# Patient Record
Sex: MALE | Race: WHITE | NOT HISPANIC OR LATINO | Employment: OTHER | URBAN - NONMETROPOLITAN AREA
[De-identification: names, ages, dates, MRNs, and addresses within clinical notes are randomized per-mention and may not be internally consistent; named-entity substitution may affect disease eponyms.]

---

## 2017-03-15 ENCOUNTER — OFFICE VISIT (OUTPATIENT)
Dept: CARDIOLOGY | Facility: CLINIC | Age: 62
End: 2017-03-15

## 2017-03-15 VITALS
SYSTOLIC BLOOD PRESSURE: 133 MMHG | WEIGHT: 215 LBS | HEIGHT: 66 IN | DIASTOLIC BLOOD PRESSURE: 72 MMHG | BODY MASS INDEX: 34.55 KG/M2 | HEART RATE: 61 BPM | OXYGEN SATURATION: 96 %

## 2017-03-15 DIAGNOSIS — R07.9 CHEST PAIN, UNSPECIFIED TYPE: ICD-10-CM

## 2017-03-15 DIAGNOSIS — E78.5 HYPERLIPIDEMIA, UNSPECIFIED HYPERLIPIDEMIA TYPE: ICD-10-CM

## 2017-03-15 DIAGNOSIS — I25.119 CORONARY ARTERY DISEASE INVOLVING NATIVE CORONARY ARTERY OF NATIVE HEART WITH ANGINA PECTORIS (HCC): Primary | ICD-10-CM

## 2017-03-15 DIAGNOSIS — R06.02 SHORTNESS OF BREATH: ICD-10-CM

## 2017-03-15 DIAGNOSIS — E11.9 TYPE 2 DIABETES MELLITUS WITHOUT COMPLICATION, WITHOUT LONG-TERM CURRENT USE OF INSULIN (HCC): ICD-10-CM

## 2017-03-15 DIAGNOSIS — I73.9 PVD (PERIPHERAL VASCULAR DISEASE) (HCC): ICD-10-CM

## 2017-03-15 DIAGNOSIS — R09.89 BILATERAL CAROTID BRUITS: ICD-10-CM

## 2017-03-15 DIAGNOSIS — I77.9 BILATERAL CAROTID ARTERY DISEASE (HCC): ICD-10-CM

## 2017-03-15 DIAGNOSIS — I10 ESSENTIAL HYPERTENSION: ICD-10-CM

## 2017-03-15 PROCEDURE — 99213 OFFICE O/P EST LOW 20 MIN: CPT | Performed by: NURSE PRACTITIONER

## 2017-03-15 RX ORDER — NITROGLYCERIN 0.4 MG/1
TABLET SUBLINGUAL
Qty: 30 TABLET | Refills: 3 | Status: SHIPPED | OUTPATIENT
Start: 2017-03-15

## 2017-03-15 NOTE — PATIENT INSTRUCTIONS
Shortness of Breath  Shortness of breath means you have trouble breathing. It could also mean that you have a medical problem. You should get immediate medical care for shortness of breath.  CAUSES   · Not enough oxygen in the air such as with high altitudes or a smoke-filled room.  · Certain lung diseases, infections, or problems.  · Heart disease or conditions, such as angina or heart failure.  · Low red blood cells (anemia).  · Poor physical fitness, which can cause shortness of breath when you exercise.  · Chest or back injuries or stiffness.  · Being overweight.  · Smoking.  · Anxiety, which can make you feel like you are not getting enough air.  DIAGNOSIS   Serious medical problems can often be found during your physical exam. Tests may also be done to determine why you are having shortness of breath. Tests may include:  · Chest X-rays.  · Lung function tests.  · Blood tests.  · An electrocardiogram (ECG).  · An ambulatory electrocardiogram. An ambulatory ECG records your heartbeat patterns over a 24-hour period.  · Exercise testing.  · A transthoracic echocardiogram (TTE). During echocardiography, sound waves are used to evaluate how blood flows through your heart.  · A transesophageal echocardiogram (JAMAAL).  · Imaging scans.  Your health care provider may not be able to find a cause for your shortness of breath after your exam. In this case, it is important to have a follow-up exam with your health care provider as directed.   TREATMENT   Treatment for shortness of breath depends on the cause of your symptoms and can vary greatly.  HOME CARE INSTRUCTIONS   · Do not smoke. Smoking is a common cause of shortness of breath. If you smoke, ask for help to quit.  · Avoid being around chemicals or things that may bother your breathing, such as paint fumes and dust.  · Rest as needed. Slowly resume your usual activities.  · If medicines were prescribed, take them as directed for the full length of time directed. This  includes oxygen and any inhaled medicines.  · Keep all follow-up appointments as directed by your health care provider.  SEEK MEDICAL CARE IF:   · Your condition does not improve in the time expected.  · You have a hard time doing your normal activities even with rest.  · You have any new symptoms.  SEEK IMMEDIATE MEDICAL CARE IF:   · Your shortness of breath gets worse.  · You feel light-headed, faint, or develop a cough not controlled with medicines.  · You start coughing up blood.  · You have pain with breathing.  · You have chest pain or pain in your arms, shoulders, or abdomen.  · You have a fever.  · You are unable to walk up stairs or exercise the way you normally do.  MAKE SURE YOU:  · Understand these instructions.  · Will watch your condition.  · Will get help right away if you are not doing well or get worse.     This information is not intended to replace advice given to you by your health care provider. Make sure you discuss any questions you have with your health care provider.     Document Released: 09/12/2002 Document Revised: 12/23/2014 Document Reviewed: 03/04/2013  [employer] Interactive Patient Education ©2016 [employer]  Nonspecific Chest Pain   Chest pain can be caused by many different conditions. There is always a chance that your pain could be related to something serious, such as a heart attack or a blood clot in your lungs. Chest pain can also be caused by conditions that are not life-threatening. If you have chest pain, it is very important to follow up with your health care provider.  CAUSES   Chest pain can be caused by:  · Heartburn.  · Pneumonia or bronchitis.  · Anxiety or stress.  · Inflammation around your heart (pericarditis) or lung (pleuritis or pleurisy).  · A blood clot in your lung.  · A collapsed lung (pneumothorax). It can develop suddenly on its own (spontaneous pneumothorax) or from trauma to the chest.  · Shingles infection (varicella-zoster virus).  · Heart  attack.  · Damage to the bones, muscles, and cartilage that make up your chest wall. This can include:    Bruised bones due to injury.    Strained muscles or cartilage due to frequent or repeated coughing or overwork.    Fracture to one or more ribs.    Sore cartilage due to inflammation (costochondritis).  RISK FACTORS   Risk factors for chest pain may include:  · Activities that increase your risk for trauma or injury to your chest.  · Respiratory infections or conditions that cause frequent coughing.  · Medical conditions or overeating that can cause heartburn.  · Heart disease or family history of heart disease.  · Conditions or health behaviors that increase your risk of developing a blood clot.  · Having had chicken pox (varicella zoster).  SIGNS AND SYMPTOMS  Chest pain can feel like:  · Burning or tingling on the surface of your chest or deep in your chest.  · Crushing, pressure, aching, or squeezing pain.  · Dull or sharp pain that is worse when you move, cough, or take a deep breath.  · Pain that is also felt in your back, neck, shoulder, or arm, or pain that spreads to any of these areas.  Your chest pain may come and go, or it may stay constant.  DIAGNOSIS  Lab tests or other studies may be needed to find the cause of your pain. Your health care provider may have you take a test called an ambulatory ECG (electrocardiogram). An ECG records your heartbeat patterns at the time the test is performed. You may also have other tests, such as:  · Transthoracic echocardiogram (TTE). During echocardiography, sound waves are used to create a picture of all of the heart structures and to look at how blood flows through your heart.  · Transesophageal echocardiogram (JAMAAL). This is a more advanced imaging test that obtains images from inside your body. It allows your health care provider to see your heart in finer detail.  · Cardiac monitoring. This allows your health care provider to monitor your heart rate and rhythm  in real time.  · Holter monitor. This is a portable device that records your heartbeat and can help to diagnose abnormal heartbeats. It allows your health care provider to track your heart activity for several days, if needed.  · Stress tests. These can be done through exercise or by taking medicine that makes your heart beat more quickly.  · Blood tests.  · Imaging tests.  TREATMENT   Your treatment depends on what is causing your chest pain. Treatment may include:  · Medicines. These may include:    Acid blockers for heartburn.    Anti-inflammatory medicine.    Pain medicine for inflammatory conditions.    Antibiotic medicine, if an infection is present.    Medicines to dissolve blood clots.    Medicines to treat coronary artery disease.  · Supportive care for conditions that do not require medicines. This may include:    Resting.    Applying heat or cold packs to injured areas.    Limiting activities until pain decreases.  HOME CARE INSTRUCTIONS  · If you were prescribed an antibiotic medicine, finish it all even if you start to feel better.  · Avoid any activities that bring on chest pain.  · Do not use any tobacco products, including cigarettes, chewing tobacco, or electronic cigarettes. If you need help quitting, ask your health care provider.  · Do not drink alcohol.  · Take medicines only as directed by your health care provider.  · Keep all follow-up visits as directed by your health care provider. This is important. This includes any further testing if your chest pain does not go away.  · If heartburn is the cause for your chest pain, you may be told to keep your head raised (elevated) while sleeping. This reduces the chance that acid will go from your stomach into your esophagus.  · Make lifestyle changes as directed by your health care provider. These may include:    Getting regular exercise. Ask your health care provider to suggest some activities that are safe for you.    Eating a heart-healthy diet. A  registered dietitian can help you to learn healthy eating options.    Maintaining a healthy weight.    Managing diabetes, if necessary.    Reducing stress.  SEEK MEDICAL CARE IF:  · Your chest pain does not go away after treatment.  · You have a rash with blisters on your chest.  · You have a fever.  SEEK IMMEDIATE MEDICAL CARE IF:   · Your chest pain is worse.  · You have an increasing cough, or you cough up blood.  · You have severe abdominal pain.  · You have severe weakness.  · You faint.  · You have chills.  · You have sudden, unexplained chest discomfort.  · You have sudden, unexplained discomfort in your arms, back, neck, or jaw.  · You have shortness of breath at any time.  · You suddenly start to sweat, or your skin gets clammy.  · You feel nauseous or you vomit.  · You suddenly feel light-headed or dizzy.  · Your heart begins to beat quickly, or it feels like it is skipping beats.  These symptoms may represent a serious problem that is an emergency. Do not wait to see if the symptoms will go away. Get medical help right away. Call your local emergency services (911 in the [place] Do not drive yourself to the hospital.     This information is not intended to replace advice given to you by your health care provider. Make sure you discuss any questions you have with your health care provider.     Document Released: 09/27/2006 Document Revised: 01/08/2016 Document Reviewed: 07/24/2015  [employer] Interactive Patient Education ©2016 [employer]  Coronary Artery Disease, Male  Coronary artery disease (CAD) is a process in which the blood vessels of the heart (coronary arteries) become narrow or blocked. The narrowing or blockage can lead to decreased blood flow to the heart muscle (angina). Prolonged reduced blood flow can cause a heart attack (myocardial infarction, MI). Because CAD is the leading cause of death in men, it is important to understand what causes this condition and how it is  treated.  CAUSES  Atherosclerosis is the cause of CAD. This is the buildup of fat and cholesterol (plaque) on the inside of the arteries. Over time, the plaque may narrow or block the artery, and this will lessen blood flow to the heart. Plaque can also become weak and break off within a coronary artery to form a clot and cause a sudden blockage.  RISK FACTORS  Many risk factors increase your chances of getting CAD, including:  · High cholesterol levels.  · High blood pressure (hypertension).  · Tobacco use.  · Diabetes.  · Age. Men over age 45 are at a greater risk of CAD.  · Gender. Men often develop CAD earlier in life than women.  · Family history of CAD.  · Obesity.  · Lack of exercise.  · A diet high in saturated fats.  SYMPTOMS   Many people do not experience any symptoms during the early stages of CAD. As the condition progresses, symptoms may include:   · Chest pain.  ¨ The pain can be described as a crushing or squeezing in the chest, or a tightness, pressure, fullness, or heaviness in the chest.  ¨ The pain can last more than a few minutes or can stop and recur.   · Pain in the arms, neck, jaw, or back.  · Unexplained heartburn or indigestion.  · Shortness of breath.  · Nausea.  · Sudden cold sweats.   Less common symptoms of CAD in men can include:  · Fatigue.  · Unexplained feelings of nervousness or anxiety.  · Weakness.  · Diarrhea.  · Sudden light-headedness.  DIAGNOSIS   Tests to diagnose CAD may include:  · ECG (electrocardiogram).  · Exercise stress test. This looks for signs of blockage when the heart is being exercised.  · Pharmacologic stress test. This test looks for signs of blockage when the heart is being stressed with a medicine.  · Blood tests.  · Coronary angiogram. This is a procedure to look at the coronary arteries to see if there is any blockage.  TREATMENT  The treatment of CAD may include the following:  · Healthy behavioral changes to reduce or control risk  factors.  · Medicine.  · Coronary stenting. A stent helps to keep an artery open.  · Coronary angioplasty. This procedure widens a narrowed or blocked artery.  · Coronary artery bypass surgery. This will allow your blood to pass the blockage (bypass) to reach your heart.  HOME CARE INSTRUCTIONS  · Take medicines only as directed by your health care provider.  · Do not take the following medicines unless your health care provider approves:    Nonsteroidal anti-inflammatory drugs (NSAIDs), such as ibuprofen, naproxen, or celecoxib.    Vitamin supplements that contain vitamin A, vitamin E, or both.  · Manage other health conditions such as hypertension and diabetes as directed by your health care provider.  · Follow a heart-healthy diet. A dietitian can help to educate you about healthy food options and changes.  · Use healthy cooking methods such as roasting, grilling, broiling, baking, poaching, steaming, or stir-frying. Talk to a dietitian to learn more about healthy cooking methods.  · Follow an exercise program approved by your health care provider.  · Maintain a healthy weight. Lose weight as approved by your health care provider.  · Plan rest periods when fatigued.  · Learn to manage stress.  · Do not use any tobacco products, including cigarettes, chewing tobacco, or electronic cigarettes. If you need help quitting, ask your health care provider.  · If you drink alcohol, and your health care provider approves, limit your alcohol intake to no more than 1 drink per day. One drink equals 12 ounces of beer, 5 ounces of wine, or 1½ ounces of hard liquor.  · Stop illegal drug use.  · Your health care provider may ask you to monitor your blood pressure. A blood pressure reading consists of a higher number over a lower number, such as 110 over 72, which is written as 110/72. Ideally, your blood pressure should be:    Below 140/90 if you have no other medical conditions.    Below 130/80 if you have diabetes or kidney  disease.  · Keep all follow-up visits as directed by your health care provider. This is important.  SEEK IMMEDIATE MEDICAL CARE IF:  · You have pain in your chest, neck, arm, jaw, stomach, or back that lasts more than a few minutes, is recurring, or is unrelieved by taking medicine under your tongue (sublingual nitroglycerin).  · You have profuse sweating without cause.  · You have unexplained:    Heartburn or indigestion.    Shortness of breath or difficulty breathing.    Nausea or vomiting.    Fatigue.    Feelings of nervousness or anxiety.    Weakness.    Diarrhea.  · You have sudden light-headedness or dizziness.  · You faint.  These symptoms may represent a serious problem that is an emergency. Do not wait to see if the symptoms will go away. Get medical help right away. Call your local emergency services (911 in the [place] Do not drive yourself to the hospital.     This information is not intended to replace advice given to you by your health care provider. Make sure you discuss any questions you have with your health care provider.     Document Released: 07/15/2015 Document Reviewed: 07/15/2015  "[employer] Interactive Patient Education ©2016 "[employer] Inc[employer]

## 2017-03-15 NOTE — PROGRESS NOTES
Subjective   Mike Gusman is a 61 y.o. male     Chief Complaint   Patient presents with   • Follow-up     presents as a follow up       HPI    Problem List:    1. CAD  1.1 Stress Test 9/1613 - normal SPECT imaging without inducible chest pain or ECG abnormalities.  Normal regional and global left ventricular systolic function.    2. Hypertension  3. Hyperlipidemia   4. Viral Pericarditis 2009  5. Aortic valve disorder  6. PVD   6.1 Carotid Artery U/S 7/29/13 - no sig stenosis or occlusion in either carotid system  6.2 JESSICA 3/7/14 - mild changes consistent with known PVD of BLE; probably diffuse   6.2 MRI BLE 4/7/14 - diffuse narrowing of the right common iliac and right external iliac artery; high grade focal stenosis of the left common iliac artery   6.3 H/O left common iliac artery stenting   7. Shortness of breath  7.1 Echo 8/29/13 - EF 60-65%: mild MR, trace TR; mild to mod aortic sclerosis without stenosis; trace AR; trace IN  8. H/O back surgery; per report   9. DM II  10. GERD  11. BPH   12. Brain Bleed 11/2011 s/p fall   13. ADRIANA - non compliant CPAP     Patient is a 61-year-old male who presents today for a follow-up with his wife at his side.  He says he has been having some issues with breathing when he tries to lay down at night.  He says he just cannot get any air.  He does not really have any other symptoms with it except maybe a little tightness, but not really.  He denies any palpitations or fluttering, dizziness, presyncope or syncope.  He does complain of orthopnea.  For about a year, but did not complain of this at his previous appt.  He has not been able to lay down or he will not be able to get his breath.  He says he has been congested lately, but it is not because of this. He says his normal shortness of breath has not changed.  They brought his recent labs.       Current Outpatient Prescriptions   Medication Sig Dispense Refill   • aspirin 81 MG tablet Take 1 tablet by mouth daily. 30 tablet  11   • atorvastatin (LIPITOR) 10 MG tablet Take 10 mg by mouth daily.     • donepezil (ARICEPT) 10 MG tablet Take 10 mg by mouth every night.     • finasteride (PROSCAR) 5 MG tablet Take 5 mg by mouth daily.     • gabapentin (NEURONTIN) 600 MG tablet Take 600 mg by mouth 3 (three) times a day.     • magnesium oxide (MAGOX) 400 (241.3 MG) MG tablet tablet Take 400 mg by mouth daily.     • memantine (NAMENDA) 5 MG tablet Take 5 mg by mouth 2 (two) times a day.     • mirtazapine (REMERON) 15 MG tablet Take 15 mg by mouth every night.     • nebivolol (BYSTOLIC) 5 MG tablet Take 5 mg by mouth daily.     • omeprazole (PriLOSEC) 40 MG capsule Take 40 mg by mouth daily.     • ramipril (ALTACE) 10 MG capsule Take 10 mg by mouth daily.     • SITagliptin-MetFORMIN HCl ER (JANUMET XR) 100-1000 MG tablet sustained-release 24 hour Take  by mouth.     • tamsulosin (FLOMAX) 0.4 MG capsule 24 hr capsule Take 1 capsule by mouth every night.     • nitroglycerin (NITROSTAT) 0.4 MG SL tablet 1 under the tongue as needed for angina, may repeat q5mins for up three doses 30 tablet 3     No current facility-administered medications for this visit.        ALLERGIES    Review of patient's allergies indicates no known allergies.    Past Medical History   Diagnosis Date   • GERD (gastroesophageal reflux disease)    • Hyperlipidemia    • Hypertension        Social History     Social History   • Marital status:      Spouse name: N/A   • Number of children: N/A   • Years of education: N/A     Occupational History   • Not on file.     Social History Main Topics   • Smoking status: Former Smoker   • Smokeless tobacco: Never Used   • Alcohol use No   • Drug use: No   • Sexual activity: Defer     Other Topics Concern   • Not on file     Social History Narrative       Family History   Problem Relation Age of Onset   • Heart failure Mother    • Heart disease Father    • Heart failure Father        Review of Systems   Constitutional: Positive for  "fatigue. Negative for diaphoresis.   HENT: Positive for congestion, postnasal drip, rhinorrhea and sneezing.    Eyes: Positive for visual disturbance (glasses as needed).   Respiratory: Positive for shortness of breath (no change; has to concentrate on his breathing; can't get any air when he lays down at night). Negative for chest tightness.    Cardiovascular: Positive for chest pain (gasp for air at night when he lays down; for about 1 yr). Negative for palpitations and leg swelling.   Gastrointestinal: Negative for nausea and vomiting.   Endocrine: Negative.    Genitourinary: Negative for difficulty urinating.   Musculoskeletal: Positive for arthralgias, back pain (low back ) and gait problem (uses cane). Negative for neck pain.   Skin: Negative.    Allergic/Immunologic: Positive for environmental allergies.   Neurological: Negative for dizziness, syncope and light-headedness.   Hematological: Bruises/bleeds easily (bleeds easily).   Psychiatric/Behavioral: The patient is nervous/anxious.        Objective   Visit Vitals   • /72 (BP Location: Left arm, Patient Position: Sitting)   • Pulse 61   • Ht 66\" (167.6 cm)   • Wt 215 lb (97.5 kg)   • SpO2 96%   • BMI 34.7 kg/m2     Lab Results (most recent)     None        Physical Exam   Constitutional: He is oriented to person, place, and time. Vital signs are normal. He appears well-developed and well-nourished. He is active and cooperative.   HENT:   Head: Normocephalic.   Eyes: Lids are normal.   Neck: Normal carotid pulses, no hepatojugular reflux and no JVD present. Carotid bruit is present (soft ).   Cardiovascular: Normal rate, regular rhythm and normal heart sounds.    Pulses:       Radial pulses are 2+ on the right side, and 2+ on the left side.        Dorsalis pedis pulses are 2+ on the right side, and 2+ on the left side.        Posterior tibial pulses are 2+ on the right side, and 2+ on the left side.   No edema BLE.    Pulmonary/Chest: Effort normal and " breath sounds normal.   Abdominal: Normal appearance.   Neurological: He is alert and oriented to person, place, and time.   Skin: Skin is warm, dry and intact.   Psychiatric: He has a normal mood and affect. His speech is normal and behavior is normal. Judgment and thought content normal. Cognition and memory are normal.       Procedure   Procedures         Assessment/Plan      Diagnosis Plan   1. Coronary artery disease involving native coronary artery of native heart with angina pectoris  Stress Test With Myocardial Perfusion One Day    Adult Transthoracic Echo Complete    nitroglycerin (NITROSTAT) 0.4 MG SL tablet    Stress Test With Myocardial Perfusion One Day    Adult Transthoracic Echo Complete   2. Essential hypertension  Stress Test With Myocardial Perfusion One Day    Adult Transthoracic Echo Complete    Stress Test With Myocardial Perfusion One Day    Adult Transthoracic Echo Complete   3. Hyperlipidemia, unspecified hyperlipidemia type  Stress Test With Myocardial Perfusion One Day    Adult Transthoracic Echo Complete    Stress Test With Myocardial Perfusion One Day    Adult Transthoracic Echo Complete   4. PVD (peripheral vascular disease)  Stress Test With Myocardial Perfusion One Day    Adult Transthoracic Echo Complete    Stress Test With Myocardial Perfusion One Day    Adult Transthoracic Echo Complete   5. Type 2 diabetes mellitus without complication, without long-term current use of insulin  Stress Test With Myocardial Perfusion One Day    Adult Transthoracic Echo Complete    Stress Test With Myocardial Perfusion One Day    Adult Transthoracic Echo Complete   6. Shortness of breath  Stress Test With Myocardial Perfusion One Day    Adult Transthoracic Echo Complete    D-dimer, Quantitative    proBNP    Stress Test With Myocardial Perfusion One Day    Adult Transthoracic Echo Complete    D-dimer, Quantitative    proBNP   7. Chest pain, unspecified type  Stress Test With Myocardial Perfusion One  Day    Adult Transthoracic Echo Complete    D-dimer, Quantitative    proBNP    nitroglycerin (NITROSTAT) 0.4 MG SL tablet    Stress Test With Myocardial Perfusion One Day    Adult Transthoracic Echo Complete    D-dimer, Quantitative    proBNP   8. Bilateral carotid artery disease  US Carotid Bilateral    US Carotid Bilateral   9. Bilateral carotid bruits  US Carotid Bilateral    US Carotid Bilateral       Return for After testing.    Patient will have an ischemia work-up, stress and echo.  He will get a carotid U/S.  He will get BNP and D-Dimer.  He will continue his medication regimen.  He will use nitro PRN for chest pain, no resolution he will go to the ER.  He will follow-up after testing or sooner if any changes.

## 2017-04-04 ENCOUNTER — HOSPITAL ENCOUNTER (OUTPATIENT)
Dept: CARDIOLOGY | Facility: HOSPITAL | Age: 62
Discharge: HOME | End: 2017-04-04

## 2017-04-04 ENCOUNTER — OUTSIDE FACILITY SERVICE (OUTPATIENT)
Dept: CARDIOLOGY | Facility: CLINIC | Age: 62
End: 2017-04-04

## 2017-04-04 LAB
MAXIMAL PREDICTED HEART RATE: 157 BPM
STRESS TARGET HR: 133 BPM

## 2017-04-04 PROCEDURE — A9500 TC99M SESTAMIBI: HCPCS | Performed by: INTERNAL MEDICINE

## 2017-04-04 PROCEDURE — 93880 EXTRACRANIAL BILAT STUDY: CPT

## 2017-04-04 PROCEDURE — 93306 TTE W/DOPPLER COMPLETE: CPT

## 2017-04-04 PROCEDURE — 0 TECHNETIUM SESTAMIBI: Performed by: INTERNAL MEDICINE

## 2017-04-04 PROCEDURE — 93018 CV STRESS TEST I&R ONLY: CPT | Performed by: INTERNAL MEDICINE

## 2017-04-04 PROCEDURE — 78452 HT MUSCLE IMAGE SPECT MULT: CPT

## 2017-04-04 PROCEDURE — 78452 HT MUSCLE IMAGE SPECT MULT: CPT | Performed by: INTERNAL MEDICINE

## 2017-04-04 PROCEDURE — 25010000002 REGADENOSON 0.4 MG/5ML SOLUTION: Performed by: INTERNAL MEDICINE

## 2017-04-04 PROCEDURE — 93306 TTE W/DOPPLER COMPLETE: CPT | Performed by: INTERNAL MEDICINE

## 2017-04-04 PROCEDURE — 93017 CV STRESS TEST TRACING ONLY: CPT

## 2017-04-04 RX ADMIN — Medication 1 DOSE: at 11:00

## 2017-04-04 RX ADMIN — REGADENOSON 0.4 MG: 0.08 INJECTION, SOLUTION INTRAVENOUS at 11:00

## 2017-04-10 ENCOUNTER — TELEPHONE (OUTPATIENT)
Dept: CARDIOLOGY | Facility: CLINIC | Age: 62
End: 2017-04-10

## 2017-04-19 ENCOUNTER — DOCUMENTATION (OUTPATIENT)
Dept: CARDIOLOGY | Facility: CLINIC | Age: 62
End: 2017-04-19

## 2017-05-03 ENCOUNTER — OFFICE VISIT (OUTPATIENT)
Dept: CARDIOLOGY | Facility: CLINIC | Age: 62
End: 2017-05-03

## 2017-05-03 VITALS
DIASTOLIC BLOOD PRESSURE: 63 MMHG | WEIGHT: 217.8 LBS | HEIGHT: 66 IN | SYSTOLIC BLOOD PRESSURE: 129 MMHG | OXYGEN SATURATION: 98 % | BODY MASS INDEX: 35 KG/M2 | HEART RATE: 78 BPM

## 2017-05-03 DIAGNOSIS — E78.5 HYPERLIPIDEMIA, UNSPECIFIED HYPERLIPIDEMIA TYPE: ICD-10-CM

## 2017-05-03 DIAGNOSIS — I51.89 DIASTOLIC DYSFUNCTION: ICD-10-CM

## 2017-05-03 DIAGNOSIS — J44.9 CHRONIC OBSTRUCTIVE PULMONARY DISEASE, UNSPECIFIED COPD TYPE (HCC): ICD-10-CM

## 2017-05-03 DIAGNOSIS — E11.9 TYPE 2 DIABETES MELLITUS WITHOUT COMPLICATION, WITHOUT LONG-TERM CURRENT USE OF INSULIN (HCC): ICD-10-CM

## 2017-05-03 DIAGNOSIS — I10 ESSENTIAL HYPERTENSION: ICD-10-CM

## 2017-05-03 DIAGNOSIS — G47.33 OSA (OBSTRUCTIVE SLEEP APNEA): ICD-10-CM

## 2017-05-03 DIAGNOSIS — R42 DIZZINESS: ICD-10-CM

## 2017-05-03 DIAGNOSIS — I73.9 PVD (PERIPHERAL VASCULAR DISEASE) (HCC): Primary | ICD-10-CM

## 2017-05-03 DIAGNOSIS — I25.10 CORONARY ARTERY DISEASE INVOLVING NATIVE CORONARY ARTERY OF NATIVE HEART WITHOUT ANGINA PECTORIS: ICD-10-CM

## 2017-05-03 PROCEDURE — 99214 OFFICE O/P EST MOD 30 MIN: CPT | Performed by: NURSE PRACTITIONER

## 2017-05-03 RX ORDER — NORTRIPTYLINE HYDROCHLORIDE 50 MG/1
50 CAPSULE ORAL NIGHTLY
COMMUNITY
Start: 2017-03-08

## 2017-05-03 RX ORDER — MIRTAZAPINE 30 MG/1
30 TABLET, FILM COATED ORAL NIGHTLY
COMMUNITY
Start: 2017-02-07 | End: 2020-11-03

## 2017-05-03 RX ORDER — PROMETHAZINE HYDROCHLORIDE 25 MG/1
25 TABLET ORAL EVERY 6 HOURS PRN
Refills: 0 | COMMUNITY
Start: 2017-04-17 | End: 2020-11-03

## 2017-05-03 RX ORDER — FUROSEMIDE 20 MG/1
TABLET ORAL
Qty: 30 TABLET | Refills: 11 | Status: SHIPPED | OUTPATIENT
Start: 2017-05-03 | End: 2018-05-23

## 2017-05-22 ENCOUNTER — OUTSIDE FACILITY SERVICE (OUTPATIENT)
Dept: CARDIOLOGY | Facility: CLINIC | Age: 62
End: 2017-05-22

## 2017-05-22 PROCEDURE — 93228 REMOTE 30 DAY ECG REV/REPORT: CPT | Performed by: INTERNAL MEDICINE

## 2017-06-15 ENCOUNTER — OFFICE VISIT (OUTPATIENT)
Dept: CARDIOLOGY | Facility: CLINIC | Age: 62
End: 2017-06-15

## 2017-06-15 VITALS
SYSTOLIC BLOOD PRESSURE: 137 MMHG | OXYGEN SATURATION: 96 % | DIASTOLIC BLOOD PRESSURE: 70 MMHG | WEIGHT: 211.6 LBS | HEIGHT: 66 IN | HEART RATE: 81 BPM | BODY MASS INDEX: 34.01 KG/M2

## 2017-06-15 DIAGNOSIS — G47.33 OSA (OBSTRUCTIVE SLEEP APNEA): ICD-10-CM

## 2017-06-15 DIAGNOSIS — I10 ESSENTIAL HYPERTENSION: ICD-10-CM

## 2017-06-15 DIAGNOSIS — I25.10 CORONARY ARTERY DISEASE INVOLVING NATIVE CORONARY ARTERY OF NATIVE HEART WITHOUT ANGINA PECTORIS: ICD-10-CM

## 2017-06-15 DIAGNOSIS — I73.9 PVD (PERIPHERAL VASCULAR DISEASE) (HCC): Primary | ICD-10-CM

## 2017-06-15 DIAGNOSIS — E78.5 HYPERLIPIDEMIA, UNSPECIFIED HYPERLIPIDEMIA TYPE: ICD-10-CM

## 2017-06-15 DIAGNOSIS — I47.1 PSVT (PAROXYSMAL SUPRAVENTRICULAR TACHYCARDIA) (HCC): ICD-10-CM

## 2017-06-15 PROCEDURE — 99214 OFFICE O/P EST MOD 30 MIN: CPT | Performed by: NURSE PRACTITIONER

## 2017-06-15 RX ORDER — METOPROLOL SUCCINATE 25 MG/1
25 TABLET, EXTENDED RELEASE ORAL DAILY
Qty: 30 TABLET | Refills: 5 | Status: SHIPPED | OUTPATIENT
Start: 2017-06-15 | End: 2017-10-10 | Stop reason: SDUPTHER

## 2017-06-15 NOTE — PROGRESS NOTES
Subjective   Mike Gusman is a 62 y.o. male     Chief Complaint   Patient presents with   • Follow-up     presents as a follow up       HPI    Problem List:    1. CAD  1.1 Stress Test 9/1613 - normal SPECT imaging without inducible chest pain or ECG abnormalities.  Normal regional and global left ventricular systolic function.    1.2 Stress Test 4/4/17 - no ischemia; low risk  2. Hypertension  3. Hyperlipidemia   4. Viral Pericarditis 2009  5. Aortic valve disorder  6. PVD   6.1 Carotid Artery U/S 7/29/13 - no sig stenosis or occlusion in either carotid system  6.2 Carotid Artery U/S 3/15/17 - 16-49% DEXTER and LICA; antegrade flow bilaterally   6.2 JESSICA 3/7/14 - mild changes consistent with known PVD of BLE; probably diffuse   6.2 MRI BLE 4/7/14 - diffuse narrowing of the right common iliac and right external iliac artery; high grade focal stenosis of the left common iliac artery   6.3 H/O left common iliac artery stenting   7. Shortness of breath  7.1 Echo 8/29/13 - EF 60-65%: mild MR, trace TR; mild to mod aortic sclerosis without stenosis; trace AR; trace OH  7.2 Echo 4/4/17 - mild LVH; EF 60-65%; DD II; mild MR  8. H/O back surgery; per report   9. DM II  10. GERD  11. BPH   12. Brain Bleed 11/2011 s/p fall   13. ADRIANA - non compliant CPAP   14. Emphysema/Asthma   15. PSVT  15.1 Event Monitor 5/9-5/22/17 - NSR - ST with one episode of PSVT    Patient is a 62-year-old male who presents today for a follow-up on test results.  He denies any chest pain, pressure, palpitations, fluttering, presyncope, syncope, orthopnea, PND or edema.  He will get short of breath with activity, but not right away, after he has been doing it for a little while.  He says he gets dizzy all of the time.  He goes and sees Dr. Cabral this month.  They are going to [place] in Aug with their grandchildren.     We went over event monitor.     Current Outpatient Prescriptions   Medication Sig Dispense Refill   • ADVAIR DISKUS 250-50 MCG/DOSE  DISKUS 1 puff Daily.     • aspirin 81 MG tablet Take 1 tablet by mouth daily. 30 tablet 11   • atorvastatin (LIPITOR) 10 MG tablet Take 10 mg by mouth daily.     • donepezil (ARICEPT) 10 MG tablet Take 10 mg by mouth every night.     • finasteride (PROSCAR) 5 MG tablet Take 5 mg by mouth daily.     • furosemide (LASIX) 20 MG tablet Take Thurs, Friday and Sat then as needed for 2 lb weight gain 30 tablet 11   • gabapentin (NEURONTIN) 600 MG tablet Take 600 mg by mouth 3 (three) times a day.     • magnesium oxide (MAGOX) 400 (241.3 MG) MG tablet tablet Take 400 mg by mouth daily.     • memantine (NAMENDA) 5 MG tablet Take 5 mg by mouth 2 (two) times a day.     • mirtazapine (REMERON) 30 MG tablet Every Night.     • nitroglycerin (NITROSTAT) 0.4 MG SL tablet 1 under the tongue as needed for angina, may repeat q5mins for up three doses 30 tablet 3   • nortriptyline (PAMELOR) 50 MG capsule Every Night.     • omeprazole (PriLOSEC) 40 MG capsule Take 40 mg by mouth daily.     • promethazine (PHENERGAN) 25 MG tablet prn  0   • ramipril (ALTACE) 10 MG capsule Take 10 mg by mouth daily.     • SITagliptin-MetFORMIN HCl ER (JANUMET XR) 100-1000 MG tablet sustained-release 24 hour Take  by mouth Every Night.     • tamsulosin (FLOMAX) 0.4 MG capsule 24 hr capsule Take 1 capsule by mouth every night.     • metoprolol succinate XL (TOPROL-XL) 25 MG 24 hr tablet Take 1 tablet by mouth Daily. 30 tablet 5     No current facility-administered medications for this visit.        ALLERGIES    Review of patient's allergies indicates no known allergies.    Past Medical History:   Diagnosis Date   • GERD (gastroesophageal reflux disease)    • Hyperlipidemia    • Hypertension        Social History     Social History   • Marital status:      Spouse name: N/A   • Number of children: N/A   • Years of education: N/A     Occupational History   • Not on file.     Social History Main Topics   • Smoking status: Former Smoker   • Smokeless  "tobacco: Never Used   • Alcohol use No   • Drug use: No   • Sexual activity: Defer     Other Topics Concern   • Not on file     Social History Narrative       Family History   Problem Relation Age of Onset   • Heart failure Mother    • Heart disease Father    • Heart failure Father        Review of Systems   Constitutional: Positive for fatigue. Negative for diaphoresis.   HENT: Positive for rhinorrhea and sneezing.    Eyes: Positive for visual disturbance (glasses PRN when drive ).   Respiratory: Positive for shortness of breath (any activity after a few minutes not right away, going to see Dr. Cabral). Negative for chest tightness.    Cardiovascular: Negative for chest pain, palpitations and leg swelling.   Gastrointestinal: Negative for nausea and vomiting.   Endocrine: Negative.    Genitourinary: Negative for difficulty urinating.   Musculoskeletal: Positive for arthralgias, back pain and myalgias.   Skin: Negative.    Allergic/Immunologic: Positive for environmental allergies.   Neurological: Positive for dizziness (anytime ). Negative for syncope and light-headedness.   Hematological: Bruises/bleeds easily.   Psychiatric/Behavioral: The patient is nervous/anxious.        Objective   /70 (BP Location: Left arm, Patient Position: Sitting)  Pulse 81  Ht 66\" (167.6 cm)  Wt 211 lb 9.6 oz (96 kg)  SpO2 96%  BMI 34.15 kg/m2  Lab Results (most recent)     None        Physical Exam   Constitutional: He is oriented to person, place, and time. Vital signs are normal. He appears well-developed and well-nourished. He is active and cooperative.   HENT:   Head: Normocephalic.   Eyes: Lids are normal.   Neck: Normal carotid pulses, no hepatojugular reflux and no JVD present. Carotid bruit is not present.   Cardiovascular: Normal rate, regular rhythm and normal heart sounds.    Pulses:       Radial pulses are 2+ on the right side, and 2+ on the left side.        Dorsalis pedis pulses are 2+ on the right side, and 2+ " on the left side.        Posterior tibial pulses are 2+ on the right side, and 2+ on the left side.   Trace edema BLE.    Pulmonary/Chest: Effort normal and breath sounds normal.   Abdominal: Normal appearance and bowel sounds are normal.   Musculoskeletal:   Uses a cane    Neurological: He is alert and oriented to person, place, and time.   Skin: Skin is warm, dry and intact.   Psychiatric: He has a normal mood and affect. His speech is normal and behavior is normal. Judgment and thought content normal. Cognition and memory are normal.       Procedure   Procedures         Assessment/Plan      Diagnosis Plan   1. PVD (peripheral vascular disease)     2. Hyperlipidemia, unspecified hyperlipidemia type     3. Essential hypertension     4. Coronary artery disease involving native coronary artery of native heart without angina pectoris     5. ADRIANA (obstructive sleep apnea)     6. PSVT (paroxysmal supraventricular tachycardia)  metoprolol succinate XL (TOPROL-XL) 25 MG 24 hr tablet       Return in about 2 months (around 8/15/2017).    PVD - doing well, no change.  Dyslipidemia - managed with lipitor. Hypertension, managed well.  CAD - ASA, Statin & Beta.  ADRIANA - noncompliant with CPAP.  PSVT - will stop Bystolic and start Metoprolol.  He will monitor his BP/HR.  He will follow-up in 2 mos or sooner if any changes.  He will continue his medication regimen otherwise.

## 2017-06-15 NOTE — PATIENT INSTRUCTIONS
Paroxysmal Supraventricular Tachycardia  Paroxysmal supraventricular tachycardia (PSVT) is a type of abnormal heart rhythm. It causes your heart to beat very quickly and then suddenly stop beating so quickly. A normal heart rate is  beats per minute. During an episode of PSVT, your heart rate may be 150-250 beats per minute. This can make you feel light-headed and short of breath. An episode of PSVT can be frightening. It is usually not dangerous.  The heart has four chambers. All chambers need to work together for the heart to beat effectively. A normal heartbeat usually starts in the right upper chamber of the heart (atrium) when an area (sinoatrial node) puts out an electrical signal that spreads to the other chambers. People with PSVT may have abnormal electrical pathways, or they may have other areas in the upper chambers that send out electrical signals. The result is a very rapid heartbeat.  When your heart beats very quickly, it does not have time to fill completely with blood. When PSVT happens often or it lasts for long periods, it can lead to heart weakness and failure. Most people with PSVT do not have any other heart disease.  CAUSES  Abnormal electrical activity in the heart causes PSVT. It is not known why some people get PSVT and others do not.  RISK FACTORS  You may be more likely to have PSVT if:  · You are 20-30 years old.  · You are a woman.  Other factors that may increase your chances of an attack include:  · Stress.  · Being tired.  · Smoking.  · Stimulant drugs.  · Alcoholic drinks.  · Caffeine.  · Pregnancy.  SIGNS AND SYMPTOMS  A mild episode of PSVT may cause no symptoms. If you do have signs and symptoms, they may include:  · A pounding heart.  · Feeling of skipped heartbeats (palpitations).  · Weakness.  · Shortness of breath.  · Tightness or pain in your chest.  · Light-headedness.  · Anxiety.  · Dizziness.  · Sweating.  · Nausea.  · A fainting spell.  DIAGNOSIS  Your health care  provider may suspect PSVT if you have symptoms that come and go. The health care provider will do a physical exam. If you are having an episode during the exam, the health care provider may be able to diagnose PSVT by listening to your heart and feeling your pulse. Tests may also be done, including:  · An electrical study of your heart (electrocardiogram, or ECG).  · A test in which you wear a portable ECG monitor all day (Holter monitor) or for several days (event monitor).  · A test that involves taking an image of your heart using sound waves (echocardiogram) to rule out other causes of a fast heart rate.  TREATMENT  You may not need treatment if episodes of PSVT do not happen often or if they do not cause symptoms. If PSVT episodes do cause symptoms, your health care provider may first suggest trying a self-treatment called vagus nerve stimulation. The vagus nerve extends down from the brain. It regulates certain body functions. Stimulating this nerve can slow down the heart. Your health care provider can teach you ways to do this. You may need to try a few ways to find what works best for you. Options include:  · Holding your breath and pushing, as though you are having a bowel movement.  · Massaging an area on one side of your neck below your jaw.  · Bending forward with your head between your legs.  · Bending forward with your head between your legs and coughing.  · Massaging your eyeballs with your eyes closed.  If vagus nerve stimulation does not work, other treatment options include:  · Medicines to prevent an attack.  · Being treated in the hospital with medicine or electric shock to stop an attack (cardioversion). This treatment can include:    Getting medicine through an IV line.    Having a small electric shock delivered to your heart. You will be given medicine to make you sleep through this procedure.  · If you have frequent episodes with symptoms, you may need a procedure to get rid of the faulty  areas of your heart (radiofrequency ablation) and end the episodes of PSVT. In this procedure:    A long, thin tube (catheter) is passed through one of your veins into your heart.    Energy directed through the catheter eliminates the areas of your heart that are causing abnormal electric stimulation.  HOME CARE INSTRUCTIONS  · Take medicines only as directed by your health care provider.  · Do not use caffeine in any form if caffeine triggers episodes of PSVT. Otherwise, consume caffeine in moderation. This means no more than a few cups of coffee or the equivalent each day.  · Do not drink alcohol if alcohol triggers episodes of PSVT. Otherwise, limit alcohol intake to no more than 1 drink per day for nonpregnant women and 2 drinks per day for men. One drink equals 12 ounces of beer, 5 ounces of wine, or 1½ ounces of hard liquor.  · Do not use any tobacco products, including cigarettes, chewing tobacco, or electronic cigarettes. If you need help quitting, ask your health care provider.  · Try to get at least 7 hours of sleep each night.  · Find healthy ways to manage stress.  · Perform vagus nerve stimulation as directed by your health care provider.  · Maintain a healthy weight.  · Get some exercise on most days. Ask your health care provider to suggest some good activities for you.  SEEK MEDICAL CARE IF:  · You are having episodes of PSVT more often, or they are lasting longer.  · Vagus nerve stimulation is no longer helping.  · You have new symptoms during an episode.  SEEK IMMEDIATE MEDICAL CARE IF:  · You have chest pain or trouble breathing.  · You have an episode of PSVT that has lasted longer than 20 minutes.  · You have passed out from an episode of PSVT.  These symptoms may represent a serious problem that is an emergency. Do not wait to see if the symptoms will go away. Get medical help right away. Call your local emergency services (911 in the [place] Do not drive yourself to the hospital.     This  information is not intended to replace advice given to you by your health care provider. Make sure you discuss any questions you have with your health care provider.     Document Released: 12/18/2006 Document Revised: 01/08/2016 Document Reviewed: 05/28/2015  [employer] Interactive Patient Education ©2017 [email or web address]

## 2017-08-16 ENCOUNTER — OFFICE VISIT (OUTPATIENT)
Dept: CARDIOLOGY | Facility: CLINIC | Age: 62
End: 2017-08-16

## 2017-08-16 VITALS
BODY MASS INDEX: 34.78 KG/M2 | WEIGHT: 216.4 LBS | HEIGHT: 66 IN | OXYGEN SATURATION: 94 % | HEART RATE: 77 BPM | DIASTOLIC BLOOD PRESSURE: 74 MMHG | SYSTOLIC BLOOD PRESSURE: 135 MMHG

## 2017-08-16 DIAGNOSIS — E78.5 HYPERLIPIDEMIA, UNSPECIFIED HYPERLIPIDEMIA TYPE: ICD-10-CM

## 2017-08-16 DIAGNOSIS — I10 ESSENTIAL HYPERTENSION: ICD-10-CM

## 2017-08-16 DIAGNOSIS — G47.33 OSA (OBSTRUCTIVE SLEEP APNEA): ICD-10-CM

## 2017-08-16 DIAGNOSIS — I25.10 CORONARY ARTERY DISEASE INVOLVING NATIVE CORONARY ARTERY OF NATIVE HEART WITHOUT ANGINA PECTORIS: Primary | ICD-10-CM

## 2017-08-16 PROCEDURE — 99214 OFFICE O/P EST MOD 30 MIN: CPT | Performed by: NURSE PRACTITIONER

## 2017-08-16 PROCEDURE — 93000 ELECTROCARDIOGRAM COMPLETE: CPT | Performed by: NURSE PRACTITIONER

## 2017-08-16 NOTE — PATIENT INSTRUCTIONS
Edema  Edema is an abnormal buildup of fluids in your body tissues. Edema is somewhat dependent on gravity to pull the fluid to the lowest place in your body. That makes the condition more common in the legs and thighs (lower extremities). Painless swelling of the feet and ankles is common and becomes more likely as you get older. It is also common in looser tissues, like around your eyes.   When the affected area is squeezed, the fluid may move out of that spot and leave a dent for a few moments. This dent is called pitting.   CAUSES   There are many possible causes of edema. Eating too much salt and being on your feet or sitting for a long time can cause edema in your legs and ankles. Hot weather may make edema worse. Common medical causes of edema include:  · Heart failure.  · Liver disease.  · Kidney disease.  · Weak blood vessels in your legs.  · Cancer.  · An injury.  · Pregnancy.  · Some medications.  · Obesity.   SYMPTOMS   Edema is usually painless. Your skin may look swollen or shiny.   DIAGNOSIS   Your health care provider may be able to diagnose edema by asking about your medical history and doing a physical exam. You may need to have tests such as X-rays, an electrocardiogram, or blood tests to check for medical conditions that may cause edema.   TREATMENT   Edema treatment depends on the cause. If you have heart, liver, or kidney disease, you need the treatment appropriate for these conditions. General treatment may include:  · Elevation of the affected body part above the level of your heart.  · Compression of the affected body part. Pressure from elastic bandages or support stockings squeezes the tissues and forces fluid back into the blood vessels. This keeps fluid from entering the tissues.  · Restriction of fluid and salt intake.  · Use of a water pill (diuretic). These medications are appropriate only for some types of edema. They pull fluid out of your body and make you urinate more often. This  gets rid of fluid and reduces swelling, but diuretics can have side effects. Only use diuretics as directed by your health care provider.  HOME CARE INSTRUCTIONS   · Keep the affected body part above the level of your heart when you are lying down.    · Do not sit still or stand for prolonged periods.    · Do not put anything directly under your knees when lying down.  · Do not wear constricting clothing or garters on your upper legs.    · Exercise your legs to work the fluid back into your blood vessels. This may help the swelling go down.    · Wear elastic bandages or support stockings to reduce ankle swelling as directed by your health care provider.    · Eat a low-salt diet to reduce fluid if your health care provider recommends it.    · Only take medicines as directed by your health care provider.   SEEK MEDICAL CARE IF:   · Your edema is not responding to treatment.  · You have heart, liver, or kidney disease and notice symptoms of edema.  · You have edema in your legs that does not improve after elevating them.    · You have sudden and unexplained weight gain.  SEEK IMMEDIATE MEDICAL CARE IF:   · You develop shortness of breath or chest pain.    · You cannot breathe when you lie down.  · You develop pain, redness, or warmth in the swollen areas.    · You have heart, liver, or kidney disease and suddenly get edema.  · You have a fever and your symptoms suddenly get worse.  MAKE SURE YOU:   · Understand these instructions.  · Will watch your condition.  · Will get help right away if you are not doing well or get worse.     This information is not intended to replace advice given to you by your health care provider. Make sure you discuss any questions you have with your health care provider.     Document Released: 12/18/2006 Document Revised: 04/10/2017 Document Reviewed: 10/10/2014  [employer] Interactive Patient Education ©2017 [employer]

## 2017-08-16 NOTE — PROGRESS NOTES
Subjective   Mike Gusman is a 62 y.o. male     Chief Complaint   Patient presents with   • Hypertension     presents as a follow up        HPI    Problem List:    1. CAD  1.1 Stress Test 9/1613 - normal SPECT imaging without inducible chest pain or ECG abnormalities.  Normal regional and global left ventricular systolic function.    1.2 Stress Test 4/4/17 - no ischemia; low risk  2. Hypertension  3. Hyperlipidemia   4. Viral Pericarditis 2009  5. Aortic valve disorder  6. PVD   6.1 Carotid Artery U/S 7/29/13 - no sig stenosis or occlusion in either carotid system  6.2 Carotid Artery U/S 3/15/17 - 16-49% DEXTER and LICA; antegrade flow bilaterally   6.2 JESSICA 3/7/14 - mild changes consistent with known PVD of BLE; probably diffuse   6.2 MRI BLE 4/7/14 - diffuse narrowing of the right common iliac and right external iliac artery; high grade focal stenosis of the left common iliac artery   6.3 H/O left common iliac artery stenting   7. Shortness of breath  7.1 Echo 8/29/13 - EF 60-65%: mild MR, trace TR; mild to mod aortic sclerosis without stenosis; trace AR; trace W[place]  7.2 Echo 4/4/17 - mild LVH; EF 60-65%; DD II; mild MR  8. H/O back surgery; per report   9. DM II  10. GERD  11. BPH   12. Brain Bleed 11/2011 s/p fall   13. ADRIANA - non compliant CPAP   14. Emphysema/Asthma   15. PSVT  15.1 Event Monitor 5/9-5/22/17 - NSR - ST with one episode of PSVT    Patient is a 62-year-old male who presents today for follow-up.  He denies any chest pain, pressure, palpitations, fluttering, dizziness, presyncope, syncope, orthopnea, PND or edema.  He is short of breath all the time this has not changed.  Dr. Cabral did start him on an inhaler which he just started today.  Patient just got back from [place] with his family and had a great time.    Current Outpatient Prescriptions   Medication Sig Dispense Refill   • ADVAIR DISKUS 250-50 MCG/DOSE DISKUS 1 puff Daily.     • aspirin 81 MG tablet Take 1 tablet by mouth daily. 30 tablet 11    • atorvastatin (LIPITOR) 10 MG tablet Take 10 mg by mouth daily.     • donepezil (ARICEPT) 10 MG tablet Take 10 mg by mouth every night.     • finasteride (PROSCAR) 5 MG tablet Take 5 mg by mouth daily.     • furosemide (LASIX) 20 MG tablet Take Thurs, Friday and Sat then as needed for 2 lb weight gain 30 tablet 11   • gabapentin (NEURONTIN) 600 MG tablet Take 600 mg by mouth 3 (three) times a day.     • magnesium oxide (MAGOX) 400 (241.3 MG) MG tablet tablet Take 400 mg by mouth daily.     • memantine (NAMENDA) 5 MG tablet Take 5 mg by mouth 2 (two) times a day.     • metoprolol succinate XL (TOPROL-XL) 25 MG 24 hr tablet Take 1 tablet by mouth Daily. 30 tablet 5   • mirtazapine (REMERON) 30 MG tablet Every Night.     • nitroglycerin (NITROSTAT) 0.4 MG SL tablet 1 under the tongue as needed for angina, may repeat q5mins for up three doses 30 tablet 3   • nortriptyline (PAMELOR) 50 MG capsule Every Night.     • omeprazole (PriLOSEC) 40 MG capsule Take 40 mg by mouth daily.     • promethazine (PHENERGAN) 25 MG tablet prn  0   • ramipril (ALTACE) 10 MG capsule Take 10 mg by mouth daily.     • SITagliptin-MetFORMIN HCl ER (JANUMET XR) 100-1000 MG tablet sustained-release 24 hour Take  by mouth Every Night.     • tamsulosin (FLOMAX) 0.4 MG capsule 24 hr capsule Take 1 capsule by mouth every night.       No current facility-administered medications for this visit.        ALLERGIES    Review of patient's allergies indicates no known allergies.    Past Medical History:   Diagnosis Date   • COPD (chronic obstructive pulmonary disease)    • GERD (gastroesophageal reflux disease)    • Hyperlipidemia    • Hypertension        Social History     Social History   • Marital status:      Spouse name: N/A   • Number of children: N/A   • Years of education: N/A     Occupational History   • Not on file.     Social History Main Topics   • Smoking status: Former Smoker   • Smokeless tobacco: Never Used   • Alcohol use No   •  "Drug use: No   • Sexual activity: Defer     Other Topics Concern   • Not on file     Social History Narrative       Family History   Problem Relation Age of Onset   • Heart failure Mother    • Heart disease Father    • Heart failure Father        Review of Systems   Constitutional: Positive for fatigue. Negative for diaphoresis.   HENT: Positive for postnasal drip.    Eyes: Positive for visual disturbance (glasses, wears when he drives at night ).   Respiratory: Positive for shortness of breath (all of the time ). Negative for chest tightness.    Cardiovascular: Negative for chest pain, palpitations and leg swelling.   Gastrointestinal: Negative for nausea and vomiting.   Endocrine: Negative.    Genitourinary: Negative for difficulty urinating.   Musculoskeletal: Positive for arthralgias, back pain and gait problem (uses a cane ). Negative for neck pain.   Skin: Negative.    Allergic/Immunologic: Positive for environmental allergies.   Neurological: Negative for dizziness, syncope and light-headedness.   Hematological: Bruises/bleeds easily (bruises and bleeds easily ).   Psychiatric/Behavioral: Negative.        Objective   /74 (BP Location: Left arm, Patient Position: Sitting)  Pulse 77  Ht 66\" (167.6 cm)  Wt 216 lb 6.4 oz (98.2 kg)  SpO2 94%  BMI 34.93 kg/m2  Lab Results (most recent)     None        Physical Exam   Constitutional: He is oriented to person, place, and time. Vital signs are normal. He appears well-developed and well-nourished. He is active and cooperative.   HENT:   Head: Normocephalic.   Eyes: Lids are normal.   Neck: Normal carotid pulses, no hepatojugular reflux and no JVD present. Carotid bruit is not present.   Cardiovascular: Normal rate, regular rhythm and normal heart sounds.    Pulses:       Radial pulses are 2+ on the right side, and 2+ on the left side.        Dorsalis pedis pulses are 2+ on the right side, and 2+ on the left side.        Posterior tibial pulses are 2+ on the " right side, and 2+ on the left side.   Trace edema - 1+ edema BLE.    Pulmonary/Chest: Effort normal and breath sounds normal.   Abdominal: Normal appearance and bowel sounds are normal.   Musculoskeletal:   Uses a cane    Neurological: He is alert and oriented to person, place, and time.   Skin: Skin is warm, dry and intact.   Psychiatric: He has a normal mood and affect. His speech is normal and behavior is normal. Judgment and thought content normal. Cognition and memory are normal.       Procedure     ECG 12 Lead  Date/Time: 8/16/2017 12:37 PM  Performed by: CAMILLE JOHNSON  Authorized by: CAMILLE JOHNSON   Comparison: compared with previous ECG from 9/8/2016  Similar to previous ECG  Rhythm: sinus rhythm  Rate: normal  BPM: 74  QRS axis: normal  Q waves: III  Clinical impression: non-specific ECG                 Assessment/Plan      Diagnosis Plan   1. Coronary artery disease involving native coronary artery of native heart without angina pectoris     2. Hyperlipidemia, unspecified hyperlipidemia type     3. Essential hypertension     4. ADRIANA (obstructive sleep apnea)         Return in about 6 months (around 2/16/2018).         CAD-patient is on aspirin, statin and beta.  Hyperlipidemia-patient is on Lipitor monitor by PCP.  Hypertension-doing well.  ADRIANA-patient uses CPAP at noncompliant.  He will continue his medication regimen.  He will follow-up in 6 months or sooner if any changes.

## 2017-10-10 ENCOUNTER — TELEPHONE (OUTPATIENT)
Dept: CARDIOLOGY | Facility: CLINIC | Age: 62
End: 2017-10-10

## 2017-10-10 DIAGNOSIS — I47.1 PSVT (PAROXYSMAL SUPRAVENTRICULAR TACHYCARDIA) (HCC): ICD-10-CM

## 2017-10-10 RX ORDER — METOPROLOL SUCCINATE 25 MG/1
25 TABLET, EXTENDED RELEASE ORAL DAILY
Qty: 90 TABLET | Refills: 3 | Status: SHIPPED | OUTPATIENT
Start: 2017-10-10 | End: 2018-10-31 | Stop reason: SDUPTHER

## 2017-10-10 NOTE — TELEPHONE ENCOUNTER
----- Message from Debi Pineda sent at 10/10/2017 11:13 AM EDT -----  Contact: PATIENT  THE PATIENT STOPPED AND REQUESTED A 90 DAY SUPPLY OF HIS METOPROLOL 25MG.  HE USES [facility].  IF THERE ARE ANY ISSUES HE CAN BE REACHED [phone].  THANKS.

## 2018-02-14 ENCOUNTER — OFFICE VISIT (OUTPATIENT)
Dept: CARDIOLOGY | Facility: CLINIC | Age: 63
End: 2018-02-14

## 2018-02-14 VITALS
SYSTOLIC BLOOD PRESSURE: 131 MMHG | HEART RATE: 89 BPM | HEIGHT: 66 IN | BODY MASS INDEX: 35.65 KG/M2 | OXYGEN SATURATION: 96 % | WEIGHT: 221.8 LBS | DIASTOLIC BLOOD PRESSURE: 71 MMHG

## 2018-02-14 DIAGNOSIS — IMO0001 CLASS 2 OBESITY WITH SERIOUS COMORBIDITY AND BODY MASS INDEX (BMI) OF 35.0 TO 35.9 IN ADULT, UNSPECIFIED OBESITY TYPE: Primary | ICD-10-CM

## 2018-02-14 DIAGNOSIS — G47.33 OSA (OBSTRUCTIVE SLEEP APNEA): ICD-10-CM

## 2018-02-14 DIAGNOSIS — I25.10 CORONARY ARTERY DISEASE INVOLVING NATIVE CORONARY ARTERY OF NATIVE HEART WITHOUT ANGINA PECTORIS: ICD-10-CM

## 2018-02-14 DIAGNOSIS — I10 ESSENTIAL HYPERTENSION: ICD-10-CM

## 2018-02-14 DIAGNOSIS — E78.5 HYPERLIPIDEMIA, UNSPECIFIED HYPERLIPIDEMIA TYPE: ICD-10-CM

## 2018-02-14 PROCEDURE — 99213 OFFICE O/P EST LOW 20 MIN: CPT | Performed by: NURSE PRACTITIONER

## 2018-02-14 NOTE — PROGRESS NOTES
Subjective   Mike Gusman is a 62 y.o. male     Chief Complaint   Patient presents with   • Coronary Artery Disease     presents as a follow up       HPI    Problem List:    1. CAD  1.1 Stress Test 9/1613 - normal SPECT imaging without inducible chest pain or ECG abnormalities.  Normal regional and global left ventricular systolic function.    1.2 Stress Test 4/4/17 - no ischemia; low risk  2. Hypertension  3. Hyperlipidemia   4. Viral Pericarditis 2009  5. Aortic valve disorder  6. PVD   6.1 Carotid Artery U/S 7/29/13 - no sig stenosis or occlusion in either carotid system  6.2 Carotid Artery U/S 3/15/17 - 16-49% DEXTER and LICA; antegrade flow bilaterally   6.2 JESSICA 3/7/14 - mild changes consistent with known PVD of BLE; probably diffuse   6.2 MRI BLE 4/7/14 - diffuse narrowing of the right common iliac and right external iliac artery; high grade focal stenosis of the left common iliac artery   6.3 H/O left common iliac artery stenting   7. Shortness of breath  7.1 Echo 8/29/13 - EF 60-65%: mild MR, trace TR; mild to mod aortic sclerosis without stenosis; trace AR; trace [place]  7.2 Echo 4/4/17 - mild LVH; EF 60-65%; DD II; mild MR  8. H/O back surgery; per report   9. DM II  10. GERD  11. BPH   12. Brain Bleed 11/2011 s/p fall   13. ADRIANA - non compliant CPAP   14. Emphysema/Asthma   15. PSVT  15.1 Event Monitor 5/9-5/22/17 - NSR - ST with one episode of PSVT    Patient is a 62-year-old male who presents today for follow-up with his wife at his side.  He denies any chest pain, pressure, palpitations, fluttering, dizziness, presyncope, syncope, orthopnea, PND or edema.  He says he does get short of breath with any exertion and he relates this to his back.  He has a history of 3 back surgeries and is currently getting nerve blocks.  PCP monitors his cholesterol.    Current Outpatient Prescriptions   Medication Sig Dispense Refill   • ADVAIR DISKUS 250-50 MCG/DOSE DISKUS 1 puff Daily.     • aspirin 81 MG tablet Take 1  tablet by mouth daily. 30 tablet 11   • atorvastatin (LIPITOR) 10 MG tablet Take 10 mg by mouth daily.     • donepezil (ARICEPT) 10 MG tablet Take 10 mg by mouth every night.     • finasteride (PROSCAR) 5 MG tablet Take 5 mg by mouth daily.     • furosemide (LASIX) 20 MG tablet Take Thurs, Friday and Sat then as needed for 2 lb weight gain 30 tablet 11   • gabapentin (NEURONTIN) 600 MG tablet Take 600 mg by mouth 3 (three) times a day.     • magnesium oxide (MAGOX) 400 (241.3 MG) MG tablet tablet Take 400 mg by mouth daily.     • memantine (NAMENDA) 5 MG tablet Take 5 mg by mouth 2 (two) times a day.     • metoprolol succinate XL (TOPROL-XL) 25 MG 24 hr tablet Take 1 tablet by mouth Daily. 90 tablet 3   • mirtazapine (REMERON) 30 MG tablet Every Night.     • nitroglycerin (NITROSTAT) 0.4 MG SL tablet 1 under the tongue as needed for angina, may repeat q5mins for up three doses 30 tablet 3   • nortriptyline (PAMELOR) 50 MG capsule Every Night.     • omeprazole (PriLOSEC) 40 MG capsule Take 40 mg by mouth daily.     • promethazine (PHENERGAN) 25 MG tablet prn  0   • ramipril (ALTACE) 10 MG capsule Take 10 mg by mouth daily.     • SITagliptin-MetFORMIN HCl ER (JANUMET XR) 100-1000 MG tablet sustained-release 24 hour Take  by mouth Every Night.     • tamsulosin (FLOMAX) 0.4 MG capsule 24 hr capsule Take 1 capsule by mouth every night.       No current facility-administered medications for this visit.        ALLERGIES    Review of patient's allergies indicates no known allergies.    Past Medical History:   Diagnosis Date   • COPD (chronic obstructive pulmonary disease)    • GERD (gastroesophageal reflux disease)    • Hyperlipidemia    • Hypertension        Social History     Social History   • Marital status:      Spouse name: N/A   • Number of children: N/A   • Years of education: N/A     Occupational History   • Not on file.     Social History Main Topics   • Smoking status: Former Smoker   • Smokeless tobacco:  "Never Used   • Alcohol use No   • Drug use: No   • Sexual activity: Defer     Other Topics Concern   • Not on file     Social History Narrative       Family History   Problem Relation Age of Onset   • Heart failure Mother    • Heart disease Father    • Heart failure Father        Review of Systems   Constitutional: Positive for fatigue. Negative for diaphoresis.   HENT: Positive for postnasal drip and rhinorrhea. Negative for congestion, sinus pressure and sneezing.    Eyes: Negative for visual disturbance.   Respiratory: Positive for shortness of breath (with any exertion ). Negative for chest tightness.    Cardiovascular: Negative for chest pain, palpitations and leg swelling.   Gastrointestinal: Negative for constipation, diarrhea, nausea and vomiting.   Endocrine: Negative.    Genitourinary: Negative for difficulty urinating.   Musculoskeletal: Positive for back pain (H/O low back surgery x 3 and getting nerve block injections ) and gait problem (uses a cane ). Negative for arthralgias, myalgias and neck pain.   Skin: Negative.    Allergic/Immunologic: Positive for environmental allergies.   Neurological: Negative for dizziness, syncope, light-headedness and headaches.   Hematological: Does not bruise/bleed easily.   Psychiatric/Behavioral: The patient is not nervous/anxious.        Objective   /71 (BP Location: Left arm, Patient Position: Sitting)  Pulse 89  Ht 167.6 cm (66\")  Wt 101 kg (221 lb 12.8 oz)  SpO2 96%  BMI 35.8 kg/m2  Vitals:    02/14/18 1044   BP: 131/71   BP Location: Left arm   Patient Position: Sitting   Pulse: 89   SpO2: 96%   Weight: 101 kg (221 lb 12.8 oz)   Height: 167.6 cm (66\")      Lab Results (most recent)     None        Physical Exam   Constitutional: He is oriented to person, place, and time. Vital signs are normal. He appears well-developed and well-nourished. He is active and cooperative.   HENT:   Head: Normocephalic.   Eyes: Lids are normal.   Neck: Normal carotid " pulses, no hepatojugular reflux and no JVD present. Carotid bruit is not present.   Cardiovascular: Normal rate, regular rhythm and normal heart sounds.    Pulses:       Radial pulses are 2+ on the right side, and 2+ on the left side.        Dorsalis pedis pulses are 2+ on the right side, and 2+ on the left side.        Posterior tibial pulses are 2+ on the right side, and 2+ on the left side.   Trace edema RLE; no edema LLE.    Pulmonary/Chest: Effort normal and breath sounds normal.   Abdominal: Normal appearance and bowel sounds are normal.   Musculoskeletal:   Uses a cane    Neurological: He is alert and oriented to person, place, and time.   Skin: Skin is warm, dry and intact.   Psychiatric: He has a normal mood and affect. His speech is normal and behavior is normal. Judgment and thought content normal. Cognition and memory are normal.       Procedure   Procedures         Assessment/Plan      Diagnosis Plan   1. Class 2 obesity with serious comorbidity and body mass index (BMI) of 35.0 to 35.9 in adult, unspecified obesity type     2. Coronary artery disease involving native coronary artery of native heart without angina pectoris     3. Essential hypertension     4. Hyperlipidemia, unspecified hyperlipidemia type     5. ADRIANA (obstructive sleep apnea)         Return in about 3 months (around 5/14/2018).         Obesity-patient provide education material.  CAD-patient is on aspirin, beta and statin.  Hypertension-patient doing well.  Hyperlipidemia-patient is on Lipitor monitor by PCP.  ADRIANA-patient is compliant with CPAP.  He will continue his medication regimen.  Follow-up in 3 months or sooner if any changes.    Electronically signed by:

## 2018-02-14 NOTE — PATIENT INSTRUCTIONS
Exercising to Lose Weight  Exercising can help you to lose weight. In order to lose weight through exercise, you need to do vigorous-intensity exercise. You can tell that you are exercising with vigorous intensity if you are breathing very hard and fast and cannot hold a conversation while exercising.  Moderate-intensity exercise helps to maintain your current weight. You can tell that you are exercising at a moderate level if you have a higher heart rate and faster breathing, but you are still able to hold a conversation.  How often should I exercise?  Choose an activity that you enjoy and set realistic goals. Your health care provider can help you to make an activity plan that works for you. Exercise regularly as directed by your health care provider. This may include:  · Doing resistance training twice each week, such as:  ¨ Push-ups.  ¨ Sit-ups.  ¨ Lifting weights.  ¨ Using resistance bands.  · Doing a given intensity of exercise for a given amount of time. Choose from these options:  ¨ 150 minutes of moderate-intensity exercise every week.  ¨ 75 minutes of vigorous-intensity exercise every week.  ¨ A mix of moderate-intensity and vigorous-intensity exercise every week.  Children, pregnant women, people who are out of shape, people who are overweight, and older adults may need to consult a health care provider for individual recommendations. If you have any sort of medical condition, be sure to consult your health care provider before starting a new exercise program.  What are some activities that can help me to lose weight?  · Walking at a rate of at least 4.5 miles an hour.  · Jogging or running at a rate of 5 miles per hour.  · Biking at a rate of at least 10 miles per hour.  · Lap swimming.  · Roller-skating or in-line skating.  · Cross-country skiing.  · Vigorous competitive sports, such as football, basketball, and soccer.  · Jumping rope.  · Aerobic dancing.  How can I be more active in my day-to-day  activities?  · Use the stairs instead of the elevator.  · Take a walk during your lunch break.  · If you drive, park your car farther away from work or school.  · If you take public transportation, get off one stop early and walk the rest of the way.  · Make all of your phone calls while standing up and walking around.  · Get up, stretch, and walk around every 30 minutes throughout the day.  What guidelines should I follow while exercising?  · Do not exercise so much that you hurt yourself, feel dizzy, or get very short of breath.  · Consult your health care provider prior to starting a new exercise program.  · Wear comfortable clothes and shoes with good support.  · Drink plenty of water while you exercise to prevent dehydration or heat stroke. Body water is lost during exercise and must be replaced.  · Work out until you breathe faster and your heart beats faster.  This information is not intended to replace advice given to you by your health care provider. Make sure you discuss any questions you have with your health care provider.  Document Released: 01/20/2012 Document Revised: 05/25/2017 Document Reviewed: 05/21/2015  [employer] Interactive Patient Education © 2017 [employer]      MyPlate from [employer]  The general, healthful diet is based on the 2010 Dietary Guidelines for Americans. The amount of food you need to eat from each food group depends on your age, sex, and level of physical activity and can be individualized by a dietitian. Go to ChooseMyPlate.gov for more information.  What do I need to know about the MyPlate plan?  · Enjoy your food, but eat less.  · Avoid oversized portions.  ¨ ½ of your plate should include fruits and vegetables.  ¨ ¼ of your plate should be grains.  ¨ ¼ of your plate should be protein.  Grains   · Make at least half of your grains whole grains.  · For a 2,000 calorie daily food plan, eat 6 oz every day.  · 1 oz is about 1 slice bread, 1 cup cereal, or ½ cup cooked rice, cereal,  or pasta.  Vegetables   · Make half your plate fruits and vegetables.  · For a 2,000 calorie daily food plan, eat 2½ cups every day.  · 1 cup is about 1 cup raw or cooked vegetables or vegetable juice or 2 cups raw leafy greens.  Fruits   · Make half your plate fruits and vegetables.  · For a 2,000 calorie daily food plan, eat 2 cups every day.  · 1 cup is about 1 cup fruit or 100% fruit juice or ½ cup dried fruit.  Protein   · For a 2,000 calorie daily food plan, eat 5½ oz every day.  · 1 oz is about 1 oz meat, poultry, or fish, ¼ cup cooked beans, 1 egg, 1 Tbsp peanut butter, or ½ oz nuts or seeds.  Dairy   · Switch to fat-free or low-fat (1%) milk.  · For a 2,000 calorie daily food plan, eat 3 cups every day.  · 1 cup is about 1 cup milk or yogurt or soy milk (soy beverage), 1½ oz natural cheese, or 2 oz processed cheese.  Fats, Oils, and Empty Calories   · Only small amounts of oils are recommended.  · Empty calories are calories from solid fats or added sugars.  · Compare sodium in foods like soup, bread, and frozen meals. Choose the foods with lower numbers.  · Drink water instead of sugary drinks.  What foods can I eat?  Grains   Whole grains such as whole wheat, quinoa, millet, and bulgur. Bread, rolls, and pasta made from whole grains. Brown or wild rice. Hot or cold cereals made from whole grains and without added sugar.  Vegetables   All fresh vegetables, especially fresh red, dark green, or orange vegetables. Peas and beans. Low-sodium frozen or canned vegetables prepared without added salt. Low-sodium vegetable juices.  Fruits   All fresh, frozen, and dried fruits. Canned fruit packed in water or fruit juice without added sugar. Fruit juices without added sugar.  Meats and Other Protein Sources   Boiled, baked, or grilled lean meat trimmed of fat. Skinless poultry. Fresh seafood and shellfish. Canned seafood packed in water. Unsalted nuts and unsalted nut butters. Tofu. Dried beans and pea. Eggs.  Dairy    Low-fat or fat-free milk, yogurt, and cheeses.  Sweets and Desserts   Frozen desserts made from low-fat milk.  Fats and Oils   Olive, peanut, and canola oils and margarine. Salad dressing and mayonnaise made from these oils.  Other   Soups and casseroles made from allowed ingredients and without added fat or salt.  The items listed above may not be a complete list of recommended foods or beverages. Contact your dietitian for more options.   What foods are not recommended?  Grains   Sweetened, low-fiber cereals. Packaged baked goods. Snack crackers and chips. Cheese crackers, butter crackers, and biscuits. Frozen waffles, sweet breads, doughnuts, pastries, packaged baking mixes, pancakes, cakes, and cookies.  Vegetables   Regular canned or frozen vegetables or vegetables prepared with salt. Canned tomatoes. Canned tomato sauce. Fried vegetables. Vegetables in cream sauce or cheese sauce.  Fruits   Fruits packed in syrup or made with added sugar.  Meats and Other Protein Sources   Marbled or fatty meats such as ribs. Poultry with skin. Fried meats, poultry, eggs, or fish. Sausages, hot dogs, and deli meats such as pastrami, bologna, or salami.  Dairy   Whole milk, cream, cheeses made from whole milk, sour cream. Ice cream or yogurt made from whole milk or with added sugar.  Beverages   For adults, no more than one alcoholic drink per day. Regular soft drinks or other sugary beverages. Juice drinks.  Sweets and Desserts   Sugary or fatty desserts, candy, and other sweets.  Fats and Oils   Solid shortening or partially hydrogenated oils. Solid margarine. Margarine that contains trans fats. Butter.  The items listed above may not be a complete list of foods and beverages to avoid. Contact your dietitian for more information.   This information is not intended to replace advice given to you by your health care provider. Make sure you discuss any questions you have with your health care provider.  Document Released:  01/06/2009 Document Revised: 05/25/2017 Document Reviewed: 11/26/2014  [employer] Interactive Patient Education © 2017 [employer]    Edema  Edema is an abnormal buildup of fluids in your body tissues. Edema is somewhat dependent on gravity to pull the fluid to the lowest place in your body. That makes the condition more common in the legs and thighs (lower extremities). Painless swelling of the feet and ankles is common and becomes more likely as you get older. It is also common in looser tissues, like around your eyes.  When the affected area is squeezed, the fluid may move out of that spot and leave a dent for a few moments. This dent is called pitting.  What are the causes?  There are many possible causes of edema. Eating too much salt and being on your feet or sitting for a long time can cause edema in your legs and ankles. Hot weather may make edema worse. Common medical causes of edema include:  · Heart failure.  · Liver disease.  · Kidney disease.  · Weak blood vessels in your legs.  · Cancer.  · An injury.  · Pregnancy.  · Some medications.  · Obesity.  What are the signs or symptoms?  Edema is usually painless. Your skin may look swollen or shiny.  How is this diagnosed?  Your health care provider may be able to diagnose edema by asking about your medical history and doing a physical exam. You may need to have tests such as X-rays, an electrocardiogram, or blood tests to check for medical conditions that may cause edema.  How is this treated?  Edema treatment depends on the cause. If you have heart, liver, or kidney disease, you need the treatment appropriate for these conditions. General treatment may include:  · Elevation of the affected body part above the level of your heart.  · Compression of the affected body part. Pressure from elastic bandages or support stockings squeezes the tissues and forces fluid back into the blood vessels. This keeps fluid from entering the tissues.  · Restriction of fluid  and salt intake.  · Use of a water pill (diuretic). These medications are appropriate only for some types of edema. They pull fluid out of your body and make you urinate more often. This gets rid of fluid and reduces swelling, but diuretics can have side effects. Only use diuretics as directed by your health care provider.  Follow these instructions at home:  · Keep the affected body part above the level of your heart when you are lying down.  · Do not sit still or stand for prolonged periods.  · Do not put anything directly under your knees when lying down.  · Do not wear constricting clothing or garters on your upper legs.  · Exercise your legs to work the fluid back into your blood vessels. This may help the swelling go down.  · Wear elastic bandages or support stockings to reduce ankle swelling as directed by your health care provider.  · Eat a low-salt diet to reduce fluid if your health care provider recommends it.  · Only take medicines as directed by your health care provider.  Contact a health care provider if:  · Your edema is not responding to treatment.  · You have heart, liver, or kidney disease and notice symptoms of edema.  · You have edema in your legs that does not improve after elevating them.  · You have sudden and unexplained weight gain.  Get help right away if:  · You develop shortness of breath or chest pain.  · You cannot breathe when you lie down.  · You develop pain, redness, or warmth in the swollen areas.  · You have heart, liver, or kidney disease and suddenly get edema.  · You have a fever and your symptoms suddenly get worse.  This information is not intended to replace advice given to you by your health care provider. Make sure you discuss any questions you have with your health care provider.  Document Released: 12/18/2006 Document Revised: 05/25/2017 Document Reviewed: 10/10/2014  [employer] Interactive Patient Education © 2017 [employer]

## 2018-05-23 ENCOUNTER — OFFICE VISIT (OUTPATIENT)
Dept: CARDIOLOGY | Facility: CLINIC | Age: 63
End: 2018-05-23

## 2018-05-23 VITALS
SYSTOLIC BLOOD PRESSURE: 170 MMHG | DIASTOLIC BLOOD PRESSURE: 73 MMHG | HEIGHT: 66 IN | OXYGEN SATURATION: 97 % | WEIGHT: 216.8 LBS | BODY MASS INDEX: 34.84 KG/M2 | HEART RATE: 87 BPM

## 2018-05-23 DIAGNOSIS — I73.9 PVD (PERIPHERAL VASCULAR DISEASE) (HCC): ICD-10-CM

## 2018-05-23 DIAGNOSIS — R06.02 SHORTNESS OF BREATH: ICD-10-CM

## 2018-05-23 DIAGNOSIS — I25.10 CORONARY ARTERY DISEASE INVOLVING NATIVE CORONARY ARTERY OF NATIVE HEART WITHOUT ANGINA PECTORIS: Primary | ICD-10-CM

## 2018-05-23 DIAGNOSIS — I10 ESSENTIAL HYPERTENSION: ICD-10-CM

## 2018-05-23 DIAGNOSIS — G47.33 OSA (OBSTRUCTIVE SLEEP APNEA): ICD-10-CM

## 2018-05-23 DIAGNOSIS — E78.5 HYPERLIPIDEMIA, UNSPECIFIED HYPERLIPIDEMIA TYPE: ICD-10-CM

## 2018-05-23 PROCEDURE — 99213 OFFICE O/P EST LOW 20 MIN: CPT | Performed by: NURSE PRACTITIONER

## 2018-05-23 RX ORDER — ATORVASTATIN CALCIUM 40 MG/1
40 TABLET, FILM COATED ORAL NIGHTLY
COMMUNITY
End: 2019-11-13

## 2018-05-23 RX ORDER — GLIPIZIDE 5 MG/1
5 TABLET ORAL
COMMUNITY
End: 2020-11-03

## 2018-05-23 NOTE — PROGRESS NOTES
Subjective   Mike Gusman is a 63 y.o. male     Chief Complaint   Patient presents with   • Hypertension     presents as a follow up   • Hyperlipidemia   • Shortness of Breath   • Coronary Artery Disease       HPI    Problem List:    1. CAD  1.1 Stress Test 9/1613 - normal SPECT imaging without inducible chest pain or ECG abnormalities.  Normal regional and global left ventricular systolic function.    1.2 Stress Test 4/4/17 - no ischemia; low risk  2. Hypertension  3. Hyperlipidemia   4. Viral Pericarditis 2009  5. Aortic valve disorder  6. PVD   6.1 Carotid Artery U/S 7/29/13 - no sig stenosis or occlusion in either carotid system  6.2 Carotid Artery U/S 3/15/17 - 16-49% DEXTER and LICA; antegrade flow bilaterally   6.2 JESSICA 3/7/14 - mild changes consistent with known PVD of BLE; probably diffuse   6.2 MRI BLE 4/7/14 - diffuse narrowing of the right common iliac and right external iliac artery; high grade focal stenosis of the left common iliac artery   6.3 H/O left common iliac artery stenting   7. Shortness of breath  7.1 Echo 8/29/13 - EF 60-65%: mild MR, trace TR; mild to mod aortic sclerosis without stenosis; trace AR; trace MT  7.2 Echo 4/4/17 - mild LVH; EF 60-65%; DD II; mild MR  8. H/O back surgery; per report   9. DM II  10. GERD  11. BPH   12. Brain Bleed 11/2011 s/p fall   13. ADRIANA - non compliant CPAP   14. Emphysema/Asthma   15. PSVT  15.1 Event Monitor 5/9-5/22/17 - NSR - ST with one episode of PSVT  16. Moderate periventricular and subcortical microangiopathy  16.1 MRI of the brain/MRI of orbitis 5/16/18-moderate.  Moderate periventricular and subcortical microangiopathic    Patient is a 63-year-old male who presents today for follow-up with his wife at his side.  He denies any chest pain, pressure, palpitations, fluttering, dizziness, presyncope, syncope, orthopnea, PND or edema.  He says he does get a little lightheaded with increased exertion because of the short of breath.  He says he only gets  short of breath with increased activity.  Patient has had swelling in his left thigh and been to the ophthalmologist.  He did actually have a MRI of the brain as well as the optic area.    Current Outpatient Prescriptions   Medication Sig Dispense Refill   • aspirin 81 MG tablet Take 1 tablet by mouth daily. 30 tablet 11   • atorvastatin (LIPITOR) 10 MG tablet Take 10 mg by mouth Every Night.     • donepezil (ARICEPT) 10 MG tablet Take 10 mg by mouth every night.     • finasteride (PROSCAR) 5 MG tablet Take 5 mg by mouth daily.     • gabapentin (NEURONTIN) 600 MG tablet Take 600 mg by mouth 3 (three) times a day.     • glipiZIDE (GLUCOTROL) 5 MG tablet Take 5 mg by mouth 2 (Two) Times a Day Before Meals.     • magnesium oxide (MAGOX) 400 (241.3 MG) MG tablet tablet Take 400 mg by mouth daily.     • memantine (NAMENDA) 5 MG tablet Take 5 mg by mouth 2 (two) times a day.     • metoprolol succinate XL (TOPROL-XL) 25 MG 24 hr tablet Take 1 tablet by mouth Daily. 90 tablet 3   • mirtazapine (REMERON) 30 MG tablet Every Night.     • nitroglycerin (NITROSTAT) 0.4 MG SL tablet 1 under the tongue as needed for angina, may repeat q5mins for up three doses 30 tablet 3   • nortriptyline (PAMELOR) 50 MG capsule Every Night.     • omeprazole (PriLOSEC) 40 MG capsule Take 40 mg by mouth daily.     • promethazine (PHENERGAN) 25 MG tablet prn  0   • ramipril (ALTACE) 10 MG capsule Take 10 mg by mouth daily.     • SITagliptin-MetFORMIN HCl ER (JANUMET XR) 100-1000 MG tablet sustained-release 24 hour Take  by mouth Every Night.     • tamsulosin (FLOMAX) 0.4 MG capsule 24 hr capsule Take 1 capsule by mouth every night.       No current facility-administered medications for this visit.        ALLERGIES    Patient has no known allergies.    Past Medical History:   Diagnosis Date   • COPD (chronic obstructive pulmonary disease)    • GERD (gastroesophageal reflux disease)    • Hyperlipidemia    • Hypertension    • Stroke        Social  History     Social History   • Marital status:      Spouse name: N/A   • Number of children: N/A   • Years of education: N/A     Occupational History   • Not on file.     Social History Main Topics   • Smoking status: Former Smoker   • Smokeless tobacco: Never Used   • Alcohol use No   • Drug use: No   • Sexual activity: Defer     Other Topics Concern   • Not on file     Social History Narrative   • No narrative on file       Family History   Problem Relation Age of Onset   • Heart failure Mother    • Heart disease Father    • Heart failure Father        Review of Systems   Constitutional: Positive for fatigue. Negative for chills, diaphoresis and fever.   HENT: Positive for sneezing. Negative for rhinorrhea.    Eyes: Positive for pain (Left Eye. Recent TIA.. currently being evaluated by Opthamology.) and visual disturbance (Wears glasses; double vision ).        Left eye completely swollen shut   Respiratory: Positive for shortness of breath (With increased exertion. ). Negative for chest tightness.    Cardiovascular: Negative for chest pain, palpitations and leg swelling.   Gastrointestinal: Positive for diarrhea (just a little ). Negative for abdominal pain, anal bleeding, blood in stool, constipation, nausea and vomiting.   Endocrine: Negative.    Genitourinary: Negative for difficulty urinating and hematuria.   Musculoskeletal: Positive for arthralgias (Chronic), back pain (Arthritis.), gait problem (Ambulates with aid of a cane. ) and neck pain. Negative for myalgias.   Skin: Negative.    Allergic/Immunologic: Negative for environmental allergies.   Neurological: Positive for weakness (Left side of face- with pressure.), light-headedness (With increased exertion becomes soa and then begins to get light-headed. ) and headaches (Reports pain that shoots out of eye through left side of head. ). Negative for dizziness and syncope.   Hematological: Bruises/bleeds easily.   Psychiatric/Behavioral: Positive  "for sleep disturbance (Does not wake up SOA. Has Insomnia. ).       Objective   /73 (BP Location: Left arm, Patient Position: Sitting)   Pulse 87   Ht 167.6 cm (66\")   Wt 98.3 kg (216 lb 12.8 oz)   SpO2 97%   BMI 34.99 kg/m²   Vitals:    05/23/18 1039   BP: 170/73   BP Location: Left arm   Patient Position: Sitting   Pulse: 87   SpO2: 97%   Weight: 98.3 kg (216 lb 12.8 oz)   Height: 167.6 cm (66\")      Lab Results (most recent)     None        Physical Exam   Constitutional: He is oriented to person, place, and time. Vital signs are normal. He appears well-developed and well-nourished. He is active and cooperative.   HENT:   Head: Normocephalic.   Eyes: Lids are normal.   Tinted glasses; left eye swollen closed   Neck: Normal carotid pulses, no hepatojugular reflux and no JVD present. Carotid bruit is not present.   Cardiovascular: Normal rate, regular rhythm and normal heart sounds.    Pulses:       Radial pulses are 2+ on the right side, and 2+ on the left side.        Dorsalis pedis pulses are 2+ on the right side, and 2+ on the left side.        Posterior tibial pulses are 2+ on the right side, and 2+ on the left side.   No edema BLE.    Pulmonary/Chest: Effort normal and breath sounds normal.   Abdominal: Normal appearance and bowel sounds are normal.   Musculoskeletal:   Uses a cane    Neurological: He is alert and oriented to person, place, and time.   Skin: Skin is warm, dry and intact.   Psychiatric: He has a normal mood and affect. His speech is normal and behavior is normal. Judgment and thought content normal. Cognition and memory are normal.       Procedure   Procedures         Assessment/Plan      Diagnosis Plan   1. Coronary artery disease involving native coronary artery of native heart without angina pectoris     2. Essential hypertension     3. Hyperlipidemia, unspecified hyperlipidemia type     4. PVD (peripheral vascular disease)     5. ADRIANA (obstructive sleep apnea)     6. Shortness of " breath         Return in about 6 months (around 11/23/2018).    CAD-patient is on aspirin, beta and statin.  Hypertension-patient's blood pressure is elevated today but they think it do with what's going on with his eye.  He follows up with his PCP next week if his blood pressure is still elevated they will either call me or have Dr. Randall adjust his medications.  Hyperlipidemia-patient is on Lipitor.  PVD-patient is on aspirin and statin.  ADRIANA-patient is compliant with CPAP.  Shortness of breath-stable.  He will continue his medication regimen.  He will follow-up in 6 months or sooner if any changes.         Patient's Body mass index is 34.99 kg/m². BMI is above normal parameters. Recommendations include: educational material.      Electronically signed by:

## 2018-05-23 NOTE — PATIENT INSTRUCTIONS
"Fat and Cholesterol Restricted Diet  Getting too much fat and cholesterol in your diet may cause health problems. Following this diet helps keep your fat and cholesterol at normal levels. This can keep you from getting sick.  What types of fat should I choose?  · Choose monosaturated and polyunsaturated fats. These are found in foods such as olive oil, canola oil, flaxseeds, walnuts, almonds, and seeds.  · Eat more omega-3 fats. Good choices include salmon, mackerel, sardines, tuna, flaxseed oil, and ground flaxseeds.  · Limit saturated fats. These are in animal products such as meats, butter, and cream. They can also be in plant products such as palm oil, palm kernel oil, and coconut oil.  · Avoid foods with partially hydrogenated oils in them. These contain trans fats. Examples of foods that have trans fats are stick margarine, some tub margarines, cookies, crackers, and other baked goods.  What general guidelines do I need to follow?  · Check food labels. Look for the words \"trans fat\" and \"saturated fat.\"  · When preparing a meal:  ¨ Fill half of your plate with vegetables and green salads.  ¨ Fill one fourth of your plate with whole grains. Look for the word \"whole\" as the first word in the ingredient list.  ¨ Fill one fourth of your plate with lean protein foods.  · Eat more foods that have fiber, like apples, carrots, beans, peas, and barley.  · Eat more home-cooked foods. Eat less at restaurants and buffets.  · Limit or avoid alcohol.  · Limit foods high in starch and sugar.  · Limit fried foods.  · Cook foods without frying them. Baking, boiling, grilling, and broiling are all great options.  · Lose weight if you are overweight. Losing even a small amount of weight can help your overall health. It can also help prevent diseases such as diabetes and heart disease.  What foods can I eat?  Grains   Whole grains, such as whole wheat or whole grain breads, crackers, cereals, and pasta. Unsweetened oatmeal, " bulgur, barley, quinoa, or brown rice. Corn or whole wheat flour tortillas.  Vegetables   Fresh or frozen vegetables (raw, steamed, roasted, or grilled). Green salads.  Fruits   All fresh, canned (in natural juice), or frozen fruits.  Meat and Other Protein Products   Ground beef (85% or leaner), grass-fed beef, or beef trimmed of fat. Skinless chicken or turkey. Ground chicken or turkey. Pork trimmed of fat. All fish and seafood. Eggs. Dried beans, peas, or lentils. Unsalted nuts or seeds. Unsalted canned or dry beans.  Dairy   Low-fat dairy products, such as skim or 1% milk, 2% or reduced-fat cheeses, low-fat ricotta or cottage cheese, or plain low-fat yogurt.  Fats and Oils   Tub margarines without trans fats. Light or reduced-fat mayonnaise and salad dressings. Avocado. Olive, canola, sesame, or safflower oils. Natural peanut or almond butter (choose ones without added sugar and oil).  The items listed above may not be a complete list of recommended foods or beverages. Contact your dietitian for more options.   What foods are not recommended?  Grains   White bread. White pasta. White rice. Cornbread. Bagels, pastries, and croissants. Crackers that contain trans fat.  Vegetables   White potatoes. Corn. Creamed or fried vegetables. Vegetables in a cheese sauce.  Fruits   Dried fruits. Canned fruit in light or heavy syrup. Fruit juice.  Meat and Other Protein Products   Fatty cuts of meat. Ribs, chicken wings, may, sausage, bologna, salami, chitterlings, fatback, hot dogs, bratwurst, and packaged luncheon meats. Liver and organ meats.  Dairy   Whole or 2% milk, cream, half-and-half, and cream cheese. Whole milk cheeses. Whole-fat or sweetened yogurt. Full-fat cheeses. Nondairy creamers and whipped toppings. Processed cheese, cheese spreads, or cheese curds.  Sweets and Desserts   Corn syrup, sugars, honey, and molasses. Candy. Jam and jelly. Syrup. Sweetened cereals. Cookies, pies, cakes, donuts, muffins, and ice  cream.  Fats and Oils   Butter, stick margarine, lard, shortening, ghee, or may fat. Coconut, palm kernel, or palm oils.  Beverages   Alcohol. Sweetened drinks (such as sodas, lemonade, and fruit drinks or punches).  The items listed above may not be a complete list of foods and beverages to avoid. Contact your dietitian for more information.   This information is not intended to replace advice given to you by your health care provider. Make sure you discuss any questions you have with your health care provider.  Document Released: 06/18/2013 Document Revised: 08/24/2017 Document Reviewed: 03/19/2015  [employer] Interactive Patient Education © 2017 [employer]  BMI for Adults  Body mass index (BMI) is a number that is calculated from a person's weight and height. In most adults, the number is used to find how much of an adult's weight is made up of fat. BMI is not as accurate as a direct measure of body fat.  How is BMI calculated?  BMI is calculated by dividing weight in kilograms by height in meters squared. It can also be calculated by dividing weight in pounds by height in inches squared, then multiplying the resulting number by 703. Charts are available to help you find your BMI quickly and easily without doing this calculation.  How is BMI interpreted?  Health care professionals use BMI charts to identify whether an adult is underweight, at a normal weight, or overweight based on the following guidelines:  · Underweight: BMI less than 18.5.  · Normal weight: BMI between 18.5 and 24.9.  · Overweight: BMI between 25 and 29.9.  · Obese: BMI of 30 and above.  BMI is usually interpreted the same for males and females.  Weight includes both fat and muscle, so someone with a muscular build, such as an athlete, may have a BMI that is higher than 24.9. In cases like these, BMI may not accurately depict body fat. To determine if excess body fat is the cause of a BMI of 25 or higher, further assessments may need to be  done by a health care provider.  Why is BMI a useful tool?  BMI is used to identify a possible weight problem that may be related to a medical problem or may increase the risk for medical problems. BMI can also be used to promote changes to reach a healthy weight.  This information is not intended to replace advice given to you by your health care provider. Make sure you discuss any questions you have with your health care provider.  Document Released: 08/29/2005 Document Revised: 04/27/2017 Document Reviewed: 05/15/2015  [employer] Interactive Patient Education © 2017 [employer]

## 2018-08-08 ENCOUNTER — DOCUMENTATION (OUTPATIENT)
Dept: CARDIOLOGY | Facility: CLINIC | Age: 63
End: 2018-08-08

## 2018-08-08 NOTE — PROGRESS NOTES
Cardiac clearance req was received in office from [facility]. This was reviewed by ELIZABETH Madison and faxed back. -;[facility]

## 2018-10-31 DIAGNOSIS — I47.1 PSVT (PAROXYSMAL SUPRAVENTRICULAR TACHYCARDIA) (HCC): ICD-10-CM

## 2018-10-31 RX ORDER — METOPROLOL SUCCINATE 25 MG/1
25 TABLET, EXTENDED RELEASE ORAL DAILY
Qty: 90 TABLET | Refills: 3 | Status: SHIPPED | OUTPATIENT
Start: 2018-10-31 | End: 2020-11-03

## 2018-11-28 ENCOUNTER — OFFICE VISIT (OUTPATIENT)
Dept: CARDIOLOGY | Facility: CLINIC | Age: 63
End: 2018-11-28

## 2018-11-28 VITALS
DIASTOLIC BLOOD PRESSURE: 64 MMHG | HEART RATE: 52 BPM | SYSTOLIC BLOOD PRESSURE: 137 MMHG | OXYGEN SATURATION: 98 % | WEIGHT: 221.6 LBS | HEIGHT: 66 IN | BODY MASS INDEX: 35.62 KG/M2

## 2018-11-28 DIAGNOSIS — I25.10 CORONARY ARTERY DISEASE INVOLVING NATIVE CORONARY ARTERY OF NATIVE HEART WITHOUT ANGINA PECTORIS: Primary | ICD-10-CM

## 2018-11-28 DIAGNOSIS — I73.9 PVD (PERIPHERAL VASCULAR DISEASE) (HCC): ICD-10-CM

## 2018-11-28 DIAGNOSIS — R53.1 WEAKNESS: ICD-10-CM

## 2018-11-28 DIAGNOSIS — I10 ESSENTIAL HYPERTENSION: ICD-10-CM

## 2018-11-28 DIAGNOSIS — G47.33 OSA (OBSTRUCTIVE SLEEP APNEA): ICD-10-CM

## 2018-11-28 DIAGNOSIS — G89.29 CHRONIC BILATERAL LOW BACK PAIN, WITH SCIATICA PRESENCE UNSPECIFIED: ICD-10-CM

## 2018-11-28 DIAGNOSIS — E78.5 HYPERLIPIDEMIA, UNSPECIFIED HYPERLIPIDEMIA TYPE: ICD-10-CM

## 2018-11-28 DIAGNOSIS — R07.9 CHEST PAIN, UNSPECIFIED TYPE: ICD-10-CM

## 2018-11-28 DIAGNOSIS — M54.5 CHRONIC BILATERAL LOW BACK PAIN, WITH SCIATICA PRESENCE UNSPECIFIED: ICD-10-CM

## 2018-11-28 PROCEDURE — 99213 OFFICE O/P EST LOW 20 MIN: CPT | Performed by: NURSE PRACTITIONER

## 2018-11-28 PROCEDURE — 93000 ELECTROCARDIOGRAM COMPLETE: CPT | Performed by: NURSE PRACTITIONER

## 2018-11-28 NOTE — PATIENT INSTRUCTIONS
"Hypertension  Hypertension, commonly called high blood pressure, is when the force of blood pumping through the arteries is too strong. The arteries are the blood vessels that carry blood from the heart throughout the body. Hypertension forces the heart to work harder to pump blood and may cause arteries to become narrow or stiff. Having untreated or uncontrolled hypertension can cause heart attacks, strokes, kidney disease, and other problems.  A blood pressure reading consists of a higher number over a lower number. Ideally, your blood pressure should be below 120/80. The first (\"top\") number is called the systolic pressure. It is a measure of the pressure in your arteries as your heart beats. The second (\"bottom\") number is called the diastolic pressure. It is a measure of the pressure in your arteries as the heart relaxes.  What are the causes?  The cause of this condition is not known.  What increases the risk?  Some risk factors for high blood pressure are under your control. Others are not.  Factors you can change  · Smoking.  · Having type 2 diabetes mellitus, high cholesterol, or both.  · Not getting enough exercise or physical activity.  · Being overweight.  · Having too much fat, sugar, calories, or salt (sodium) in your diet.  · Drinking too much alcohol.  Factors that are difficult or impossible to change  · Having chronic kidney disease.  · Having a family history of high blood pressure.  · Age. Risk increases with age.  · Race. You may be at higher risk if you are -American.  · Gender. Men are at higher risk than women before age 45. After age 65, women are at higher risk than men.  · Having obstructive sleep apnea.  · Stress.  What are the signs or symptoms?  Extremely high blood pressure (hypertensive crisis) may cause:  · Headache.  · Anxiety.  · Shortness of breath.  · Nosebleed.  · Nausea and vomiting.  · Severe chest pain.  · Jerky movements you cannot control (seizures).    How is this " diagnosed?  This condition is diagnosed by measuring your blood pressure while you are seated, with your arm resting on a surface. The cuff of the blood pressure monitor will be placed directly against the skin of your upper arm at the level of your heart. It should be measured at least twice using the same arm. Certain conditions can cause a difference in blood pressure between your right and left arms.  Certain factors can cause blood pressure readings to be lower or higher than normal (elevated) for a short period of time:  · When your blood pressure is higher when you are in a health care provider's office than when you are at home, this is called white coat hypertension. Most people with this condition do not need medicines.  · When your blood pressure is higher at home than when you are in a health care provider's office, this is called masked hypertension. Most people with this condition may need medicines to control blood pressure.    If you have a high blood pressure reading during one visit or you have normal blood pressure with other risk factors:  · You may be asked to return on a different day to have your blood pressure checked again.  · You may be asked to monitor your blood pressure at home for 1 week or longer.    If you are diagnosed with hypertension, you may have other blood or imaging tests to help your health care provider understand your overall risk for other conditions.  How is this treated?  This condition is treated by making healthy lifestyle changes, such as eating healthy foods, exercising more, and reducing your alcohol intake. Your health care provider may prescribe medicine if lifestyle changes are not enough to get your blood pressure under control, and if:  · Your systolic blood pressure is above 130.  · Your diastolic blood pressure is above 80.    Your personal target blood pressure may vary depending on your medical conditions, your age, and other factors.  Follow these  instructions at home:  Eating and drinking  · Eat a diet that is high in fiber and potassium, and low in sodium, added sugar, and fat. An example eating plan is called the DASH (Dietary Approaches to Stop Hypertension) diet. To eat this way:  ? Eat plenty of fresh fruits and vegetables. Try to fill half of your plate at each meal with fruits and vegetables.  ? Eat whole grains, such as whole wheat pasta, brown rice, or whole grain bread. Fill about one quarter of your plate with whole grains.  ? Eat or drink low-fat dairy products, such as skim milk or low-fat yogurt.  ? Avoid fatty cuts of meat, processed or cured meats, and poultry with skin. Fill about one quarter of your plate with lean proteins, such as fish, chicken without skin, beans, eggs, and tofu.  ? Avoid premade and processed foods. These tend to be higher in sodium, added sugar, and fat.  · Reduce your daily sodium intake. Most people with hypertension should eat less than 1,500 mg of sodium a day.  · Limit alcohol intake to no more than 1 drink a day for nonpregnant women and 2 drinks a day for men. One drink equals 12 oz of beer, 5 oz of wine, or 1½ oz of hard liquor.  Lifestyle  · Work with your health care provider to maintain a healthy body weight or to lose weight. Ask what an ideal weight is for you.  · Get at least 30 minutes of exercise that causes your heart to beat faster (aerobic exercise) most days of the week. Activities may include walking, swimming, or biking.  · Include exercise to strengthen your muscles (resistance exercise), such as pilates or lifting weights, as part of your weekly exercise routine. Try to do these types of exercises for 30 minutes at least 3 days a week.  · Do not use any products that contain nicotine or tobacco, such as cigarettes and e-cigarettes. If you need help quitting, ask your health care provider.  · Monitor your blood pressure at home as told by your health care provider.  · Keep all follow-up visits as  told by your health care provider. This is important.  Medicines  · Take over-the-counter and prescription medicines only as told by your health care provider. Follow directions carefully. Blood pressure medicines must be taken as prescribed.  · Do not skip doses of blood pressure medicine. Doing this puts you at risk for problems and can make the medicine less effective.  · Ask your health care provider about side effects or reactions to medicines that you should watch for.  Contact a health care provider if:  · You think you are having a reaction to a medicine you are taking.  · You have headaches that keep coming back (recurring).  · You feel dizzy.  · You have swelling in your ankles.  · You have trouble with your vision.  Get help right away if:  · You develop a severe headache or confusion.  · You have unusual weakness or numbness.  · You feel faint.  · You have severe pain in your chest or abdomen.  · You vomit repeatedly.  · You have trouble breathing.  Summary  · Hypertension is when the force of blood pumping through your arteries is too strong. If this condition is not controlled, it may put you at risk for serious complications.  · Your personal target blood pressure may vary depending on your medical conditions, your age, and other factors. For most people, a normal blood pressure is less than 120/80.  · Hypertension is treated with lifestyle changes, medicines, or a combination of both. Lifestyle changes include weight loss, eating a healthy, low-sodium diet, exercising more, and limiting alcohol.  This information is not intended to replace advice given to you by your health care provider. Make sure you discuss any questions you have with your health care provider.  Document Released: 12/18/2006 Document Revised: 11/15/2017 Document Reviewed: 11/15/2017  "[employer] Interactive Patient Education © 2018 "[employer]    Fat and Cholesterol Restricted Diet  Getting too much fat and cholesterol in your diet  "may cause health problems. Following this diet helps keep your fat and cholesterol at normal levels. This can keep you from getting sick.  What types of fat should I choose?  · Choose monosaturated and polyunsaturated fats. These are found in foods such as olive oil, canola oil, flaxseeds, walnuts, almonds, and seeds.  · Eat more omega-3 fats. Good choices include salmon, mackerel, sardines, tuna, flaxseed oil, and ground flaxseeds.  · Limit saturated fats. These are in animal products such as meats, butter, and cream. They can also be in plant products such as palm oil, palm kernel oil, and coconut oil.  · Avoid foods with partially hydrogenated oils in them. These contain trans fats. Examples of foods that have trans fats are stick margarine, some tub margarines, cookies, crackers, and other baked goods.  What general guidelines do I need to follow?  · Check food labels. Look for the words \"trans fat\" and \"saturated fat.\"  · When preparing a meal:  ? Fill half of your plate with vegetables and green salads.  ? Fill one fourth of your plate with whole grains. Look for the word \"whole\" as the first word in the ingredient list.  ? Fill one fourth of your plate with lean protein foods.  · Eat more foods that have fiber, like apples, carrots, beans, peas, and barley.  · Eat more home-cooked foods. Eat less at restaurants and buffets.  · Limit or avoid alcohol.  · Limit foods high in starch and sugar.  · Limit fried foods.  · Cook foods without frying them. Baking, boiling, grilling, and broiling are all great options.  · Lose weight if you are overweight. Losing even a small amount of weight can help your overall health. It can also help prevent diseases such as diabetes and heart disease.  What foods can I eat?  Grains  Whole grains, such as whole wheat or whole grain breads, crackers, cereals, and pasta. Unsweetened oatmeal, bulgur, barley, quinoa, or brown rice. Corn or whole wheat flour " tortillas.  Vegetables  Fresh or frozen vegetables (raw, steamed, roasted, or grilled). Green salads.  Fruits  All fresh, canned (in natural juice), or frozen fruits.  Meat and Other Protein Products  Ground beef (85% or leaner), grass-fed beef, or beef trimmed of fat. Skinless chicken or turkey. Ground chicken or turkey. Pork trimmed of fat. All fish and seafood. Eggs. Dried beans, peas, or lentils. Unsalted nuts or seeds. Unsalted canned or dry beans.  Dairy  Low-fat dairy products, such as skim or 1% milk, 2% or reduced-fat cheeses, low-fat ricotta or cottage cheese, or plain low-fat yogurt.  Fats and Oils  Tub margarines without trans fats. Light or reduced-fat mayonnaise and salad dressings. Avocado. Olive, canola, sesame, or safflower oils. Natural peanut or almond butter (choose ones without added sugar and oil).  The items listed above may not be a complete list of recommended foods or beverages. Contact your dietitian for more options.  What foods are not recommended?  Grains  White bread. White pasta. White rice. Cornbread. Bagels, pastries, and croissants. Crackers that contain trans fat.  Vegetables  White potatoes. Corn. Creamed or fried vegetables. Vegetables in a cheese sauce.  Fruits  Dried fruits. Canned fruit in light or heavy syrup. Fruit juice.  Meat and Other Protein Products  Fatty cuts of meat. Ribs, chicken wings, may, sausage, bologna, salami, chitterlings, fatback, hot dogs, bratwurst, and packaged luncheon meats. Liver and organ meats.  Dairy  Whole or 2% milk, cream, half-and-half, and cream cheese. Whole milk cheeses. Whole-fat or sweetened yogurt. Full-fat cheeses. Nondairy creamers and whipped toppings. Processed cheese, cheese spreads, or cheese curds.  Sweets and Desserts  Corn syrup, sugars, honey, and molasses. Candy. Jam and jelly. Syrup. Sweetened cereals. Cookies, pies, cakes, donuts, muffins, and ice cream.  Fats and Oils  Butter, stick margarine, lard, shortening, ghee, or  may fat. Coconut, palm kernel, or palm oils.  Beverages  Alcohol. Sweetened drinks (such as sodas, lemonade, and fruit drinks or punches).  The items listed above may not be a complete list of foods and beverages to avoid. Contact your dietitian for more information.  This information is not intended to replace advice given to you by your health care provider. Make sure you discuss any questions you have with your health care provider.  Document Released: 06/18/2013 Document Revised: 08/24/2017 Document Reviewed: 03/19/2015  [employer] Interactive Patient Education © 2018 [employer]  BMI for Adults  Body mass index (BMI) is a number that is calculated from a person's weight and height. In most adults, the number is used to find how much of an adult's weight is made up of fat. BMI is not as accurate as a direct measure of body fat.  How is BMI calculated?  BMI is calculated by dividing weight in kilograms by height in meters squared. It can also be calculated by dividing weight in pounds by height in inches squared, then multiplying the resulting number by 703. Charts are available to help you find your BMI quickly and easily without doing this calculation.  How is BMI interpreted?  Health care professionals use BMI charts to identify whether an adult is underweight, at a normal weight, or overweight based on the following guidelines:  · Underweight: BMI less than 18.5.  · Normal weight: BMI between 18.5 and 24.9.  · Overweight: BMI between 25 and 29.9.  · Obese: BMI of 30 and above.    BMI is usually interpreted the same for males and females.  Weight includes both fat and muscle, so someone with a muscular build, such as an athlete, may have a BMI that is higher than 24.9. In cases like these, BMI may not accurately depict body fat. To determine if excess body fat is the cause of a BMI of 25 or higher, further assessments may need to be done by a health care provider.  Why is BMI a useful tool?  BMI is used to  identify a possible weight problem that may be related to a medical problem or may increase the risk for medical problems. BMI can also be used to promote changes to reach a healthy weight.  This information is not intended to replace advice given to you by your health care provider. Make sure you discuss any questions you have with your health care provider.  Document Released: 08/29/2005 Document Revised: 04/27/2017 Document Reviewed: 05/15/2015  [employer] Interactive Patient Education © 2018 [employer]    Nonspecific Chest Pain  Chest pain can be caused by many different conditions. There is a chance that your pain could be related to something serious, such as a heart attack or a blood clot in your lungs. Chest pain can also be caused by conditions that are not life-threatening. If you have chest pain, it is very important to follow up with your doctor.  Follow these instructions at home:  Medicines  · If you were prescribed an antibiotic medicine, take it as told by your doctor. Do not stop taking the antibiotic even if you start to feel better.  · Take over-the-counter and prescription medicines only as told by your doctor.  Lifestyle  · Do not use any products that contain nicotine or tobacco, such as cigarettes and e-cigarettes. If you need help quitting, ask your doctor.  · Do not drink alcohol.  · Make lifestyle changes as told by your doctor. These may include:  ? Getting regular exercise. Ask your doctor for some activities that are safe for you.  ? Eating a heart-healthy diet. A diet specialist (dietitian) can help you to learn healthy eating options.  ? Staying at a healthy weight.  ? Managing diabetes, if needed.  ? Lowering your stress, as with deep breathing or spending time in nature.  General instructions  · Avoid any activities that make you feel chest pain.  · If your chest pain is because of heartburn:  ? Raise (elevate) the head of your bed about 6 inches (15 cm). You can do this by putting  blocks under the bed legs at the head of the bed.  ? Do not sleep with extra pillows under your head. That does not help heartburn.  · Keep all follow-up visits as told by your doctor. This is important. This includes any further testing if your chest pain does not go away.  Contact a doctor if:  · Your chest pain does not go away.  · You have a rash with blisters on your chest.  · You have a fever.  · You have chills.  Get help right away if:  · Your chest pain is worse.  · You have a cough that gets worse, or you cough up blood.  · You have very bad (severe) pain in your belly (abdomen).  · You are very weak.  · You pass out (faint).  · You have either of these for no clear reason:  ? Sudden chest discomfort.  ? Sudden discomfort in your arms, back, neck, or jaw.  · You have shortness of breath at any time.  · You suddenly start to sweat, or your skin gets clammy.  · You feel sick to your stomach (nauseous).  · You throw up (vomit).  · You suddenly feel light-headed or dizzy.  · Your heart starts to beat fast, or it feels like it is skipping beats.  These symptoms may be an emergency. Do not wait to see if the symptoms will go away. Get medical help right away. Call your local emergency services (911 in the [place] Do not drive yourself to the hospital.  This information is not intended to replace advice given to you by your health care provider. Make sure you discuss any questions you have with your health care provider.  Document Released: 06/05/2009 Document Revised: 09/11/2017 Document Reviewed: 09/11/2017  [employer] Interactive Patient Education © 2017 [employer]

## 2018-11-28 NOTE — PROGRESS NOTES
Subjective   Mike Gusman is a 63 y.o. male     Chief Complaint   Patient presents with   • Follow-up     pt in office for 6 month follow up    • Coronary Artery Disease       HPI    Problem List:    1. CAD  1.1 Stress Test 9/1613 - normal SPECT imaging without inducible chest pain or ECG abnormalities.  Normal regional and global left ventricular systolic function.    1.2 Stress Test 4/4/17 - no ischemia; low risk  2. Hypertension  3. Hyperlipidemia   4. Viral Pericarditis 2009  5. Aortic valve disorder  6. PVD   6.1 Carotid Artery U/S 7/29/13 - no sig stenosis or occlusion in either carotid system  6.2 Carotid Artery U/S 3/15/17 - 16-49% DEXTER and LICA; antegrade flow bilaterally   6.2 JESSICA 3/7/14 - mild changes consistent with known PVD of BLE; probably diffuse   6.2 MRI BLE 4/7/14 - diffuse narrowing of the right common iliac and right external iliac artery; high grade focal stenosis of the left common iliac artery   6.3 H/O left common iliac artery stenting   7. Shortness of breath  7.1 Echo 8/29/13 - EF 60-65%: mild MR, trace TR; mild to mod aortic sclerosis without stenosis; trace AR; trace HI  7.2 Echo 4/4/17 - mild LVH; EF 60-65%; DD II; mild MR  8. H/O back surgery; per report   9. DM II  10. GERD  11. BPH   12. Brain Bleed 11/2011 s/p fall   13. ADRIANA - non compliant CPAP   14. Emphysema/Asthma   15. PSVT  15.1 Event Monitor 5/9-5/22/17 - NSR - ST with one episode of PSVT  16. Moderate periventricular and subcortical microangiopathy  16.1 MRI of the brain/MRI of orbitis 5/16/18-moderate.  Moderate periventricular and subcortical microangiopathic    Patient is a 63-year-old male who presents today for follow-up with his wife at his side.  He says that he will get an occasional sharp pain at all the chest but this does not happen very often.  He says when it does occur is generally at night.  He will get short of breath, nauseated, lightheaded and diaphoretic when this occurs.  He does not have her take  nitroglycerin.  He denies any palpitations or fluttering.  He denies any dizziness, presyncope, syncope, orthopnea or PND.  He says he does get some swelling in his medication helps.  He says he'll get lightheaded at times well with position change.  He says that he does get short of breath and it is all the time.  He does see pulmonary.    I discussed patient's symptoms and having a repeat ischemia workup however he and his wife but did not fill like his relevant at this time.  Patient's father and grandfather both  in his sleep from heart attacks.    Current Outpatient Medications   Medication Sig Dispense Refill   • aspirin 81 MG tablet Take 1 tablet by mouth daily. 30 tablet 11   • atorvastatin (LIPITOR) 10 MG tablet Take 10 mg by mouth Every Night.     • donepezil (ARICEPT) 10 MG tablet Take 10 mg by mouth every night.     • finasteride (PROSCAR) 5 MG tablet Take 5 mg by mouth daily.     • gabapentin (NEURONTIN) 600 MG tablet Take 600 mg by mouth 3 (three) times a day.     • glipiZIDE (GLUCOTROL) 5 MG tablet Take 5 mg by mouth 2 (Two) Times a Day Before Meals.     • magnesium oxide (MAGOX) 400 (241.3 MG) MG tablet tablet Take 400 mg by mouth daily.     • memantine (NAMENDA) 5 MG tablet Take 5 mg by mouth 2 (two) times a day.     • metoprolol succinate XL (TOPROL-XL) 25 MG 24 hr tablet Take 1 tablet by mouth Daily. 90 tablet 3   • mirtazapine (REMERON) 30 MG tablet Every Night.     • nitroglycerin (NITROSTAT) 0.4 MG SL tablet 1 under the tongue as needed for angina, may repeat q5mins for up three doses 30 tablet 3   • nortriptyline (PAMELOR) 50 MG capsule Every Night.     • omeprazole (PriLOSEC) 40 MG capsule Take 40 mg by mouth daily.     • promethazine (PHENERGAN) 25 MG tablet prn  0   • ramipril (ALTACE) 10 MG capsule Take 10 mg by mouth daily.     • SITagliptin-MetFORMIN HCl ER (JANUMET XR) 100-1000 MG tablet sustained-release 24 hour Take  by mouth Every Night.     • tamsulosin (FLOMAX) 0.4 MG capsule  "24 hr capsule Take 1 capsule by mouth every night.       No current facility-administered medications for this visit.        ALLERGIES    Patient has no known allergies.    Past Medical History:   Diagnosis Date   • COPD (chronic obstructive pulmonary disease) (CMS/[facility])    • GERD (gastroesophageal reflux disease)    • Hyperlipidemia    • Hypertension    • Stroke (CMS/[facility])        Social History     Socioeconomic History   • Marital status:      Spouse name: Not on file   • Number of children: Not on file   • Years of education: Not on file   • Highest education level: Not on file   Social Needs   • Financial resource strain: Not on file   • Food insecurity - worry: Not on file   • Food insecurity - inability: Not on file   • Transportation needs - medical: Not on file   • Transportation needs - non-medical: Not on file   Occupational History   • Not on file   Tobacco Use   • Smoking status: Former Smoker   • Smokeless tobacco: Never Used   Substance and Sexual Activity   • Alcohol use: No   • Drug use: No   • Sexual activity: Defer   Other Topics Concern   • Not on file   Social History Narrative   • Not on file       Family History   Problem Relation Age of Onset   • Heart failure Mother    • Heart disease Father    • Heart failure Father        Review of Systems   Constitutional: Positive for diaphoresis (with CP ) and fatigue.   HENT: Negative for congestion, rhinorrhea and sore throat.    Eyes: Positive for visual disturbance (supposed to wear glasses when driving- wife says he doesn't wear them).   Respiratory: Positive for shortness of breath (\"all the time\" ). Negative for chest tightness.    Cardiovascular: Positive for chest pain (occasional CP, sharp pain, in the middle of chest; not very often; random; generally occurs at night ) and leg swelling (bilateral edema-rx helps). Negative for palpitations.   Gastrointestinal: Positive for nausea (with CP ). Negative for abdominal pain, blood in stool, " "constipation, diarrhea and vomiting.   Endocrine: Positive for heat intolerance (\"hot all the time\" ). Negative for cold intolerance.   Genitourinary: Negative for difficulty urinating, dysuria, frequency, hematuria and urgency.   Musculoskeletal: Positive for arthralgias, back pain and neck pain.   Skin: Positive for wound (small cut on left arm). Negative for rash.   Allergic/Immunologic: Negative for environmental allergies and food allergies.   Neurological: Positive for light-headedness (occasionally w/ position changes; with CP ) and numbness (occassional numbness in hands and feet). Negative for dizziness, syncope, weakness and headaches.   Hematological: Bruises/bleeds easily.   Psychiatric/Behavioral: Positive for sleep disturbance (Up several times/night taking care of animals. ).       Objective   /64   Pulse 52   Ht 167.6 cm (66\")   Wt 101 kg (221 lb 9.6 oz)   SpO2 98%   BMI 35.77 kg/m²   Vitals:    11/28/18 1145   BP: 137/64   Pulse: 52   SpO2: 98%   Weight: 101 kg (221 lb 9.6 oz)   Height: 167.6 cm (66\")      Lab Results (most recent)     None        Physical Exam   Constitutional: He is oriented to person, place, and time. Vital signs are normal. He appears well-developed and well-nourished. He is active and cooperative.   HENT:   Head: Normocephalic.   Right Ear: Decreased hearing is noted.   Left Ear: Decreased hearing is noted.   Eyes: Lids are normal.   Neck: Normal carotid pulses, no hepatojugular reflux and no JVD present. Carotid bruit is not present.   Cardiovascular: Regular rhythm and normal heart sounds. Bradycardia present.   Pulses:       Radial pulses are 2+ on the right side, and 2+ on the left side.        Dorsalis pedis pulses are 2+ on the right side, and 2+ on the left side.        Posterior tibial pulses are 2+ on the right side, and 2+ on the left side.   No edema BLE.   Pulmonary/Chest: Effort normal and breath sounds normal.   Abdominal: Normal appearance and bowel " sounds are normal.   Neurological: He is alert and oriented to person, place, and time.   Skin: Skin is warm and dry. Lesion (left forearm ) noted.   Psychiatric: He has a normal mood and affect. His speech is normal and behavior is normal. Judgment and thought content normal. Cognition and memory are normal.       Procedure     ECG 12 Lead  Date/Time: 11/28/2018 12:56 PM  Performed by: Ericka Gamez APRN  Authorized by: Ericka Gamez APRN   Comparison: compared with previous ECG from 8/16/2017  Rhythm: sinus rhythm  Other findings: LVH  Q waves: II and aVF  Clinical impression: non-specific ECG                 Assessment/Plan      Diagnosis Plan   1. Coronary artery disease involving native coronary artery of native heart without angina pectoris     2. Essential hypertension     3. Hyperlipidemia, unspecified hyperlipidemia type     4. PVD (peripheral vascular disease) (CMS/[facility])     5. ADRIANA (obstructive sleep apnea)     6. Chest pain, unspecified type     7. Weakness  Cane   8. Chronic bilateral low back pain, with sciatica presence unspecified  Cane       Return in about 6 months (around 5/28/2019).    CAD-patient is on aspirin, beta and statin.  Hypertension-patient doing well.  Hyperlipidemia-patient is on Lipitor monitor by PCP.  PVD-patient on aspirin and statin.  ADRIANA-patient is compliant with CPAP.  I did order cane for patient.  He will continue his medication regimen.  He'll follow-up in 6 months or sooner if any changes.  Chest pain-patient does not want a repeat ischemic workup.  I did advise him to use nitro when necessary for chest pain no resolution he is to go to the ER.         Patient's Body mass index is 35.77 kg/m². BMI is above normal parameters. Recommendations include: educational material.      Electronically signed by:

## 2019-05-22 ENCOUNTER — OFFICE VISIT (OUTPATIENT)
Dept: CARDIOLOGY | Facility: CLINIC | Age: 64
End: 2019-05-22

## 2019-05-22 VITALS
BODY MASS INDEX: 33.65 KG/M2 | HEIGHT: 66 IN | HEART RATE: 88 BPM | OXYGEN SATURATION: 94 % | WEIGHT: 209.4 LBS | DIASTOLIC BLOOD PRESSURE: 68 MMHG | SYSTOLIC BLOOD PRESSURE: 129 MMHG

## 2019-05-22 DIAGNOSIS — E78.5 HYPERLIPIDEMIA, UNSPECIFIED HYPERLIPIDEMIA TYPE: ICD-10-CM

## 2019-05-22 DIAGNOSIS — R06.02 SHORTNESS OF BREATH: ICD-10-CM

## 2019-05-22 DIAGNOSIS — I25.10 CORONARY ARTERY DISEASE INVOLVING NATIVE CORONARY ARTERY OF NATIVE HEART WITHOUT ANGINA PECTORIS: Primary | ICD-10-CM

## 2019-05-22 DIAGNOSIS — G47.33 OSA (OBSTRUCTIVE SLEEP APNEA): ICD-10-CM

## 2019-05-22 DIAGNOSIS — I10 ESSENTIAL HYPERTENSION: ICD-10-CM

## 2019-05-22 DIAGNOSIS — I73.9 PVD (PERIPHERAL VASCULAR DISEASE) (HCC): ICD-10-CM

## 2019-05-22 PROCEDURE — 99213 OFFICE O/P EST LOW 20 MIN: CPT | Performed by: NURSE PRACTITIONER

## 2019-05-22 RX ORDER — INSULIN GLARGINE 100 [IU]/ML
90 INJECTION, SOLUTION SUBCUTANEOUS DAILY
Status: ON HOLD | COMMUNITY
End: 2021-11-12

## 2019-05-22 NOTE — PROGRESS NOTES
Subjective   Mike Gusman is a 64 y.o. male     Chief Complaint   Patient presents with   • Follow-up     Here for a 6 month follow up        HPI    Problem List:    1. CAD  1.1 Stress Test 9/1613 - normal SPECT imaging without inducible chest pain or ECG abnormalities.  Normal regional and global left ventricular systolic function.    1.2 Stress Test 4/4/17 - no ischemia; low risk  2. Hypertension  3. Hyperlipidemia   4. Viral Pericarditis 2009  5. Aortic valve disorder  6. PVD   6.1 Carotid Artery U/S 7/29/13 - no sig stenosis or occlusion in either carotid system  6.2 Carotid Artery U/S 3/15/17 - 16-49% DEXTER and LICA; antegrade flow bilaterally   6.2 JESSICA 3/7/14 - mild changes consistent with known PVD of BLE; probably diffuse   6.2 MRI BLE 4/7/14 - diffuse narrowing of the right common iliac and right external iliac artery; high grade focal stenosis of the left common iliac artery   6.3 H/O left common iliac artery stenting   7. Shortness of breath  7.1 Echo 8/29/13 - EF 60-65%: mild MR, trace TR; mild to mod aortic sclerosis without stenosis; trace AR; trace HI  7.2 Echo 4/4/17 - mild LVH; EF 60-65%; DD II; mild MR  8. H/O back surgery; per report   9. DM II  10. GERD  11. BPH   12. Brain Bleed 11/2011 s/p fall   13. ADRIANA - non compliant CPAP   14. Emphysema/Asthma   15. PSVT  15.1 Event Monitor 5/9-5/22/17 - NSR - ST with one episode of PSVT  16. Moderate periventricular and subcortical microangiopathy  16.1 MRI of the brain/MRI of orbitis 5/16/18-moderate.  Moderate periventricular and subcortical microangiopathic    Patient is a 64-year-old male who presents today for follow-up with his wife at his side.  He denies any chest pain, pressure, palpitations, fluttering, dizziness, presyncope, syncope, orthopnea, PND or edema.  He says he does get lightheaded at times when he stands too fast.  He says he is always short of breath and fatigue but this is not changed.  He does not always use a CPAP he does not  always tolerate it.  He says he gets maybe 4 to 5 hours out of it.  PCP monitors his cholesterol.    Current Outpatient Medications   Medication Sig Dispense Refill   • aspirin 81 MG tablet Take 1 tablet by mouth daily. 30 tablet 11   • atorvastatin (LIPITOR) 10 MG tablet Take 10 mg by mouth Every Night.     • donepezil (ARICEPT) 10 MG tablet Take 10 mg by mouth every night.     • finasteride (PROSCAR) 5 MG tablet Take 5 mg by mouth daily.     • gabapentin (NEURONTIN) 600 MG tablet Take 600 mg by mouth 3 (three) times a day.     • glipiZIDE (GLUCOTROL) 5 MG tablet Take 5 mg by mouth 2 (Two) Times a Day Before Meals.     • insulin glargine (LANTUS) 100 UNIT/ML injection Inject 10 Units under the skin into the appropriate area as directed Daily.     • magnesium oxide (MAGOX) 400 (241.3 MG) MG tablet tablet Take 400 mg by mouth daily.     • memantine (NAMENDA) 5 MG tablet Take 5 mg by mouth 2 (two) times a day.     • metoprolol succinate XL (TOPROL-XL) 25 MG 24 hr tablet Take 1 tablet by mouth Daily. 90 tablet 3   • mirtazapine (REMERON) 30 MG tablet Every Night.     • nitroglycerin (NITROSTAT) 0.4 MG SL tablet 1 under the tongue as needed for angina, may repeat q5mins for up three doses 30 tablet 3   • nortriptyline (PAMELOR) 50 MG capsule Every Night.     • omeprazole (PriLOSEC) 40 MG capsule Take 40 mg by mouth daily.     • promethazine (PHENERGAN) 25 MG tablet prn  0   • ramipril (ALTACE) 10 MG capsule Take 10 mg by mouth daily.     • SITagliptin-MetFORMIN HCl ER (JANUMET XR) 100-1000 MG tablet sustained-release 24 hour Take  by mouth Every Night.     • tamsulosin (FLOMAX) 0.4 MG capsule 24 hr capsule Take 1 capsule by mouth every night.       No current facility-administered medications for this visit.        ALLERGIES    Patient has no known allergies.    Past Medical History:   Diagnosis Date   • COPD (chronic obstructive pulmonary disease) (CMS/[facility])    • GERD (gastroesophageal reflux disease)    •  "Hyperlipidemia    • Hypertension    • Stroke (CMS/[facility])        Social History     Socioeconomic History   • Marital status:      Spouse name: Not on file   • Number of children: Not on file   • Years of education: Not on file   • Highest education level: Not on file   Tobacco Use   • Smoking status: Former Smoker   • Smokeless tobacco: Never Used   Substance and Sexual Activity   • Alcohol use: No   • Drug use: No   • Sexual activity: Defer       Family History   Problem Relation Age of Onset   • Heart failure Mother    • Heart disease Father    • Heart failure Father        Review of Systems   Constitutional: Positive for fatigue (always feels tired ). Negative for chills and fever.   HENT: Negative for congestion, rhinorrhea and sore throat.    Eyes: Positive for visual disturbance (glasses prn ).   Respiratory: Positive for apnea (Uses Cpap nightly ) and shortness of breath (Always feels short of air ). Negative for chest tightness.    Cardiovascular: Negative for chest pain, palpitations and leg swelling.   Gastrointestinal: Negative for abdominal pain, blood in stool, nausea and vomiting.   Endocrine: Positive for heat intolerance (always feels hot ). Negative for cold intolerance.   Genitourinary: Positive for frequency. Negative for dysuria, hematuria and urgency.   Musculoskeletal: Positive for arthralgias (joints ), back pain (lower back pain ) and gait problem (uses a straight cane ).   Skin: Negative for rash and wound.   Allergic/Immunologic: Negative for environmental allergies and food allergies.   Neurological: Positive for light-headedness (occasional when standing too fast ). Negative for dizziness and weakness.   Hematological: Bruises/bleeds easily (bruises and bleeds easily ).   Psychiatric/Behavioral: Negative for sleep disturbance (denies waking with smothering or SOA).       Objective   /68 (BP Location: Left arm, Patient Position: Sitting)   Pulse 88   Ht 167.6 cm (66\")   Wt 95 " "kg (209 lb 6.4 oz)   SpO2 94%   BMI 33.80 kg/m²   Vitals:    05/22/19 1046   BP: 129/68   BP Location: Left arm   Patient Position: Sitting   Pulse: 88   SpO2: 94%   Weight: 95 kg (209 lb 6.4 oz)   Height: 167.6 cm (66\")      Lab Results (most recent)     None        Physical Exam   Constitutional: He is oriented to person, place, and time. Vital signs are normal. He appears well-developed and well-nourished. He is active and cooperative.   HENT:   Head: Normocephalic.   Eyes: Lids are normal.   Neck: Normal carotid pulses, no hepatojugular reflux and no JVD present. Carotid bruit is not present.   Cardiovascular: Normal rate, regular rhythm and normal heart sounds.   Pulses:       Radial pulses are 2+ on the right side, and 2+ on the left side.        Dorsalis pedis pulses are 2+ on the right side, and 2+ on the left side.        Posterior tibial pulses are 2+ on the right side, and 2+ on the left side.   No edema BLE.   Pulmonary/Chest: Effort normal and breath sounds normal.   Abdominal: Normal appearance and bowel sounds are normal.   Musculoskeletal:   Uses a cane    Neurological: He is alert and oriented to person, place, and time.   Skin: Skin is warm, dry and intact.   Psychiatric: He has a normal mood and affect. His speech is normal and behavior is normal. Judgment and thought content normal. Cognition and memory are normal.       Procedure   Procedures         Assessment/Plan      Diagnosis Plan   1. Coronary artery disease involving native coronary artery of native heart without angina pectoris     2. Essential hypertension     3. Hyperlipidemia, unspecified hyperlipidemia type     4. PVD (peripheral vascular disease) (CMS/HCC)     5. ADRIANA (obstructive sleep apnea)     6. Shortness of breath         Return in about 6 months (around 11/22/2019).    CAD-patient's on aspirin, beta and statin.  Hypertension-patient doing very well on metoprolol and Altace.  Hyperlipidemia-patient is on Lipitor monitor by " PCP.  PVD-patient is on aspirin and statin.  ADRIANA-patient does use CPAP all the time.  Shortness of breath-stable.  He will continue his medication regimen.  He will follow-up in 6 months or sooner if any changes.         Patient's Body mass index is 33.8 kg/m². BMI is above normal parameters. Recommendations include: educational material and referral to primary care.      Electronically signed by:

## 2019-05-22 NOTE — PATIENT INSTRUCTIONS
"Fat and Cholesterol Restricted Eating Plan  Getting too much fat and cholesterol in your diet may cause health problems. Choosing the right foods helps keep your fat and cholesterol at normal levels. This can keep you from getting certain diseases.  Your doctor may recommend an eating plan that includes:  · Total fat: ______% or less of total calories a day.  · Saturated fat: ______% or less of total calories a day.  · Cholesterol: less than _________mg a day.  · Fiber: ______g a day.    What are tips for following this plan?  General tips  · Work with your doctor to lose weight if you need to.  · Avoid:  ? Foods with added sugar.  ? Fried foods.  ? Foods with partially hydrogenated oils.  · Limit alcohol intake to no more than 1 drink a day for nonpregnant women and 2 drinks a day for men. One drink equals 12 oz of beer, 5 oz of wine, or 1½ oz of hard liquor.  Reading food labels  · Check food labels for:  ? Trans fats.  ? Partially hydrogenated oils.  ? Saturated fat (g) in each serving.  ? Cholesterol (mg) in each serving.  ? Fiber (g) in each serving.  · Choose foods with healthy fats, such as:  ? Monounsaturated fats.  ? Polyunsaturated fats.  ? Omega-3 fats.  · Choose grain products that have whole grains. Look for the word \"whole\" as the first word in the ingredient list.  Cooking  · Cook foods using low-fat methods. These include baking, boiling, grilling, and broiling.  · Eat more home-cooked foods. Eat at restaurants and buffets less often.  · Avoid cooking using saturated fats, such as butter, cream, palm oil, palm kernel oil, and coconut oil.  Meal planning  · At meals, divide your plate into four equal parts:  ? Fill one-half of your plate with vegetables and green salads.  ? Fill one-fourth of your plate with whole grains.  ? Fill one-fourth of your plate with low-fat (lean) protein foods.  · Eat fish that is high in omega-3 fats at least two times a week. This includes mackerel, tuna, sardines, and " salmon.  · Eat foods that are high in fiber, such as whole grains, beans, apples, broccoli, carrots, peas, and barley.  Recommended foods  Grains  · Whole grains, such as whole wheat or whole grain breads, crackers, cereals, and pasta. Unsweetened oatmeal, bulgur, barley, quinoa, or brown rice. Corn or whole wheat flour tortillas.  Vegetables  · Fresh or frozen vegetables (raw, steamed, roasted, or grilled). Green salads.  Fruits  · All fresh, canned (in natural juice), or frozen fruits.  Meats and other protein foods  · Ground beef (85% or leaner), grass-fed beef, or beef trimmed of fat. Skinless chicken or turkey. Ground chicken or turkey. Pork trimmed of fat. All fish and seafood. Egg whites. Dried beans, peas, or lentils. Unsalted nuts or seeds. Unsalted canned beans. Nut butters without added sugar or oil.  Dairy  · Low-fat or nonfat dairy products, such as skim or 1% milk, 2% or reduced-fat cheeses, low-fat and fat-free ricotta or cottage cheese, or plain low-fat and nonfat yogurt.  Fats and oils  · Tub margarine without trans fats. Light or reduced-fat mayonnaise and salad dressings. Avocado. Olive, canola, sesame, or safflower oils.  The items listed above may not be a complete list of recommended foods or beverages. Contact your dietitian for more options.  Foods to avoid  Grains  · White bread. White pasta. White rice. Cornbread. Bagels, pastries, and croissants. Crackers and snack foods that contain trans fat and hydrogenated oils.  Vegetables  · Vegetables cooked in cheese, cream, or butter sauce. Fried vegetables.  Fruits  · Canned fruit in heavy syrup. Fruit in cream or butter sauce. Fried fruit.  Meats and other protein foods  · Fatty cuts of meat. Ribs, chicken wings, may, sausage, bologna, salami, chitterlings, fatback, hot dogs, bratwurst, and packaged lunch meats. Liver and organ meats. Whole eggs and egg yolks. Chicken and turkey with skin. Fried meat.  Dairy  · Whole or 2% milk, cream,  half-and-half, and cream cheese. Whole milk cheeses. Whole-fat or sweetened yogurt. Full-fat cheeses. Nondairy creamers and whipped toppings. Processed cheese, cheese spreads, and cheese curds.  Beverages  · Alcohol. Sugar-sweetened drinks such as sodas, lemonade, and fruit drinks.  Fats and oils  · Butter, stick margarine, lard, shortening, ghee, or may fat. Coconut, palm kernel, and palm oils.  Sweets and desserts  · Corn syrup, sugars, honey, and molasses. Candy. Jam and jelly. Syrup. Sweetened cereals. Cookies, pies, cakes, donuts, muffins, and ice cream.  The items listed above may not be a complete list of foods and beverages to avoid. Contact your dietitian for more information.  Summary  · Choosing the right foods helps keep your fat and cholesterol at normal levels. This can keep you from getting certain diseases.  · At meals, fill one-half of your plate with vegetables and green salads.  · Eat high-fiber foods, like whole grains, beans, apples, carrots, peas, and barley.  · Limit added sugar, saturated fats, alcohol, and fried foods.  This information is not intended to replace advice given to you by your health care provider. Make sure you discuss any questions you have with your health care provider.  Document Released: 06/18/2013 Document Revised: 09/04/2018 Document Reviewed: 09/04/2018  [employer] Patient Education © 2019 [employer]  BMI for Adults  Body mass index (BMI) is a number that is calculated from a person's weight and height. In most adults, the number is used to find how much of an adult's weight is made up of fat. BMI is not as accurate as a direct measure of body fat.  How is BMI calculated?  BMI is calculated by dividing weight in kilograms by height in meters squared. It can also be calculated by dividing weight in pounds by height in inches squared, then multiplying the resulting number by 703. Charts are available to help you find your BMI quickly and easily without  doing this calculation.  How is BMI interpreted?  Health care professionals use BMI charts to identify whether an adult is underweight, at a normal weight, or overweight based on the following guidelines:  · Underweight: BMI less than 18.5.  · Normal weight: BMI between 18.5 and 24.9.  · Overweight: BMI between 25 and 29.9.  · Obese: BMI of 30 and above.    BMI is usually interpreted the same for males and females.  Weight includes both fat and muscle, so someone with a muscular build, such as an athlete, may have a BMI that is higher than 24.9. In cases like these, BMI may not accurately depict body fat. To determine if excess body fat is the cause of a BMI of 25 or higher, further assessments may need to be done by a health care provider.  Why is BMI a useful tool?  BMI is used to identify a possible weight problem that may be related to a medical problem or may increase the risk for medical problems. BMI can also be used to promote changes to reach a healthy weight.  This information is not intended to replace advice given to you by your health care provider. Make sure you discuss any questions you have with your health care provider.  Document Released: 08/29/2005 Document Revised: 04/27/2017 Document Reviewed: 05/15/2015  [employer] Patient Education © 2018 [employer]

## 2019-08-13 ENCOUNTER — OFFICE VISIT (OUTPATIENT)
Dept: CARDIOLOGY | Facility: CLINIC | Age: 64
End: 2019-08-13

## 2019-08-13 VITALS
HEIGHT: 66 IN | DIASTOLIC BLOOD PRESSURE: 58 MMHG | SYSTOLIC BLOOD PRESSURE: 108 MMHG | BODY MASS INDEX: 33.2 KG/M2 | WEIGHT: 206.6 LBS | OXYGEN SATURATION: 96 % | HEART RATE: 89 BPM

## 2019-08-13 DIAGNOSIS — I65.23 BILATERAL CAROTID ARTERY STENOSIS: ICD-10-CM

## 2019-08-13 DIAGNOSIS — I10 ESSENTIAL HYPERTENSION: ICD-10-CM

## 2019-08-13 DIAGNOSIS — I25.10 CORONARY ARTERY DISEASE INVOLVING NATIVE CORONARY ARTERY OF NATIVE HEART WITHOUT ANGINA PECTORIS: Primary | ICD-10-CM

## 2019-08-13 DIAGNOSIS — E78.5 HYPERLIPIDEMIA, UNSPECIFIED HYPERLIPIDEMIA TYPE: ICD-10-CM

## 2019-08-13 DIAGNOSIS — G47.33 OSA (OBSTRUCTIVE SLEEP APNEA): ICD-10-CM

## 2019-08-13 DIAGNOSIS — I73.9 PVD (PERIPHERAL VASCULAR DISEASE) (HCC): ICD-10-CM

## 2019-08-13 DIAGNOSIS — R42 DIZZINESS: ICD-10-CM

## 2019-08-13 DIAGNOSIS — R93.89 ABNORMAL CT OF THE CHEST: ICD-10-CM

## 2019-08-13 DIAGNOSIS — R06.02 SHORTNESS OF BREATH: ICD-10-CM

## 2019-08-13 PROBLEM — J43.9 COPD WITH EMPHYSEMA: Status: ACTIVE | Noted: 2017-05-03

## 2019-08-13 PROCEDURE — 99213 OFFICE O/P EST LOW 20 MIN: CPT | Performed by: NURSE PRACTITIONER

## 2019-08-13 NOTE — PATIENT INSTRUCTIONS
"Fat and Cholesterol Restricted Eating Plan  Getting too much fat and cholesterol in your diet may cause health problems. Choosing the right foods helps keep your fat and cholesterol at normal levels. This can keep you from getting certain diseases.  Your doctor may recommend an eating plan that includes:  · Total fat: ______% or less of total calories a day.  · Saturated fat: ______% or less of total calories a day.  · Cholesterol: less than _________mg a day.  · Fiber: ______g a day.  What are tips for following this plan?  General tips    · Work with your doctor to lose weight if you need to.  · Avoid:  ? Foods with added sugar.  ? Fried foods.  ? Foods with partially hydrogenated oils.  · Limit alcohol intake to no more than 1 drink a day for nonpregnant women and 2 drinks a day for men. One drink equals 12 oz of beer, 5 oz of wine, or 1½ oz of hard liquor.  Reading food labels  · Check food labels for:  ? Trans fats.  ? Partially hydrogenated oils.  ? Saturated fat (g) in each serving.  ? Cholesterol (mg) in each serving.  ? Fiber (g) in each serving.  · Choose foods with healthy fats, such as:  ? Monounsaturated fats.  ? Polyunsaturated fats.  ? Omega-3 fats.  · Choose grain products that have whole grains. Look for the word \"whole\" as the first word in the ingredient list.  Cooking  · Cook foods using low-fat methods. These include baking, boiling, grilling, and broiling.  · Eat more home-cooked foods. Eat at restaurants and buffets less often.  · Avoid cooking using saturated fats, such as butter, cream, palm oil, palm kernel oil, and coconut oil.  Meal planning    · At meals, divide your plate into four equal parts:  ? Fill one-half of your plate with vegetables and green salads.  ? Fill one-fourth of your plate with whole grains.  ? Fill one-fourth of your plate with low-fat (lean) protein foods.  · Eat fish that is high in omega-3 fats at least two times a week. This includes mackerel, tuna, sardines, and " salmon.  · Eat foods that are high in fiber, such as whole grains, beans, apples, broccoli, carrots, peas, and barley.  Recommended foods  Grains  · Whole grains, such as whole wheat or whole grain breads, crackers, cereals, and pasta. Unsweetened oatmeal, bulgur, barley, quinoa, or brown rice. Corn or whole wheat flour tortillas.  Vegetables  · Fresh or frozen vegetables (raw, steamed, roasted, or grilled). Green salads.  Fruits  · All fresh, canned (in natural juice), or frozen fruits.  Meats and other protein foods  · Ground beef (85% or leaner), grass-fed beef, or beef trimmed of fat. Skinless chicken or turkey. Ground chicken or turkey. Pork trimmed of fat. All fish and seafood. Egg whites. Dried beans, peas, or lentils. Unsalted nuts or seeds. Unsalted canned beans. Nut butters without added sugar or oil.  Dairy  · Low-fat or nonfat dairy products, such as skim or 1% milk, 2% or reduced-fat cheeses, low-fat and fat-free ricotta or cottage cheese, or plain low-fat and nonfat yogurt.  Fats and oils  · Tub margarine without trans fats. Light or reduced-fat mayonnaise and salad dressings. Avocado. Olive, canola, sesame, or safflower oils.  The items listed above may not be a complete list of recommended foods or beverages. Contact your dietitian for more options.  The items listed above may not be a complete list of foods and beverages [you/your child] can eat. Contact a dietitian for more information.  Foods to avoid  Grains  · White bread. White pasta. White rice. Cornbread. Bagels, pastries, and croissants. Crackers and snack foods that contain trans fat and hydrogenated oils.  Vegetables  · Vegetables cooked in cheese, cream, or butter sauce. Fried vegetables.  Fruits  · Canned fruit in heavy syrup. Fruit in cream or butter sauce. Fried fruit.  Meats and other protein foods  · Fatty cuts of meat. Ribs, chicken wings, may, sausage, bologna, salami, chitterlings, fatback, hot dogs, bratwurst, and packaged  lunch meats. Liver and organ meats. Whole eggs and egg yolks. Chicken and turkey with skin. Fried meat.  Dairy  · Whole or 2% milk, cream, half-and-half, and cream cheese. Whole milk cheeses. Whole-fat or sweetened yogurt. Full-fat cheeses. Nondairy creamers and whipped toppings. Processed cheese, cheese spreads, and cheese curds.  Beverages  · Alcohol. Sugar-sweetened drinks such as sodas, lemonade, and fruit drinks.  Fats and oils  · Butter, stick margarine, lard, shortening, ghee, or may fat. Coconut, palm kernel, and palm oils.  Sweets and desserts  · Corn syrup, sugars, honey, and molasses. Candy. Jam and jelly. Syrup. Sweetened cereals. Cookies, pies, cakes, donuts, muffins, and ice cream.  The items listed above may not be a complete list of foods and beverages to avoid. Contact your dietitian for more information.  The items listed above may not be a complete list of foods and beverages [you/your child] should avoid. Contact a dietitian for more information.  Summary  · Choosing the right foods helps keep your fat and cholesterol at normal levels. This can keep you from getting certain diseases.  · At meals, fill one-half of your plate with vegetables and green salads.  · Eat high-fiber foods, like whole grains, beans, apples, carrots, peas, and barley.  · Limit added sugar, saturated fats, alcohol, and fried foods.  This information is not intended to replace advice given to you by your health care provider. Make sure you discuss any questions you have with your health care provider.  Document Released: 06/18/2013 Document Revised: 09/04/2018 Document Reviewed: 09/04/2018  [employer] Interactive Patient Education © 2019 [employer]  BMI for Adults    Body mass index (BMI) is a number that is calculated from a person's weight and height. BMI may help to estimate how much of a person's weight is composed of fat. BMI can help identify those who may be at higher risk for certain medical problems.  How is BMI  "used with adults?  BMI is used as a screening tool to identify possible weight problems. It is used to check whether a person is obese, overweight, healthy weight, or underweight.  How is BMI calculated?  BMI measures your weight and compares it to your height. This can be done either in English ([place] or metric measurements. Note that charts are available to help you find your BMI quickly and easily without having to do these calculations yourself.  To calculate your BMI in English ([place] measurements, your health care provider will:  1. Measure your weight in pounds (lb).  2. Multiply the number of pounds by 703.  ? For example, for a person who weighs 180 lb, multiply that number by 703, which equals 126,540.  3. Measure your height in inches (in). Then multiply that number by itself to get a measurement called \"inches squared.\"  ? For example, for a person who is 70 in tall, the \"inches squared\" measurement is 70 in x 70 in, which equals 4900 inches squared.  4. Divide the total from Step 2 (number of lb x 703) by the total from Step 3 (inches squared): 126,540 ÷ 4900 = 25.8. This is your BMI.  To calculate your BMI in metric measurements, your health care provider will:  1. Measure your weight in kilograms (kg).  2. Measure your height in meters (m). Then multiply that number by itself to get a measurement called \"meters squared.\"  ? For example, for a person who is 1.75 m tall, the \"meters squared\" measurement is 1.75 m x 1.75 m, which is equal to 3.1 meters squared.  3. Divide the number of kilograms (your weight) by the meters squared number. In this example: 70 ÷ 3.1 = 22.6. This is your BMI.  How is BMI interpreted?  To interpret your results, your health care provider will use BMI charts to identify whether you are underweight, normal weight, overweight, or obese. The following guidelines will be used:  · Underweight: BMI less than 18.5.  · Normal weight: BMI between 18.5 and 24.9.  · Overweight: BMI " between 25 and 29.9.  · Obese: BMI of 30 and above.  Please note:  · Weight includes both fat and muscle, so someone with a muscular build, such as an athlete, may have a BMI that is higher than 24.9. In cases like these, BMI is not an accurate measure of body fat.  · To determine if excess body fat is the cause of a BMI of 25 or higher, further assessments may need to be done by a health care provider.  · BMI is usually interpreted in the same way for men and women.  Why is BMI a useful tool?  BMI is useful in two ways:  · Identifying a weight problem that may be related to a medical condition, or that may increase the risk for medical problems.  · Promoting lifestyle and diet changes in order to reach a healthy weight.  Summary  · Body mass index (BMI) is a number that is calculated from a person's weight and height.  · BMI may help to estimate how much of a person's weight is composed of fat. BMI can help identify those who may be at higher risk for certain medical problems.  · BMI can be measured using English measurements or metric measurements.  · To interpret your results, your health care provider will use BMI charts to identify whether you are underweight, normal weight, overweight, or obese.  This information is not intended to replace advice given to you by your health care provider. Make sure you discuss any questions you have with your health care provider.  Document Released: 08/29/2005 Document Revised: 10/31/2018 Document Reviewed: 10/31/2018  [employer] Interactive Patient Education © 2019 [employer]    Coronary Artery Disease, Male    Coronary artery disease (CAD) is a condition in which the arteries that lead to the heart (coronary arteries) become narrow or blocked. The narrowing or blockage can lead to decreased blood flow to the heart. Prolonged reduced blood flow can cause a heart attack (myocardial infarction or MI). This condition may also be called coronary heart disease.  Because CAD is  the leading cause of death in men, it is important to understand what causes this condition and how it is treated.  What are the causes?  CAD is most often caused by atherosclerosis. This is the buildup of fat and cholesterol (plaque) on the inside of the arteries. Over time, the plaque may narrow or block the artery, reducing blood flow to the heart. Plaque can also become weak and break off within a coronary artery and cause a sudden blockage. Other less common causes of CAD include:  · An embolism or blood clot in a coronary artery.  · A tearing of the artery (spontaneous coronary artery dissection).  · An aneurysm.  · Inflammation (vasculitis) in the artery wall.  What increases the risk?  The following factors may make you more likely to develop this condition:  · Age. Men over age 45 are at a greater risk of CAD.  · Family history of CAD.  · Gender. Men often develop CAD earlier in life than women.  · High blood pressure (hypertension).  · Diabetes.  · High cholesterol levels.  · Tobacco use.  · Excessive alcohol use.  · Lack of exercise.  · A diet high in saturated and trans fats, such as fried food and processed meat.  Other possible risk factors include:  · High stress levels.  · Depression.  · Obesity.  · Sleep apnea.  What are the signs or symptoms?  Many people do not have any symptoms during the early stages of CAD. As the condition progresses, symptoms may include:  · Chest pain (angina). The pain can:  ? Feel like a crushing or squeezing, or a tightness, pressure, fullness, or heaviness in the chest.  ? Last more than a few minutes or can stop and recur. The pain tends to get worse with exercise or stress and to fade with rest.  · Pain in the arms, neck, jaw, or back.  · Unexplained heartburn or indigestion.  · Shortness of breath.  · Nausea or vomiting.  · Sudden light-headedness.  · Sudden cold sweats.  · Fluttering or fast heartbeat (palpitations).  How is this diagnosed?  This condition is  diagnosed based on:  · Your family and medical history.  · A physical exam.  · Tests, including:  ? A test to check the electrical signals in your heart (electrocardiogram).  ? Exercise stress test. This looks for signs of blockage when the heart is stressed with exercise, such as running on a treadmill.  ? Pharmacologic stress test. This test looks for signs of blockage when the heart is being stressed with a medicine.  ? Blood tests.  ? Coronary angiogram. This is a procedure to look at the coronary arteries to see if there is any blockage. During this test, a dye is injected into your arteries so they appear on an X-ray.  ? A test that uses sound waves to take a picture of your heart (echocardiogram).  ? Chest X-ray.  How is this treated?  This condition may be treated by:  · Healthy lifestyle changes to reduce risk factors.  · Medicines such as:  ? Antiplatelet medicines and blood-thinning medicines, such as aspirin. These help to prevent blood clots.  ? Nitroglycerin.  ? Blood pressure medicines.  ? Cholesterol-lowering medicine.  · Coronary angioplasty and stenting. During this procedure, a thin, flexible tube is inserted through a blood vessel and into a blocked artery. A balloon or similar device on the end of the tube is inflated to open up the artery. In some cases, a small, mesh tube (stent) is inserted into the artery to keep it open.  · Coronary artery bypass surgery. During this surgery, veins or arteries from other parts of the body are used to create a bypass around the blockage and allow blood to reach your heart.  Follow these instructions at home:  Medicines  · Take over-the-counter and prescription medicines only as told by your health care provider.  · Do not take the following medicines unless your health care provider approves:  ? NSAIDs, such as ibuprofen, naproxen, or celecoxib.  ? Vitamin supplements that contain vitamin A, vitamin E, or both.  Lifestyle  · Follow an exercise program  approved by your health care provider. Aim for 150 minutes of moderate exercise or 75 minutes of vigorous exercise each week.  · Maintain a healthy weight or lose weight as approved by your health care provider.  · Rest when you are tired.  · Learn to manage stress or try to limit your stress. Ask your health care provider for suggestions if you need help.  · Get screened for depression and seek treatment, if needed.  · Do not use any products that contain nicotine or tobacco, such as cigarettes and e-cigarettes. If you need help quitting, ask your health care provider.  · Do not use illegal drugs.  Eating and drinking  · Follow a heart-healthy diet. A dietitian can help educate you about healthy food options and changes. In general, eat plenty of fruits and vegetables, lean meats, and whole grains.  · Avoid foods high in:  ? Sugar.  ? Salt (sodium).  ? Saturated fat, such as processed or fatty meat.  ? Trans fat, such as fried foods.  · Use healthy cooking methods such as roasting, grilling, broiling, baking, poaching, steaming, or stir-frying.  · If you drink alcohol, and your health care provider approves, limit your alcohol intake to no more than 2 drinks per day. One drink equals 12 ounces of beer, 5 ounces of wine, or 1½ ounces of hard liquor.  General instructions  · Manage any other health conditions, such as hypertension and diabetes. These conditions affect your heart.  · Your health care provider may ask you to monitor your blood pressure. Ideally, your blood pressure should be below 130/80.  · Keep all follow-up visits as told by your health care provider. This is important.  Get help right away if:  · You have pain in your chest, neck, arm, jaw, stomach, or back that:  ? Lasts more than a few minutes.  ? Is recurring.  ? Is not relieved by taking medicine under your tongue (sublingualnitroglycerin).  · You have too much (profuse) sweating without cause.  · You have unexplained:  ? Heartburn or  "indigestion.  ? Shortness of breath or difficulty breathing.  ? Fluttering or fast heartbeat (palpitations).  ? Nausea or vomiting.  ? Fatigue.  ? Feelings of nervousness or anxiety.  ? Weakness.  ? Diarrhea.  · You have sudden light-headedness or dizziness.  · You faint.  · You feel like hurting yourself or think about taking your own life.  These symptoms may represent a serious problem that is an emergency. Do not wait to see if the symptoms will go away. Get medical help right away. Call your local emergency services (911 in the [place] Do not drive yourself to the hospital.  Summary  · Coronary artery disease (CAD) is a process in which the arteries that lead to the heart (coronary arteries) become narrow or blocked. The narrowing or blockage can lead to a heart attack.  · Many people do not have any symptoms during the early stages of CAD. This is called \"silent CAD.\"  · CAD can be treated with lifestyle changes, medicines, surgery, or a combination of these treatments.  This information is not intended to replace advice given to you by your health care provider. Make sure you discuss any questions you have with your health care provider.  Document Released: 07/15/2015 Document Revised: 12/08/2017 Document Reviewed: 12/08/2017  [employer] Interactive Patient Education © 2019 [employer]    "

## 2019-08-13 NOTE — PROGRESS NOTES
Subjective   Mike Gusman is a 64 y.o. male     Chief Complaint   Patient presents with   • Follow-up     Here for a follow up on testing        HPI    Problem List:    1. CAD stent x1 somewhere between 2004 and 2006 in [place]  1.1 Stress Test 9/1613 - normal SPECT imaging without inducible chest pain or ECG abnormalities.  Normal regional and global left ventricular systolic function.    1.2 Stress Test 4/4/17 - no ischemia; low risk  1.3 CT chest 8/5/19-severe atherosclerotic plaque formation throughout the coronary arteries, aorta has mild atherosclerotic plaque formation without aneurysmal dilation, interstitial fibrosis with underlying emphysema, fatty liver  2. Hypertension  3. Hyperlipidemia   4. Viral Pericarditis 2009  5. Aortic valve disorder  6. PVD   6.1 Carotid Artery U/S 7/29/13 - no sig stenosis or occlusion in either carotid system  6.2 Carotid Artery U/S 3/15/17 - 16-49% DEXTER and LICA; antegrade flow bilaterally   6.2 JESSICA 3/7/14 - mild changes consistent with known PVD of BLE; probably diffuse   6.2 MRI BLE 4/7/14 - diffuse narrowing of the right common iliac and right external iliac artery; high grade focal stenosis of the left common iliac artery   6.3 H/O left common iliac artery stenting   7. Shortness of breath  7.1 Echo 8/29/13 - EF 60-65%: mild MR, trace TR; mild to mod aortic sclerosis without stenosis; trace AR; trace MA  7.2 Echo 4/4/17 - mild LVH; EF 60-65%; DD II; mild MR  8. H/O back surgery; per report   9. DM II  10. GERD  11. BPH   12. Brain Bleed 11/2011 s/p fall   13. ADRIANA - non compliant CPAP   14. Emphysema/Asthma   15. PSVT  15.1 Event Monitor 5/9-5/22/17 - NSR - ST with one episode of PSVT  16. Moderate periventricular and subcortical microangiopathy  16.1 MRI of the brain/MRI of orbitis 5/16/18-moderate.  Moderate periventricular and subcortical microangiopathic  17.  Emphysema/COPD, ELIZABETH Borrero    Patient is a 64-year-old male who presents today for follow-up  due to a CT of the chest.  He denies any chest pain, pressure, palpitations, fluttering, presyncope, syncope, orthopnea, PND or edema.  Patient says that he does have dizziness/lightheadedness every day with shortness of breath.  He also says he has some dizziness when he first gets up in the morning.  Patient says that he has shortness of breath all the time and fatigue.  He says with very minimal activity he gets very short of breath.  Patient says when he has stent previously he did have chest pain.  He denies having any of that whatsoever.    We went over the CT of the chest.    Current Outpatient Medications   Medication Sig Dispense Refill   • aspirin 81 MG tablet Take 1 tablet by mouth daily. 30 tablet 11   • atorvastatin (LIPITOR) 10 MG tablet Take 10 mg by mouth Every Night.     • donepezil (ARICEPT) 10 MG tablet Take 10 mg by mouth every night.     • finasteride (PROSCAR) 5 MG tablet Take 5 mg by mouth daily.     • gabapentin (NEURONTIN) 600 MG tablet Take 600 mg by mouth 3 (three) times a day.     • glipiZIDE (GLUCOTROL) 5 MG tablet Take 5 mg by mouth 2 (Two) Times a Day Before Meals.     • insulin glargine (LANTUS) 100 UNIT/ML injection Inject 10 Units under the skin into the appropriate area as directed Daily.     • magnesium oxide (MAGOX) 400 (241.3 MG) MG tablet tablet Take 400 mg by mouth daily.     • memantine (NAMENDA) 5 MG tablet Take 5 mg by mouth 2 (two) times a day.     • metoprolol succinate XL (TOPROL-XL) 25 MG 24 hr tablet Take 1 tablet by mouth Daily. 90 tablet 3   • mirtazapine (REMERON) 30 MG tablet Take 30 mg by mouth Every Night.     • nitroglycerin (NITROSTAT) 0.4 MG SL tablet 1 under the tongue as needed for angina, may repeat q5mins for up three doses 30 tablet 3   • nortriptyline (PAMELOR) 50 MG capsule Take 50 mg by mouth Every Night.     • omeprazole (PriLOSEC) 40 MG capsule Take 40 mg by mouth daily.     • promethazine (PHENERGAN) 25 MG tablet Take 25 mg by mouth Every 6 (Six)  Hours As Needed for Nausea or Vomiting. prn  0   • ramipril (ALTACE) 10 MG capsule Take 10 mg by mouth daily.     • SITagliptin-MetFORMIN HCl ER (JANUMET XR) 100-1000 MG tablet sustained-release 24 hour Take  by mouth Every Night.     • tamsulosin (FLOMAX) 0.4 MG capsule 24 hr capsule Take 1 capsule by mouth every night.       No current facility-administered medications for this visit.        ALLERGIES    Patient has no known allergies.    Past Medical History:   Diagnosis Date   • COPD (chronic obstructive pulmonary disease) (CMS/[facility])    • GERD (gastroesophageal reflux disease)    • Hyperlipidemia    • Hypertension    • Stroke (CMS/[facility])        Social History     Socioeconomic History   • Marital status:      Spouse name: Not on file   • Number of children: Not on file   • Years of education: Not on file   • Highest education level: Not on file   Tobacco Use   • Smoking status: Former Smoker   • Smokeless tobacco: Never Used   Substance and Sexual Activity   • Alcohol use: No   • Drug use: No   • Sexual activity: Defer       Family History   Problem Relation Age of Onset   • Heart failure Mother    • Heart disease Father    • Heart failure Father        Review of Systems   Constitutional: Positive for diaphoresis (very rare) and fatigue (always has ).   HENT: Negative for rhinorrhea and sneezing.    Eyes: Positive for visual disturbance (glasses PRN ).   Respiratory: Positive for shortness of breath (all of the time with minimal activity ). Negative for chest tightness.    Cardiovascular: Negative for chest pain, palpitations and leg swelling.   Gastrointestinal: Negative for constipation, diarrhea, nausea and vomiting.   Endocrine: Negative.    Genitourinary: Negative for difficulty urinating.   Musculoskeletal: Positive for arthralgias, back pain, gait problem (uses a cane ) and neck pain.   Allergic/Immunologic: Negative for environmental allergies and food allergies.   Neurological: Positive for  "dizziness (every day with shortness of breath ) and light-headedness (every day with shortness of breath ). Negative for syncope.   Hematological: Bruises/bleeds easily.   Psychiatric/Behavioral: Negative.        Objective   /58 (BP Location: Left arm, Patient Position: Sitting)   Pulse 89   Ht 167.6 cm (66\")   Wt 93.7 kg (206 lb 9.6 oz)   SpO2 96%   BMI 33.35 kg/m²   Vitals:    08/13/19 1048   BP: 108/58   BP Location: Left arm   Patient Position: Sitting   Pulse: 89   SpO2: 96%   Weight: 93.7 kg (206 lb 9.6 oz)   Height: 167.6 cm (66\")      Lab Results (most recent)     None        Physical Exam   Constitutional: He is oriented to person, place, and time. Vital signs are normal. He appears well-developed and well-nourished. He is active and cooperative.   HENT:   Head: Normocephalic.   Right Ear: Decreased hearing is noted.   Left Ear: Decreased hearing is noted.   Eyes: Lids are normal.   Neck: Normal carotid pulses, no hepatojugular reflux and no JVD present. Carotid bruit is not present.   Cardiovascular: Normal rate and regular rhythm.   Murmur heard.   Systolic (LUSB ) murmur is present with a grade of 1/6.  Pulses:       Radial pulses are 2+ on the right side, and 2+ on the left side.        Dorsalis pedis pulses are 2+ on the right side, and 2+ on the left side.        Posterior tibial pulses are 2+ on the right side, and 2+ on the left side.   Trace edema BLE.   Pulmonary/Chest: Effort normal and breath sounds normal.   Abdominal: Normal appearance and bowel sounds are normal.   Neurological: He is alert and oriented to person, place, and time.   Skin: Skin is warm, dry and intact.   Psychiatric: He has a normal mood and affect. His speech is normal and behavior is normal. Judgment and thought content normal. Cognition and memory are normal.       Procedure   Procedures         Assessment/Plan      Diagnosis Plan   1. Coronary artery disease involving native coronary artery of native heart " without angina pectoris  Stress Test With Myocardial Perfusion One Day    Adult Transthoracic Echo Complete W/ Cont if Necessary Per Protocol   2. Essential hypertension  Stress Test With Myocardial Perfusion One Day    Adult Transthoracic Echo Complete W/ Cont if Necessary Per Protocol   3. Hyperlipidemia, unspecified hyperlipidemia type  Stress Test With Myocardial Perfusion One Day    Adult Transthoracic Echo Complete W/ Cont if Necessary Per Protocol   4. PVD (peripheral vascular disease) (CMS/HCC)  Stress Test With Myocardial Perfusion One Day    Adult Transthoracic Echo Complete W/ Cont if Necessary Per Protocol   5. ADRIANA (obstructive sleep apnea)  Stress Test With Myocardial Perfusion One Day    Adult Transthoracic Echo Complete W/ Cont if Necessary Per Protocol   6. Shortness of breath  Stress Test With Myocardial Perfusion One Day    Adult Transthoracic Echo Complete W/ Cont if Necessary Per Protocol   7. Abnormal CT of the chest  Stress Test With Myocardial Perfusion One Day    Adult Transthoracic Echo Complete W/ Cont if Necessary Per Protocol   8. Bilateral carotid artery stenosis  Duplex Carotid Ultrasound CAR   9. Dizziness  Duplex Carotid Ultrasound CAR       Return in about 12 weeks (around 11/5/2019).    CAD/hypertension/hyperlipidemia/PVD/shortness of breath/abnormal CT of the chest-patient will have ischemia work-up, stress and echo.  Bilateral carotid artery disease/dizziness-patient have carotid artery ultrasound.  He will continue his medication regimen.  He will follow-up in 12 weeks or sooner if any changes.    Due to significant shortness of breath we will try to get his testing scheduled as soon as possible.    Patient's Body mass index is 33.35 kg/m². BMI is above normal parameters. Recommendations include: educational material and referral to primary care.      Electronically signed by:

## 2019-09-03 ENCOUNTER — HOSPITAL ENCOUNTER (OUTPATIENT)
Dept: CARDIOLOGY | Facility: HOSPITAL | Age: 64
Discharge: HOME | End: 2019-09-03

## 2019-09-03 VITALS — BODY MASS INDEX: 33.2 KG/M2 | HEIGHT: 66 IN | WEIGHT: 206.57 LBS

## 2019-09-03 DIAGNOSIS — R93.89 ABNORMAL CT OF THE CHEST: ICD-10-CM

## 2019-09-03 DIAGNOSIS — I25.10 CORONARY ARTERY DISEASE INVOLVING NATIVE CORONARY ARTERY OF NATIVE HEART WITHOUT ANGINA PECTORIS: ICD-10-CM

## 2019-09-03 DIAGNOSIS — G47.33 OSA (OBSTRUCTIVE SLEEP APNEA): ICD-10-CM

## 2019-09-03 DIAGNOSIS — R06.02 SHORTNESS OF BREATH: ICD-10-CM

## 2019-09-03 DIAGNOSIS — E78.5 HYPERLIPIDEMIA, UNSPECIFIED HYPERLIPIDEMIA TYPE: ICD-10-CM

## 2019-09-03 DIAGNOSIS — I10 ESSENTIAL HYPERTENSION: ICD-10-CM

## 2019-09-03 DIAGNOSIS — I65.23 BILATERAL CAROTID ARTERY STENOSIS: ICD-10-CM

## 2019-09-03 DIAGNOSIS — I73.9 PVD (PERIPHERAL VASCULAR DISEASE) (HCC): ICD-10-CM

## 2019-09-03 DIAGNOSIS — R42 DIZZINESS: ICD-10-CM

## 2019-09-03 PROCEDURE — 93017 CV STRESS TEST TRACING ONLY: CPT

## 2019-09-03 PROCEDURE — 78452 HT MUSCLE IMAGE SPECT MULT: CPT

## 2019-09-03 PROCEDURE — 93018 CV STRESS TEST I&R ONLY: CPT | Performed by: INTERNAL MEDICINE

## 2019-09-03 PROCEDURE — 0 TECHNETIUM SESTAMIBI: Performed by: INTERNAL MEDICINE

## 2019-09-03 PROCEDURE — 93880 EXTRACRANIAL BILAT STUDY: CPT

## 2019-09-03 PROCEDURE — A9500 TC99M SESTAMIBI: HCPCS | Performed by: INTERNAL MEDICINE

## 2019-09-03 PROCEDURE — 78452 HT MUSCLE IMAGE SPECT MULT: CPT | Performed by: INTERNAL MEDICINE

## 2019-09-03 PROCEDURE — 25010000002 REGADENOSON 0.4 MG/5ML SOLUTION: Performed by: INTERNAL MEDICINE

## 2019-09-03 PROCEDURE — 93880 EXTRACRANIAL BILAT STUDY: CPT | Performed by: INTERNAL MEDICINE

## 2019-09-03 PROCEDURE — 93306 TTE W/DOPPLER COMPLETE: CPT

## 2019-09-03 PROCEDURE — 93306 TTE W/DOPPLER COMPLETE: CPT | Performed by: INTERNAL MEDICINE

## 2019-09-03 RX ADMIN — REGADENOSON 0.4 MG: 0.08 INJECTION, SOLUTION INTRAVENOUS at 12:11

## 2019-09-03 RX ADMIN — TECHNETIUM TC 99M SESTAMIBI 1 DOSE: 1 INJECTION INTRAVENOUS at 12:11

## 2019-09-03 RX ADMIN — TECHNETIUM TC 99M SESTAMIBI 1 DOSE: 1 INJECTION INTRAVENOUS at 12:10

## 2019-09-08 LAB
BH CV ECHO MEAS - ACS: 1.8 CM
BH CV ECHO MEAS - AO MAX PG (FULL): 3.3 MMHG
BH CV ECHO MEAS - AO MAX PG: 13.6 MMHG
BH CV ECHO MEAS - AO MEAN PG (FULL): 3.1 MMHG
BH CV ECHO MEAS - AO MEAN PG: 8 MMHG
BH CV ECHO MEAS - AO ROOT AREA (BSA CORRECTED): 1.5
BH CV ECHO MEAS - AO ROOT AREA: 7.4 CM^2
BH CV ECHO MEAS - AO ROOT DIAM: 3.1 CM
BH CV ECHO MEAS - AO V2 MAX: 184.3 CM/SEC
BH CV ECHO MEAS - AO V2 MEAN: 134.2 CM/SEC
BH CV ECHO MEAS - AO V2 VTI: 40.9 CM
BH CV ECHO MEAS - BSA(HAYCOCK): 2.1 M^2
BH CV ECHO MEAS - BSA(HAYCOCK): 2.1 M^2
BH CV ECHO MEAS - BSA: 2 M^2
BH CV ECHO MEAS - BSA: 2 M^2
BH CV ECHO MEAS - BZI_BMI: 33.2 KILOGRAMS/M^2
BH CV ECHO MEAS - BZI_BMI: 33.2 KILOGRAMS/M^2
BH CV ECHO MEAS - BZI_METRIC_HEIGHT: 167.6 CM
BH CV ECHO MEAS - BZI_METRIC_HEIGHT: 167.6 CM
BH CV ECHO MEAS - BZI_METRIC_WEIGHT: 93.4 KG
BH CV ECHO MEAS - BZI_METRIC_WEIGHT: 93.4 KG
BH CV ECHO MEAS - CI(CUBED): 6.8 L/MIN/M^2
BH CV ECHO MEAS - CI(TEICH): 5.5 L/MIN/M^2
BH CV ECHO MEAS - CO(CUBED): 13.7 L/MIN
BH CV ECHO MEAS - CO(TEICH): 11.2 L/MIN
BH CV ECHO MEAS - EDV(CUBED): 133.9 ML
BH CV ECHO MEAS - EDV(TEICH): 124.7 ML
BH CV ECHO MEAS - EF(CUBED): 70.3 %
BH CV ECHO MEAS - EF(TEICH): 61.6 %
BH CV ECHO MEAS - ESV(CUBED): 39.7 ML
BH CV ECHO MEAS - ESV(TEICH): 47.9 ML
BH CV ECHO MEAS - FS: 33.3 %
BH CV ECHO MEAS - IVS/LVPW: 1
BH CV ECHO MEAS - IVSD: 0.96 CM
BH CV ECHO MEAS - LA DIMENSION(2D): 3.9 CM
BH CV ECHO MEAS - LA DIMENSION: 4.2 CM
BH CV ECHO MEAS - LA/AO: 1.4
BH CV ECHO MEAS - LAT PEAK E' VEL: 8 CM/SEC
BH CV ECHO MEAS - LV IVRT: 0.12 SEC
BH CV ECHO MEAS - LV MASS(C)D: 179.8 GRAMS
BH CV ECHO MEAS - LV MASS(C)DI: 88.8 GRAMS/M^2
BH CV ECHO MEAS - LV MAX PG: 10.3 MMHG
BH CV ECHO MEAS - LV MEAN PG: 4.9 MMHG
BH CV ECHO MEAS - LV V1 MAX: 160.2 CM/SEC
BH CV ECHO MEAS - LV V1 MEAN: 99.2 CM/SEC
BH CV ECHO MEAS - LV V1 VTI: 41.2 CM
BH CV ECHO MEAS - LVIDD: 5.1 CM
BH CV ECHO MEAS - LVIDS: 3.4 CM
BH CV ECHO MEAS - LVPWD: 0.96 CM
BH CV ECHO MEAS - MED PEAK E' VEL: 6 CM/SEC
BH CV ECHO MEAS - MITRAL HR: 88.5 BPM
BH CV ECHO MEAS - MITRAL R-R: 0.68 SEC
BH CV ECHO MEAS - MM HR: 145.7 BPM
BH CV ECHO MEAS - MM R-R INT: 0.41 SEC
BH CV ECHO MEAS - MR MAX PG: 38.3 MMHG
BH CV ECHO MEAS - MR MAX VEL: 309.5 CM/SEC
BH CV ECHO MEAS - MV A MAX VEL: 101.3 CM/SEC
BH CV ECHO MEAS - MV DEC SLOPE: 464.5 CM/SEC^2
BH CV ECHO MEAS - MV DEC TIME: 0.25 SEC
BH CV ECHO MEAS - MV E MAX VEL: 117.2 CM/SEC
BH CV ECHO MEAS - MV E/A: 1.2
BH CV ECHO MEAS - MV MAX PG: 5.2 MMHG
BH CV ECHO MEAS - MV MEAN PG: 2.1 MMHG
BH CV ECHO MEAS - MV V2 MAX: 113.7 CM/SEC
BH CV ECHO MEAS - MV V2 MEAN: 67 CM/SEC
BH CV ECHO MEAS - MV V2 VTI: 45 CM
BH CV ECHO MEAS - PA MAX PG (FULL): 0.35 MMHG
BH CV ECHO MEAS - PA MAX PG: 4.9 MMHG
BH CV ECHO MEAS - PA MEAN PG (FULL): 0.59 MMHG
BH CV ECHO MEAS - PA MEAN PG: 3.2 MMHG
BH CV ECHO MEAS - PA V2 MAX: 111 CM/SEC
BH CV ECHO MEAS - PA V2 MEAN: 85.2 CM/SEC
BH CV ECHO MEAS - PA V2 VTI: 24.8 CM
BH CV ECHO MEAS - PULM. HR: 206.4 BPM
BH CV ECHO MEAS - PULM. R-R: 0.29 SEC
BH CV ECHO MEAS - RAP SYSTOLE: 10 MMHG
BH CV ECHO MEAS - RV MAX PG: 4.6 MMHG
BH CV ECHO MEAS - RV MEAN PG: 2.6 MMHG
BH CV ECHO MEAS - RV V1 MAX: 107 CM/SEC
BH CV ECHO MEAS - RV V1 MEAN: 75 CM/SEC
BH CV ECHO MEAS - RV V1 VTI: 21 CM
BH CV ECHO MEAS - RVDD: 2.4 CM
BH CV ECHO MEAS - RVSP: 16 MMHG
BH CV ECHO MEAS - SI(AO): 148.6 ML/M^2
BH CV ECHO MEAS - SI(CUBED): 46.5 ML/M^2
BH CV ECHO MEAS - SI(TEICH): 38 ML/M^2
BH CV ECHO MEAS - SV(AO): 301 ML
BH CV ECHO MEAS - SV(CUBED): 94.2 ML
BH CV ECHO MEAS - SV(TEICH): 76.9 ML
BH CV ECHO MEAS - TR MAX VEL: 112.3 CM/SEC
BH CV ECHO MEASUREMENTS AVERAGE E/E' RATIO: 16.74
BH CV STRESS COMMENTS STAGE 1: NORMAL
BH CV STRESS DOSE REGADENOSON STAGE 1: 0.4
BH CV STRESS DURATION MIN STAGE 1: 0
BH CV STRESS DURATION SEC STAGE 1: 10
BH CV STRESS PROTOCOL 1: NORMAL
BH CV STRESS RECOVERY BP: NORMAL MMHG
BH CV STRESS RECOVERY HR: 68 BPM
BH CV STRESS STAGE 1: 1
BH CV XLRA MEAS LEFT BULB EDV: 13.3 CM/SEC
BH CV XLRA MEAS LEFT BULB PSV: 84.4 CM/SEC
BH CV XLRA MEAS LEFT CCA RATIO VEL: 101 CM/SEC
BH CV XLRA MEAS LEFT DIST CCA EDV: 20.6 CM/SEC
BH CV XLRA MEAS LEFT DIST CCA PSV: 101 CM/SEC
BH CV XLRA MEAS LEFT DIST ICA EDV: -36.9 CM/SEC
BH CV XLRA MEAS LEFT DIST ICA PSV: -117.9 CM/SEC
BH CV XLRA MEAS LEFT ICA RATIO VEL: -157 CM/SEC
BH CV XLRA MEAS LEFT ICA/CCA RATIO: -1.6
BH CV XLRA MEAS LEFT MID CCA EDV: 24.1 CM/SEC
BH CV XLRA MEAS LEFT MID CCA PSV: 140 CM/SEC
BH CV XLRA MEAS LEFT MID ICA EDV: -45.5 CM/SEC
BH CV XLRA MEAS LEFT MID ICA PSV: -157 CM/SEC
BH CV XLRA MEAS LEFT PROX CCA EDV: 21.5 CM/SEC
BH CV XLRA MEAS LEFT PROX CCA PSV: 152 CM/SEC
BH CV XLRA MEAS LEFT PROX ECA EDV: -15.5 CM/SEC
BH CV XLRA MEAS LEFT PROX ECA PSV: -162 CM/SEC
BH CV XLRA MEAS LEFT PROX ICA EDV: -25.8 CM/SEC
BH CV XLRA MEAS LEFT PROX ICA PSV: -136 CM/SEC
BH CV XLRA MEAS LEFT VERTEBRAL A EDV: 12.4 CM/SEC
BH CV XLRA MEAS LEFT VERTEBRAL A PSV: 59.1 CM/SEC
BH CV XLRA MEAS RIGHT BULB EDV: 11.3 CM/SEC
BH CV XLRA MEAS RIGHT BULB PSV: 68.2 CM/SEC
BH CV XLRA MEAS RIGHT CCA RATIO VEL: 101 CM/SEC
BH CV XLRA MEAS RIGHT DIST CCA EDV: 14.6 CM/SEC
BH CV XLRA MEAS RIGHT DIST CCA PSV: 101 CM/SEC
BH CV XLRA MEAS RIGHT DIST ICA EDV: -25.4 CM/SEC
BH CV XLRA MEAS RIGHT DIST ICA PSV: -85.3 CM/SEC
BH CV XLRA MEAS RIGHT ICA RATIO VEL: -107 CM/SEC
BH CV XLRA MEAS RIGHT ICA/CCA RATIO: -1.1
BH CV XLRA MEAS RIGHT MID CCA EDV: 19.8 CM/SEC
BH CV XLRA MEAS RIGHT MID CCA PSV: 140 CM/SEC
BH CV XLRA MEAS RIGHT MID ICA EDV: -26.8 CM/SEC
BH CV XLRA MEAS RIGHT MID ICA PSV: -107 CM/SEC
BH CV XLRA MEAS RIGHT PROX CCA EDV: 18 CM/SEC
BH CV XLRA MEAS RIGHT PROX CCA PSV: 114 CM/SEC
BH CV XLRA MEAS RIGHT PROX ECA EDV: -15.3 CM/SEC
BH CV XLRA MEAS RIGHT PROX ECA PSV: -169 CM/SEC
BH CV XLRA MEAS RIGHT PROX ICA EDV: -22 CM/SEC
BH CV XLRA MEAS RIGHT PROX ICA PSV: -85.3 CM/SEC
BH CV XLRA MEAS RIGHT VERTEBRAL A EDV: 17 CM/SEC
BH CV XLRA MEAS RIGHT VERTEBRAL A PSV: 55.9 CM/SEC
MAXIMAL PREDICTED HEART RATE: 154 BPM
MAXIMAL PREDICTED HEART RATE: 154 BPM
PERCENT MAX PREDICTED HR: 49.35 %
STRESS BASELINE BP: NORMAL MMHG
STRESS BASELINE HR: 57 BPM
STRESS PERCENT HR: 58 %
STRESS POST PEAK BP: NORMAL MMHG
STRESS POST PEAK HR: 76 BPM
STRESS TARGET HR: 131 BPM
STRESS TARGET HR: 131 BPM

## 2019-09-09 ENCOUNTER — TELEPHONE (OUTPATIENT)
Dept: CARDIOLOGY | Facility: CLINIC | Age: 64
End: 2019-09-09

## 2019-09-09 NOTE — TELEPHONE ENCOUNTER
----- Message from ELIZABETH Benítez sent at 9/8/2019  9:00 PM EDT -----  Advise patient, already on ASA and statin.  He has three test you will be calling on and all are read.

## 2019-09-09 NOTE — TELEPHONE ENCOUNTER
Duplex Carotid:  1.  Both common carotids are without hemodynamically significant obstruction although mild to moderate disease is noted in the distal portions of both.     2.  Mild bulb and bifurcation disease bilaterally with 16 to 49% stenosis.     3.  16 to 49% stenosis in the mid right internal carotid artery and throughout the length of the left internal carotid artery.     4.  Antegrade flow in both vertebral arteries.     Summary: Nonobstructive carotid disease bilaterally as above.  Antegrade flow in both vertebral arteries.      Stress:  1.  No scintigraphic evidence of ischemia.     2.  Preserved post stress ejection fraction of 60% with no focal wall motion abnormalities.     3.  No evidence of pharmacologically-induced ischemic dilation nor of increased lung uptake of radiopharmaceutical.      Follow up on 11/6/19.

## 2019-09-09 NOTE — TELEPHONE ENCOUNTER
Patient's wife aware of stress test results. Patient unable to make opening tomorrow. Appointment scheduled 9/11/19. Yesi Campuzano MA      ----- Message from ELIZABETH Benítez sent at 9/9/2019 12:57 PM EDT -----  Will you get this patient in tomorrow in that open spot please.  His stress test was negative, but I want to discuss his symptoms with him. Thanks!  ----- Message -----  From: Farhat Nelson MD  Sent: 9/8/2019   2:44 PM  To: ELIZABETH Benítez

## 2019-09-11 ENCOUNTER — OFFICE VISIT (OUTPATIENT)
Dept: CARDIOLOGY | Facility: CLINIC | Age: 64
End: 2019-09-11

## 2019-09-11 VITALS
SYSTOLIC BLOOD PRESSURE: 117 MMHG | BODY MASS INDEX: 32.78 KG/M2 | WEIGHT: 204 LBS | HEIGHT: 66 IN | OXYGEN SATURATION: 98 % | DIASTOLIC BLOOD PRESSURE: 67 MMHG | HEART RATE: 88 BPM

## 2019-09-11 DIAGNOSIS — I73.9 PVD (PERIPHERAL VASCULAR DISEASE) (HCC): ICD-10-CM

## 2019-09-11 DIAGNOSIS — R53.81 MALAISE AND FATIGUE: ICD-10-CM

## 2019-09-11 DIAGNOSIS — I10 ESSENTIAL HYPERTENSION: ICD-10-CM

## 2019-09-11 DIAGNOSIS — Z00.00 HEALTHCARE MAINTENANCE: ICD-10-CM

## 2019-09-11 DIAGNOSIS — I25.10 CORONARY ARTERY DISEASE INVOLVING NATIVE CORONARY ARTERY OF NATIVE HEART WITHOUT ANGINA PECTORIS: Primary | ICD-10-CM

## 2019-09-11 DIAGNOSIS — E78.5 HYPERLIPIDEMIA, UNSPECIFIED HYPERLIPIDEMIA TYPE: ICD-10-CM

## 2019-09-11 DIAGNOSIS — R60.9 PERIPHERAL EDEMA: ICD-10-CM

## 2019-09-11 DIAGNOSIS — R93.89 ABNORMAL CT OF THE CHEST: ICD-10-CM

## 2019-09-11 DIAGNOSIS — I51.89 DIASTOLIC DYSFUNCTION: ICD-10-CM

## 2019-09-11 DIAGNOSIS — G47.33 OSA (OBSTRUCTIVE SLEEP APNEA): ICD-10-CM

## 2019-09-11 DIAGNOSIS — R06.02 SHORTNESS OF BREATH: ICD-10-CM

## 2019-09-11 DIAGNOSIS — R53.83 MALAISE AND FATIGUE: ICD-10-CM

## 2019-09-11 PROCEDURE — 99214 OFFICE O/P EST MOD 30 MIN: CPT | Performed by: NURSE PRACTITIONER

## 2019-09-11 RX ORDER — CLOPIDOGREL BISULFATE 75 MG/1
75 TABLET ORAL DAILY
Qty: 30 TABLET | Refills: 11 | Status: SHIPPED | OUTPATIENT
Start: 2019-09-11 | End: 2019-11-13

## 2019-09-11 RX ORDER — FUROSEMIDE 20 MG/1
20 TABLET ORAL DAILY PRN
Qty: 90 TABLET | Refills: 3
Start: 2019-09-11 | End: 2020-11-03

## 2019-09-11 NOTE — PATIENT INSTRUCTIONS
"Fat and Cholesterol Restricted Eating Plan  Getting too much fat and cholesterol in your diet may cause health problems. Choosing the right foods helps keep your fat and cholesterol at normal levels. This can keep you from getting certain diseases.  Your doctor may recommend an eating plan that includes:  · Total fat: ______% or less of total calories a day.  · Saturated fat: ______% or less of total calories a day.  · Cholesterol: less than _________mg a day.  · Fiber: ______g a day.  What are tips for following this plan?  General tips    · Work with your doctor to lose weight if you need to.  · Avoid:  ? Foods with added sugar.  ? Fried foods.  ? Foods with partially hydrogenated oils.  · Limit alcohol intake to no more than 1 drink a day for nonpregnant women and 2 drinks a day for men. One drink equals 12 oz of beer, 5 oz of wine, or 1½ oz of hard liquor.  Reading food labels  · Check food labels for:  ? Trans fats.  ? Partially hydrogenated oils.  ? Saturated fat (g) in each serving.  ? Cholesterol (mg) in each serving.  ? Fiber (g) in each serving.  · Choose foods with healthy fats, such as:  ? Monounsaturated fats.  ? Polyunsaturated fats.  ? Omega-3 fats.  · Choose grain products that have whole grains. Look for the word \"whole\" as the first word in the ingredient list.  Cooking  · Cook foods using low-fat methods. These include baking, boiling, grilling, and broiling.  · Eat more home-cooked foods. Eat at restaurants and buffets less often.  · Avoid cooking using saturated fats, such as butter, cream, palm oil, palm kernel oil, and coconut oil.  Meal planning    · At meals, divide your plate into four equal parts:  ? Fill one-half of your plate with vegetables and green salads.  ? Fill one-fourth of your plate with whole grains.  ? Fill one-fourth of your plate with low-fat (lean) protein foods.  · Eat fish that is high in omega-3 fats at least two times a week. This includes mackerel, tuna, sardines, and " salmon.  · Eat foods that are high in fiber, such as whole grains, beans, apples, broccoli, carrots, peas, and barley.  Recommended foods  Grains  · Whole grains, such as whole wheat or whole grain breads, crackers, cereals, and pasta. Unsweetened oatmeal, bulgur, barley, quinoa, or brown rice. Corn or whole wheat flour tortillas.  Vegetables  · Fresh or frozen vegetables (raw, steamed, roasted, or grilled). Green salads.  Fruits  · All fresh, canned (in natural juice), or frozen fruits.  Meats and other protein foods  · Ground beef (85% or leaner), grass-fed beef, or beef trimmed of fat. Skinless chicken or turkey. Ground chicken or turkey. Pork trimmed of fat. All fish and seafood. Egg whites. Dried beans, peas, or lentils. Unsalted nuts or seeds. Unsalted canned beans. Nut butters without added sugar or oil.  Dairy  · Low-fat or nonfat dairy products, such as skim or 1% milk, 2% or reduced-fat cheeses, low-fat and fat-free ricotta or cottage cheese, or plain low-fat and nonfat yogurt.  Fats and oils  · Tub margarine without trans fats. Light or reduced-fat mayonnaise and salad dressings. Avocado. Olive, canola, sesame, or safflower oils.  The items listed above may not be a complete list of recommended foods or beverages. Contact your dietitian for more options.  The items listed above may not be a complete list of foods and beverages [you/your child] can eat. Contact a dietitian for more information.  Foods to avoid  Grains  · White bread. White pasta. White rice. Cornbread. Bagels, pastries, and croissants. Crackers and snack foods that contain trans fat and hydrogenated oils.  Vegetables  · Vegetables cooked in cheese, cream, or butter sauce. Fried vegetables.  Fruits  · Canned fruit in heavy syrup. Fruit in cream or butter sauce. Fried fruit.  Meats and other protein foods  · Fatty cuts of meat. Ribs, chicken wings, may, sausage, bologna, salami, chitterlings, fatback, hot dogs, bratwurst, and packaged  lunch meats. Liver and organ meats. Whole eggs and egg yolks. Chicken and turkey with skin. Fried meat.  Dairy  · Whole or 2% milk, cream, half-and-half, and cream cheese. Whole milk cheeses. Whole-fat or sweetened yogurt. Full-fat cheeses. Nondairy creamers and whipped toppings. Processed cheese, cheese spreads, and cheese curds.  Beverages  · Alcohol. Sugar-sweetened drinks such as sodas, lemonade, and fruit drinks.  Fats and oils  · Butter, stick margarine, lard, shortening, ghee, or may fat. Coconut, palm kernel, and palm oils.  Sweets and desserts  · Corn syrup, sugars, honey, and molasses. Candy. Jam and jelly. Syrup. Sweetened cereals. Cookies, pies, cakes, donuts, muffins, and ice cream.  The items listed above may not be a complete list of foods and beverages to avoid. Contact your dietitian for more information.  The items listed above may not be a complete list of foods and beverages [you/your child] should avoid. Contact a dietitian for more information.  Summary  · Choosing the right foods helps keep your fat and cholesterol at normal levels. This can keep you from getting certain diseases.  · At meals, fill one-half of your plate with vegetables and green salads.  · Eat high-fiber foods, like whole grains, beans, apples, carrots, peas, and barley.  · Limit added sugar, saturated fats, alcohol, and fried foods.  This information is not intended to replace advice given to you by your health care provider. Make sure you discuss any questions you have with your health care provider.  Document Released: 06/18/2013 Document Revised: 09/04/2018 Document Reviewed: 09/04/2018  [employer] Interactive Patient Education © 2019 [employer]  BMI for Adults    Body mass index (BMI) is a number that is calculated from a person's weight and height. BMI may help to estimate how much of a person's weight is composed of fat. BMI can help identify those who may be at higher risk for certain medical problems.  How is BMI  "used with adults?  BMI is used as a screening tool to identify possible weight problems. It is used to check whether a person is obese, overweight, healthy weight, or underweight.  How is BMI calculated?  BMI measures your weight and compares it to your height. This can be done either in English ([place] or metric measurements. Note that charts are available to help you find your BMI quickly and easily without having to do these calculations yourself.  To calculate your BMI in English ([place] measurements, your health care provider will:  1. Measure your weight in pounds (lb).  2. Multiply the number of pounds by 703.  ? For example, for a person who weighs 180 lb, multiply that number by 703, which equals [phone],540.  3. Measure your height in inches (in). Then multiply that number by itself to get a measurement called \"inches squared.\"  ? For example, for a person who is 70 in tall, the \"inches squared\" measurement is 70 in x 70 in, which equals 4900 inches squared.  4. Divide the total from Step 2 (number of lb x 703) by the total from Step 3 (inches squared): 126,540 ÷ 4900 = 25.8. This is your BMI.  To calculate your BMI in metric measurements, your health care provider will:  1. Measure your weight in kilograms (kg).  2. Measure your height in meters (m). Then multiply that number by itself to get a measurement called \"meters squared.\"  ? For example, for a person who is 1.75 m tall, the \"meters squared\" measurement is 1.75 m x 1.75 m, which is equal to 3.1 meters squared.  3. Divide the number of kilograms (your weight) by the meters squared number. In this example: 70 ÷ 3.1 = 22.6. This is your BMI.  How is BMI interpreted?  To interpret your results, your health care provider will use BMI charts to identify whether you are underweight, normal weight, overweight, or obese. The following guidelines will be used:  · Underweight: BMI less than 18.5.  · Normal weight: BMI between 18.5 and 24.9.  · Overweight: BMI " between 25 and 29.9.  · Obese: BMI of 30 and above.  Please note:  · Weight includes both fat and muscle, so someone with a muscular build, such as an athlete, may have a BMI that is higher than 24.9. In cases like these, BMI is not an accurate measure of body fat.  · To determine if excess body fat is the cause of a BMI of 25 or higher, further assessments may need to be done by a health care provider.  · BMI is usually interpreted in the same way for men and women.  Why is BMI a useful tool?  BMI is useful in two ways:  · Identifying a weight problem that may be related to a medical condition, or that may increase the risk for medical problems.  · Promoting lifestyle and diet changes in order to reach a healthy weight.  Summary  · Body mass index (BMI) is a number that is calculated from a person's weight and height.  · BMI may help to estimate how much of a person's weight is composed of fat. BMI can help identify those who may be at higher risk for certain medical problems.  · BMI can be measured using English measurements or metric measurements.  · To interpret your results, your health care provider will use BMI charts to identify whether you are underweight, normal weight, overweight, or obese.  This information is not intended to replace advice given to you by your health care provider. Make sure you discuss any questions you have with your health care provider.  Document Released: 08/29/2005 Document Revised: 10/31/2018 Document Reviewed: 10/31/2018  [employer] Interactive Patient Education © 2019 [employer]    Coronary Angiogram With Stent  Coronary angiogram with stent placement is a procedure to widen or open a narrow blood vessel of the heart (coronary artery). Arteries may become blocked by cholesterol buildup (plaques) in the lining of the wall. When a coronary artery becomes partially blocked, blood flow to that area decreases. This may lead to chest pain or a heart attack (myocardial  infarction).  A stent is a small piece of metal that looks like mesh or a spring. Stent placement may be done as treatment for a heart attack or right after a coronary angiogram in which a blocked artery is found.  Let your health care provider know about:  · Any allergies you have.  · All medicines you are taking, including vitamins, herbs, eye drops, creams, and over-the-counter medicines.  · Any problems you or family members have had with anesthetic medicines.  · Any blood disorders you have.  · Any surgeries you have had.  · Any medical conditions you have.  · Whether you are pregnant or may be pregnant.  What are the risks?  Generally, this is a safe procedure. However, problems may occur, including:  · Damage to the heart or its blood vessels.  · A return of blockage.  · Bleeding, infection, or bruising at the insertion site.  · A collection of blood under the skin (hematoma) at the insertion site.  · A blood clot in another part of the body.  · Kidney injury.  · Allergic reaction to the dye or contrast that is used.  · Bleeding into the abdomen (retroperitoneal bleeding).    What happens before the procedure?  Staying hydrated  Follow instructions from your health care provider about hydration, which may include:  · Up to 2 hours before the procedure - you may continue to drink clear liquids, such as water, clear fruit juice, black coffee, and plain tea.    Eating and drinking restrictions  Follow instructions from your health care provider about eating and drinking, which may include:  · 8 hours before the procedure - stop eating heavy meals or foods such as meat, fried foods, or fatty foods.  · 6 hours before the procedure - stop eating light meals or foods, such as toast or cereal.  · 2 hours before the procedure - stop drinking clear liquids.    Ask your health care provider about:  · Changing or stopping your regular medicines. This is especially important if you are taking diabetes medicines or blood  thinners.  · Taking medicines such as ibuprofen. These medicines can thin your blood. Do not take these medicines before your procedure if your health care provider instructs you not to. Generally, aspirin is recommended before a procedure of passing a small, thin tube (catheter) through a blood vessel and into the heart (cardiac catheterization).    What happens during the procedure?  · An IV tube will be inserted into one of your veins.  · You will be given one or more of the following:  ? A medicine to help you relax (sedative).  ? A medicine to numb the area where the catheter will be inserted into an artery (local anesthetic).  · To reduce your risk of infection:  ? Your health care team will wash or sanitize their hands.  ? Your skin will be washed with soap.  ? Hair may be removed from the area where the catheter will be inserted.  · Using a guide wire, the catheter will be inserted into an artery. The location may be in your groin, in your wrist, or in the fold of your arm (near your elbow).  · A type of X-ray (fluoroscopy) will be used to help guide the catheter to the opening of the arteries in the heart.  · A dye will be injected into the catheter, and X-rays will be taken. The dye will help to show where any narrowing or blockages are located in the arteries.  · A tiny wire will be guided to the blocked spot, and a balloon will be inflated to make the artery wider.  · The stent will be expanded and will crush the plaques into the wall of the vessel. The stent will hold the area open and improve the blood flow. Most stents have a drug coating to reduce the risk of the stent narrowing over time.  · The artery may be made wider using a drill, laser, or other tools to remove plaques.  · When the blood flow is better, the catheter will be removed. The lining of the artery will grow over the stent, which stays where it was placed.  This procedure may vary among health care providers and hospitals.  What  happens after the procedure?  · If the procedure is done through the leg, you will be kept in bed lying flat for about 6 hours. You will be instructed to not bend and not cross your legs.  · The insertion site will be checked frequently.  · The pulse in your foot or wrist will be checked frequently.  · You may have additional blood tests, X-rays, and a test that records the electrical activity of your heart (electrocardiogram, or ECG).  This information is not intended to replace advice given to you by your health care provider. Make sure you discuss any questions you have with your health care provider.  Document Released: 06/23/2004 Document Revised: 08/17/2017 Document Reviewed: 07/23/2017  [employer] Interactive Patient Education © 2019 [employer]    Edema    Edema is an abnormal buildup of fluids in the body tissues and under the skin. Swelling of the legs, feet, and ankles is a common symptom that becomes more likely as you get older. Swelling is also common in looser tissues, like around the eyes. When the affected area is squeezed, the fluid may move out of that spot and leave a dent for a few moments. This dent is called pitting edema.  There are many possible causes of edema. Eating too much salt (sodium) and being on your feet or sitting for a long time can cause edema in your legs, feet, and ankles. Hot weather may make edema worse. Common causes of edema include:  · Heart failure.  · Liver or kidney disease.  · Weak leg blood vessels.  · Cancer.  · An injury.  · Pregnancy.  · Medicines.  · Being obese.  · Low protein levels in the blood.  Edema is usually painless. Your skin may look swollen or shiny.  Follow these instructions at home:  · Keep the affected body part raised (elevated) above the level of your heart when you are sitting or lying down.  · Do not sit still or stand for long periods of time.  · Do not wear tight clothing. Do not wear garters on your upper legs.  · Exercise your legs to get  your circulation going. This helps to move the fluid back into your blood vessels, and it may help the swelling go down.  · Wear elastic bandages or support stockings to reduce swelling as told by your health care provider.  · Eat a low-salt (low-sodium) diet to reduce fluid as told by your health care provider.  · Depending on the cause of your swelling, you may need to limit how much fluid you drink (fluid restriction).  · Take over-the-counter and prescription medicines only as told by your health care provider.  Contact a health care provider if:  · Your edema does not get better with treatment.  · You have heart, liver, or kidney disease and have symptoms of edema.  · You have sudden and unexplained weight gain.  Get help right away if:  · You develop shortness of breath or chest pain.  · You cannot breathe when you lie down.  · You develop pain, redness, or warmth in the swollen areas.  · You have heart, liver, or kidney disease and suddenly get edema.  · You have a fever and your symptoms suddenly get worse.  Summary  · Edema is an abnormal buildup of fluids in the body tissues and under the skin.  · Eating too much salt (sodium) and being on your feet or sitting for a long time can cause edema in your legs, feet, and ankles.  · Keep the affected body part raised (elevated) above the level of your heart when you are sitting or lying down.  This information is not intended to replace advice given to you by your health care provider. Make sure you discuss any questions you have with your health care provider.  Document Released: 12/18/2006 Document Revised: 01/20/2018 Document Reviewed: 01/20/2018  [employer] Interactive Patient Education © 2019 [employer]

## 2019-09-11 NOTE — PROGRESS NOTES
Subjective   Mike Gusman is a 64 y.o. male     Chief Complaint   Patient presents with   • Coronary Artery Disease   • Hypertension       HPI    Problem List:    1. CAD stent x1 somewhere between 2004 and 2006 in [place]  1.1 Stress Test 9/1613 - normal SPECT imaging without inducible chest pain or ECG abnormalities.  Normal regional and global left ventricular systolic function.    1.2 Stress Test 4/4/17 - no ischemia; low risk  1.3 CT chest 8/5/19-severe atherosclerotic plaque formation throughout the coronary arteries, aorta has mild atherosclerotic plaque formation without aneurysmal dilation, interstitial fibrosis with underlying emphysema, fatty liver  1.4 stress test 9/3/19-no evidence of ischemia, post-rest EF 60%  2. Hypertension  3. Hyperlipidemia   4. Viral Pericarditis 2009  5. Aortic valve disorder  6. PVD   6.1 Carotid Artery U/S 7/29/13 - no sig stenosis or occlusion in either carotid system  6.2 Carotid Artery U/S 3/15/17 - 16-49% DEXTER and LICA; antegrade flow bilaterally   6.2 JESSICA 3/7/14 - mild changes consistent with known PVD of BLE; probably diffuse   6.2 MRI BLE 4/7/14 - diffuse narrowing of the right common iliac and right external iliac artery; high grade focal stenosis of the left common iliac artery   6.3 H/O left common iliac artery stenting   6.4 carotid artery ultrasound 9/3/19-16 to 49% stenosis in the bulb and bifurcation bilaterally, 16 to 49% stenosis mid right internal carotid artery and length of the left internal carotid artery, antegrade flow both vertebral arteries  7. Shortness of breath  7.1 Echo 8/29/13 - EF 60-65%: mild MR, trace TR; mild to mod aortic sclerosis without stenosis; trace AR; trace IL  7.2 Echo 4/4/17 - mild LVH; EF 60-65%; DD II; mild MR  7.3 echo 9/3/19 -diastolic dysfunction 2, mild to moderate left atrial enlargement, mild to moderate MR  8. H/O back surgery; per report   9. DM II  10. GERD  11. BPH   12. Brain Bleed 11/2011 s/p fall   13. ADRIANA - non  compliant CPAP   14. Emphysema/Asthma   15. PSVT  15.1 Event Monitor 5/9-5/22/17 - NSR - ST with one episode of PSVT  16. Moderate periventricular and subcortical microangiopathy  16.1 MRI of the brain/MRI of orbitis 5/16/18-moderate.  Moderate periventricular and subcortical microangiopathic  17.  Emphysema/COPD, Ashley LoboELIZABETH    Patient is a 64-year-old male who presents today for follow-up on testing.  He denies any chest pain, pressure, palpitations, fluttering, presyncope, syncope, orthopnea, PND or edema.  He says that he does get dizzy/lightheaded which is more frequent here lately.  He says he notices it more after lying down and getting up.  He has shortness of breath with any activity and fatigue.  He says even if is walking a short distance he becomes very fatigued and short of breath.    We went over stress, echo and carotid.    Current Outpatient Medications   Medication Sig Dispense Refill   • aspirin 81 MG tablet Take 1 tablet by mouth daily. 30 tablet 11   • atorvastatin (LIPITOR) 10 MG tablet Take 10 mg by mouth Every Night.     • clopidogrel (PLAVIX) 75 MG tablet Take 1 tablet by mouth Daily. 30 tablet 11   • donepezil (ARICEPT) 10 MG tablet Take 10 mg by mouth every night.     • finasteride (PROSCAR) 5 MG tablet Take 5 mg by mouth daily.     • furosemide (LASIX) 20 MG tablet Take 1 tablet by mouth Daily As Needed (edema). 90 tablet 3   • gabapentin (NEURONTIN) 600 MG tablet Take 600 mg by mouth 3 (three) times a day.     • glipiZIDE (GLUCOTROL) 5 MG tablet Take 5 mg by mouth 2 (Two) Times a Day Before Meals.     • insulin glargine (LANTUS) 100 UNIT/ML injection Inject 10 Units under the skin into the appropriate area as directed Daily.     • magnesium oxide (MAGOX) 400 (241.3 MG) MG tablet tablet Take 400 mg by mouth daily.     • memantine (NAMENDA) 5 MG tablet Take 5 mg by mouth 2 (two) times a day.     • metoprolol succinate XL (TOPROL-XL) 25 MG 24 hr tablet Take 1 tablet by mouth  Daily. 90 tablet 3   • mirtazapine (REMERON) 30 MG tablet Take 30 mg by mouth Every Night.     • nitroglycerin (NITROSTAT) 0.4 MG SL tablet 1 under the tongue as needed for angina, may repeat q5mins for up three doses 30 tablet 3   • nortriptyline (PAMELOR) 50 MG capsule Take 50 mg by mouth Every Night.     • omeprazole (PriLOSEC) 40 MG capsule Take 40 mg by mouth daily.     • promethazine (PHENERGAN) 25 MG tablet Take 25 mg by mouth Every 6 (Six) Hours As Needed for Nausea or Vomiting. prn  0   • ramipril (ALTACE) 10 MG capsule Take 10 mg by mouth daily.     • SITagliptin-MetFORMIN HCl ER (JANUMET XR) 100-1000 MG tablet sustained-release 24 hour Take  by mouth Every Night.     • tamsulosin (FLOMAX) 0.4 MG capsule 24 hr capsule Take 1 capsule by mouth every night.       No current facility-administered medications for this visit.        ALLERGIES    Patient has no known allergies.    Past Medical History:   Diagnosis Date   • COPD (chronic obstructive pulmonary disease) (CMS/[facility])    • GERD (gastroesophageal reflux disease)    • Hyperlipidemia    • Hypertension    • Stroke (CMS/[facility])        Social History     Socioeconomic History   • Marital status:      Spouse name: Not on file   • Number of children: Not on file   • Years of education: Not on file   • Highest education level: Not on file   Tobacco Use   • Smoking status: Former Smoker   • Smokeless tobacco: Never Used   Substance and Sexual Activity   • Alcohol use: No   • Drug use: No   • Sexual activity: Defer       Family History   Problem Relation Age of Onset   • Heart failure Mother    • Heart disease Father    • Heart failure Father        Review of Systems   Constitutional: Positive for fatigue (reports all the time). Negative for chills and fever.   HENT: Positive for congestion and postnasal drip. Negative for rhinorrhea and sore throat.    Eyes: Positive for visual disturbance (wears glasses to drive at night).   Respiratory: Positive for  "shortness of breath (if up moving). Negative for chest tightness.    Cardiovascular: Negative for chest pain, palpitations and leg swelling.   Gastrointestinal: Positive for abdominal pain. Negative for blood in stool, constipation, diarrhea, nausea and vomiting.   Endocrine: Negative for cold intolerance and heat intolerance.   Genitourinary: Negative for difficulty urinating, dysuria, hematuria and urgency.   Musculoskeletal: Positive for back pain (chronic, surgeries in the past). Negative for neck pain.   Skin: Negative for rash and wound.   Allergic/Immunologic: Negative for environmental allergies and food allergies.   Neurological: Positive for dizziness (becoming more frequent) and light-headedness (more frequent, more so when getting up after lying down). Negative for numbness.   Hematological: Bruises/bleeds easily (bruise and bleeds easily).   Psychiatric/Behavioral: Negative for sleep disturbance (reports sleeps well most nights, no soa, uses cpap).       Objective   /67 (BP Location: Left arm, Patient Position: Sitting)   Pulse 88   Ht 167.6 cm (66\")   Wt 92.5 kg (204 lb)   SpO2 98%   BMI 32.93 kg/m²   Vitals:    09/11/19 1509   BP: 117/67   BP Location: Left arm   Patient Position: Sitting   Pulse: 88   SpO2: 98%   Weight: 92.5 kg (204 lb)   Height: 167.6 cm (66\")      Lab Results (most recent)     None        Physical Exam   Constitutional: He is oriented to person, place, and time. Vital signs are normal. He appears well-developed and well-nourished. He is active and cooperative.   HENT:   Head: Normocephalic.   Right Ear: Decreased hearing is noted.   Left Ear: Decreased hearing is noted.   Eyes: Lids are normal.   Neck: Normal carotid pulses, no hepatojugular reflux and no JVD present. Carotid bruit is not present.   Cardiovascular: Normal rate and regular rhythm.   Murmur heard.   Systolic (LUSB and LLSB 1-2/6) murmur is present.  Pulses:       Radial pulses are 2+ on the right side, " and 2+ on the left side.        Dorsalis pedis pulses are 2+ on the right side, and 2+ on the left side.        Posterior tibial pulses are 2+ on the right side, and 2+ on the left side.   1+ edema BLE.   Pulmonary/Chest: Effort normal and breath sounds normal.   Abdominal: Normal appearance and bowel sounds are normal.   Musculoskeletal:   Uses a cane    Neurological: He is alert and oriented to person, place, and time.   Skin: Skin is warm, dry and intact.   Psychiatric: He has a normal mood and affect. His speech is normal and behavior is normal. Judgment and thought content normal. Cognition and memory are normal.       Procedure   Procedures         Assessment/Plan      Diagnosis Plan   1. Coronary artery disease involving native coronary artery of native heart without angina pectoris  clopidogrel (PLAVIX) 75 MG tablet    [facility]   2. Essential hypertension  CBC (No Diff)    Basic Metabolic Panel    [facility]   3. Hyperlipidemia, unspecified hyperlipidemia type  [facility]   4. PVD (peripheral vascular disease) (CMS/HCC)  [facility]   5. ADRIANA (obstructive sleep apnea)  [facility]   6. Shortness of breath  [facility]   7. Abnormal CT of the chest  clopidogrel (PLAVIX) 75 MG tablet    [facility]   8. Malaise and fatigue  [facility]   9. Healthcare maintenance  CBC (No Diff)    Basic Metabolic Panel   10. Diastolic dysfunction  furosemide (LASIX) 20 MG tablet   11. Peripheral edema  furosemide (LASIX) 20 MG tablet       Return keep follow-up appt .    CAD/hypertension/hyperlipidemia/shortness of breath/abnormal CT of the chest/fatigue/diastolic dysfunction-patient will have left heart cath.  He will use Plavix.  He will continue his medication regimen otherwise.  Diastolic dysfunction/peripheral edema-patient does have Lasix to use as needed.  I am showed him how to assess for swelling and encouraged him to use Lasix.  He will get a  CBC and BMP.  He will follow-up after left heart cath or sooner if any changes.  He will hold his Janumet 24 hours prior to cath.       Patient's Body mass index is 32.93 kg/m². BMI is above normal parameters. Recommendations include: educational material and referral to primary care.      Electronically signed by:

## 2019-09-24 ENCOUNTER — LAB (OUTPATIENT)
Dept: LAB | Facility: HOSPITAL | Age: 64
End: 2019-09-24

## 2019-09-24 DIAGNOSIS — Z00.00 HEALTHCARE MAINTENANCE: ICD-10-CM

## 2019-09-24 DIAGNOSIS — I10 ESSENTIAL HYPERTENSION: ICD-10-CM

## 2019-09-24 LAB
ANION GAP SERPL CALCULATED.3IONS-SCNC: 16.3 MMOL/L (ref 5–15)
BUN BLD-MCNC: 13 MG/DL (ref 8–23)
BUN/CREAT SERPL: 11.4 (ref 7–25)
CALCIUM SPEC-SCNC: 9.7 MG/DL (ref 8.6–10.5)
CHLORIDE SERPL-SCNC: 95 MMOL/L (ref 98–107)
CO2 SERPL-SCNC: 19.7 MMOL/L (ref 22–29)
CREAT BLD-MCNC: 1.14 MG/DL (ref 0.76–1.27)
DEPRECATED RDW RBC AUTO: 43 FL (ref 37–54)
ERYTHROCYTE [DISTWIDTH] IN BLOOD BY AUTOMATED COUNT: 13.6 % (ref 12.3–15.4)
GFR SERPL CREATININE-BSD FRML MDRD: 64 ML/MIN/1.73
GLUCOSE BLD-MCNC: 290 MG/DL (ref 65–99)
HCT VFR BLD AUTO: 42.2 % (ref 37.5–51)
HGB BLD-MCNC: 13.7 G/DL (ref 13–17.7)
MCH RBC QN AUTO: 28.3 PG (ref 26.6–33)
MCHC RBC AUTO-ENTMCNC: 32.5 G/DL (ref 31.5–35.7)
MCV RBC AUTO: 87.2 FL (ref 79–97)
PLATELET # BLD AUTO: 247 10*3/MM3 (ref 140–450)
PMV BLD AUTO: 11.3 FL (ref 6–12)
POTASSIUM BLD-SCNC: 4.6 MMOL/L (ref 3.5–5.2)
RBC # BLD AUTO: 4.84 10*6/MM3 (ref 4.14–5.8)
SODIUM BLD-SCNC: 131 MMOL/L (ref 136–145)
WBC NRBC COR # BLD: 11.16 10*3/MM3 (ref 3.4–10.8)

## 2019-09-24 PROCEDURE — 36415 COLL VENOUS BLD VENIPUNCTURE: CPT

## 2019-09-24 PROCEDURE — 80048 BASIC METABOLIC PNL TOTAL CA: CPT | Performed by: NURSE PRACTITIONER

## 2019-09-24 PROCEDURE — 85027 COMPLETE CBC AUTOMATED: CPT | Performed by: NURSE PRACTITIONER

## 2019-09-25 ENCOUNTER — TELEPHONE (OUTPATIENT)
Dept: CARDIOLOGY | Facility: CLINIC | Age: 64
End: 2019-09-25

## 2019-09-25 NOTE — TELEPHONE ENCOUNTER
----- Message from ELIZABETH Benítez sent at 9/24/2019 10:25 PM EDT -----  Advise patient of labs for [facility]. His sugar is running high and sodium a little low.  Also his WBCs slightly elevated, any recent infections or on steroids?

## 2019-09-25 NOTE — TELEPHONE ENCOUNTER
Glucose  65 - 99 mg/dL 290 Abnormally high       Sodium  136 - 145 mmol/L 131 Abnormally low       WBC  3.40 - 10.80 10*3/mm3 11.16 Abnormally high

## 2019-09-25 NOTE — TELEPHONE ENCOUNTER
Informed pt's wife of his results, she denied pt having any recent infections or being on abx.     Routed results to PCP.

## 2019-10-03 ENCOUNTER — TELEPHONE (OUTPATIENT)
Dept: CARDIOLOGY | Facility: CLINIC | Age: 64
End: 2019-10-03

## 2019-10-03 NOTE — TELEPHONE ENCOUNTER
THE PM SPOKE WITH MYRIAM TERRY REGARDING THE NEED TO CHANGE MR. TERRY'S CATH APPT DUE TO DR. JIM BEING ON VACATION.  MRS. TERRY DID NOT THINK THE PT NEEDED A SOONER APPT WITH DR. MERINO AND OPTED TO PROCEED WITH DR. JIM PERFORMING THE CATH OCT 22.

## 2019-10-22 ENCOUNTER — OUTSIDE FACILITY SERVICE (OUTPATIENT)
Dept: CARDIOLOGY | Facility: CLINIC | Age: 64
End: 2019-10-22

## 2019-10-22 DIAGNOSIS — I10 ESSENTIAL HYPERTENSION: ICD-10-CM

## 2019-10-22 DIAGNOSIS — R06.02 SHORTNESS OF BREATH: ICD-10-CM

## 2019-10-22 DIAGNOSIS — I25.10 CORONARY ARTERY DISEASE INVOLVING NATIVE CORONARY ARTERY OF NATIVE HEART WITHOUT ANGINA PECTORIS: ICD-10-CM

## 2019-10-22 DIAGNOSIS — R53.83 MALAISE AND FATIGUE: ICD-10-CM

## 2019-10-22 DIAGNOSIS — G47.33 OSA (OBSTRUCTIVE SLEEP APNEA): ICD-10-CM

## 2019-10-22 DIAGNOSIS — R53.81 MALAISE AND FATIGUE: ICD-10-CM

## 2019-10-22 DIAGNOSIS — E78.5 HYPERLIPIDEMIA, UNSPECIFIED HYPERLIPIDEMIA TYPE: ICD-10-CM

## 2019-10-22 DIAGNOSIS — I73.9 PVD (PERIPHERAL VASCULAR DISEASE) (HCC): ICD-10-CM

## 2019-10-22 DIAGNOSIS — R93.89 ABNORMAL CT OF THE CHEST: ICD-10-CM

## 2019-10-22 PROCEDURE — 93458 L HRT ARTERY/VENTRICLE ANGIO: CPT | Performed by: INTERNAL MEDICINE

## 2019-11-13 ENCOUNTER — OFFICE VISIT (OUTPATIENT)
Dept: CARDIOLOGY | Facility: CLINIC | Age: 64
End: 2019-11-13

## 2019-11-13 VITALS
DIASTOLIC BLOOD PRESSURE: 67 MMHG | BODY MASS INDEX: 33.88 KG/M2 | HEART RATE: 71 BPM | WEIGHT: 210.8 LBS | OXYGEN SATURATION: 97 % | SYSTOLIC BLOOD PRESSURE: 128 MMHG | HEIGHT: 66 IN

## 2019-11-13 DIAGNOSIS — Z00.00 HEALTHCARE MAINTENANCE: ICD-10-CM

## 2019-11-13 DIAGNOSIS — I73.9 PVD (PERIPHERAL VASCULAR DISEASE) (HCC): ICD-10-CM

## 2019-11-13 DIAGNOSIS — E78.5 HYPERLIPIDEMIA, UNSPECIFIED HYPERLIPIDEMIA TYPE: ICD-10-CM

## 2019-11-13 DIAGNOSIS — R06.02 SHORTNESS OF BREATH: ICD-10-CM

## 2019-11-13 DIAGNOSIS — I10 ESSENTIAL HYPERTENSION: Primary | ICD-10-CM

## 2019-11-13 DIAGNOSIS — G47.33 OSA (OBSTRUCTIVE SLEEP APNEA): ICD-10-CM

## 2019-11-13 DIAGNOSIS — I47.1 PSVT (PAROXYSMAL SUPRAVENTRICULAR TACHYCARDIA) (HCC): ICD-10-CM

## 2019-11-13 DIAGNOSIS — I25.119 CORONARY ARTERY DISEASE INVOLVING NATIVE CORONARY ARTERY OF NATIVE HEART WITH ANGINA PECTORIS (HCC): ICD-10-CM

## 2019-11-13 PROBLEM — I47.10 PSVT (PAROXYSMAL SUPRAVENTRICULAR TACHYCARDIA): Status: ACTIVE | Noted: 2019-11-13

## 2019-11-13 PROCEDURE — 99214 OFFICE O/P EST MOD 30 MIN: CPT | Performed by: NURSE PRACTITIONER

## 2019-11-13 RX ORDER — BUSPIRONE HYDROCHLORIDE 15 MG/1
15 TABLET ORAL 2 TIMES DAILY
COMMUNITY
Start: 2019-10-03

## 2019-11-13 RX ORDER — ROSUVASTATIN CALCIUM 40 MG/1
40 TABLET, COATED ORAL NIGHTLY
Qty: 90 TABLET | Refills: 3 | Status: SHIPPED | OUTPATIENT
Start: 2019-11-13 | End: 2020-10-22

## 2019-11-13 NOTE — PROGRESS NOTES
Subjective   Mike Gusman is a 64 y.o. male     Chief Complaint   Patient presents with   • Follow-up   • Coronary Artery Disease       HPI    Problem List:    1. CAD stent x1 somewhere between 2004 and 2006 in [place]  1.1 Stress Test 9/1613 - normal SPECT imaging without inducible chest pain or ECG abnormalities.  Normal regional and global left ventricular systolic function.    1.2 Stress Test 4/4/17 - no ischemia; low risk  1.3 CT chest 8/5/19-severe atherosclerotic plaque formation throughout the coronary arteries, aorta has mild atherosclerotic plaque formation without aneurysmal dilation, interstitial fibrosis with underlying emphysema, fatty liver  1.4 stress test 9/3/19-no evidence of ischemia, post-rest EF 60%  1.5 left heart cath 10/22/19-2's marginal small-caliber 70% proximal narrowing, obtuse marginal terminal 50 to 70%, small limb of the LAD 50 to 70%, EF 60%, LVEDP 22-24  2. Hypertension  3. Hyperlipidemia   4. Viral Pericarditis 2009  5. Aortic valve disorder  6. PVD   6.1 Carotid Artery U/S 7/29/13 - no sig stenosis or occlusion in either carotid system  6.2 Carotid Artery U/S 3/15/17 - 16-49% DEXTER and LICA; antegrade flow bilaterally   6.2 JESSICA 3/7/14 - mild changes consistent with known PVD of BLE; probably diffuse   6.2 MRI BLE 4/7/14 - diffuse narrowing of the right common iliac and right external iliac artery; high grade focal stenosis of the left common iliac artery   6.3 H/O left common iliac artery stenting   6.4 carotid artery ultrasound 9/3/19-16 to 49% stenosis in the bulb and bifurcation bilaterally, 16 to 49% stenosis mid right internal carotid artery and length of the left internal carotid artery, antegrade flow both vertebral arteries  7. Shortness of breath  7.1 Echo 8/29/13 - EF 60-65%: mild MR, trace TR; mild to mod aortic sclerosis without stenosis; trace AR; trace [place]  7.2 Echo 4/4/17 - mild LVH; EF 60-65%; DD II; mild MR  7.3 echo 9/3/19 -diastolic dysfunction 2, mild to  moderate left atrial enlargement, mild to moderate MR  8. H/O back surgery; per report   9. DM II  10. GERD  11. BPH   12. Brain Bleed 11/2011 s/p fall   13. ADRIANA - non compliant CPAP   14. Emphysema/Asthma   15. PSVT  15.1 Event Monitor 5/9-5/22/17 - NSR - ST with one episode of PSVT  16. Moderate periventricular and subcortical microangiopathy  16.1 MRI of the brain/MRI of orbitis 5/16/18-moderate.  Moderate periventricular and subcortical microangiopathic  17.  Emphysema/COPD, ELIZABETH Borrero    Patient is a 64-year-old male who presents today for follow-up status post left heart cath with his wife at his side.  He denies any chest pain, pressure, palpitations, fluttering, dizziness, presyncope, syncope, orthopnea, PND or edema.  He says that he does have shortness of breath with normal daily activity and this is not changed.  He does use a CPAP however sometimes he takes it off.  They are going to [place] for [place].    We went over left heart cath.    Current Outpatient Medications on File Prior to Visit   Medication Sig Dispense Refill   • aspirin 81 MG tablet Take 1 tablet by mouth daily. 30 tablet 11   • busPIRone (BUSPAR) 15 MG tablet Take 15 mg by mouth As Needed. 1.5 tabs BID     • donepezil (ARICEPT) 10 MG tablet Take 10 mg by mouth every night.     • finasteride (PROSCAR) 5 MG tablet Take 5 mg by mouth daily.     • furosemide (LASIX) 20 MG tablet Take 1 tablet by mouth Daily As Needed (edema). 90 tablet 3   • gabapentin (NEURONTIN) 600 MG tablet Take 600 mg by mouth 3 (three) times a day.     • glipiZIDE (GLUCOTROL) 5 MG tablet Take 5 mg by mouth 2 (Two) Times a Day Before Meals.     • insulin glargine (LANTUS) 100 UNIT/ML injection Inject 10 Units under the skin into the appropriate area as directed Daily.     • magnesium oxide (MAGOX) 400 (241.3 MG) MG tablet tablet Take 400 mg by mouth daily.     • memantine (NAMENDA) 5 MG tablet Take 5 mg by mouth 2 (two) times a day.     • metoprolol  succinate XL (TOPROL-XL) 25 MG 24 hr tablet Take 1 tablet by mouth Daily. 90 tablet 3   • mirtazapine (REMERON) 30 MG tablet Take 30 mg by mouth Every Night.     • nitroglycerin (NITROSTAT) 0.4 MG SL tablet 1 under the tongue as needed for angina, may repeat q5mins for up three doses 30 tablet 3   • nortriptyline (PAMELOR) 50 MG capsule Take 50 mg by mouth Every Night.     • omeprazole (PriLOSEC) 40 MG capsule Take 40 mg by mouth daily.     • promethazine (PHENERGAN) 25 MG tablet Take 25 mg by mouth Every 6 (Six) Hours As Needed for Nausea or Vomiting. prn  0   • ramipril (ALTACE) 10 MG capsule Take 10 mg by mouth daily.     • SITagliptin-MetFORMIN HCl ER (JANUMET XR) 100-1000 MG tablet sustained-release 24 hour Take  by mouth Every Night.     • tamsulosin (FLOMAX) 0.4 MG capsule 24 hr capsule Take 1 capsule by mouth every night.     • [DISCONTINUED] atorvastatin (LIPITOR) 40 MG tablet Take 40 mg by mouth Every Night.     • [DISCONTINUED] clopidogrel (PLAVIX) 75 MG tablet Take 1 tablet by mouth Daily. 30 tablet 11     No current facility-administered medications on file prior to visit.        ALLERGIES    Patient has no known allergies.    Past Medical History:   Diagnosis Date   • COPD (chronic obstructive pulmonary disease) (CMS/[facility])    • GERD (gastroesophageal reflux disease)    • Hyperlipidemia    • Hypertension    • Stroke (CMS/[facility])        Social History     Socioeconomic History   • Marital status:      Spouse name: Not on file   • Number of children: Not on file   • Years of education: Not on file   • Highest education level: Not on file   Tobacco Use   • Smoking status: Former Smoker   • Smokeless tobacco: Never Used   Substance and Sexual Activity   • Alcohol use: No   • Drug use: No   • Sexual activity: Defer       Family History   Problem Relation Age of Onset   • Heart failure Mother    • Heart disease Father    • Heart failure Father        Review of Systems   Constitutional: Positive for  "fatigue. Negative for diaphoresis.   HENT: Negative for congestion, rhinorrhea and sore throat.    Eyes: Negative for visual disturbance.   Respiratory: Positive for apnea (Pt's wife states he \"sort of uses\" CPAP. States he takes it off a lot. ) and shortness of breath (w/ normal daily activity ). Negative for chest tightness.    Cardiovascular: Negative for chest pain (Denies CP ), palpitations and leg swelling.   Gastrointestinal: Negative for abdominal pain, blood in stool, constipation, diarrhea, nausea and vomiting.   Endocrine: Negative for cold intolerance and heat intolerance.   Genitourinary: Negative for difficulty urinating, dysuria, frequency, hematuria and urgency.   Musculoskeletal: Positive for back pain. Negative for arthralgias and neck pain.   Skin: Positive for wound (small cuts all over arms ). Negative for rash.   Allergic/Immunologic: Negative for environmental allergies and food allergies.   Neurological: Negative for dizziness, syncope, weakness, light-headedness, numbness and headaches.   Hematological: Bruises/bleeds easily (both ).   Psychiatric/Behavioral: Positive for sleep disturbance (Sleeps about 4-6 hours).       Objective   /67   Pulse 71   Ht 167.6 cm (66\")   Wt 95.6 kg (210 lb 12.8 oz)   SpO2 97%   BMI 34.02 kg/m²   Vitals:    11/13/19 1355   BP: 128/67   Pulse: 71   SpO2: 97%   Weight: 95.6 kg (210 lb 12.8 oz)   Height: 167.6 cm (66\")      Lab Results (most recent)     None        Physical Exam   Constitutional: He is oriented to person, place, and time. Vital signs are normal. He appears well-developed and well-nourished. He is active and cooperative.   HENT:   Head: Normocephalic.   Eyes: Lids are normal.   Neck: Normal carotid pulses, no hepatojugular reflux and no JVD present. Carotid bruit is not present.   Cardiovascular: Normal rate, regular rhythm and normal heart sounds.   Pulses:       Radial pulses are 2+ on the right side, and 2+ on the left side.        " Dorsalis pedis pulses are 2+ on the right side, and 2+ on the left side.        Posterior tibial pulses are 2+ on the right side, and 2+ on the left side.   No edema BLE.   Pulmonary/Chest: Effort normal and breath sounds normal.   Abdominal: Normal appearance and bowel sounds are normal.   Neurological: He is alert and oriented to person, place, and time.   Skin: Skin is warm, dry and intact.   Psychiatric: He has a normal mood and affect. His speech is normal and behavior is normal. Judgment and thought content normal. Cognition and memory are normal.       Procedure   Procedures         Assessment/Plan      Diagnosis Plan   1. Essential hypertension  Comprehensive Metabolic Panel   2. PSVT (paroxysmal supraventricular tachycardia) (CMS/[facility])     3. Coronary artery disease involving native coronary artery of native heart with angina pectoris (CMS/[facility])  rosuvastatin (CRESTOR) 40 MG tablet    Lipid Panel   4. Hyperlipidemia, unspecified hyperlipidemia type  rosuvastatin (CRESTOR) 40 MG tablet    Lipid Panel   5. PVD (peripheral vascular disease) (CMS/[facility])     6. ADRIANA (obstructive sleep apnea)     7. Shortness of breath     8. Healthcare maintenance  Comprehensive Metabolic Panel    Lipid Panel       Return in about 6 months (around 5/13/2020).  Hypertension-patient is doing very well on Altace and metoprolol.  PSVT-patient does well on the Toprol.  CAD-patient is on aspirin, beta and I am switching his statin to Crestor.  Hyperlipidemia-I stopped atorvastatin and start Crestor.  He will get lipid and CMP in 6 weeks.  PVD-patient's on aspirin and statin.  ADRIANA-patient is intolerant to CPAP.  Shortness of breath-stable.  He will continue his medication regimen with above-noted change.  He will follow-up in 6 months or sooner if any changes.             Patient's Body mass index is 34.02 kg/m². BMI is above normal parameters. Recommendations include: educational material.      Electronically signed by:

## 2019-11-13 NOTE — PATIENT INSTRUCTIONS
"Fat and Cholesterol Restricted Eating Plan  Getting too much fat and cholesterol in your diet may cause health problems. Choosing the right foods helps keep your fat and cholesterol at normal levels. This can keep you from getting certain diseases.  Your doctor may recommend an eating plan that includes:  · Total fat: ______% or less of total calories a day.  · Saturated fat: ______% or less of total calories a day.  · Cholesterol: less than _________mg a day.  · Fiber: ______g a day.  What are tips for following this plan?  Meal planning  · At meals, divide your plate into four equal parts:  ? Fill one-half of your plate with vegetables and green salads.  ? Fill one-fourth of your plate with whole grains.  ? Fill one-fourth of your plate with low-fat (lean) protein foods.  · Eat fish that is high in omega-3 fats at least two times a week. This includes mackerel, tuna, sardines, and salmon.  · Eat foods that are high in fiber, such as whole grains, beans, apples, broccoli, carrots, peas, and barley.  General tips    · Work with your doctor to lose weight if you need to.  · Avoid:  ? Foods with added sugar.  ? Fried foods.  ? Foods with partially hydrogenated oils.  · Limit alcohol intake to no more than 1 drink a day for nonpregnant women and 2 drinks a day for men. One drink equals 12 oz of beer, 5 oz of wine, or 1½ oz of hard liquor.  Reading food labels  · Check food labels for:  ? Trans fats.  ? Partially hydrogenated oils.  ? Saturated fat (g) in each serving.  ? Cholesterol (mg) in each serving.  ? Fiber (g) in each serving.  · Choose foods with healthy fats, such as:  ? Monounsaturated fats.  ? Polyunsaturated fats.  ? Omega-3 fats.  · Choose grain products that have whole grains. Look for the word \"whole\" as the first word in the ingredient list.  Cooking  · Cook foods using low-fat methods. These include baking, boiling, grilling, and broiling.  · Eat more home-cooked foods. Eat at restaurants and buffets " less often.  · Avoid cooking using saturated fats, such as butter, cream, palm oil, palm kernel oil, and coconut oil.  Recommended foods    Fruits  · All fresh, canned (in natural juice), or frozen fruits.  Vegetables  · Fresh or frozen vegetables (raw, steamed, roasted, or grilled). Green salads.  Grains  · Whole grains, such as whole wheat or whole grain breads, crackers, cereals, and pasta. Unsweetened oatmeal, bulgur, barley, quinoa, or brown rice. Corn or whole wheat flour tortillas.  Meats and other protein foods  · Ground beef (85% or leaner), grass-fed beef, or beef trimmed of fat. Skinless chicken or turkey. Ground chicken or turkey. Pork trimmed of fat. All fish and seafood. Egg whites. Dried beans, peas, or lentils. Unsalted nuts or seeds. Unsalted canned beans. Nut butters without added sugar or oil.  Dairy  · Low-fat or nonfat dairy products, such as skim or 1% milk, 2% or reduced-fat cheeses, low-fat and fat-free ricotta or cottage cheese, or plain low-fat and nonfat yogurt.  Fats and oils  · Tub margarine without trans fats. Light or reduced-fat mayonnaise and salad dressings. Avocado. Olive, canola, sesame, or safflower oils.  The items listed above may not be a complete list of foods and beverages you can eat. Contact a dietitian for more information.  Foods to avoid  Fruits  · Canned fruit in heavy syrup. Fruit in cream or butter sauce. Fried fruit.  Vegetables  · Vegetables cooked in cheese, cream, or butter sauce. Fried vegetables.  Grains  · White bread. White pasta. White rice. Cornbread. Bagels, pastries, and croissants. Crackers and snack foods that contain trans fat and hydrogenated oils.  Meats and other protein foods  · Fatty cuts of meat. Ribs, chicken wings, may, sausage, bologna, salami, chitterlings, fatback, hot dogs, bratwurst, and packaged lunch meats. Liver and organ meats. Whole eggs and egg yolks. Chicken and turkey with skin. Fried meat.  Dairy  · Whole or 2% milk, cream,  half-and-half, and cream cheese. Whole milk cheeses. Whole-fat or sweetened yogurt. Full-fat cheeses. Nondairy creamers and whipped toppings. Processed cheese, cheese spreads, and cheese curds.  Beverages  · Alcohol. Sugar-sweetened drinks such as sodas, lemonade, and fruit drinks.  Fats and oils  · Butter, stick margarine, lard, shortening, ghee, or may fat. Coconut, palm kernel, and palm oils.  Sweets and desserts  · Corn syrup, sugars, honey, and molasses. Candy. Jam and jelly. Syrup. Sweetened cereals. Cookies, pies, cakes, donuts, muffins, and ice cream.  The items listed above may not be a complete list of foods and beverages you should avoid. Contact a dietitian for more information.  Summary  · Choosing the right foods helps keep your fat and cholesterol at normal levels. This can keep you from getting certain diseases.  · At meals, fill one-half of your plate with vegetables and green salads.  · Eat high-fiber foods, like whole grains, beans, apples, carrots, peas, and barley.  · Limit added sugar, saturated fats, alcohol, and fried foods.  This information is not intended to replace advice given to you by your health care provider. Make sure you discuss any questions you have with your health care provider.  Document Released: 06/18/2013 Document Revised: 08/21/2019 Document Reviewed: 09/04/2018  [employer] Interactive Patient Education © 2019 [employer]  BMI for Adults    Body mass index (BMI) is a number that is calculated from a person's weight and height. BMI may help to estimate how much of a person's weight is composed of fat. BMI can help identify those who may be at higher risk for certain medical problems.  How is BMI used with adults?  BMI is used as a screening tool to identify possible weight problems. It is used to check whether a person is obese, overweight, healthy weight, or underweight.  How is BMI calculated?  BMI measures your weight and compares it to your height. This can be done  "either in English ([place] or metric measurements. Note that charts are available to help you find your BMI quickly and easily without having to do these calculations yourself.  To calculate your BMI in English ([place] measurements, your health care provider will:  1. Measure your weight in pounds (lb).  2. Multiply the number of pounds by 703.  ? For example, for a person who weighs 180 lb, multiply that number by 703, which equals 126,540.  3. Measure your height in inches (in). Then multiply that number by itself to get a measurement called \"inches squared.\"  ? For example, for a person who is 70 in tall, the \"inches squared\" measurement is 70 in x 70 in, which equals 4900 inches squared.  4. Divide the total from Step 2 (number of lb x 703) by the total from Step 3 (inches squared): 126,540 ÷ 4900 = 25.8. This is your BMI.  To calculate your BMI in metric measurements, your health care provider will:  1. Measure your weight in kilograms (kg).  2. Measure your height in meters (m). Then multiply that number by itself to get a measurement called \"meters squared.\"  ? For example, for a person who is 1.75 m tall, the \"meters squared\" measurement is 1.75 m x 1.75 m, which is equal to 3.1 meters squared.  3. Divide the number of kilograms (your weight) by the meters squared number. In this example: 70 ÷ 3.1 = 22.6. This is your BMI.  How is BMI interpreted?  To interpret your results, your health care provider will use BMI charts to identify whether you are underweight, normal weight, overweight, or obese. The following guidelines will be used:  · Underweight: BMI less than 18.5.  · Normal weight: BMI between 18.5 and 24.9.  · Overweight: BMI between 25 and 29.9.  · Obese: BMI of 30 and above.  Please note:  · Weight includes both fat and muscle, so someone with a muscular build, such as an athlete, may have a BMI that is higher than 24.9. In cases like these, BMI is not an accurate measure of body fat.  · To determine " if excess body fat is the cause of a BMI of 25 or higher, further assessments may need to be done by a health care provider.  · BMI is usually interpreted in the same way for men and women.  Why is BMI a useful tool?  BMI is useful in two ways:  · Identifying a weight problem that may be related to a medical condition, or that may increase the risk for medical problems.  · Promoting lifestyle and diet changes in order to reach a healthy weight.  Summary  · Body mass index (BMI) is a number that is calculated from a person's weight and height.  · BMI may help to estimate how much of a person's weight is composed of fat. BMI can help identify those who may be at higher risk for certain medical problems.  · BMI can be measured using English measurements or metric measurements.  · To interpret your results, your health care provider will use BMI charts to identify whether you are underweight, normal weight, overweight, or obese.  This information is not intended to replace advice given to you by your health care provider. Make sure you discuss any questions you have with your health care provider.  Document Released: 08/29/2005 Document Revised: 10/31/2018 Document Reviewed: 10/31/2018  [employer] Interactive Patient Education © 2019 [employer]    Coronary Artery Disease, Male    Coronary artery disease (CAD) is a condition in which the arteries that lead to the heart (coronary arteries) become narrow or blocked. The narrowing or blockage can lead to decreased blood flow to the heart. Prolonged reduced blood flow can cause a heart attack (myocardial infarction or MI). This condition may also be called coronary heart disease.  Because CAD is the leading cause of death in men, it is important to understand what causes this condition and how it is treated.  What are the causes?  CAD is most often caused by atherosclerosis. This is the buildup of fat and cholesterol (plaque) on the inside of the arteries. Over time, the  plaque may narrow or block the artery, reducing blood flow to the heart. Plaque can also become weak and break off within a coronary artery and cause a sudden blockage. Other less common causes of CAD include:  · An embolism or blood clot in a coronary artery.  · A tearing of the artery (spontaneous coronary artery dissection).  · An aneurysm.  · Inflammation (vasculitis) in the artery wall.  What increases the risk?  The following factors may make you more likely to develop this condition:  · Age. Men over age 45 are at a greater risk of CAD.  · Family history of CAD.  · Gender. Men often develop CAD earlier in life than women.  · High blood pressure (hypertension).  · Diabetes.  · High cholesterol levels.  · Tobacco use.  · Excessive alcohol use.  · Lack of exercise.  · A diet high in saturated and trans fats, such as fried food and processed meat.  Other possible risk factors include:  · High stress levels.  · Depression.  · Obesity.  · Sleep apnea.  What are the signs or symptoms?  Many people do not have any symptoms during the early stages of CAD. As the condition progresses, symptoms may include:  · Chest pain (angina). The pain can:  ? Feel like a crushing or squeezing, or a tightness, pressure, fullness, or heaviness in the chest.  ? Last more than a few minutes or can stop and recur. The pain tends to get worse with exercise or stress and to fade with rest.  · Pain in the arms, neck, jaw, or back.  · Unexplained heartburn or indigestion.  · Shortness of breath.  · Nausea or vomiting.  · Sudden light-headedness.  · Sudden cold sweats.  · Fluttering or fast heartbeat (palpitations).  How is this diagnosed?  This condition is diagnosed based on:  · Your family and medical history.  · A physical exam.  · Tests, including:  ? A test to check the electrical signals in your heart (electrocardiogram).  ? Exercise stress test. This looks for signs of blockage when the heart is stressed with exercise, such as  running on a treadmill.  ? Pharmacologic stress test. This test looks for signs of blockage when the heart is being stressed with a medicine.  ? Blood tests.  ? Coronary angiogram. This is a procedure to look at the coronary arteries to see if there is any blockage. During this test, a dye is injected into your arteries so they appear on an X-ray.  ? A test that uses sound waves to take a picture of your heart (echocardiogram).  ? Chest X-ray.  How is this treated?  This condition may be treated by:  · Healthy lifestyle changes to reduce risk factors.  · Medicines such as:  ? Antiplatelet medicines and blood-thinning medicines, such as aspirin. These help to prevent blood clots.  ? Nitroglycerin.  ? Blood pressure medicines.  ? Cholesterol-lowering medicine.  · Coronary angioplasty and stenting. During this procedure, a thin, flexible tube is inserted through a blood vessel and into a blocked artery. A balloon or similar device on the end of the tube is inflated to open up the artery. In some cases, a small, mesh tube (stent) is inserted into the artery to keep it open.  · Coronary artery bypass surgery. During this surgery, veins or arteries from other parts of the body are used to create a bypass around the blockage and allow blood to reach your heart.  Follow these instructions at home:  Medicines  · Take over-the-counter and prescription medicines only as told by your health care provider.  · Do not take the following medicines unless your health care provider approves:  ? NSAIDs, such as ibuprofen, naproxen, or celecoxib.  ? Vitamin supplements that contain vitamin A, vitamin E, or both.  Lifestyle  · Follow an exercise program approved by your health care provider. Aim for 150 minutes of moderate exercise or 75 minutes of vigorous exercise each week.  · Maintain a healthy weight or lose weight as approved by your health care provider.  · Rest when you are tired.  · Learn to manage stress or try to limit your  stress. Ask your health care provider for suggestions if you need help.  · Get screened for depression and seek treatment, if needed.  · Do not use any products that contain nicotine or tobacco, such as cigarettes and e-cigarettes. If you need help quitting, ask your health care provider.  · Do not use illegal drugs.  Eating and drinking  · Follow a heart-healthy diet. A dietitian can help educate you about healthy food options and changes. In general, eat plenty of fruits and vegetables, lean meats, and whole grains.  · Avoid foods high in:  ? Sugar.  ? Salt (sodium).  ? Saturated fat, such as processed or fatty meat.  ? Trans fat, such as fried foods.  · Use healthy cooking methods such as roasting, grilling, broiling, baking, poaching, steaming, or stir-frying.  · If you drink alcohol, and your health care provider approves, limit your alcohol intake to no more than 2 drinks per day. One drink equals 12 ounces of beer, 5 ounces of wine, or 1½ ounces of hard liquor.  General instructions  · Manage any other health conditions, such as hypertension and diabetes. These conditions affect your heart.  · Your health care provider may ask you to monitor your blood pressure. Ideally, your blood pressure should be below 130/80.  · Keep all follow-up visits as told by your health care provider. This is important.  Get help right away if:  · You have pain in your chest, neck, arm, jaw, stomach, or back that:  ? Lasts more than a few minutes.  ? Is recurring.  ? Is not relieved by taking medicine under your tongue (sublingualnitroglycerin).  · You have too much (profuse) sweating without cause.  · You have unexplained:  ? Heartburn or indigestion.  ? Shortness of breath or difficulty breathing.  ? Fluttering or fast heartbeat (palpitations).  ? Nausea or vomiting.  ? Fatigue.  ? Feelings of nervousness or anxiety.  ? Weakness.  ? Diarrhea.  · You have sudden light-headedness or dizziness.  · You faint.  · You feel like  "hurting yourself or think about taking your own life.  These symptoms may represent a serious problem that is an emergency. Do not wait to see if the symptoms will go away. Get medical help right away. Call your local emergency services (911 in the [place] Do not drive yourself to the hospital.  Summary  · Coronary artery disease (CAD) is a process in which the arteries that lead to the heart (coronary arteries) become narrow or blocked. The narrowing or blockage can lead to a heart attack.  · Many people do not have any symptoms during the early stages of CAD. This is called \"silent CAD.\"  · CAD can be treated with lifestyle changes, medicines, surgery, or a combination of these treatments.  This information is not intended to replace advice given to you by your health care provider. Make sure you discuss any questions you have with your health care provider.  Document Released: 07/15/2015 Document Revised: 12/08/2017 Document Reviewed: 12/08/2017  [employer] Interactive Patient Education © 2019 [employer]    "

## 2020-05-20 ENCOUNTER — OFFICE VISIT (OUTPATIENT)
Dept: CARDIOLOGY | Facility: CLINIC | Age: 65
End: 2020-05-20

## 2020-05-20 ENCOUNTER — LAB (OUTPATIENT)
Dept: CARDIOLOGY | Facility: CLINIC | Age: 65
End: 2020-05-20

## 2020-05-20 VITALS
WEIGHT: 201 LBS | TEMPERATURE: 97.3 F | BODY MASS INDEX: 32.3 KG/M2 | HEIGHT: 66 IN | OXYGEN SATURATION: 96 % | DIASTOLIC BLOOD PRESSURE: 64 MMHG | SYSTOLIC BLOOD PRESSURE: 129 MMHG | HEART RATE: 80 BPM

## 2020-05-20 DIAGNOSIS — E78.5 HYPERLIPIDEMIA, UNSPECIFIED HYPERLIPIDEMIA TYPE: ICD-10-CM

## 2020-05-20 DIAGNOSIS — I73.9 PVD (PERIPHERAL VASCULAR DISEASE) (HCC): ICD-10-CM

## 2020-05-20 DIAGNOSIS — Z00.00 HEALTHCARE MAINTENANCE: ICD-10-CM

## 2020-05-20 DIAGNOSIS — I47.1 PSVT (PAROXYSMAL SUPRAVENTRICULAR TACHYCARDIA) (HCC): ICD-10-CM

## 2020-05-20 DIAGNOSIS — G47.33 OSA (OBSTRUCTIVE SLEEP APNEA): ICD-10-CM

## 2020-05-20 DIAGNOSIS — R06.02 SHORTNESS OF BREATH: ICD-10-CM

## 2020-05-20 DIAGNOSIS — I25.119 CORONARY ARTERY DISEASE INVOLVING NATIVE CORONARY ARTERY OF NATIVE HEART WITH ANGINA PECTORIS (HCC): Primary | ICD-10-CM

## 2020-05-20 DIAGNOSIS — I10 ESSENTIAL HYPERTENSION: ICD-10-CM

## 2020-05-20 LAB
ANION GAP SERPL CALCULATED.3IONS-SCNC: 18.6 MMOL/L (ref 5–15)
BUN BLD-MCNC: 18 MG/DL (ref 8–23)
BUN/CREAT SERPL: 14.6 (ref 7–25)
CALCIUM SPEC-SCNC: 10.3 MG/DL (ref 8.6–10.5)
CHLORIDE SERPL-SCNC: 98 MMOL/L (ref 98–107)
CO2 SERPL-SCNC: 17.4 MMOL/L (ref 22–29)
CREAT BLD-MCNC: 1.23 MG/DL (ref 0.76–1.27)
GFR SERPL CREATININE-BSD FRML MDRD: 59 ML/MIN/1.73
GLUCOSE BLD-MCNC: 186 MG/DL (ref 65–99)
MAGNESIUM SERPL-MCNC: 1.8 MG/DL (ref 1.6–2.4)
NT-PROBNP SERPL-MCNC: 31.6 PG/ML (ref 5–900)
POTASSIUM BLD-SCNC: 4.5 MMOL/L (ref 3.5–5.2)
SODIUM BLD-SCNC: 134 MMOL/L (ref 136–145)

## 2020-05-20 PROCEDURE — 93000 ELECTROCARDIOGRAM COMPLETE: CPT | Performed by: NURSE PRACTITIONER

## 2020-05-20 PROCEDURE — 80048 BASIC METABOLIC PNL TOTAL CA: CPT | Performed by: NURSE PRACTITIONER

## 2020-05-20 PROCEDURE — 99214 OFFICE O/P EST MOD 30 MIN: CPT | Performed by: NURSE PRACTITIONER

## 2020-05-20 PROCEDURE — 36415 COLL VENOUS BLD VENIPUNCTURE: CPT

## 2020-05-20 PROCEDURE — 83735 ASSAY OF MAGNESIUM: CPT | Performed by: NURSE PRACTITIONER

## 2020-05-20 PROCEDURE — 83880 ASSAY OF NATRIURETIC PEPTIDE: CPT | Performed by: NURSE PRACTITIONER

## 2020-05-20 NOTE — PATIENT INSTRUCTIONS
Advance Care Planning and Advance Directives     You make decisions on a daily basis - decisions about where you want to live, your career, your home, your life. Perhaps one of the most important decisions you face is your choice for future medical care. Take time to talk with your family and your healthcare team and start planning today.  Advance Care Planning is a process that can help you:  · Understand possible future healthcare decisions in light of your own experiences  · Reflect on those decision in light of your goals and values  · Discuss your decisions with those closest to you and the healthcare professionals that care for you  · Make a plan by creating a document that reflects your wishes    Surrogate Decision Maker  In the event of a medical emergency, which has left you unable to communicate or to make your own decisions, you would need someone to make decisions for you.  It is important to discuss your preferences for medical treatment with this person while you are in good health.     Qualities of a surrogate decision maker:  • Willing to take on this role and responsibility  • Knows what you want for future medical care  • Willing to follow your wishes even if they don't agree with them  • Able to make difficult medical decisions under stressful circumstances    Advance Directives  These are legal documents you can create that will guide your healthcare team and decision maker(s) when needed. These documents can be stored in the electronic medical record.    · Living Will - a legal document to guide your care if you have a terminal condition or a serious illness and are unable to communicate. States vary by statute in document names/types, but most forms may include one or more of the following:        -  Directions regarding life-prolonging treatments        -  Directions regarding artificially provided nutrition/hydration        -  Choosing a healthcare decision maker        -  Direction  regarding organ/tissue donation    · Durable Power of  for Healthcare - this document names an -in-fact to make medical decisions for you, but it may also allow this person to make personal and financial decisions for you. Please seek the advice of an  if you need this type of document.    **Advance Directives are not required and no one may discriminate against you if you do not sign one.    Medical Orders  Many states allow specific forms/orders signed by your physician to record your wishes for medical treatment in your current state of health. This form, signed in personal communication with your physician, addresses resuscitation and other medical interventions that you may or may not want.      For more information or to schedule a time with a [facility] Advance Care Planning Facilitator contact: [facility] or call [phone] and someone will contact you directly.  BMI for Adults    Body mass index (BMI) is a number that is calculated from a person's weight and height. BMI may help to estimate how much of a person's weight is composed of fat. BMI can help identify those who may be at higher risk for certain medical problems.  How is BMI used with adults?  BMI is used as a screening tool to identify possible weight problems. It is used to check whether a person is obese, overweight, healthy weight, or underweight.  How is BMI calculated?  BMI measures your weight and compares it to your height. This can be done either in English ([place] or metric measurements. Note that charts are available to help you find your BMI quickly and easily without having to do these calculations yourself.  To calculate your BMI in English ([place] measurements, your health care provider will:  1. Measure your weight in pounds (lb).  2. Multiply the number of pounds by 703.  ? For example, for a person who weighs 180 lb, multiply that number by 703, which equals 126,540.  3. Measure your height  "in inches (in). Then multiply that number by itself to get a measurement called \"inches squared.\"  ? For example, for a person who is 70 in tall, the \"inches squared\" measurement is 70 in x 70 in, which equals 4900 inches squared.  4. Divide the total from Step 2 (number of lb x 703) by the total from Step 3 (inches squared): 126,540 ÷ 4900 = 25.8. This is your BMI.  To calculate your BMI in metric measurements, your health care provider will:  1. Measure your weight in kilograms (kg).  2. Measure your height in meters (m). Then multiply that number by itself to get a measurement called \"meters squared.\"  ? For example, for a person who is 1.75 m tall, the \"meters squared\" measurement is 1.75 m x 1.75 m, which is equal to 3.1 meters squared.  3. Divide the number of kilograms (your weight) by the meters squared number. In this example: 70 ÷ 3.1 = 22.6. This is your BMI.  How is BMI interpreted?  To interpret your results, your health care provider will use BMI charts to identify whether you are underweight, normal weight, overweight, or obese. The following guidelines will be used:  · Underweight: BMI less than 18.5.  · Normal weight: BMI between 18.5 and 24.9.  · Overweight: BMI between 25 and 29.9.  · Obese: BMI of 30 and above.  Please note:  · Weight includes both fat and muscle, so someone with a muscular build, such as an athlete, may have a BMI that is higher than 24.9. In cases like these, BMI is not an accurate measure of body fat.  · To determine if excess body fat is the cause of a BMI of 25 or higher, further assessments may need to be done by a health care provider.  · BMI is usually interpreted in the same way for men and women.  Why is BMI a useful tool?  BMI is useful in two ways:  · Identifying a weight problem that may be related to a medical condition, or that may increase the risk for medical problems.  · Promoting lifestyle and diet changes in order to reach a healthy weight.  Summary  · Body " mass index (BMI) is a number that is calculated from a person's weight and height.  · BMI may help to estimate how much of a person's weight is composed of fat. BMI can help identify those who may be at higher risk for certain medical problems.  · BMI can be measured using English measurements or metric measurements.  · To interpret your results, your health care provider will use BMI charts to identify whether you are underweight, normal weight, overweight, or obese.  This information is not intended to replace advice given to you by your health care provider. Make sure you discuss any questions you have with your health care provider.  Document Released: 08/29/2005 Document Revised: 10/31/2018 Document Reviewed: 10/31/2018  [employer] Interactive Patient Education © 2020 [employer]    Advance Directive    Advance directives are legal documents that let you make choices ahead of time about your health care and medical treatment in case you become unable to communicate for yourself. Advance directives are a way for you to communicate your wishes to family, friends, and health care providers. This can help convey your decisions about end-of-life care if you become unable to communicate.  Discussing and writing advance directives should happen over time rather than all at once. Advance directives can be changed depending on your situation and what you want, even after you have signed the advance directives.  If you do not have an advance directive, some states assign family decision makers to act on your behalf based on how closely you are related to them. Each state has its own laws regarding advance directives. You may want to check with your health care provider, , or state representative about the laws in your state. There are different types of advance directives, such as:  · Medical power of .  · Living will.  · Do not resuscitate (DNR) or do not attempt resuscitation (DNAR) order.  Health care  proxy and medical power of   A health care proxy, also called a health care agent, is a person who is appointed to make medical decisions for you in cases in which you are unable to make the decisions yourself. Generally, people choose someone they know well and trust to represent their preferences. Make sure to ask this person for an agreement to act as your proxy. A proxy may have to exercise judgment in the event of a medical decision for which your wishes are not known.  A medical power of  is a legal document that names your health care proxy. Depending on the laws in your state, after the document is written, it may also need to be:  · Signed.  · Notarized.  · Dated.  · Copied.  · Witnessed.  · Incorporated into your medical record.  You may also want to appoint someone to manage your financial affairs in a situation in which you are unable to do so. This is called a durable power of  for finances. It is a separate legal document from the durable power of  for health care. You may choose the same person or someone different from your health care proxy to act as your agent in financial matters.  If you do not appoint a proxy, or if there is a concern that the proxy is not acting in your best interests, a court-appointed guardian may be designated to act on your behalf.  Living will  A living will is a set of instructions documenting your wishes about medical care when you cannot express them yourself. Health care providers should keep a copy of your living will in your medical record. You may want to give a copy to family members or friends. To alert caregivers in case of an emergency, you can place a card in your wallet to let them know that you have a living will and where they can find it. A living will is used if you become:  · Terminally ill.  · Incapacitated.  · Unable to communicate or make decisions.  Items to consider in your living will include:  · The use or non-use of  life-sustaining equipment, such as dialysis machines and breathing machines (ventilators).  · A DNR or DNAR order, which is the instruction not to use cardiopulmonary resuscitation (CPR) if breathing or heartbeat stops.  · The use or non-use of tube feeding.  · Withholding of food and fluids.  · Comfort (palliative) care when the goal becomes comfort rather than a cure.  · Organ and tissue donation.  A living will does not give instructions for distributing your money and property if you should pass away. It is recommended that you seek the advice of a  when writing a will. Decisions about taxes, beneficiaries, and asset distribution will be legally binding. This process can relieve your family and friends of any concerns surrounding disputes or questions that may come up about the distribution of your assets.  DNR or DNAR  A DNR or DNAR order is a request not to have CPR in the event that your heart stops beating or you stop breathing. If a DNR or DNAR order has not been made and shared, a health care provider will try to help any patient whose heart has stopped or who has stopped breathing. If you plan to have surgery, talk with your health care provider about how your DNR or DNAR order will be followed if problems occur.  Summary  · Advance directives are the legal documents that allow you to make choices ahead of time about your health care and medical treatment in case you become unable to communicate for yourself.  · The process of discussing and writing advance directives should happen over time. You can change the advance directives, even after you have signed them.  · Advance directives include DNR or DNAR orders, living [place], and designating an agent as your medical power of .  This information is not intended to replace advice given to you by your health care provider. Make sure you discuss any questions you have with your health care provider.  Document Released: 03/26/2009 Document  Revised: 11/06/2017 Document Reviewed: 11/06/2017  Elsevier Interactive Patient Education © 2020 [employer]

## 2020-05-20 NOTE — PROGRESS NOTES
Subjective   Mike Gusman is a 65 y.o. male     Chief Complaint   Patient presents with   • Coronary Artery Disease   • Hypertension       HPI    Problem List:    1. CAD stent x1 somewhere between 2004 and 2006 in [place]  1.1 Stress Test 9/1613 - normal SPECT imaging without inducible chest pain or ECG abnormalities.  Normal regional and global left ventricular systolic function.    1.2 Stress Test 4/4/17 - no ischemia; low risk  1.3 CT chest 8/5/19-severe atherosclerotic plaque formation throughout the coronary arteries, aorta has mild atherosclerotic plaque formation without aneurysmal dilation, interstitial fibrosis with underlying emphysema, fatty liver  1.4 stress test 9/3/19-no evidence of ischemia, post-rest EF 60%  1.5 left heart cath 10/22/19-2's marginal small-caliber 70% proximal narrowing, obtuse marginal terminal 50 to 70%, small limb of the LAD 50 to 70%, EF 60%, LVEDP 22-24  2. Hypertension  3. Hyperlipidemia   4. Viral Pericarditis 2009  5. Aortic valve disorder  6. PVD   6.1 Carotid Artery U/S 7/29/13 - no sig stenosis or occlusion in either carotid system  6.2 Carotid Artery U/S 3/15/17 - 16-49% DEXTER and LICA; antegrade flow bilaterally   6.2 JESSICA 3/7/14 - mild changes consistent with known PVD of BLE; probably diffuse   6.2 MRI BLE 4/7/14 - diffuse narrowing of the right common iliac and right external iliac artery; high grade focal stenosis of the left common iliac artery   6.3 H/O left common iliac artery stenting   6.4 carotid artery ultrasound 9/3/19-16 to 49% stenosis in the bulb and bifurcation bilaterally, 16 to 49% stenosis mid right internal carotid artery and length of the left internal carotid artery, antegrade flow both vertebral arteries  7. Shortness of breath  7.1 Echo 8/29/13 - EF 60-65%: mild MR, trace TR; mild to mod aortic sclerosis without stenosis; trace AR; trace AK  7.2 Echo 4/4/17 - mild LVH; EF 60-65%; DD II; mild MR  7.3 echo 9/3/19 -diastolic dysfunction 2, mild to  moderate left atrial enlargement, mild to moderate MR  8. H/O back surgery; per report   9. DM II  10. GERD  11. BPH   12. Brain Bleed 11/2011 s/p fall   13. ADRIANA - non compliant CPAP   14. Emphysema/Asthma   15. PSVT  15.1 Event Monitor 5/9-5/22/17 - NSR - ST with one episode of PSVT  16. Moderate periventricular and subcortical microangiopathy  16.1 MRI of the brain/MRI of orbitis 5/16/18-moderate.  Moderate periventricular and subcortical microangiopathic  17.  Emphysema/COPD, [facility]    Patient is a 65-year-old male who presents today for a follow-up.  He denies any chest pain, pressure, palpitations, fluttering, dizziness, presyncope, syncope, orthopnea, PND or edema.  He says he feels like his shortness of breath is gotten worse with about the past month.  He says he is easily fatigued as well.  He does not use his diuretic.    Current Outpatient Medications on File Prior to Visit   Medication Sig Dispense Refill   • aspirin 81 MG tablet Take 1 tablet by mouth daily. 30 tablet 11   • busPIRone (BUSPAR) 15 MG tablet Take 15 mg by mouth As Needed. 1.5 tabs BID     • donepezil (ARICEPT) 10 MG tablet Take 10 mg by mouth every night.     • finasteride (PROSCAR) 5 MG tablet Take 5 mg by mouth daily.     • furosemide (LASIX) 20 MG tablet Take 1 tablet by mouth Daily As Needed (edema). 90 tablet 3   • gabapentin (NEURONTIN) 600 MG tablet Take 600 mg by mouth 3 (three) times a day.     • glipiZIDE (GLUCOTROL) 5 MG tablet Take 5 mg by mouth 2 (Two) Times a Day Before Meals.     • insulin glargine (LANTUS) 100 UNIT/ML injection Inject 10 Units under the skin into the appropriate area as directed Daily.     • magnesium oxide (MAGOX) 400 (241.3 MG) MG tablet tablet Take 400 mg by mouth daily.     • memantine (NAMENDA) 5 MG tablet Take 5 mg by mouth 2 (two) times a day.     • metoprolol succinate XL (TOPROL-XL) 25 MG 24 hr tablet Take 1 tablet by mouth Daily. 90 tablet 3   • mirtazapine (REMERON) 30 MG tablet  Take 30 mg by mouth Every Night.     • nitroglycerin (NITROSTAT) 0.4 MG SL tablet 1 under the tongue as needed for angina, may repeat q5mins for up three doses 30 tablet 3   • nortriptyline (PAMELOR) 50 MG capsule Take 50 mg by mouth Every Night.     • omeprazole (PriLOSEC) 40 MG capsule Take 40 mg by mouth daily.     • promethazine (PHENERGAN) 25 MG tablet Take 25 mg by mouth Every 6 (Six) Hours As Needed for Nausea or Vomiting. prn  0   • ramipril (ALTACE) 10 MG capsule Take 10 mg by mouth daily.     • rosuvastatin (CRESTOR) 40 MG tablet Take 1 tablet by mouth Every Night. 90 tablet 3   • SITagliptin-MetFORMIN HCl ER (JANUMET XR) 100-1000 MG tablet sustained-release 24 hour Take  by mouth Every Night.     • tamsulosin (FLOMAX) 0.4 MG capsule 24 hr capsule Take 1 capsule by mouth every night.       No current facility-administered medications on file prior to visit.        ALLERGIES    Patient has no known allergies.    Past Medical History:   Diagnosis Date   • COPD (chronic obstructive pulmonary disease) (CMS/[facility])    • GERD (gastroesophageal reflux disease)    • Hyperlipidemia    • Hypertension    • Stroke (CMS/[facility])        Social History     Socioeconomic History   • Marital status:      Spouse name: Not on file   • Number of children: Not on file   • Years of education: Not on file   • Highest education level: Not on file   Tobacco Use   • Smoking status: Former Smoker   • Smokeless tobacco: Never Used   Substance and Sexual Activity   • Alcohol use: No   • Drug use: No   • Sexual activity: Defer       Family History   Problem Relation Age of Onset   • Heart failure Mother    • Heart disease Father    • Heart failure Father        Review of Systems   Constitutional: Positive for fatigue (easily fatigued ; worse with exertion ). Negative for fever.   HENT: Negative for congestion, rhinorrhea and sneezing.    Eyes: Positive for visual disturbance (wears glasses PRN).   Respiratory: Positive for shortness of  "breath (easily SOA ; worse on exertion; getting more freq for about 1 mos ). Negative for cough, chest tightness and wheezing.    Cardiovascular: Negative for chest pain, palpitations and leg swelling.   Gastrointestinal: Negative for abdominal pain, blood in stool, constipation, diarrhea, nausea and vomiting.   Endocrine: Negative for cold intolerance and heat intolerance.   Genitourinary: Negative for difficulty urinating, frequency, hematuria and urgency.   Musculoskeletal: Positive for back pain (back pain ; previous surgeries) and gait problem (uses walking cane). Negative for arthralgias, neck pain and neck stiffness.   Skin: Negative for rash and wound.   Allergic/Immunologic: Negative for environmental allergies and food allergies.   Neurological: Negative for dizziness, syncope, weakness, light-headedness and numbness.   Hematological: Bruises/bleeds easily (bruises and bleeds easily).   Psychiatric/Behavioral: Positive for agitation (easily agitated). Negative for confusion and sleep disturbance (denies waking up smothering/SOA). The patient is nervous/anxious (easily anxious).        Objective   /64 (BP Location: Left arm, Patient Position: Sitting)   Pulse 80   Temp 97.3 °F (36.3 °C)   Ht 167.6 cm (66\")   Wt 91.2 kg (201 lb)   SpO2 96%   BMI 32.44 kg/m²   Vitals:    05/20/20 0847   BP: 129/64   BP Location: Left arm   Patient Position: Sitting   Pulse: 80   Temp: 97.3 °F (36.3 °C)   SpO2: 96%   Weight: 91.2 kg (201 lb)   Height: 167.6 cm (66\")      Lab Results (most recent)     None        Physical Exam   Constitutional: He is oriented to person, place, and time. Vital signs are normal. He appears well-developed and well-nourished. He is active and cooperative.   HENT:   Head: Normocephalic.   Eyes: Lids are normal.   Neck: Normal carotid pulses, no hepatojugular reflux and no JVD present. Carotid bruit is not present.   Cardiovascular: Normal rate, regular rhythm and normal heart sounds.   "   Pulses:       Radial pulses are 2+ on the right side, and 2+ on the left side.        Dorsalis pedis pulses are 2+ on the right side, and 2+ on the left side.        Posterior tibial pulses are 2+ on the right side, and 2+ on the left side.   No edema BLE.   Pulmonary/Chest: Effort normal and breath sounds normal.   Abdominal: Normal appearance and bowel sounds are normal.   Musculoskeletal:   Uses a cane    Neurological: He is alert and oriented to person, place, and time.   Skin: Skin is warm, dry and intact.   Psychiatric: He has a normal mood and affect. His speech is normal and behavior is normal. Judgment and thought content normal. Cognition and memory are normal.       Procedure     ECG 12 Lead  Date/Time: 5/20/2020 9:07 AM  Performed by: Ericka Gamez APRN  Authorized by: Ericka Gamez APRN   Comparison: compared with previous ECG from 11/28/2018  Rhythm: sinus rhythm  Rate: normal  BPM: 78  QRS axis: normal  Other findings: non-specific ST-T wave changes    Clinical impression: non-specific ECG  Comments: Pain stimulator in back                  Assessment/Plan      Diagnosis Plan   1. Coronary artery disease involving native coronary artery of native heart with angina pectoris (CMS/HCC)     2. Essential hypertension  Basic Metabolic Panel   3. Hyperlipidemia, unspecified hyperlipidemia type     4. PSVT (paroxysmal supraventricular tachycardia) (CMS/HCC)  Magnesium   5. PVD (peripheral vascular disease) (CMS/HCC)     6. ADRIANA (obstructive sleep apnea)     7. Shortness of breath  BNP   8. Healthcare maintenance  Magnesium    Basic Metabolic Panel       Return in 6 months (on 11/20/2020).    CAD-patient's on aspirin, beta and statin.  Hypertension-patient's doing very well on metoprolol and ramipril.  Hyperlipidemia-patient's on Crestor monitored by PCP.  PSVT-patient denies any further episodes.  PVD-patient's on aspirin and statin.  ADRIANA-patient is compliant with CPAP.  Shortness of breath-increased  in the last month.  Patient will get a BNP, BMP and mag.  If these come back within normal limits then I will refer him back to [facility] for pulmonary.  He will continue his medication regimen.  He will follow-up in 6 months or sooner if any changes.     Advance Care Planning   ACP discussion was held with the patient during this visit. Patient does not have an advance directive, information provided.    Patient's Body mass index is 32.44 kg/m². BMI is above normal parameters. Recommendations include: educational material and referral to primary care.      Electronically signed by:

## 2020-05-21 ENCOUNTER — TELEPHONE (OUTPATIENT)
Dept: CARDIOLOGY | Facility: CLINIC | Age: 65
End: 2020-05-21

## 2020-05-21 NOTE — TELEPHONE ENCOUNTER
CALLED PATIENT AND REVIEWED LAB RESULTS WITH HIM. NOTIFIED TO BE LOOKING FOR CALL FROM 'S OFFICE AS WELL. PATIENT VERBALIZED UNDERSTANDING AND HAD NO FURTHER QUESTIONS AT THIS TIME. -;[facility]    ---- Message from ELIZABETH Benítez sent at 5/21/2020  7:00 AM EDT -----  Please advise patient.  I did send message to Jamila Main with Dr. Cabral to have them follow-up with him re: his increased shortness of breath since his fluid marker was on the low side of normal, does not appear to be due to heart failure.  BNP   Order: [id number]   Status:  Final result   Visible to patient:  Yes (MyChart) Dx:  Shortness of breath   Specimen Information: Blood        Component  Ref Range & Units 1d ago   proBNP  5.0 - 900.0 pg/mL 31.6    Resulting Agency  [facility]      Narrative   Performed by:  [facility]   Among patients with dyspnea, NT-proBNP is highly sensitive for the detection of acute congestive heart failure. In addition NT-proBNP of <300 pg/ml effectively rules out acute congestive heart failure with 99% negative predictive value.    Results may be falsely decreased if patient taking Biotin.      Specimen Collected: 05/20/20 09:11 Last Resulted: 05/20/20 19:13         Lab Flowsheet       Order Details       View Encounter       Lab and Collection Details       Routing       Result History               Result Notes for Basic Metabolic Panel     Notes recorded by Ericka Gamez APRN on 5/21/2020 at 7:00 AM EDT  Please advise patient.  I did send message to Jamila Main with Dr. Cabral to have them follow-up with him re: his increased shortness of breath since his fluid marker was on the low side of normal, does not appear to be due to heart failure.   Contains abnormal data Basic Metabolic Panel   Order: [id number]   Status:  Final result   Visible to patient:  Yes (MyChart) Dx:  Essential hypertension; Healthcare ma...   Specimen Information: Blood        Component  Ref Range & Units 1d ago 8mo ago   Glucose  65  - 99 mg/dL 186High   290High     BUN  8 - 23 mg/dL 18  13    Creatinine  0.76 - 1.27 mg/dL 1.23  1.14    Sodium  136 - 145 mmol/L 134Low   131Low     Potassium  3.5 - 5.2 mmol/L 4.5  4.6    Chloride  98 - 107 mmol/L 98  95Low     CO2  22.0 - 29.0 mmol/L 17.4Low   19.7Low     Calcium  8.6 - 10.5 mg/dL 10.3  9.7    eGFR Non African Amer  >60 mL/min/1.73 59Low   64    BUN/Creatinine Ratio  7.0 - 25.0 14.6  11.4    Anion Gap  5.0 - 15.0 mmol/L 18.6High   16.3High     Resulting Agency  [facility]  [facility]      Narrative   Performed by:  [facility]   GFR Normal >60  Chronic Kidney Disease <60  Kidney Failure <15      Specimen Collected: 05/20/20 09:11 Last Resulted: 05/20/20 19:03         Lab Flowsheet       Order Details       View Encounter       Lab and Collection Details       Routing       Result History               Result Notes for Magnesium     Notes recorded by Ericka Gamez APRN on 5/21/2020 at 7:00 AM EDT  Please advise patient.  I did send message to Jamila Main with Dr. Cabral to have them follow-up with him re: his increased shortness of breath since his fluid marker was on the low side of normal, does not appear to be due to heart failure.   Magnesium   Order: [id number]   Status:  Final result   Visible to patient:  Yes (MyChart) Dx:  PSVT (paroxysmal supraventricular tac...   Specimen Information: Blood        Component  Ref Range & Units 1d ago   Magnesium  1.6 - 2.4 mg/dL 1.8    Resulting Agency  [facility]         Specimen Collected: 05/20/20 09:11 Last Resulted: 05/20/20 19:03

## 2020-10-22 DIAGNOSIS — E78.5 HYPERLIPIDEMIA, UNSPECIFIED HYPERLIPIDEMIA TYPE: ICD-10-CM

## 2020-10-22 DIAGNOSIS — I25.119 CORONARY ARTERY DISEASE INVOLVING NATIVE CORONARY ARTERY OF NATIVE HEART WITH ANGINA PECTORIS (HCC): ICD-10-CM

## 2020-10-22 RX ORDER — ROSUVASTATIN CALCIUM 40 MG/1
TABLET, COATED ORAL
Qty: 90 TABLET | Refills: 1 | Status: SHIPPED | OUTPATIENT
Start: 2020-10-22 | End: 2021-05-17

## 2020-11-03 ENCOUNTER — OFFICE VISIT (OUTPATIENT)
Dept: CARDIOLOGY | Facility: CLINIC | Age: 65
End: 2020-11-03

## 2020-11-03 VITALS
OXYGEN SATURATION: 97 % | WEIGHT: 203 LBS | HEART RATE: 90 BPM | DIASTOLIC BLOOD PRESSURE: 65 MMHG | HEIGHT: 66 IN | BODY MASS INDEX: 32.62 KG/M2 | SYSTOLIC BLOOD PRESSURE: 114 MMHG

## 2020-11-03 DIAGNOSIS — I47.1 PSVT (PAROXYSMAL SUPRAVENTRICULAR TACHYCARDIA) (HCC): ICD-10-CM

## 2020-11-03 DIAGNOSIS — E78.5 HYPERLIPIDEMIA, UNSPECIFIED HYPERLIPIDEMIA TYPE: ICD-10-CM

## 2020-11-03 DIAGNOSIS — I10 ESSENTIAL HYPERTENSION: ICD-10-CM

## 2020-11-03 DIAGNOSIS — I25.119 CORONARY ARTERY DISEASE INVOLVING NATIVE CORONARY ARTERY OF NATIVE HEART WITH ANGINA PECTORIS (HCC): Primary | ICD-10-CM

## 2020-11-03 DIAGNOSIS — G47.33 OSA (OBSTRUCTIVE SLEEP APNEA): ICD-10-CM

## 2020-11-03 DIAGNOSIS — I73.9 PVD (PERIPHERAL VASCULAR DISEASE) (HCC): ICD-10-CM

## 2020-11-03 DIAGNOSIS — R06.02 SHORTNESS OF BREATH: ICD-10-CM

## 2020-11-03 PROCEDURE — 99214 OFFICE O/P EST MOD 30 MIN: CPT | Performed by: NURSE PRACTITIONER

## 2020-11-03 RX ORDER — OMEGA-3 FATTY ACIDS/FISH OIL 300-1000MG
2000 CAPSULE ORAL DAILY
COMMUNITY

## 2020-11-03 RX ORDER — ASPIRIN 81 MG/1
81 TABLET ORAL DAILY
Qty: 90 TABLET | Refills: 3 | Status: SHIPPED | OUTPATIENT
Start: 2020-11-03 | End: 2020-12-01 | Stop reason: DRUGHIGH

## 2020-11-03 RX ORDER — MELATONIN
1000 DAILY
COMMUNITY

## 2020-11-03 NOTE — PATIENT INSTRUCTIONS
"Fat and Cholesterol Restricted Eating Plan  Getting too much fat and cholesterol in your diet may cause health problems. Choosing the right foods helps keep your fat and cholesterol at normal levels. This can keep you from getting certain diseases.  Your doctor may recommend an eating plan that includes:  · Total fat: ______% or less of total calories a day.  · Saturated fat: ______% or less of total calories a day.  · Cholesterol: less than _________mg a day.  · Fiber: ______g a day.  What are tips for following this plan?  Meal planning  · At meals, divide your plate into four equal parts:  ? Fill one-half of your plate with vegetables and green salads.  ? Fill one-fourth of your plate with whole grains.  ? Fill one-fourth of your plate with low-fat (lean) protein foods.  · Eat fish that is high in omega-3 fats at least two times a week. This includes mackerel, tuna, sardines, and salmon.  · Eat foods that are high in fiber, such as whole grains, beans, apples, broccoli, carrots, peas, and barley.  General tips    · Work with your doctor to lose weight if you need to.  · Avoid:  ? Foods with added sugar.  ? Fried foods.  ? Foods with partially hydrogenated oils.  · Limit alcohol intake to no more than 1 drink a day for nonpregnant women and 2 drinks a day for men. One drink equals 12 oz of beer, 5 oz of wine, or 1½ oz of hard liquor.  Reading food labels  · Check food labels for:  ? Trans fats.  ? Partially hydrogenated oils.  ? Saturated fat (g) in each serving.  ? Cholesterol (mg) in each serving.  ? Fiber (g) in each serving.  · Choose foods with healthy fats, such as:  ? Monounsaturated fats.  ? Polyunsaturated fats.  ? Omega-3 fats.  · Choose grain products that have whole grains. Look for the word \"whole\" as the first word in the ingredient list.  Cooking  · Cook foods using low-fat methods. These include baking, boiling, grilling, and broiling.  · Eat more home-cooked foods. Eat at restaurants and buffets " less often.  · Avoid cooking using saturated fats, such as butter, cream, palm oil, palm kernel oil, and coconut oil.  Recommended foods    Fruits  · All fresh, canned (in natural juice), or frozen fruits.  Vegetables  · Fresh or frozen vegetables (raw, steamed, roasted, or grilled). Green salads.  Grains  · Whole grains, such as whole wheat or whole grain breads, crackers, cereals, and pasta. Unsweetened oatmeal, bulgur, barley, quinoa, or brown rice. Corn or whole wheat flour tortillas.  Meats and other protein foods  · Ground beef (85% or leaner), grass-fed beef, or beef trimmed of fat. Skinless chicken or turkey. Ground chicken or turkey. Pork trimmed of fat. All fish and seafood. Egg whites. Dried beans, peas, or lentils. Unsalted nuts or seeds. Unsalted canned beans. Nut butters without added sugar or oil.  Dairy  · Low-fat or nonfat dairy products, such as skim or 1% milk, 2% or reduced-fat cheeses, low-fat and fat-free ricotta or cottage cheese, or plain low-fat and nonfat yogurt.  Fats and oils  · Tub margarine without trans fats. Light or reduced-fat mayonnaise and salad dressings. Avocado. Olive, canola, sesame, or safflower oils.  The items listed above may not be a complete list of foods and beverages you can eat. Contact a dietitian for more information.  Foods to avoid  Fruits  · Canned fruit in heavy syrup. Fruit in cream or butter sauce. Fried fruit.  Vegetables  · Vegetables cooked in cheese, cream, or butter sauce. Fried vegetables.  Grains  · White bread. White pasta. White rice. Cornbread. Bagels, pastries, and croissants. Crackers and snack foods that contain trans fat and hydrogenated oils.  Meats and other protein foods  · Fatty cuts of meat. Ribs, chicken wings, may, sausage, bologna, salami, chitterlings, fatback, hot dogs, bratwurst, and packaged lunch meats. Liver and organ meats. Whole eggs and egg yolks. Chicken and turkey with skin. Fried meat.  Dairy  · Whole or 2% milk, cream,  half-and-half, and cream cheese. Whole milk cheeses. Whole-fat or sweetened yogurt. Full-fat cheeses. Nondairy creamers and whipped toppings. Processed cheese, cheese spreads, and cheese curds.  Beverages  · Alcohol. Sugar-sweetened drinks such as sodas, lemonade, and fruit drinks.  Fats and oils  · Butter, stick margarine, lard, shortening, ghee, or may fat. Coconut, palm kernel, and palm oils.  Sweets and desserts  · Corn syrup, sugars, honey, and molasses. Candy. Jam and jelly. Syrup. Sweetened cereals. Cookies, pies, cakes, donuts, muffins, and ice cream.  The items listed above may not be a complete list of foods and beverages you should avoid. Contact a dietitian for more information.  Summary  · Choosing the right foods helps keep your fat and cholesterol at normal levels. This can keep you from getting certain diseases.  · At meals, fill one-half of your plate with vegetables and green salads.  · Eat high-fiber foods, like whole grains, beans, apples, carrots, peas, and barley.  · Limit added sugar, saturated fats, alcohol, and fried foods.  This information is not intended to replace advice given to you by your health care provider. Make sure you discuss any questions you have with your health care provider.  Document Released: 06/18/2013 Document Revised: 08/21/2019 Document Reviewed: 09/04/2018  [employer] Patient Education © 2020 [employer]  BMI for Adults  What is BMI?  Body mass index (BMI) is a number that is calculated from a person's weight and height. BMI can help estimate how much of a person's weight is composed of fat. BMI does not measure body fat directly. Rather, it is an alternative to procedures that directly measure body fat, which can be difficult and expensive.  BMI can help identify people who may be at higher risk for certain medical problems.  What are BMI measurements used for?  BMI is used as a screening tool to identify possible weight problems. It helps determine whether a  "person is obese, overweight, a healthy weight, or underweight.  BMI is useful for:  · Identifying a weight problem that may be related to a medical condition or may increase the risk for medical problems.  · Promoting changes, such as changes in diet and exercise, to help reach a healthy weight. BMI screening can be repeated to see if these changes are working.  How is BMI calculated?  BMI involves measuring your weight in relation to your height. Both height and weight are measured, and the BMI is calculated from those numbers. This can be done either in English ([place] or metric measurements. Note that charts and online BMI calculators are available to help you find your BMI quickly and easily without having to do these calculations yourself.  To calculate your BMI in English ([place] measurements:    1. Measure your weight in pounds (lb).  2. Multiply the number of pounds by 703.  ? For example, for a person who weighs 180 lb, multiply that number by 703, which equals 126,540.  3. Measure your height in inches. Then multiply that number by itself to get a measurement called \"inches squared.\"  ? For example, for a person who is 70 inches tall, the \"inches squared\" measurement is 70 inches x 70 inches, which equals 4,900 inches squared.  4. Divide the total from step 2 (number of lb x 703) by the total from step 3 (inches squared): 126,540 ÷ 4,900 = 25.8. This is your BMI.  To calculate your BMI in metric measurements:  1. Measure your weight in kilograms (kg).  2. Measure your height in meters (m). Then multiply that number by itself to get a measurement called \"meters squared.\"  ? For example, for a person who is 1.75 m tall, the \"meters squared\" measurement is 1.75 m x 1.75 m, which is equal to 3.1 meters squared.  3. Divide the number of kilograms (your weight) by the meters squared number. In this example: 70 ÷ 3.1 = 22.6. This is your BMI.  What do the results mean?  BMI charts are used to identify whether you " are underweight, normal weight, overweight, or obese. The following guidelines will be used:  · Underweight: BMI less than 18.5.  · Normal weight: BMI between 18.5 and 24.9.  · Overweight: BMI between 25 and 29.9.  · Obese: BMI of 30 or above.  Keep these notes in mind:  · Weight includes both fat and muscle, so someone with a muscular build, such as an athlete, may have a BMI that is higher than 24.9. In cases like these, BMI is not an accurate measure of body fat.  · To determine if excess body fat is the cause of a BMI of 25 or higher, further assessments may need to be done by a health care provider.  · BMI is usually interpreted in the same way for men and women.  Where to find more information  For more information about BMI, including tools to quickly calculate your BMI, go to these websites:  · Centers for Disease Control and Prevention: www.cdc.gov  · American Heart Association: www.heart.org  · National Heart, Lung, and Blood Strong: www.nhlbi.nih.gov  Summary  · Body mass index (BMI) is a number that is calculated from a person's weight and height.  · BMI may help estimate how much of a person's weight is composed of fat. BMI can help identify those who may be at higher risk for certain medical problems.  · BMI can be measured using English measurements or metric measurements.  · BMI charts are used to identify whether you are underweight, normal weight, overweight, or obese.  This information is not intended to replace advice given to you by your health care provider. Make sure you discuss any questions you have with your health care provider.  Document Released: 08/29/2005 Document Revised: 09/09/2020 Document Reviewed: 07/17/2020  [employer] Patient Education © 2020 [employer]    Coronary Artery Disease, Male  Coronary artery disease (CAD) is a condition in which the arteries that lead to the heart (coronary arteries) become narrow or blocked. The narrowing or blockage can lead to decreased blood  flow to the heart. Prolonged reduced blood flow can cause a heart attack (myocardial infarction or MI). This condition may also be called coronary heart disease.  Because CAD is the leading cause of death in men, it is important to understand what causes this condition and how it is treated.  What are the causes?  CAD is most often caused by atherosclerosis. This is the buildup of fat and cholesterol (plaque) on the inside of the arteries. Over time, the plaque may narrow or block the artery, reducing blood flow to the heart. Plaque can also become weak and break off within a coronary artery and cause a sudden blockage. Other less common causes of CAD include:  · A blood clot or a piece of a blood clot or other substance that blocks the flow of blood in a coronary artery (embolism).  · A tearing of the artery (spontaneous coronary artery dissection).  · An enlargement of an artery (aneurysm).  · Inflammation (vasculitis) in the artery wall.  What increases the risk?  The following factors may make you more likely to develop this condition:  · Age. Men over age 45 are at a greater risk of CAD.  · Family history of CAD.  · Gender. Men often develop CAD earlier in life than women.  · High blood pressure (hypertension).  · Diabetes.  · High cholesterol levels.  · Tobacco use.  · Excessive alcohol use.  · Lack of exercise.  · A diet high in saturated and trans fats, such as fried food and processed meat.  Other possible risk factors include:  · High stress levels.  · Depression.  · Obesity.  · Sleep apnea.  What are the signs or symptoms?  Many people do not have any symptoms during the early stages of CAD. As the condition progresses, symptoms may include:  · Chest pain (angina). The pain can:  ? Feel like crushing or squeezing, or like a tightness, pressure, fullness, or heaviness in the chest.  ? Last more than a few minutes or can stop and recur. The pain tends to get worse with exercise or stress and to fade with  rest.  · Pain in the arms, neck, jaw, ear, or back.  · Unexplained heartburn or indigestion.  · Shortness of breath.  · Nausea or vomiting.  · Sudden light-headedness.  · Sudden cold sweats.  · Fluttering or fast heartbeat (palpitations).  How is this diagnosed?  This condition is diagnosed based on:  · Your family and medical history.  · A physical exam.  · Tests, including:  ? A test to check the electrical signals in your heart (electrocardiogram).  ? Exercise stress test. This looks for signs of blockage when the heart is stressed with exercise, such as running on a treadmill.  ? Pharmacologic stress test. This test looks for signs of blockage when the heart is being stressed with a medicine.  ? Blood tests.  ? Coronary angiogram. This is a procedure to look at the coronary arteries to see if there is any blockage. During this test, a dye is injected into your arteries so they appear on an X-ray.  ? Coronary artery CT scan. This CT scan helps detect calcium deposits in your coronary arteries. Calcium deposits are an indicator of CAD.  ? A test that uses sound waves to take a picture of your heart (echocardiogram).  ? Chest X-ray.  How is this treated?  This condition may be treated by:  · Healthy lifestyle changes to reduce risk factors.  · Medicines such as:  ? Antiplatelet medicines and blood-thinning medicines, such as aspirin. These help to prevent blood clots.  ? Nitroglycerin.  ? Blood pressure medicines.  ? Cholesterol-lowering medicine.  · Coronary angioplasty and stenting. During this procedure, a thin, flexible tube is inserted through a blood vessel and into a blocked artery. A balloon or similar device on the end of the tube is inflated to open up the artery. In some cases, a small, mesh tube (stent) is inserted into the artery to keep it open.  · Coronary artery bypass surgery. During this surgery, veins or arteries from other parts of the body are used to create a bypass around the blockage and  allow blood to reach your heart.  Follow these instructions at home:  Medicines  · Take over-the-counter and prescription medicines only as told by your health care provider.  · Do not take the following medicines unless your health care provider approves:  ? NSAIDs, such as ibuprofen, naproxen, or celecoxib.  ? Vitamin supplements that contain vitamin A, vitamin E, or both.  Lifestyle  · Follow an exercise program approved by your health care provider. Aim for 150 minutes of moderate exercise or 75 minutes of vigorous exercise each week.  · Maintain a healthy weight or lose weight as approved by your health care provider.  · Learn to manage stress or try to limit your stress. Ask your health care provider for suggestions if you need help.  · Get screened for depression and seek treatment, if needed.  · Do not use any products that contain nicotine or tobacco, such as cigarettes, e-cigarettes, and chewing tobacco. If you need help quitting, ask your health care provider.  · Do not use illegal drugs.  Eating and drinking    · Follow a heart-healthy diet. A dietitian can help educate you about healthy food options and changes. In general, eat plenty of fruits and vegetables, lean meats, and whole grains.  · Avoid foods high in:  ? Sugar.  ? Salt (sodium).  ? Saturated fat, such as processed or fatty meat.  ? Trans fat, such as fried foods.  · Use healthy cooking methods such as roasting, grilling, broiling, baking, poaching, steaming, or stir-frying.  · Do not drink alcohol if your health care provider tells you not to drink.  · If you drink alcohol:  ? Limit how much you have to 0-2 drinks per day.  ? Be aware of how much alcohol is in your drink. In the [place] one drink equals one 12 oz bottle of beer (355 mL), one 5 oz glass of wine (148 mL), or one 1½ oz glass of hard liquor (44 mL).  General instructions  · Manage any other health conditions, such as hypertension and diabetes. These conditions affect your  heart.  · Your health care provider may ask you to monitor your blood pressure. Ideally, your blood pressure should be below 130/80.  · Keep all follow-up visits as told by your health care provider. This is important.  Get help right away if:  · You have pain in your chest, neck, ear, arm, jaw, stomach, or back that:  ? Lasts more than a few minutes.  ? Is recurring.  ? Is not relieved by taking medicine under your tongue (sublingual nitroglycerin).  · You have profuse sweating without cause.  · You have unexplained:  ? Heartburn or indigestion.  ? Shortness of breath or difficulty breathing.  ? Fluttering or fast heartbeat (palpitations).  ? Nausea or vomiting.  ? Fatigue.  ? Feelings of nervousness or anxiety.  ? Weakness.  ? Diarrhea.  · You have sudden light-headedness or dizziness.  · You faint.  · You feel like hurting yourself or think about taking your own life.  These symptoms may represent a serious problem that is an emergency. Do not wait to see if the symptoms will go away. Get medical help right away. Call your local emergency services (911 in the [place] Do not drive yourself to the hospital.  Summary  · Coronary artery disease (CAD) is a condition in which the arteries that lead to the heart (coronary arteries) become narrow or blocked. The narrowing or blockage can lead to a heart attack.  · Many people do not have any symptoms during the early stages of CAD.  · CAD can be treated with lifestyle changes, medicines, surgery, or a combination of these treatments.  This information is not intended to replace advice given to you by your health care provider. Make sure you discuss any questions you have with your health care provider.  Document Released: 07/15/2015 Document Revised: 09/06/2019 Document Reviewed: 08/27/2019  [employer] Patient Education © 2020 [employer]

## 2020-11-03 NOTE — PROGRESS NOTES
Subjective   Mike Gusman is a 65 y.o. male     Chief Complaint   Patient presents with   • Follow-up     Here for a 6 month follow up       HPI    Problem List:    1. CAD stent x1 somewhere between 2004 and 2006 in [place]  1.1 Stress Test 9/1613 - normal SPECT imaging without inducible chest pain or ECG abnormalities.  Normal regional and global left ventricular systolic function.    1.2 Stress Test 4/4/17 - no ischemia; low risk  1.3 CT chest 8/5/19-severe atherosclerotic plaque formation throughout the coronary arteries, aorta has mild atherosclerotic plaque formation without aneurysmal dilation, interstitial fibrosis with underlying emphysema, fatty liver  1.4 stress test 9/3/19-no evidence of ischemia, post-rest EF 60%  1.5 left heart cath 10/22/19-2's marginal small-caliber 70% proximal narrowing, obtuse marginal terminal 50 to 70%, small limb of the LAD 50 to 70%, EF 60%, LVEDP 22-24  2. Hypertension  3. Hyperlipidemia   4. Viral Pericarditis 2009  5. Aortic valve disorder  6. PVD   6.1 Carotid Artery U/S 7/29/13 - no sig stenosis or occlusion in either carotid system  6.2 Carotid Artery U/S 3/15/17 - 16-49% DEXTER and LICA; antegrade flow bilaterally   6.2 JESSICA 3/7/14 - mild changes consistent with known PVD of BLE; probably diffuse   6.2 MRI BLE 4/7/14 - diffuse narrowing of the right common iliac and right external iliac artery; high grade focal stenosis of the left common iliac artery   6.3 H/O left common iliac artery stenting   6.4 carotid artery ultrasound 9/3/19-16 to 49% stenosis in the bulb and bifurcation bilaterally, 16 to 49% stenosis mid right internal carotid artery and length of the left internal carotid artery, antegrade flow both vertebral arteries  7. Shortness of breath  7.1 Echo 8/29/13 - EF 60-65%: mild MR, trace TR; mild to mod aortic sclerosis without stenosis; trace AR; trace CT  7.2 Echo 4/4/17 - mild LVH; EF 60-65%; DD II; mild MR  7.3 echo 9/3/19 -diastolic dysfunction 2, mild  to moderate left atrial enlargement, mild to moderate MR  8. H/O back surgery; per report   9. DM II  10. GERD  11. BPH   12. Brain Bleed 11/2011 s/p fall   13. ADRIANA - non compliant CPAP   14. Emphysema/Asthma   15. PSVT  15.1 Event Monitor 5/9-5/22/17 - NSR - ST with one episode of PSVT  16. Moderate periventricular and subcortical microangiopathy  16.1 MRI of the brain/MRI of orbitis 5/16/18-moderate.  Moderate periventricular and subcortical microangiopathic  17.  Emphysema/COPD, Ashley ELIZABETH Lobo    Patient is a 65-year-old male who presents today for a follow-up.  He denies any chest pain, pressure, palpitations, fluttering, dizziness, presyncope, syncope, orthopnea, PND or edema. He says he does get shortness of breath with activity, but this has not changed.  He does use his CPAP, but he is still always fatigued.     Current Outpatient Medications on File Prior to Visit   Medication Sig Dispense Refill   • busPIRone (BUSPAR) 15 MG tablet Take 15 mg by mouth As Needed. 1.5 tabs BID     • cholecalciferol (VITAMIN D3) 25 MCG (1000 UT) tablet Take 1,000 Units by mouth Daily.     • donepezil (ARICEPT) 10 MG tablet Take 10 mg by mouth every night.     • finasteride (PROSCAR) 5 MG tablet Take 5 mg by mouth daily.     • gabapentin (NEURONTIN) 600 MG tablet Take 600 mg by mouth 3 (three) times a day.     • insulin glargine (LANTUS) 100 UNIT/ML injection Inject 90 Units under the skin into the appropriate area as directed Daily.     • magnesium oxide (MAGOX) 400 (241.3 MG) MG tablet tablet Take 400 mg by mouth daily.     • memantine (NAMENDA) 5 MG tablet Take 5 mg by mouth 2 (two) times a day.     • nitroglycerin (NITROSTAT) 0.4 MG SL tablet 1 under the tongue as needed for angina, may repeat q5mins for up three doses 30 tablet 3   • nortriptyline (PAMELOR) 50 MG capsule Take 50 mg by mouth Every Night.     • Omega 3 1000 MG capsule Take 2,000 mg by mouth Daily.     • omeprazole (PriLOSEC) 40 MG capsule Take 40 mg  by mouth daily.     • ramipril (ALTACE) 10 MG capsule Take 10 mg by mouth daily.     • rosuvastatin (CRESTOR) 40 MG tablet TAKE 1 TABLET EVERY NIGHT 90 tablet 1   • tamsulosin (FLOMAX) 0.4 MG capsule 24 hr capsule Take 1 capsule by mouth every night.     • Vitamin E 180 MG capsule Take 180 mg by mouth Daily.     • [DISCONTINUED] metoprolol succinate XL (TOPROL-XL) 25 MG 24 hr tablet Take 1 tablet by mouth Daily. 90 tablet 3   • [DISCONTINUED] aspirin 81 MG tablet Take 1 tablet by mouth daily. 30 tablet 11   • [DISCONTINUED] furosemide (LASIX) 20 MG tablet Take 1 tablet by mouth Daily As Needed (edema). 90 tablet 3   • [DISCONTINUED] glipiZIDE (GLUCOTROL) 5 MG tablet Take 5 mg by mouth 2 (Two) Times a Day Before Meals.     • [DISCONTINUED] mirtazapine (REMERON) 30 MG tablet Take 30 mg by mouth Every Night.     • [DISCONTINUED] promethazine (PHENERGAN) 25 MG tablet Take 25 mg by mouth Every 6 (Six) Hours As Needed for Nausea or Vomiting. prn  0   • [DISCONTINUED] SITagliptin-MetFORMIN HCl ER (JANUMET XR) 100-1000 MG tablet sustained-release 24 hour Take  by mouth Every Night.       No current facility-administered medications on file prior to visit.        ALLERGIES    Patient has no known allergies.    Past Medical History:   Diagnosis Date   • COPD (chronic obstructive pulmonary disease) (CMS/[facility])    • GERD (gastroesophageal reflux disease)    • Hyperlipidemia    • Hypertension    • Stroke (CMS/[facility])        Social History     Socioeconomic History   • Marital status:      Spouse name: Not on file   • Number of children: Not on file   • Years of education: Not on file   • Highest education level: Not on file   Tobacco Use   • Smoking status: Former Smoker   • Smokeless tobacco: Never Used   Substance and Sexual Activity   • Alcohol use: No   • Drug use: No   • Sexual activity: Defer       Family History   Problem Relation Age of Onset   • Heart failure Mother    • Heart disease Father    • Heart failure Father   "      Review of Systems   Constitutional: Positive for fatigue (stays tired ). Negative for chills, diaphoresis and fever.   HENT: Positive for congestion and rhinorrhea. Negative for sore throat.    Eyes: Positive for visual disturbance (glasses for driving at night ).   Respiratory: Positive for apnea (Uses cpap nightly) and shortness of breath. Negative for chest tightness.    Cardiovascular: Negative for chest pain, palpitations and leg swelling.   Gastrointestinal: Negative for abdominal pain, blood in stool, nausea and vomiting.   Endocrine: Negative for cold intolerance and heat intolerance.   Genitourinary: Negative for dysuria, frequency, hematuria and urgency.   Musculoskeletal: Positive for back pain (Pain in entire back ) and gait problem (uses a straight cane ). Negative for arthralgias and neck pain.   Skin: Negative for rash and wound.   Allergic/Immunologic: Positive for environmental allergies (mild). Negative for food allergies.   Neurological: Negative for dizziness, weakness and light-headedness.   Hematological: Bruises/bleeds easily.   Psychiatric/Behavioral: Negative for sleep disturbance (denies waking with smothering ).       Objective   /65 (BP Location: Left arm, Patient Position: Sitting)   Pulse 90   Ht 167.6 cm (66\")   Wt 92.1 kg (203 lb)   SpO2 97%   BMI 32.77 kg/m²   Vitals:    11/03/20 1036   BP: 114/65   BP Location: Left arm   Patient Position: Sitting   Pulse: 90   SpO2: 97%   Weight: 92.1 kg (203 lb)   Height: 167.6 cm (66\")      Lab Results (most recent)     None        Physical Exam  Vitals signs reviewed.   Constitutional:       General: He is awake.      Appearance: Normal appearance. He is well-developed and well-groomed. He is obese.   HENT:      Head: Normocephalic.   Eyes:      General: Lids are normal.   Neck:      Vascular: No carotid bruit, hepatojugular reflux or JVD.   Cardiovascular:      Rate and Rhythm: Normal rate and regular rhythm.      Pulses:       "     Radial pulses are 2+ on the right side and 2+ on the left side.        Dorsalis pedis pulses are 2+ on the right side and 2+ on the left side.        Posterior tibial pulses are 2+ on the right side and 2+ on the left side.      Heart sounds: Normal heart sounds.   Pulmonary:      Effort: Pulmonary effort is normal.      Breath sounds: Normal breath sounds and air entry.   Abdominal:      General: Bowel sounds are normal.      Palpations: Abdomen is soft.   Musculoskeletal:      Right lower leg: No edema.      Left lower leg: No edema.      Comments: Uses a cane   Skin:     General: Skin is warm and dry.   Neurological:      Mental Status: He is alert and oriented to person, place, and time.   Psychiatric:         Attention and Perception: Attention and perception normal.         Mood and Affect: Mood and affect normal.         Speech: Speech normal.         Behavior: Behavior normal. Behavior is cooperative.         Thought Content: Thought content normal.         Cognition and Memory: Cognition and memory normal.         Judgment: Judgment normal.         Procedure   Procedures         Assessment/Plan      Diagnosis Plan   1. Coronary artery disease involving native coronary artery of native heart with angina pectoris (CMS/HCC)  aspirin (aspirin) 81 MG EC tablet   2. Essential hypertension     3. Hyperlipidemia, unspecified hyperlipidemia type     4. PVD (peripheral vascular disease) (CMS/[facility])     5. PSVT (paroxysmal supraventricular tachycardia) (CMS/[facility])     6. Shortness of breath     7. ADRIANA (obstructive sleep apnea)         Return in about 6 months (around 5/3/2021).    CAD - patient is on statin and was unsure about asa, but advised he needs to be taking it so I resent in.  Hypertension.  Patient is doing very well on Altace.  Hyperlipidemia -patient is on Crestor monitored by PCP.  PVD-patient's on aspirin and statin.  PSVT-patient is no longer taking his beta-blocker but denies any signs of palpitations.   Shortness of breath-stable.  ADRIANA-patient is noncompliant with CPAP.  He will continue his medication regimen.  He will follow-up in 6 months or sooner if any changes.       Mike Uzma  reports that he has quit smoking. He has never used smokeless tobacco..       Patient's Body mass index is 32.77 kg/m². BMI is above normal parameters. Recommendations include: educational material and referral to primary care.      Electronically signed by:

## 2020-11-19 DIAGNOSIS — I25.119 CORONARY ARTERY DISEASE INVOLVING NATIVE CORONARY ARTERY OF NATIVE HEART WITH ANGINA PECTORIS (HCC): Primary | ICD-10-CM

## 2020-11-19 DIAGNOSIS — R93.89 ABNORMAL CT OF THE CHEST: ICD-10-CM

## 2020-11-19 DIAGNOSIS — I10 ESSENTIAL HYPERTENSION: ICD-10-CM

## 2020-11-19 DIAGNOSIS — R06.02 SHORTNESS OF BREATH: ICD-10-CM

## 2020-12-01 DIAGNOSIS — I25.10 CORONARY ARTERY DISEASE INVOLVING NATIVE CORONARY ARTERY OF NATIVE HEART WITHOUT ANGINA PECTORIS: Primary | ICD-10-CM

## 2020-12-01 RX ORDER — ASPIRIN 325 MG
325 TABLET ORAL DAILY
Qty: 90 TABLET | Refills: 3 | COMMUNITY
End: 2021-06-01 | Stop reason: DRUGHIGH

## 2021-01-06 ENCOUNTER — HOSPITAL ENCOUNTER (OUTPATIENT)
Dept: CARDIOLOGY | Facility: HOSPITAL | Age: 66
Discharge: HOME | End: 2021-01-06

## 2021-01-06 DIAGNOSIS — R93.89 ABNORMAL CT OF THE CHEST: ICD-10-CM

## 2021-01-06 DIAGNOSIS — I25.119 CORONARY ARTERY DISEASE INVOLVING NATIVE CORONARY ARTERY OF NATIVE HEART WITH ANGINA PECTORIS (HCC): ICD-10-CM

## 2021-01-06 DIAGNOSIS — R06.02 SHORTNESS OF BREATH: ICD-10-CM

## 2021-01-06 DIAGNOSIS — I10 ESSENTIAL HYPERTENSION: ICD-10-CM

## 2021-01-06 PROCEDURE — A9500 TC99M SESTAMIBI: HCPCS | Performed by: INTERNAL MEDICINE

## 2021-01-06 PROCEDURE — 93017 CV STRESS TEST TRACING ONLY: CPT

## 2021-01-06 PROCEDURE — 0 TECHNETIUM SESTAMIBI: Performed by: INTERNAL MEDICINE

## 2021-01-06 PROCEDURE — 78452 HT MUSCLE IMAGE SPECT MULT: CPT

## 2021-01-06 PROCEDURE — 25010000002 REGADENOSON 0.4 MG/5ML SOLUTION: Performed by: INTERNAL MEDICINE

## 2021-01-06 PROCEDURE — 78452 HT MUSCLE IMAGE SPECT MULT: CPT | Performed by: INTERNAL MEDICINE

## 2021-01-06 PROCEDURE — 93018 CV STRESS TEST I&R ONLY: CPT | Performed by: INTERNAL MEDICINE

## 2021-01-06 PROCEDURE — 93016 CV STRESS TEST SUPVJ ONLY: CPT | Performed by: NURSE PRACTITIONER

## 2021-01-06 RX ADMIN — TECHNETIUM TC 99M SESTAMIBI 1 DOSE: 1 INJECTION INTRAVENOUS at 10:22

## 2021-01-06 RX ADMIN — TECHNETIUM TC 99M SESTAMIBI 1 DOSE: 1 INJECTION INTRAVENOUS at 10:21

## 2021-01-06 RX ADMIN — REGADENOSON 0.4 MG: 0.08 INJECTION, SOLUTION INTRAVENOUS at 10:22

## 2021-01-07 LAB
BH CV STRESS COMMENTS STAGE 1: NORMAL
BH CV STRESS DOSE REGADENOSON STAGE 1: 0.4
BH CV STRESS DURATION MIN STAGE 1: 0
BH CV STRESS DURATION SEC STAGE 1: 10
BH CV STRESS PROTOCOL 1: NORMAL
BH CV STRESS RECOVERY BP: NORMAL MMHG
BH CV STRESS RECOVERY HR: 83 BPM
BH CV STRESS STAGE 1: 1
MAXIMAL PREDICTED HEART RATE: 153 BPM
PERCENT MAX PREDICTED HR: 58.17 %
STRESS BASELINE BP: NORMAL MMHG
STRESS BASELINE HR: 74 BPM
STRESS PERCENT HR: 68 %
STRESS POST PEAK BP: NORMAL MMHG
STRESS POST PEAK HR: 89 BPM
STRESS TARGET HR: 130 BPM

## 2021-01-08 ENCOUNTER — TELEPHONE (OUTPATIENT)
Dept: CARDIOLOGY | Facility: CLINIC | Age: 66
End: 2021-01-08

## 2021-01-08 NOTE — TELEPHONE ENCOUNTER
----- Message from ELIZABETH Benítez sent at 1/8/2021  6:30 AM EST -----  Get patient in within 1-2 weeks and advise of result.

## 2021-01-08 NOTE — TELEPHONE ENCOUNTER
Stress:  1.  Anterolateral lateral and lateral wall ischemia by scintigraphy.     2.  Preserved post-rest ejection fraction of 61% with no focal wall motion abnormalities.     3.  Elevated transient ischemic dilation ratio of 1.43 of unknown significance.  No evidence of increased lung uptake of radiopharmaceutical.      Per Ebony, we have no openings next week. Ebony will call pt w/ appt, once she's able to get one.         Called pt, informed him of the above, that he has abn results and needs appt. He stated his best # is [phone], okay to leave a detailed message if unable to reach him. Pt stated he's unavailable on the 20th due to an eye doctor appt.

## 2021-01-12 ENCOUNTER — OFFICE VISIT (OUTPATIENT)
Dept: CARDIOLOGY | Facility: CLINIC | Age: 66
End: 2021-01-12

## 2021-01-12 VITALS
WEIGHT: 226 LBS | BODY MASS INDEX: 31.64 KG/M2 | DIASTOLIC BLOOD PRESSURE: 64 MMHG | HEIGHT: 71 IN | SYSTOLIC BLOOD PRESSURE: 125 MMHG | TEMPERATURE: 97.1 F | HEART RATE: 86 BPM | OXYGEN SATURATION: 94 %

## 2021-01-12 DIAGNOSIS — I10 ESSENTIAL HYPERTENSION: ICD-10-CM

## 2021-01-12 DIAGNOSIS — I25.119 CORONARY ARTERY DISEASE INVOLVING NATIVE CORONARY ARTERY OF NATIVE HEART WITH ANGINA PECTORIS (HCC): ICD-10-CM

## 2021-01-12 DIAGNOSIS — R07.2 PRECORDIAL PAIN: Primary | ICD-10-CM

## 2021-01-12 DIAGNOSIS — G47.33 OSA (OBSTRUCTIVE SLEEP APNEA): ICD-10-CM

## 2021-01-12 DIAGNOSIS — E78.5 HYPERLIPIDEMIA, UNSPECIFIED HYPERLIPIDEMIA TYPE: ICD-10-CM

## 2021-01-12 DIAGNOSIS — I73.9 PVD (PERIPHERAL VASCULAR DISEASE) (HCC): ICD-10-CM

## 2021-01-12 DIAGNOSIS — R94.39 ABNORMAL STRESS TEST: ICD-10-CM

## 2021-01-12 DIAGNOSIS — I47.1 PSVT (PAROXYSMAL SUPRAVENTRICULAR TACHYCARDIA) (HCC): ICD-10-CM

## 2021-01-12 DIAGNOSIS — R06.02 SHORTNESS OF BREATH: ICD-10-CM

## 2021-01-12 PROCEDURE — 99214 OFFICE O/P EST MOD 30 MIN: CPT | Performed by: NURSE PRACTITIONER

## 2021-01-12 RX ORDER — ISOSORBIDE MONONITRATE 30 MG/1
15 TABLET, EXTENDED RELEASE ORAL NIGHTLY
Qty: 90 TABLET | Refills: 3 | Status: SHIPPED | OUTPATIENT
Start: 2021-01-12 | End: 2022-03-14

## 2021-01-12 NOTE — PATIENT INSTRUCTIONS
"BMI for Adults  What is BMI?  Body mass index (BMI) is a number that is calculated from a person's weight and height. BMI can help estimate how much of a person's weight is composed of fat. BMI does not measure body fat directly. Rather, it is an alternative to procedures that directly measure body fat, which can be difficult and expensive.  BMI can help identify people who may be at higher risk for certain medical problems.  What are BMI measurements used for?  BMI is used as a screening tool to identify possible weight problems. It helps determine whether a person is obese, overweight, a healthy weight, or underweight.  BMI is useful for:  · Identifying a weight problem that may be related to a medical condition or may increase the risk for medical problems.  · Promoting changes, such as changes in diet and exercise, to help reach a healthy weight. BMI screening can be repeated to see if these changes are working.  How is BMI calculated?  BMI involves measuring your weight in relation to your height. Both height and weight are measured, and the BMI is calculated from those numbers. This can be done either in English ([place] or metric measurements. Note that charts and online BMI calculators are available to help you find your BMI quickly and easily without having to do these calculations yourself.  To calculate your BMI in English ([place] measurements:    1. Measure your weight in pounds (lb).  2. Multiply the number of pounds by 703.  ? For example, for a person who weighs 180 lb, multiply that number by 703, which equals 126,540.  3. Measure your height in inches. Then multiply that number by itself to get a measurement called \"inches squared.\"  ? For example, for a person who is 70 inches tall, the \"inches squared\" measurement is 70 inches x 70 inches, which equals 4,900 inches squared.  4. Divide the total from step 2 (number of lb x 703) by the total from step 3 (inches squared): 126,540 ÷ 4,900 = 25.8. This is " "your BMI.  To calculate your BMI in metric measurements:  1. Measure your weight in kilograms (kg).  2. Measure your height in meters (m). Then multiply that number by itself to get a measurement called \"meters squared.\"  ? For example, for a person who is 1.75 m tall, the \"meters squared\" measurement is 1.75 m x 1.75 m, which is equal to 3.1 meters squared.  3. Divide the number of kilograms (your weight) by the meters squared number. In this example: 70 ÷ 3.1 = 22.6. This is your BMI.  What do the results mean?  BMI charts are used to identify whether you are underweight, normal weight, overweight, or obese. The following guidelines will be used:  · Underweight: BMI less than 18.5.  · Normal weight: BMI between 18.5 and 24.9.  · Overweight: BMI between 25 and 29.9.  · Obese: BMI of 30 or above.  Keep these notes in mind:  · Weight includes both fat and muscle, so someone with a muscular build, such as an athlete, may have a BMI that is higher than 24.9. In cases like these, BMI is not an accurate measure of body fat.  · To determine if excess body fat is the cause of a BMI of 25 or higher, further assessments may need to be done by a health care provider.  · BMI is usually interpreted in the same way for men and women.  Where to find more information  For more information about BMI, including tools to quickly calculate your BMI, go to these websites:  · Centers for Disease Control and Prevention: www.cdc.gov  · American Heart Association: www.heart.org  · National Heart, Lung, and Blood [place]: www.nhlbi.nih.gov  Summary  · Body mass index (BMI) is a number that is calculated from a person's weight and height.  · BMI may help estimate how much of a person's weight is composed of fat. BMI can help identify those who may be at higher risk for certain medical problems.  · BMI can be measured using English measurements or metric measurements.  · BMI charts are used to identify whether you are underweight, normal " weight, overweight, or obese.  This information is not intended to replace advice given to you by your health care provider. Make sure you discuss any questions you have with your health care provider.  Document Revised: 09/09/2020 Document Reviewed: 07/17/2020  Elsevier Patient Education © 2020 [employer]    Advance Care Planning and Advance Directives     You make decisions on a daily basis - decisions about where you want to live, your career, your home, your life. Perhaps one of the most important decisions you face is your choice for future medical care. Take time to talk with your family and your healthcare team and start planning today.  Advance Care Planning is a process that can help you:  · Understand possible future healthcare decisions in light of your own experiences  · Reflect on those decision in light of your goals and values  · Discuss your decisions with those closest to you and the healthcare professionals that care for you  · Make a plan by creating a document that reflects your wishes    Surrogate Decision Maker  In the event of a medical emergency, which has left you unable to communicate or to make your own decisions, you would need someone to make decisions for you.  It is important to discuss your preferences for medical treatment with this person while you are in good health.     Qualities of a surrogate decision maker:  • Willing to take on this role and responsibility  • Knows what you want for future medical care  • Willing to follow your wishes even if they don't agree with them  • Able to make difficult medical decisions under stressful circumstances    Advance Directives  These are legal documents you can create that will guide your healthcare team and decision maker(s) when needed. These documents can be stored in the electronic medical record.    · Living Will - a legal document to guide your care if you have a terminal condition or a serious illness and are unable to  communicate. States vary by statute in document names/types, but most forms may include one or more of the following:        -  Directions regarding life-prolonging treatments        -  Directions regarding artificially provided nutrition/hydration        -  Choosing a healthcare decision maker        -  Direction regarding organ/tissue donation    · Durable Power of  for Healthcare - this document names an -in-fact to make medical decisions for you, but it may also allow this person to make personal and financial decisions for you. Please seek the advice of an  if you need this type of document.    **Advance Directives are not required and no one may discriminate against you if you do not sign one.    Medical Orders  Many states allow specific forms/orders signed by your physician to record your wishes for medical treatment in your current state of health. This form, signed in personal communication with your physician, addresses resuscitation and other medical interventions that you may or may not want.      For more information or to schedule a time with a [facility] Advance Care Planning Facilitator contact: [facility]/ACP or call [phone] and someone will contact you directly.  Coronary Artery Disease, Male  Coronary artery disease (CAD) is a condition in which the arteries that lead to the heart (coronary arteries) become narrow or blocked. The narrowing or blockage can lead to decreased blood flow to the heart. Prolonged reduced blood flow can cause a heart attack (myocardial infarction or MI). This condition may also be called coronary heart disease.  Because CAD is the leading cause of death in men, it is important to understand what causes this condition and how it is treated.  What are the causes?  CAD is most often caused by atherosclerosis. This is the buildup of fat and cholesterol (plaque) on the inside of the arteries. Over time, the plaque may narrow or block  the artery, reducing blood flow to the heart. Plaque can also become weak and break off within a coronary artery and cause a sudden blockage. Other less common causes of CAD include:  · A blood clot or a piece of a blood clot or other substance that blocks the flow of blood in a coronary artery (embolism).  · A tearing of the artery (spontaneous coronary artery dissection).  · An enlargement of an artery (aneurysm).  · Inflammation (vasculitis) in the artery wall.  What increases the risk?  The following factors may make you more likely to develop this condition:  · Age. Men over age 45 are at a greater risk of CAD.  · Family history of CAD.  · Gender. Men often develop CAD earlier in life than women.  · High blood pressure (hypertension).  · Diabetes.  · High cholesterol levels.  · Tobacco use.  · Excessive alcohol use.  · Lack of exercise.  · A diet high in saturated and trans fats, such as fried food and processed meat.  Other possible risk factors include:  · High stress levels.  · Depression.  · Obesity.  · Sleep apnea.  What are the signs or symptoms?  Many people do not have any symptoms during the early stages of CAD. As the condition progresses, symptoms may include:  · Chest pain (angina). The pain can:  ? Feel like crushing or squeezing, or like a tightness, pressure, fullness, or heaviness in the chest.  ? Last more than a few minutes or can stop and recur. The pain tends to get worse with exercise or stress and to fade with rest.  · Pain in the arms, neck, jaw, ear, or back.  · Unexplained heartburn or indigestion.  · Shortness of breath.  · Nausea or vomiting.  · Sudden light-headedness.  · Sudden cold sweats.  · Fluttering or fast heartbeat (palpitations).  How is this diagnosed?  This condition is diagnosed based on:  · Your family and medical history.  · A physical exam.  · Tests, including:  ? A test to check the electrical signals in your heart (electrocardiogram).  ? Exercise stress test. This  looks for signs of blockage when the heart is stressed with exercise, such as running on a treadmill.  ? Pharmacologic stress test. This test looks for signs of blockage when the heart is being stressed with a medicine.  ? Blood tests.  ? Coronary angiogram. This is a procedure to look at the coronary arteries to see if there is any blockage. During this test, a dye is injected into your arteries so they appear on an X-ray.  ? Coronary artery CT scan. This CT scan helps detect calcium deposits in your coronary arteries. Calcium deposits are an indicator of CAD.  ? A test that uses sound waves to take a picture of your heart (echocardiogram).  ? Chest X-ray.  How is this treated?  This condition may be treated by:  · Healthy lifestyle changes to reduce risk factors.  · Medicines such as:  ? Antiplatelet medicines and blood-thinning medicines, such as aspirin. These help to prevent blood clots.  ? Nitroglycerin.  ? Blood pressure medicines.  ? Cholesterol-lowering medicine.  · Coronary angioplasty and stenting. During this procedure, a thin, flexible tube is inserted through a blood vessel and into a blocked artery. A balloon or similar device on the end of the tube is inflated to open up the artery. In some cases, a small, mesh tube (stent) is inserted into the artery to keep it open.  · Coronary artery bypass surgery. During this surgery, veins or arteries from other parts of the body are used to create a bypass around the blockage and allow blood to reach your heart.  Follow these instructions at home:  Medicines  · Take over-the-counter and prescription medicines only as told by your health care provider.  · Do not take the following medicines unless your health care provider approves:  ? NSAIDs, such as ibuprofen, naproxen, or celecoxib.  ? Vitamin supplements that contain vitamin A, vitamin E, or both.  Lifestyle  · Follow an exercise program approved by your health care provider. Aim for 150 minutes of moderate  exercise or 75 minutes of vigorous exercise each week.  · Maintain a healthy weight or lose weight as approved by your health care provider.  · Learn to manage stress or try to limit your stress. Ask your health care provider for suggestions if you need help.  · Get screened for depression and seek treatment, if needed.  · Do not use any products that contain nicotine or tobacco, such as cigarettes, e-cigarettes, and chewing tobacco. If you need help quitting, ask your health care provider.  · Do not use illegal drugs.  Eating and drinking    · Follow a heart-healthy diet. A dietitian can help educate you about healthy food options and changes. In general, eat plenty of fruits and vegetables, lean meats, and whole grains.  · Avoid foods high in:  ? Sugar.  ? Salt (sodium).  ? Saturated fat, such as processed or fatty meat.  ? Trans fat, such as fried foods.  · Use healthy cooking methods such as roasting, grilling, broiling, baking, poaching, steaming, or stir-frying.  · Do not drink alcohol if your health care provider tells you not to drink.  · If you drink alcohol:  ? Limit how much you have to 0-2 drinks per day.  ? Be aware of how much alcohol is in your drink. In the [place] one drink equals one 12 oz bottle of beer (355 mL), one 5 oz glass of wine (148 mL), or one 1½ oz glass of hard liquor (44 mL).  General instructions  · Manage any other health conditions, such as hypertension and diabetes. These conditions affect your heart.  · Your health care provider may ask you to monitor your blood pressure. Ideally, your blood pressure should be below 130/80.  · Keep all follow-up visits as told by your health care provider. This is important.  Get help right away if:  · You have pain in your chest, neck, ear, arm, jaw, stomach, or back that:  ? Lasts more than a few minutes.  ? Is recurring.  ? Is not relieved by taking medicine under your tongue (sublingual nitroglycerin).  · You have profuse sweating without  cause.  · You have unexplained:  ? Heartburn or indigestion.  ? Shortness of breath or difficulty breathing.  ? Fluttering or fast heartbeat (palpitations).  ? Nausea or vomiting.  ? Fatigue.  ? Feelings of nervousness or anxiety.  ? Weakness.  ? Diarrhea.  · You have sudden light-headedness or dizziness.  · You faint.  · You feel like hurting yourself or think about taking your own life.  These symptoms may represent a serious problem that is an emergency. Do not wait to see if the symptoms will go away. Get medical help right away. Call your local emergency services (911 in the [place] Do not drive yourself to the hospital.  Summary  · Coronary artery disease (CAD) is a condition in which the arteries that lead to the heart (coronary arteries) become narrow or blocked. The narrowing or blockage can lead to a heart attack.  · Many people do not have any symptoms during the early stages of CAD.  · CAD can be treated with lifestyle changes, medicines, surgery, or a combination of these treatments.  This information is not intended to replace advice given to you by your health care provider. Make sure you discuss any questions you have with your health care provider.  Document Revised: 09/06/2019 Document Reviewed: 08/27/2019  [employer] Patient Education © 2020 [employer]    Nonspecific Chest Pain  Chest pain can be caused by many different conditions. Some causes of chest pain can be life-threatening. These will require treatment right away. Serious causes of chest pain include:  · Heart attack.  · A tear in the body's main blood vessel.  · Redness and swelling (inflammation) around your heart.  · Blood clot in your lungs.  Other causes of chest pain may not be so serious. These include:  · Heartburn.  · Anxiety or stress.  · Damage to bones or muscles in your chest.  · Lung infections.  Chest pain can feel like:  · Pain or discomfort in your chest.  · Crushing, pressure, aching, or squeezing pain.  · Burning or  tingling.  · Dull or sharp pain that is worse when you move, cough, or take a deep breath.  · Pain or discomfort that is also felt in your back, neck, jaw, shoulder, or arm, or pain that spreads to any of these areas.  It is hard to know whether your pain is caused by something that is serious or something that is not so serious. So it is important to see your doctor right away if you have chest pain.  Follow these instructions at home:  Medicines  · Take over-the-counter and prescription medicines only as told by your doctor.  · If you were prescribed an antibiotic medicine, take it as told by your doctor. Do not stop taking the antibiotic even if you start to feel better.  Lifestyle    · Rest as told by your doctor.  · Do not use any products that contain nicotine or tobacco, such as cigarettes, e-cigarettes, and chewing tobacco. If you need help quitting, ask your doctor.  · Do not drink alcohol.  · Make lifestyle changes as told by your doctor. These may include:  ? Getting regular exercise. Ask your doctor what activities are safe for you.  ? Eating a heart-healthy diet. A diet and nutrition specialist (dietitian) can help you to learn healthy eating options.  ? Staying at a healthy weight.  ? Treating diabetes or high blood pressure, if needed.  ? Lowering your stress. Activities such as yoga and relaxation techniques can help.  General instructions  · Pay attention to any changes in your symptoms. Tell your doctor about them or any new symptoms.  · Avoid any activities that cause chest pain.  · Keep all follow-up visits as told by your doctor. This is important. You may need more testing if your chest pain does not go away.  Contact a doctor if:  · Your chest pain does not go away.  · You feel depressed.  · You have a fever.  Get help right away if:  · Your chest pain is worse.  · You have a cough that gets worse, or you cough up blood.  · You have very bad (severe) pain in your belly (abdomen).  · You pass  out (faint).  · You have either of these for no clear reason:  ? Sudden chest discomfort.  ? Sudden discomfort in your arms, back, neck, or jaw.  · You have shortness of breath at any time.  · You suddenly start to sweat, or your skin gets clammy.  · You feel sick to your stomach (nauseous).  · You throw up (vomit).  · You suddenly feel lightheaded or dizzy.  · You feel very weak or tired.  · Your heart starts to beat fast, or it feels like it is skipping beats.  These symptoms may be an emergency. Do not wait to see if the symptoms will go away. Get medical help right away. Call your local emergency services (911 in the [place] Do not drive yourself to the hospital.  Summary  · Chest pain can be caused by many different conditions. The cause may be serious and need treatment right away. If you have chest pain, see your doctor right away.  · Follow your doctor's instructions for taking medicines and making lifestyle changes.  · Keep all follow-up visits as told by your doctor. This includes visits for any further testing if your chest pain does not go away.  · Be sure to know the signs that show that your condition has become worse. Get help right away if you have these symptoms.  This information is not intended to replace advice given to you by your health care provider. Make sure you discuss any questions you have with your health care provider.  Document Revised: 06/20/2019 Document Reviewed: 06/20/2019  [email or web address] Patient Education © 2020 [employer]

## 2021-01-12 NOTE — PROGRESS NOTES
Subjective   Mike Gusman is a 65 y.o. male     Chief Complaint   Patient presents with   • Follow-up   • Coronary Artery Disease       HPI    Problem List:    1. CAD stent x1 somewhere between 2004 and 2006 in [place]  1.1 Stress Test 9/1613 - normal SPECT imaging without inducible chest pain or ECG abnormalities.  Normal regional and global left ventricular systolic function.    1.2 Stress Test 4/4/17 - no ischemia; low risk  1.3 CT chest 8/5/19-severe atherosclerotic plaque formation throughout the coronary arteries, aorta has mild atherosclerotic plaque formation without aneurysmal dilation, interstitial fibrosis with underlying emphysema, fatty liver  1.4 stress test 9/3/19-no evidence of ischemia, post-rest EF 60%  1.5 left heart cath 10/22/19- obtuse marginal small-caliber 70% proximal narrowing, obtuse marginal terminal 50 to 70%, small limb of the LAD 50 to 70%, EF 60%, LVEDP 22-24  1.6 Stress Test 1/6/2021 - anterolateral and lateral wall ischemia; post stress EF 61%; elevated transient ischemic dilation of unknown sig 1.43  2. Hypertension  3. Hyperlipidemia   4. Viral Pericarditis 2009  5. Aortic valve disorder  6. PVD   6.1 Carotid Artery U/S 7/29/13 - no sig stenosis or occlusion in either carotid system  6.2 Carotid Artery U/S 3/15/17 - 16-49% DEXTER and LICA; antegrade flow bilaterally   6.2 JESSICA 3/7/14 - mild changes consistent with known PVD of BLE; probably diffuse   6.2 MRI BLE 4/7/14 - diffuse narrowing of the right common iliac and right external iliac artery; high grade focal stenosis of the left common iliac artery   6.3 H/O left common iliac artery stenting   6.4 carotid artery ultrasound 9/3/19-16 to 49% stenosis in the bulb and bifurcation bilaterally, 16 to 49% stenosis mid right internal carotid artery and length of the left internal carotid artery, antegrade flow both vertebral arteries  7. Shortness of breath  7.1 Echo 8/29/13 - EF 60-65%: mild MR, trace TR; mild to mod aortic  sclerosis without stenosis; trace AR; trace AL  7.2 Echo 4/4/17 - mild LVH; EF 60-65%; DD II; mild MR  7.3 echo 9/3/19 -diastolic dysfunction 2, mild to moderate left atrial enlargement, mild to moderate MR  8. H/O back surgery; per report   9. DM II  10. GERD  11. BPH   12. Brain Bleed 11/2011 s/p fall   13. ADRIANA - non compliant CPAP   14. Emphysema/Asthma   15. PSVT  15.1 Event Monitor 5/9-5/22/17 - NSR - ST with one episode of PSVT  16. Moderate periventricular and subcortical microangiopathy  16.1 MRI of the brain/MRI of orbitis 5/16/18-moderate.  Moderate periventricular and subcortical microangiopathic  17.  Emphysema/COPD, ELIZABETH Borrero    Patient is a 65-year-old male who presents today for follow-up on testing with his wife at his side.  He says he does get midsternal chest tightness with activity that goes away rather quickly.  He says he has not had to take any nitroglycerin.  He says it does not radiate.  He will get short of breath, lightheaded and diaphoretic when it occurs.  He says it occurs maybe once a month.  He denies any palpitations, fluttering, dizziness, presyncope, syncope, orthopnea, PND or edema.  He does have shortness of breath every day and this has not changed.    We went over stress test and previous CT of the chest as well as previous left heart cath.    Current Outpatient Medications on File Prior to Visit   Medication Sig Dispense Refill   • aspirin 325 MG tablet Take 1 tablet by mouth Daily. 90 tablet 3   • busPIRone (BUSPAR) 15 MG tablet Take 15 mg by mouth As Needed. 1.5 tabs BID     • cholecalciferol (VITAMIN D3) 25 MCG (1000 UT) tablet Take 1,000 Units by mouth Daily.     • donepezil (ARICEPT) 10 MG tablet Take 10 mg by mouth every night.     • finasteride (PROSCAR) 5 MG tablet Take 5 mg by mouth daily.     • gabapentin (NEURONTIN) 600 MG tablet Take 600 mg by mouth 3 (three) times a day.     • insulin glargine (LANTUS) 100 UNIT/ML injection Inject 90 Units under the  skin into the appropriate area as directed Daily.     • memantine (NAMENDA) 5 MG tablet Take 5 mg by mouth 2 (two) times a day.     • nitroglycerin (NITROSTAT) 0.4 MG SL tablet 1 under the tongue as needed for angina, may repeat q5mins for up three doses 30 tablet 3   • nortriptyline (PAMELOR) 50 MG capsule Take 50 mg by mouth Every Night.     • Omega 3 1000 MG capsule Take 2,000 mg by mouth Daily.     • omeprazole (PriLOSEC) 40 MG capsule Take 40 mg by mouth daily.     • ramipril (ALTACE) 10 MG capsule Take 10 mg by mouth daily.     • rosuvastatin (CRESTOR) 40 MG tablet TAKE 1 TABLET EVERY NIGHT 90 tablet 1   • tamsulosin (FLOMAX) 0.4 MG capsule 24 hr capsule Take 1 capsule by mouth every night.     • Vitamin E 180 MG capsule Take 180 mg by mouth Daily.     • magnesium oxide (MAGOX) 400 (241.3 MG) MG tablet tablet Take 400 mg by mouth daily.       No current facility-administered medications on file prior to visit.        ALLERGIES    Patient has no known allergies.    Past Medical History:   Diagnosis Date   • COPD (chronic obstructive pulmonary disease) (CMS/[facility])    • GERD (gastroesophageal reflux disease)    • Hyperlipidemia    • Hypertension    • Stroke (CMS/[facility])        Social History     Socioeconomic History   • Marital status:      Spouse name: Not on file   • Number of children: Not on file   • Years of education: Not on file   • Highest education level: Not on file   Tobacco Use   • Smoking status: Former Smoker   • Smokeless tobacco: Never Used   Substance and Sexual Activity   • Alcohol use: No   • Drug use: No   • Sexual activity: Defer       Family History   Problem Relation Age of Onset   • Heart failure Mother    • Heart disease Father    • Heart failure Father        Review of Systems   Constitutional: Positive for diaphoresis (with CP ). Negative for appetite change, chills, fatigue and fever.   HENT: Negative for congestion, postnasal drip and sore throat.    Eyes: Positive for visual  "disturbance (reading and driving glasses).   Respiratory: Positive for chest tightness (once in awhile in the center of chest) and shortness of breath (every day; with CP ). Negative for cough and wheezing.    Cardiovascular: Positive for chest pain (mid tightness with activity goes away quickly, no nitro, no rad, maybe occurs 1 x/mos ). Negative for palpitations and leg swelling.   Gastrointestinal: Negative for abdominal pain, blood in stool, constipation, diarrhea, nausea and vomiting.   Endocrine: Negative for cold intolerance and heat intolerance.   Genitourinary: Negative for difficulty urinating, dysuria, frequency, hematuria and urgency.   Musculoskeletal: Positive for arthralgias, back pain (entire back ) and gait problem (uses a cane). Negative for joint swelling, neck pain and neck stiffness.   Skin: Negative for rash and wound.   Allergic/Immunologic: Negative for environmental allergies and food allergies.   Neurological: Positive for weakness (yes ), light-headedness (with CP  ) and numbness (not all the time but sometimes in both hands). Negative for dizziness and headaches.   Hematological: Bruises/bleeds easily (bruises ).   Psychiatric/Behavioral: Negative for sleep disturbance (4 - 5 hrs a night pt has cpac machine).       Objective   /64   Pulse 86   Temp 97.1 °F (36.2 °C)   Ht 180.3 cm (71\")   Wt 103 kg (226 lb)   SpO2 94%   BMI 31.52 kg/m²   Vitals:    01/12/21 1521   BP: 125/64   Pulse: 86   Temp: 97.1 °F (36.2 °C)   SpO2: 94%   Weight: 103 kg (226 lb)   Height: 180.3 cm (71\")      Lab Results (most recent)     None        Physical Exam  Vitals signs reviewed.   Constitutional:       General: He is awake.      Appearance: Normal appearance. He is well-developed and well-groomed. He is obese.   HENT:      Head: Normocephalic.   Eyes:      General: Lids are normal.   Neck:      Vascular: No carotid bruit, hepatojugular reflux or JVD.   Cardiovascular:      Rate and Rhythm: Normal " rate and regular rhythm.      Pulses:           Radial pulses are 2+ on the right side and 2+ on the left side.        Dorsalis pedis pulses are 2+ on the right side and 2+ on the left side.        Posterior tibial pulses are 2+ on the right side and 2+ on the left side.      Heart sounds: Normal heart sounds.   Pulmonary:      Effort: Pulmonary effort is normal.      Breath sounds: Normal air entry. Examination of the right-lower field reveals decreased breath sounds. Examination of the left-lower field reveals decreased breath sounds. Decreased breath sounds present.   Abdominal:      General: Bowel sounds are normal.      Palpations: Abdomen is soft.   Musculoskeletal:      Right lower leg: Edema (trace ) present.      Left lower leg: Edema (trace ) present.      Comments: Uses a cane    Skin:     General: Skin is warm and dry.   Neurological:      Mental Status: He is alert and oriented to person, place, and time.   Psychiatric:         Attention and Perception: Attention and perception normal.         Mood and Affect: Mood and affect normal.         Speech: Speech normal.         Behavior: Behavior normal. Behavior is cooperative.         Thought Content: Thought content normal.         Cognition and Memory: Cognition and memory normal.         Judgment: Judgment normal.         Procedure   Procedures         Assessment/Plan      Diagnosis Plan   1. Precordial pain  isosorbide mononitrate (IMDUR) 30 MG 24 hr tablet   2. Coronary artery disease involving native coronary artery of native heart with angina pectoris (CMS/HCC)  isosorbide mononitrate (IMDUR) 30 MG 24 hr tablet   3. Essential hypertension     4. Hyperlipidemia, unspecified hyperlipidemia type     5. PVD (peripheral vascular disease) (CMS/HCC)     6. Shortness of breath     7. ADRIANA (obstructive sleep apnea)     8. PSVT (paroxysmal supraventricular tachycardia) (CMS/HCC)     9. Abnormal stress test         Return in about 12 weeks (around  4/6/2021).    Chest pain/CAD/abnormal stress test-patient will start isosorbide to half a tab at night.  He will use nitro as needed for chest pain no resolution he will go to the ER.  He will continue his aspirin and statin.  Hypertension-patient's doing very well on ramipril.  Hyperlipidemia-patient is on Crestor and his most recent LDL was 86.  PVD-patient's on aspirin and statin.  Shortness of breath-stable and follows pulmonary.  ADRIANA-patient is compliant with CPAP.  History of PSVT-no further episodes.  Patient will continue his medication regimen with above-noted change.  He will follow-up in 12 weeks or sooner if any changes or intolerance to the isosorbide.       Mike Gusman  reports that he has quit smoking. He has never used smokeless tobacco..              Advance Care Planning   ACP discussion was declined by the patient. Patient does not have an advance directive, declines further assistance.    Patient's Body mass index is 31.52 kg/m². BMI is above normal parameters. Recommendations include: educational material.      Electronically signed by:

## 2021-05-16 DIAGNOSIS — I25.119 CORONARY ARTERY DISEASE INVOLVING NATIVE CORONARY ARTERY OF NATIVE HEART WITH ANGINA PECTORIS (HCC): ICD-10-CM

## 2021-05-16 DIAGNOSIS — E78.5 HYPERLIPIDEMIA, UNSPECIFIED HYPERLIPIDEMIA TYPE: ICD-10-CM

## 2021-05-17 RX ORDER — ROSUVASTATIN CALCIUM 40 MG/1
TABLET, COATED ORAL
Qty: 90 TABLET | Refills: 3 | Status: SHIPPED | OUTPATIENT
Start: 2021-05-17 | End: 2022-05-10

## 2021-06-01 ENCOUNTER — OFFICE VISIT (OUTPATIENT)
Dept: CARDIOLOGY | Facility: CLINIC | Age: 66
End: 2021-06-01

## 2021-06-01 ENCOUNTER — HOSPITAL ENCOUNTER (OUTPATIENT)
Dept: CARDIOLOGY | Facility: HOSPITAL | Age: 66
Discharge: HOME | End: 2021-06-01

## 2021-06-01 VITALS
HEART RATE: 87 BPM | HEIGHT: 66 IN | BODY MASS INDEX: 31.18 KG/M2 | WEIGHT: 194 LBS | DIASTOLIC BLOOD PRESSURE: 64 MMHG | SYSTOLIC BLOOD PRESSURE: 122 MMHG | OXYGEN SATURATION: 95 %

## 2021-06-01 DIAGNOSIS — E78.5 HYPERLIPIDEMIA, UNSPECIFIED HYPERLIPIDEMIA TYPE: ICD-10-CM

## 2021-06-01 DIAGNOSIS — I70.202 FEMORAL ARTERY STENOSIS, LEFT (HCC): ICD-10-CM

## 2021-06-01 DIAGNOSIS — I10 ESSENTIAL HYPERTENSION: ICD-10-CM

## 2021-06-01 DIAGNOSIS — I47.1 PSVT (PAROXYSMAL SUPRAVENTRICULAR TACHYCARDIA) (HCC): ICD-10-CM

## 2021-06-01 DIAGNOSIS — G47.33 OSA (OBSTRUCTIVE SLEEP APNEA): ICD-10-CM

## 2021-06-01 DIAGNOSIS — I73.9 PVD (PERIPHERAL VASCULAR DISEASE) (HCC): ICD-10-CM

## 2021-06-01 DIAGNOSIS — R42 DIZZINESS: ICD-10-CM

## 2021-06-01 DIAGNOSIS — R09.89 DECREASED PEDAL PULSES: ICD-10-CM

## 2021-06-01 DIAGNOSIS — R53.83 MALAISE AND FATIGUE: ICD-10-CM

## 2021-06-01 DIAGNOSIS — R94.39 ABNORMAL STRESS TEST: ICD-10-CM

## 2021-06-01 DIAGNOSIS — R53.81 MALAISE AND FATIGUE: ICD-10-CM

## 2021-06-01 DIAGNOSIS — I25.119 CORONARY ARTERY DISEASE INVOLVING NATIVE CORONARY ARTERY OF NATIVE HEART WITH ANGINA PECTORIS (HCC): Primary | ICD-10-CM

## 2021-06-01 DIAGNOSIS — R06.02 SHORTNESS OF BREATH: ICD-10-CM

## 2021-06-01 DIAGNOSIS — I65.23 BILATERAL CAROTID ARTERY STENOSIS: ICD-10-CM

## 2021-06-01 DIAGNOSIS — R53.1 WEAKNESS: ICD-10-CM

## 2021-06-01 PROCEDURE — 99214 OFFICE O/P EST MOD 30 MIN: CPT | Performed by: NURSE PRACTITIONER

## 2021-06-01 PROCEDURE — 93925 LOWER EXTREMITY STUDY: CPT | Performed by: INTERNAL MEDICINE

## 2021-06-01 PROCEDURE — 93880 EXTRACRANIAL BILAT STUDY: CPT | Performed by: INTERNAL MEDICINE

## 2021-06-01 PROCEDURE — 93000 ELECTROCARDIOGRAM COMPLETE: CPT | Performed by: NURSE PRACTITIONER

## 2021-06-01 PROCEDURE — 93880 EXTRACRANIAL BILAT STUDY: CPT

## 2021-06-01 PROCEDURE — 93925 LOWER EXTREMITY STUDY: CPT

## 2021-06-01 RX ORDER — CLOPIDOGREL BISULFATE 75 MG/1
75 TABLET ORAL DAILY
Qty: 30 TABLET | Refills: 11 | Status: SHIPPED | OUTPATIENT
Start: 2021-06-01 | End: 2021-08-16 | Stop reason: SDUPTHER

## 2021-06-01 RX ORDER — ASPIRIN 81 MG/1
81 TABLET ORAL DAILY
Qty: 90 TABLET | Refills: 3 | Status: SHIPPED | OUTPATIENT
Start: 2021-06-01 | End: 2022-04-25

## 2021-06-01 NOTE — PROGRESS NOTES
Subjective   Mike Gusman is a 66 y.o. male     Chief Complaint   Patient presents with   • Follow-up   • Precordial pain       HPI    Problem List:    1. CAD stent x1 somewhere between 2004 and 2006 in [place]  1.1 CT chest 8/5/19-severe atherosclerotic plaque formation throughout the coronary arteries, aorta has mild atherosclerotic plaque formation without aneurysmal dilation, interstitial fibrosis with underlying emphysema, fatty liver  1.2 stress test 9/3/19-no evidence of ischemia, post-rest EF 60%  1.3 left heart cath 10/22/19- obtuse marginal small-caliber 70% proximal narrowing, obtuse marginal terminal 50 to 70%, small limb of the LAD 50 to 70%, EF 60%, LVEDP 22-24  1.4 Stress Test 1/6/2021 - anterolateral and lateral wall ischemia; post stress EF 61%; elevated transient ischemic dilation of unknown sig 1.43  2. Hypertension  3. Hyperlipidemia   4. Viral Pericarditis 2009  5. Aortic valve disorder  6. Carotid Artery Disease   6.1 Carotid Artery U/S 7/29/13 - no sig stenosis or occlusion in either carotid system  6.2 Carotid artery ultrasound 9/3/19-16 to 49% stenosis in the bulb and bifurcation bilaterally, 16 to 49% stenosis mid right internal carotid artery and length of the left internal carotid artery, antegrade flow both vertebral arteries  7. PVD   7.1 JESSICA 3/7/14 - mild changes consistent with known PVD of BLE; probably diffuse   7.2 MRI BLE 4/7/14 - diffuse narrowing of the right common iliac and right external iliac artery; high grade focal stenosis of the left common iliac artery   7.3 H/O left common iliac artery stenting   8. Shortness of breath  8.1 echo 9/3/19 -diastolic dysfunction 2, mild to moderate left atrial enlargement, mild to moderate MR  9. H/O back surgery; per report   10. DM II  11. GERD  12. BPH   13. Brain Bleed 11/2011 s/p fall   14. ADRIANA - non compliant CPAP   15. Emphysema/Asthma   16. PSVT  16.1 Event Monitor 5/9-5/22/17 - NSR - ST with one episode of PSVT  17. Moderate  periventricular and subcortical microangiopathy  17.1 MRI of the brain/MRI of orbitis 5/16/18-moderate.  Moderate periventricular and subcortical microangiopathic  18.  Emphysema/COPD, Ashley ELIZABETH Lobo    Patient is a 66-year-old male who presents today for follow-up with his wife and foster daughter at his side.  He denies any chest pain, pressure, palpitations, fluttering, presyncope, syncope, orthopnea, PND or edema.  Patient says he does have dizziness and lightheadedness when he changes position.  He has weakness in his arms and his legs.  He says he is short of breath with any activity that he does.  He overall just does not feel well.  Patient is diabetic therefore can mask the symptoms of chest discomfort and he could actually have anginal equivalent.  He did have positive stress test and at this point he is willing to proceed with left heart cath but he needs to wait till August.    Current Outpatient Medications on File Prior to Visit   Medication Sig Dispense Refill   • busPIRone (BUSPAR) 15 MG tablet Take 15 mg by mouth As Needed. 1.5 tabs BID     • cholecalciferol (VITAMIN D3) 25 MCG (1000 UT) tablet Take 1,000 Units by mouth Daily.     • donepezil (ARICEPT) 10 MG tablet Take 10 mg by mouth every night.     • finasteride (PROSCAR) 5 MG tablet Take 5 mg by mouth daily.     • gabapentin (NEURONTIN) 800 MG tablet Take 600 mg by mouth 3 (Three) Times a Day.     • insulin glargine (LANTUS) 100 UNIT/ML injection Inject 90 Units under the skin into the appropriate area as directed Daily.     • isosorbide mononitrate (IMDUR) 30 MG 24 hr tablet Take 0.5 tablets by mouth Every Night. 90 tablet 3   • magnesium oxide (MAGOX) 400 (241.3 MG) MG tablet tablet Take 400 mg by mouth daily.     • memantine (NAMENDA) 5 MG tablet Take 5 mg by mouth 2 (two) times a day.     • nitroglycerin (NITROSTAT) 0.4 MG SL tablet 1 under the tongue as needed for angina, may repeat q5mins for up three doses 30 tablet 3   •  nortriptyline (PAMELOR) 50 MG capsule Take 50 mg by mouth Every Night.     • Omega 3 1000 MG capsule Take 1,000 mg by mouth Daily. 3000 daily     • omeprazole (PriLOSEC) 40 MG capsule Take 40 mg by mouth daily.     • ramipril (ALTACE) 10 MG capsule Take 10 mg by mouth daily.     • rosuvastatin (CRESTOR) 40 MG tablet TAKE 1 TABLET EVERY NIGHT 90 tablet 3   • tamsulosin (FLOMAX) 0.4 MG capsule 24 hr capsule Take 1 capsule by mouth every night.     • Vitamin E 180 MG capsule Take 180 mg by mouth Daily.     • [DISCONTINUED] aspirin 325 MG tablet Take 1 tablet by mouth Daily. 90 tablet 3     No current facility-administered medications on file prior to visit.       ALLERGIES    Patient has no known allergies.    Past Medical History:   Diagnosis Date   • COPD (chronic obstructive pulmonary disease) (CMS/[facility])    • COVID-19 vaccine administered 1st - 03/08/201 2nd - 04/07/2021  [place]    • GERD (gastroesophageal reflux disease)    • Hyperlipidemia    • Hypertension    • Stroke (CMS/[facility])        Social History     Socioeconomic History   • Marital status:      Spouse name: Not on file   • Number of children: Not on file   • Years of education: Not on file   • Highest education level: Not on file   Tobacco Use   • Smoking status: Former Smoker   • Smokeless tobacco: Never Used   Substance and Sexual Activity   • Alcohol use: No   • Drug use: No   • Sexual activity: Defer       Family History   Problem Relation Age of Onset   • Heart failure Mother    • Heart disease Father    • Heart failure Father        Review of Systems   Constitutional: Negative for appetite change, chills, fatigue and fever.   HENT: Negative for congestion, rhinorrhea and sore throat.    Eyes: Positive for visual disturbance (night vision ).   Respiratory: Positive for shortness of breath (always , walking any distance or doing anything ). Negative for cough, chest tightness and wheezing.    Cardiovascular: Negative for chest pain,  "palpitations and leg swelling.   Gastrointestinal: Negative for abdominal pain, blood in stool, constipation, diarrhea and vomiting.   Endocrine: Negative for cold intolerance and heat intolerance.   Genitourinary: Positive for frequency (alot durning the night ). Negative for difficulty urinating, dysuria, hematuria and urgency.   Musculoskeletal: Positive for arthralgias (all over body ) and back pain (lower ). Negative for joint swelling, neck pain and neck stiffness.   Skin: Negative for color change, pallor, rash and wound.   Allergic/Immunologic: Positive for environmental allergies (seasonal allergies , HAY ). Negative for food allergies.   Neurological: Positive for dizziness (at times walking or when he gets up to fast ), weakness (at times legs and arms ), light-headedness (once in awhile when he gets up to fast ) and numbness (hands ). Negative for headaches.   Hematological: Bruises/bleeds easily (both ).   Psychiatric/Behavioral: Positive for sleep disturbance (up and down alot going to the restroomn , he uses a cpac machine ).       Objective   /64 (BP Location: Left arm)   Pulse 87   Ht 167.6 cm (66\")   Wt 88 kg (194 lb)   SpO2 95%   BMI 31.31 kg/m²   Vitals:    06/01/21 1046   BP: 122/64   BP Location: Left arm   Pulse: 87   SpO2: 95%   Weight: 88 kg (194 lb)   Height: 167.6 cm (66\")      Lab Results (most recent)     None        Physical Exam  Vitals reviewed.   Constitutional:       General: He is awake.      Appearance: Normal appearance. He is well-developed and well-groomed. He is obese.   HENT:      Head: Normocephalic.   Eyes:      General: Lids are normal.   Neck:      Vascular: No carotid bruit, hepatojugular reflux or JVD.   Cardiovascular:      Rate and Rhythm: Normal rate and regular rhythm.      Pulses:           Radial pulses are 2+ on the right side and 2+ on the left side.      Heart sounds: Murmur (LUSB ) heard.   Systolic murmur is present with a grade of 2/6.        " Comments: Decreased pedal pulses bilateral lower extremity  Pulmonary:      Effort: Pulmonary effort is normal.      Breath sounds: Normal air entry. Examination of the right-middle field reveals decreased breath sounds. Examination of the left-middle field reveals decreased breath sounds. Examination of the right-lower field reveals decreased breath sounds. Examination of the left-lower field reveals decreased breath sounds. Decreased breath sounds present.   Abdominal:      General: Bowel sounds are normal.      Palpations: Abdomen is soft.   Musculoskeletal:      Right lower leg: No edema.      Left lower leg: No edema.   Skin:     General: Skin is warm and dry.   Neurological:      Mental Status: He is alert and oriented to person, place, and time.   Psychiatric:         Attention and Perception: Attention and perception normal.         Mood and Affect: Mood and affect normal.         Speech: Speech normal.         Behavior: Behavior normal. Behavior is cooperative.         Thought Content: Thought content normal.         Cognition and Memory: Cognition and memory normal.         Judgment: Judgment normal.         Procedure     ECG 12 Lead    Date/Time: 6/1/2021 12:20 PM  Performed by: Ericka Gamez APRN  Authorized by: Ericka Gamez APRN   Comparison: compared with previous ECG from 5/20/2020  Comparison to previous ECG: IRBBB today   Rhythm: sinus rhythm  Rate: normal  BPM: 89  Conduction: incomplete right bundle branch block  QRS axis: normal  Other findings: low voltage    Clinical impression: non-specific ECG                 Assessment/Plan      Diagnosis Plan   1. Coronary artery disease involving native coronary artery of native heart with angina pectoris (CMS/HCC)  [facility]    clopidogrel (PLAVIX) 75 MG tablet    aspirin (aspirin) 81 MG EC tablet   2. Essential hypertension  [facility]    CBC (No Diff)    Basic Metabolic Panel   3. Hyperlipidemia, unspecified hyperlipidemia  type  [facility] Cath   4. PSVT (paroxysmal supraventricular tachycardia) (CMS/HCC)  [facility] Cath   5. PVD (peripheral vascular disease) (CMS/HCC)     6. Shortness of breath  [facility] Cath   7. ADRIANA (obstructive sleep apnea)     8. Dizziness  [facility] Carotid Ultrasound [facility]   9. Bilateral carotid artery stenosis  Duplex Carotid Ultrasound CAR   10. Malaise and fatigue  [facility]   11. Weakness  [facility]   12. Abnormal stress test  [facility]    clopidogrel (PLAVIX) 75 MG tablet    aspirin (aspirin) 81 MG EC tablet       Return 2-4 weeks after [facility].    CAD/hypertension/hyperlipidemia/PSVT/shortness of breath/weakness/fatigue/abnormal stress test-patient will proceed with left heart cath but he would like it scheduled 1 August.  He will stop his 325 aspirin and start aspirin 81 with Plavix.  He will get a CBC and BMP after he returns from the [facility].  He will continue his medication regimen otherwise.  He will follow-up 2 to 4 weeks after heart catheter sooner if any changes.  He is to take the Plavix and aspirin up until and including the day of the heart cath.       Mike Gusman  reports that he has quit smoking. He has never used smokeless tobacco.. Advance Care Planning   ACP discussion was declined by the patient. Patient does not have an advance directive, declines further assistance.    Electronically signed by:

## 2021-06-01 NOTE — PATIENT INSTRUCTIONS
Advance Care Planning and Advance Directives     You make decisions on a daily basis - decisions about where you want to live, your career, your home, your life. Perhaps one of the most important decisions you face is your choice for future medical care. Take time to talk with your family and your healthcare team and start planning today.  Advance Care Planning is a process that can help you:  · Understand possible future healthcare decisions in light of your own experiences  · Reflect on those decision in light of your goals and values  · Discuss your decisions with those closest to you and the healthcare professionals that care for you  · Make a plan by creating a document that reflects your wishes    Surrogate Decision Maker  In the event of a medical emergency, which has left you unable to communicate or to make your own decisions, you would need someone to make decisions for you.  It is important to discuss your preferences for medical treatment with this person while you are in good health.     Qualities of a surrogate decision maker:  • Willing to take on this role and responsibility  • Knows what you want for future medical care  • Willing to follow your wishes even if they don't agree with them  • Able to make difficult medical decisions under stressful circumstances    Advance Directives  These are legal documents you can create that will guide your healthcare team and decision maker(s) when needed. These documents can be stored in the electronic medical record.    · Living Will - a legal document to guide your care if you have a terminal condition or a serious illness and are unable to communicate. States vary by statute in document names/types, but most forms may include one or more of the following:        -  Directions regarding life-prolonging treatments        -  Directions regarding artificially provided nutrition/hydration        -  Choosing a healthcare decision maker        -  Direction  regarding organ/tissue donation    · Durable Power of  for Healthcare - this document names an -in-fact to make medical decisions for you, but it may also allow this person to make personal and financial decisions for you. Please seek the advice of an  if you need this type of document.    **Advance Directives are not required and no one may discriminate against you if you do not sign one.    Medical Orders  Many states allow specific forms/orders signed by your physician to record your wishes for medical treatment in your current state of health. This form, signed in personal communication with your physician, addresses resuscitation and other medical interventions that you may or may not want.      For more information or to schedule a time with a [facility] Advance Care Planning Facilitator contact: [facility].coDizziness  Dizziness is a common problem. It is a feeling of unsteadiness or light-headedness. You may feel like you are about to faint. Dizziness can lead to injury if you stumble or fall. Anyone can become dizzy, but dizziness is more common in older adults. This condition can be caused by a number of things, including medicines, dehydration, or illness.  Follow these instructions at home:  Eating and drinking  · Drink enough fluid to keep your urine clear or pale yellow. This helps to keep you from becoming dehydrated. Try to drink more clear fluids, such as water.  · Do not drink alcohol.  · Limit your caffeine intake if told to do so by your health care provider. Check ingredients and nutrition facts to see if a food or beverage contains caffeine.  · Limit your salt (sodium) intake if told to do so by your health care provider. Check ingredients and nutrition facts to see if a food or beverage contains sodium.  Activity  · Avoid making quick movements.  ? Rise slowly from chairs and steady yourself until you feel okay.  ? In the morning, first sit up on the side of the  bed. When you feel okay, stand slowly while you hold onto something until you know that your balance is fine.  · If you need to  one place for a long time, move your legs often. Tighten and relax the muscles in your legs while you are standing.  · Do not drive or use heavy machinery if you feel dizzy.  · Avoid bending down if you feel dizzy. Place items in your home so that they are easy for you to reach without leaning over.  Lifestyle  · Do not use any products that contain nicotine or tobacco, such as cigarettes and e-cigarettes. If you need help quitting, ask your health care provider.  · Try to reduce your stress level by using methods such as yoga or meditation. Talk with your health care provider if you need help to manage your stress.  General instructions  · Watch your dizziness for any changes.  · Take over-the-counter and prescription medicines only as told by your health care provider. Talk with your health care provider if you think that your dizziness is caused by a medicine that you are taking.  · Tell a friend or a family member that you are feeling dizzy. If he or she notices any changes in your behavior, have this person call your health care provider.  · Keep all follow-up visits as told by your health care provider. This is important.  Contact a health care provider if:  · Your dizziness does not go away.  · Your dizziness or light-headedness gets worse.  · You feel nauseous.  · You have reduced hearing.  · You have new symptoms.  · You are unsteady on your feet or you feel like the room is spinning.  Get help right away if:  · You vomit or have diarrhea and are unable to eat or drink anything.  · You have problems talking, walking, swallowing, or using your arms, hands, or legs.  · You feel generally weak.  · You are not thinking clearly or you have trouble forming sentences. It may take a friend or family member to notice this.  · You have chest pain, abdominal pain, shortness of  breath, or sweating.  · Your vision changes.  · You have any bleeding.  · You have a severe headache.  · You have neck pain or a stiff neck.  · You have a fever.  These symptoms may represent a serious problem that is an emergency. Do not wait to see if the symptoms will go away. Get medical help right away. Call your local emergency services (911 in the [place] Do not drive yourself to the hospital.  Summary  · Dizziness is a feeling of unsteadiness or light-headedness. This condition can be caused by a number of things, including medicines, dehydration, or illness.  · Anyone can become dizzy, but dizziness is more common in older adults.  · Drink enough fluid to keep your urine clear or pale yellow. Do not drink alcohol.  · Avoid making quick movements if you feel dizzy. Monitor your dizziness for any changes.  This information is not intended to replace advice given to you by your health care provider. Make sure you discuss any questions you have with your health care provider.  Document Revised: 12/21/2018 Document Reviewed: 01/20/2018  [facility] Patient Education © 2021 [employer]  m/ACP or call [phone] and someone will contact you directly.  Coronary Angiogram With Stent  Coronary angiogram with stent placement is a procedure to widen or open a narrow blood vessel of the heart (coronary artery). Arteries may become blocked by cholesterol buildup (plaques) in the lining of the artery wall. When a coronary artery becomes partially blocked, blood flow to that area decreases. This may lead to chest pain or a heart attack (myocardial infarction).  A stent is a small piece of metal that looks like mesh or spring. Stent placement may be done as treatment after a heart attack, or to prevent a heart attack if a blocked artery is found by a coronary angiogram.  Let your health care provider know about:  Any allergies you have, including allergies to medicines or contrast dye.  All medicines you are taking,  including vitamins, herbs, eye drops, creams, and over-the-counter medicines.  Any problems you or family members have had with anesthetic medicines.  Any blood disorders you have.  Any surgeries you have had.  Any medical conditions you have, including kidney problems or kidney failure.  Whether you are pregnant or may be pregnant.  Whether you are breastfeeding.  What are the risks?  Generally, this is a safe procedure. However, serious problems may occur, including:  Damage to nearby structures or organs, such as the heart, blood vessels, or kidneys.  A return of blockage.  Bleeding, infection, or bruising at the insertion site.  A collection of blood under the skin (hematoma) at the insertion site.  A blood clot in another part of the body.  Allergic reaction to medicines or dyes.  Bleeding into the abdomen (retroperitoneal bleeding).  Stroke (rare).  Heart attack (rare).  What happens before the procedure?  Staying hydrated  Follow instructions from your health care provider about hydration, which may include:  Up to 2 hours before the procedure - you may continue to drink clear liquids, such as water, clear fruit juice, black coffee, and plain tea.    Eating and drinking restrictions  Follow instructions from your health care provider about eating and drinking, which may include:  8 hours before the procedure - stop eating heavy meals or foods, such as meat, fried foods, or fatty foods.  6 hours before the procedure - stop eating light meals or foods, such as toast or cereal.  2 hours before the procedure - stop drinking clear liquids.  Medicines  Ask your health care provider about:  Changing or stopping your regular medicines. This is especially important if you are taking diabetes medicines or blood thinners.  Taking medicines such as aspirin and ibuprofen. These medicines can thin your blood. Do not take these medicines unless your health care provider tells you to take them.  Generally, aspirin is  recommended before a thin tube, called a catheter, is passed through a blood vessel and inserted into the heart (cardiac catheterization).  Taking over-the-counter medicines, vitamins, herbs, and supplements.  General instructions  Do not use any products that contain nicotine or tobacco for at least 4 weeks before the procedure. These products include cigarettes, e-cigarettes, and chewing tobacco. If you need help quitting, ask your health care provider.  Plan to have someone take you home from the hospital or clinic.  If you will be going home right after the procedure, plan to have someone with you for 24 hours.  You may have tests and imaging procedures.  Ask your health care provider:  How your insertion site will be marked. Ask which artery will be used for the procedure.  What steps will be taken to help prevent infection. These may include:  Removing hair at the insertion site.  Washing skin with a germ-killing soap.  Taking antibiotic medicine.  What happens during the procedure?    An IV will be inserted into one of your veins.  Electrodes may be placed on your chest to monitor your heart rate during the procedure.  You will be given one or more of the following:  A medicine to help you relax (sedative).  A medicine to numb the area (local anesthetic) for catheter insertion.  A small incision will be made for catheter insertion.  The catheter will be inserted into an artery using a guide wire. The location may be in your groin, your wrist, or the fold of your arm (near your elbow).  An X-ray procedure (fluoroscopy) will be used to help guide the catheter to the opening of the heart arteries.  A dye will be injected into the catheter. X-rays will be taken. The dye helps to show where any narrowing or blockages are located in the arteries.  Tell your health care provider if you have chest pain or trouble breathing.  A tiny wire will be guided to the blocked spot, and a balloon will be inflated to make the  artery wider.  The stent will be expanded to crush the plaques into the wall of the vessel. The stent will hold the area open and improve the blood flow. Most stents have a drug coating to reduce the risk of the stent narrowing over time.  The artery may be made wider using a drill, laser, or other tools that remove plaques.  The catheter will be removed when the blood flow improves. The stent will stay where it was placed, and the lining of the artery will grow over it.  A bandage (dressing) will be placed on the insertion site. Pressure will be applied to stop bleeding.  The IV will be removed.  This procedure may vary among health care providers and hospitals.  What happens after the procedure?  Your blood pressure, heart rate, breathing rate, and blood oxygen level will be monitored until you leave the hospital or clinic.  If the procedure is done through the leg, you will lie flat in bed for a few hours or for as long as told by your health care provider. You will be instructed not to bend or cross your legs.  The insertion site and the pulse in your foot or wrist will be checked often.  You may have more blood tests, X-rays, and a test that records the electrical activity of your heart (electrocardiogram, or ECG).  Do not drive for 24 hours if you were given a sedative during your procedure.  Summary  Coronary angiogram with stent placement is a procedure to widen or open a narrowed coronary artery. This is done to treat heart problems.  Before the procedure, let your health care provider know about all the medical conditions and surgeries you have or have had.  This is a safe procedure. However, some problems may occur, including damage to nearby structures or organs, bleeding, blood clots, or allergies.  Follow your health care provider's instructions about eating, drinking, medicines, and other lifestyle changes, such as quitting tobacco use before the procedure.  This information is not intended to replace  advice given to you by your health care provider. Make sure you discuss any questions you have with your health care provider.  Document Revised: 07/08/2020 Document Reviewed: 07/08/2020  Elsevier Patient Education © 2021 [employer]

## 2021-06-06 LAB
BH CV ECHO MEAS - BSA(HAYCOCK): 2.1 M^2
BH CV ECHO MEAS - BSA(HAYCOCK): 2.1 M^2
BH CV ECHO MEAS - BSA: 2 M^2
BH CV ECHO MEAS - BSA: 2 M^2
BH CV ECHO MEAS - BZI_BMI: 31.3 KILOGRAMS/M^2
BH CV ECHO MEAS - BZI_BMI: 31.3 KILOGRAMS/M^2
BH CV ECHO MEAS - BZI_METRIC_HEIGHT: 167.6 CM
BH CV ECHO MEAS - BZI_METRIC_HEIGHT: 167.6 CM
BH CV ECHO MEAS - BZI_METRIC_WEIGHT: 88 KG
BH CV ECHO MEAS - BZI_METRIC_WEIGHT: 88 KG
BH CV LEA LEFT ANT TIBIAL A DISTAL PSV: 39 CM/S
BH CV LEA LEFT CFA PROX PSV: 166 CM/S
BH CV LEA LEFT DFA PROX PSV: 137 CM/S
BH CV LEA LEFT POPITEAL A  PROX PSV: 93 CM/S
BH CV LEA LEFT PTA DISTAL PSV: 79 CM/S
BH CV LEA LEFT SFA DISTAL PSV: 113 CM/S
BH CV LEA LEFT SFA MID PSV: 296 CM/S
BH CV LEA LEFT SFA PROX PSV: 187 CM/S
BH CV LEA RIGHT ANT TIBIAL A DISTAL PSV: 65 CM/S
BH CV LEA RIGHT CFA PROX PSV: 229 CM/S
BH CV LEA RIGHT DFA PROX PSV: 108 CM/S
BH CV LEA RIGHT POPITEAL A  PROX PSV: 90 CM/S
BH CV LEA RIGHT PTA DISTAL PSV: 78 CM/S
BH CV LEA RIGHT SFA DISTAL PSV: 132 CM/S
BH CV LEA RIGHT SFA MID PSV: 156 CM/S
BH CV LEA RIGHT SFA PROX PSV: 137 CM/S
BH CV XLRA MEAS LEFT BULB EDV: -18.2 CM/SEC
BH CV XLRA MEAS LEFT BULB PSV: -82.2 CM/SEC
BH CV XLRA MEAS LEFT CCA RATIO VEL: 110 CM/SEC
BH CV XLRA MEAS LEFT DIST CCA EDV: 25.1 CM/SEC
BH CV XLRA MEAS LEFT DIST CCA PSV: 110.3 CM/SEC
BH CV XLRA MEAS LEFT DIST ICA EDV: -44 CM/SEC
BH CV XLRA MEAS LEFT DIST ICA PSV: -132.8 CM/SEC
BH CV XLRA MEAS LEFT ICA RATIO VEL: -139 CM/SEC
BH CV XLRA MEAS LEFT ICA/CCA RATIO: -1.3
BH CV XLRA MEAS LEFT MID ICA EDV: -36.1 CM/SEC
BH CV XLRA MEAS LEFT MID ICA PSV: -122.6 CM/SEC
BH CV XLRA MEAS LEFT PROX CCA EDV: 17.7 CM/SEC
BH CV XLRA MEAS LEFT PROX CCA PSV: 127.7 CM/SEC
BH CV XLRA MEAS LEFT PROX ECA EDV: -27.5 CM/SEC
BH CV XLRA MEAS LEFT PROX ECA PSV: -150.9 CM/SEC
BH CV XLRA MEAS LEFT PROX ICA EDV: -37.7 CM/SEC
BH CV XLRA MEAS LEFT PROX ICA PSV: -139.9 CM/SEC
BH CV XLRA MEAS LEFT VERTEBRAL A EDV: 14.3 CM/SEC
BH CV XLRA MEAS LEFT VERTEBRAL A PSV: 74.4 CM/SEC
BH CV XLRA MEAS RIGHT BULB EDV: 13.8 CM/SEC
BH CV XLRA MEAS RIGHT BULB PSV: 68.4 CM/SEC
BH CV XLRA MEAS RIGHT CCA RATIO VEL: 125 CM/SEC
BH CV XLRA MEAS RIGHT DIST CCA EDV: 17.3 CM/SEC
BH CV XLRA MEAS RIGHT DIST CCA PSV: 125.7 CM/SEC
BH CV XLRA MEAS RIGHT DIST ICA EDV: -31.4 CM/SEC
BH CV XLRA MEAS RIGHT DIST ICA PSV: -100.6 CM/SEC
BH CV XLRA MEAS RIGHT ICA RATIO VEL: -121 CM/SEC
BH CV XLRA MEAS RIGHT ICA/CCA RATIO: -0.97
BH CV XLRA MEAS RIGHT MID ICA EDV: -35.6 CM/SEC
BH CV XLRA MEAS RIGHT MID ICA PSV: -121.5 CM/SEC
BH CV XLRA MEAS RIGHT PROX CCA EDV: 16.5 CM/SEC
BH CV XLRA MEAS RIGHT PROX CCA PSV: 122.6 CM/SEC
BH CV XLRA MEAS RIGHT PROX ECA EDV: -21.3 CM/SEC
BH CV XLRA MEAS RIGHT PROX ECA PSV: -195.3 CM/SEC
BH CV XLRA MEAS RIGHT PROX ICA EDV: -29.3 CM/SEC
BH CV XLRA MEAS RIGHT PROX ICA PSV: -95 CM/SEC
BH CV XLRA MEAS RIGHT VERTEBRAL A EDV: 13.3 CM/SEC
BH CV XLRA MEAS RIGHT VERTEBRAL A PSV: 63.3 CM/SEC
DIST ATA PSV LEFT: 39 CM/SEC
DIST ATA PSV RIGHT: 64.5 CM/SEC
DIST PTA PSV LEFT: 79.1 CM/SEC
DIST PTA PSV RIGHT: 78.4 CM/SEC
DIST SFA PSV LEFT: -114.5 CM/SEC
DIST SFA PSV RIGHT: -132.7 CM/SEC
LEFT CFA PROX SYS PSV: 166 CM/SEC
MAXIMAL PREDICTED HEART RATE: 152 BPM
MAXIMAL PREDICTED HEART RATE: 152 BPM
MID SFA PSV LEFT: -297.5 CM/SEC
MID SFA PSV RIGHT: -156 CM/SEC
PROX PFA PSV LEFT: -137 CM/SEC
PROX PFA PSV RIGHT: -108 CM/SEC
PROX SFA PSV LEFT: -187 CM/SEC
PROX SFA PSV RIGHT: -137 CM/SEC
RIGHT CFA PROX SYS PSV: 229 CM/SEC
STRESS TARGET HR: 129 BPM
STRESS TARGET HR: 129 BPM

## 2021-06-07 ENCOUNTER — TELEPHONE (OUTPATIENT)
Dept: CARDIOLOGY | Facility: CLINIC | Age: 66
End: 2021-06-07

## 2021-06-07 DIAGNOSIS — I10 ESSENTIAL HYPERTENSION: ICD-10-CM

## 2021-06-07 DIAGNOSIS — I70.202 FEMORAL ARTERY STENOSIS, LEFT (HCC): Primary | ICD-10-CM

## 2021-06-07 NOTE — TELEPHONE ENCOUNTER
Duplex Lower Ext:  Summary: Diffuse proximal disease bilaterally with potentially hemodynamically significant stenosis in the right common femoral artery, and left superficial femoral artery proximally.  There is severe stenosis in the midportion of the left superficial femoral artery.  Consider CTA for further evaluation.    Duplex Carotid US:  Summary: Nonobstructive carotid disease bilaterally as above.  Antegrade flow in both vertebral arteries.        Called pt's wife and informed her of results and the message from Ericka, she verbalized understanding.

## 2021-06-07 NOTE — TELEPHONE ENCOUNTER
----- Message from ELIZABETH Benítez sent at 6/7/2021  6:25 AM EDT -----  Needs CTA of abdomen with runoff.  Will need BMP prior.  Will need testing before 28 June as he will be out of town after that.

## 2021-06-07 NOTE — PROGRESS NOTES
Needs CTA of abdomen with runoff.  Will need BMP prior.  Will need testing before 28 June as he will be out of town after that.

## 2021-06-21 ENCOUNTER — APPOINTMENT (OUTPATIENT)
Dept: CARDIOLOGY | Facility: HOSPITAL | Age: 66
End: 2021-06-21

## 2021-06-29 ENCOUNTER — TELEPHONE (OUTPATIENT)
Dept: CARDIOLOGY | Facility: CLINIC | Age: 66
End: 2021-06-29

## 2021-06-29 DIAGNOSIS — I73.9 PVD (PERIPHERAL VASCULAR DISEASE) (HCC): Primary | ICD-10-CM

## 2021-06-29 DIAGNOSIS — R93.5 ABNORMAL ABDOMINAL CT SCAN: ICD-10-CM

## 2021-06-29 NOTE — TELEPHONE ENCOUNTER
Attempted to reach patient no answer, left message to call office. Stephanie Moran LPN      ----- Message from ELIZABETH Benítez sent at 6/29/2021  2:32 PM EDT -----  Regarding: FW:  Please refer to vascular surgeon, DR Mann.  Please advise patient to get copy of imaging on CD to take to appt.  please have patient FU with PCP re: spleen findings and forward copy to PCP.  Please leave copy of these results on my desk.  Thank you.  Referral DX abnormal CT and PVD.  ----- Message -----  From: Ilene oCsta RegSched Rep  Sent: 6/29/2021  10:03 AM EDT  To: ELIZABETH Benítez

## 2021-06-30 ENCOUNTER — TELEPHONE (OUTPATIENT)
Dept: CARDIOLOGY | Facility: CLINIC | Age: 66
End: 2021-06-30

## 2021-06-30 NOTE — TELEPHONE ENCOUNTER
Message on wife's cell phone regarding results of CT with runoff.  Advise referred to Dr. Mann for evaluation due to abnormalities in CT.  Also advised that there is a low-attenuation on his spleen and that he needs to follow-up with PCP regarding this and that the results have been forwarded to PCP.  Advised Johnathan's office will call for appointment date and time and that they will need to  the CT on CD to take to that appointment.

## 2021-07-06 ENCOUNTER — TELEPHONE (OUTPATIENT)
Dept: CARDIOLOGY | Facility: CLINIC | Age: 66
End: 2021-07-06

## 2021-07-15 DIAGNOSIS — Z00.6 EXAMINATION FOR NORMAL COMPARISON FOR CLINICAL RESEARCH: Primary | ICD-10-CM

## 2021-07-20 ENCOUNTER — TELEPHONE (OUTPATIENT)
Dept: CARDIOLOGY | Facility: CLINIC | Age: 66
End: 2021-07-20

## 2021-07-20 NOTE — TELEPHONE ENCOUNTER
Called and confirmed that pt was aware of the [facility] date 08/05/2021 pt will need to be there at 10am at [facility]     JAROD Johnson    Pt's wife gave verbal understanding

## 2021-07-21 ENCOUNTER — LAB (OUTPATIENT)
Dept: LAB | Facility: HOSPITAL | Age: 66
End: 2021-07-21

## 2021-07-21 DIAGNOSIS — I10 ESSENTIAL HYPERTENSION: ICD-10-CM

## 2021-07-21 DIAGNOSIS — I70.202 FEMORAL ARTERY STENOSIS, LEFT (HCC): ICD-10-CM

## 2021-07-21 LAB
ANION GAP SERPL CALCULATED.3IONS-SCNC: 16 MMOL/L (ref 5–15)
BUN SERPL-MCNC: 25 MG/DL (ref 8–23)
BUN/CREAT SERPL: 16.1 (ref 7–25)
CALCIUM SPEC-SCNC: 10 MG/DL (ref 8.6–10.5)
CHLORIDE SERPL-SCNC: 94 MMOL/L (ref 98–107)
CO2 SERPL-SCNC: 17 MMOL/L (ref 22–29)
CREAT SERPL-MCNC: 1.55 MG/DL (ref 0.76–1.27)
DEPRECATED RDW RBC AUTO: 41.9 FL (ref 37–54)
ERYTHROCYTE [DISTWIDTH] IN BLOOD BY AUTOMATED COUNT: 13.9 % (ref 12.3–15.4)
GFR SERPL CREATININE-BSD FRML MDRD: 45 ML/MIN/1.73
GLUCOSE SERPL-MCNC: 127 MG/DL (ref 65–99)
HCT VFR BLD AUTO: 42.1 % (ref 37.5–51)
HGB BLD-MCNC: 13.7 G/DL (ref 13–17.7)
MCH RBC QN AUTO: 26.9 PG (ref 26.6–33)
MCHC RBC AUTO-ENTMCNC: 32.5 G/DL (ref 31.5–35.7)
MCV RBC AUTO: 82.5 FL (ref 79–97)
PLATELET # BLD AUTO: 300 10*3/MM3 (ref 140–450)
PMV BLD AUTO: 10.6 FL (ref 6–12)
POTASSIUM SERPL-SCNC: 4.5 MMOL/L (ref 3.5–5.2)
RBC # BLD AUTO: 5.1 10*6/MM3 (ref 4.14–5.8)
SODIUM SERPL-SCNC: 127 MMOL/L (ref 136–145)
WBC # BLD AUTO: 11.65 10*3/MM3 (ref 3.4–10.8)

## 2021-07-21 PROCEDURE — 36415 COLL VENOUS BLD VENIPUNCTURE: CPT

## 2021-07-21 PROCEDURE — 85027 COMPLETE CBC AUTOMATED: CPT

## 2021-07-21 PROCEDURE — 80048 BASIC METABOLIC PNL TOTAL CA: CPT

## 2021-07-22 ENCOUNTER — TELEPHONE (OUTPATIENT)
Dept: CARDIOLOGY | Facility: CLINIC | Age: 66
End: 2021-07-22

## 2021-07-22 DIAGNOSIS — I10 ESSENTIAL HYPERTENSION: Primary | ICD-10-CM

## 2021-07-22 DIAGNOSIS — R06.02 SHORTNESS OF BREATH: ICD-10-CM

## 2021-07-22 DIAGNOSIS — R07.2 PRECORDIAL PAIN: ICD-10-CM

## 2021-07-22 NOTE — TELEPHONE ENCOUNTER
----- Message from ELIZABETH Benítez sent at 7/21/2021  4:40 PM EDT -----  Patient sodium down quite a bit and is kidney function is elevated.  He is scheduled for heart cath on 8/5.  Has he been sick?  Dehydrated?  He will need a repeat BMP next week.        Pt was advised of lab results , he states that he has not been drinking a lot of water , he will repeat labs next week for C (BMP) labs ordered     Creatinine   0.76 - 1.27 mg/dL 1.55High       Sodium   136 - 145 mmol/L 127Low       JAROD Johnson

## 2021-07-26 ENCOUNTER — TELEPHONE (OUTPATIENT)
Dept: CARDIOLOGY | Facility: CLINIC | Age: 66
End: 2021-07-26

## 2021-07-26 ENCOUNTER — LAB (OUTPATIENT)
Dept: LAB | Facility: HOSPITAL | Age: 66
End: 2021-07-26

## 2021-07-26 DIAGNOSIS — I10 ESSENTIAL HYPERTENSION: ICD-10-CM

## 2021-07-26 DIAGNOSIS — R07.2 PRECORDIAL PAIN: ICD-10-CM

## 2021-07-26 DIAGNOSIS — R06.02 SHORTNESS OF BREATH: ICD-10-CM

## 2021-07-26 LAB
ANION GAP SERPL CALCULATED.3IONS-SCNC: 15.4 MMOL/L (ref 5–15)
BUN SERPL-MCNC: 26 MG/DL (ref 8–23)
BUN/CREAT SERPL: 19.8 (ref 7–25)
CALCIUM SPEC-SCNC: 10 MG/DL (ref 8.6–10.5)
CHLORIDE SERPL-SCNC: 93 MMOL/L (ref 98–107)
CO2 SERPL-SCNC: 18.6 MMOL/L (ref 22–29)
CREAT SERPL-MCNC: 1.31 MG/DL (ref 0.76–1.27)
GFR SERPL CREATININE-BSD FRML MDRD: 54 ML/MIN/1.73
GLUCOSE SERPL-MCNC: 106 MG/DL (ref 65–99)
POTASSIUM SERPL-SCNC: 4.8 MMOL/L (ref 3.5–5.2)
SODIUM SERPL-SCNC: 127 MMOL/L (ref 136–145)

## 2021-07-26 PROCEDURE — 80048 BASIC METABOLIC PNL TOTAL CA: CPT

## 2021-07-26 NOTE — TELEPHONE ENCOUNTER
Glucose   65 - 99 mg/dL 106High   127High   186High     BUN   8 - 23 mg/dL 26High   25High   18    Creatinine   0.76 - 1.27 mg/dL 1.31High   1.55High   1.23    Sodium   136 - 145 mmol/L 127Low   127Low   134Low     Potassium   3.5 - 5.2 mmol/L 4.8  4.5  4.5    Chloride   98 - 107 mmol/L 93Low   94Low   98    CO2   22.0 - 29.0 mmol/L 18.6Low   17.0Low   17.4Low     Calcium   8.6 - 10.5 mg/dL 10.0  10.0  10.3    eGFR Non African Amer   >60 mL/min/1.73 54Low   45Low   59Low     BUN/Creatinine Ratio   7.0 - 25.0 19.8  16.1  14.6    Anion Gap   5.0 - 15.0 mmol/L 15.4High   16.0High   18.6High

## 2021-07-26 NOTE — TELEPHONE ENCOUNTER
----- Message from ELIZABETH Benítez sent at 7/26/2021  4:01 PM EDT -----  Regarding: FW:  Please advise patient.  CR is better for LHC, however sodium is low. Needs to drink water smart or propel water, Gatorade or Powerade, can be zero and try to increase sodium m.  Please forward results to PCP and have patient fu with them ASAP re sodium.  He is not on diuretic  ----- Message -----  From: Lab, Background User  Sent: 7/26/2021   3:11 PM EDT  To: ELIZABETH Benítez

## 2021-07-26 NOTE — TELEPHONE ENCOUNTER
"Routed results to PCP.       Called and spoke w/ Cira, informed her of results, she verbalized understanding and asked about meds prior to procedure. I asked if they had instructions, she stated they didn't. She stated pt is on insulin and has DM. I explained that I don't see any metformin containing products on his med list. She stated he has something in his med cabinet that says \"contains metformin.\" I asked if he brought his pill bottles to his last appt, she stated he did not. I stated that anything w/ Metformin needs to be held for 24hr prior.   "

## 2021-08-02 ENCOUNTER — OFFICE VISIT (OUTPATIENT)
Dept: CARDIAC SURGERY | Facility: CLINIC | Age: 66
End: 2021-08-02

## 2021-08-02 VITALS
HEIGHT: 66 IN | SYSTOLIC BLOOD PRESSURE: 106 MMHG | BODY MASS INDEX: 30.86 KG/M2 | WEIGHT: 192 LBS | DIASTOLIC BLOOD PRESSURE: 64 MMHG | HEART RATE: 91 BPM | TEMPERATURE: 98.1 F | OXYGEN SATURATION: 98 %

## 2021-08-02 DIAGNOSIS — I73.9 PAD (PERIPHERAL ARTERY DISEASE) (HCC): Primary | ICD-10-CM

## 2021-08-02 PROCEDURE — 99204 OFFICE O/P NEW MOD 45 MIN: CPT | Performed by: THORACIC SURGERY (CARDIOTHORACIC VASCULAR SURGERY)

## 2021-08-02 RX ORDER — METOPROLOL SUCCINATE 25 MG/1
25 TABLET, EXTENDED RELEASE ORAL EVERY MORNING
COMMUNITY
Start: 2021-07-01

## 2021-08-02 RX ORDER — BLOOD SUGAR DIAGNOSTIC
STRIP MISCELLANEOUS
COMMUNITY
Start: 2021-06-20 | End: 2023-02-01

## 2021-08-02 NOTE — PROGRESS NOTES
08/02/2021  Patient Information  Mike Gusman                                                                                          [address] [facility] [place] [address]   1955  'PCP/Referring Physician'  Rex Shields MD  [phone]  Ericka Gamez APRN  [phone]  Chief Complaint   Patient presents with   • Consult     N/p per Ericka JOHNSON for PAD.  Pt states that he has bilateral pain in the shin of his legs and he has groin pain in the right leg. Swelling from time to time. Pt has fatigue, uses a cane daily states that he cant walk more than 100 feet without stopping to rest.        History of Present Illness:  The patient is a 66-year-old male who was referred to me to evaluate peripheral arterial vascular disease.  He states that his legs become weak and fatigued and his calf muscles start cramping walking as little as 100 to 150 feet.  When he rests for 15 to 20 seconds the cramping and fatigue resolve.  Also, he states that about the time his legs begin to cramp he becomes extremely short of breath. A CT angiogram demonstrates bilateral superficial femoral artery disease which is nearly occlusive in long sections.  He now indicates to me that he is actually scheduled for a heart catheterization on Thursday of this week.  I do not have the details of that in terms of stress test, nuclear test or echocardiogram.  He denies slow healing or nonhealing ulcers, but stated his feet are constantly cold.      Patient Active Problem List   Diagnosis   • Coronary artery disease involving native coronary artery of native heart with angina pectoris (CMS/HCC)   • Hyperlipemia   • Essential hypertension   • PAD (peripheral artery disease) (CMS/HCC)   • Type 2 diabetes mellitus without complication, without long-term current use of insulin (CMS/HCC)   • Shortness of breath   • Chest pain   • ADRIANA (obstructive sleep apnea)   • COPD with emphysema (CMS/HCC)   • Abnormal CT of the chest   • PSVT  (paroxysmal supraventricular tachycardia) (CMS/HCC)     Past Medical History:   Diagnosis Date   • COPD (chronic obstructive pulmonary disease) (CMS/HCC)    • COVID-19 vaccine administered 1st - 03/08/201 2nd - 04/07/2021  Ana [facility]isa    • GERD (gastroesophageal reflux disease)    • Hyperlipidemia    • Hypertension    • Stroke (CMS/HCC)      Past Surgical History:   Procedure Laterality Date   • BACK SURGERY     • CARDIAC CATHETERIZATION     • SPINAL CORD STIMULATOR IMPLANT     • TOTAL HIP ARTHROPLASTY         Current Outpatient Medications:   •  Accu-Chek Bethany Plus test strip, , Disp: , Rfl:   •  Anoro Ellipta 62.5-25 MCG/INH aerosol powder  inhaler, , Disp: , Rfl:   •  aspirin (aspirin) 81 MG EC tablet, Take 1 tablet by mouth Daily., Disp: 90 tablet, Rfl: 3  •  busPIRone (BUSPAR) 15 MG tablet, Take 15 mg by mouth As Needed. 1.5 tabs BID, Disp: , Rfl:   •  cholecalciferol (VITAMIN D3) 25 MCG (1000 UT) tablet, Take 1,000 Units by mouth Daily., Disp: , Rfl:   •  clopidogrel (PLAVIX) 75 MG tablet, Take 1 tablet by mouth Daily., Disp: 30 tablet, Rfl: 11  •  donepezil (ARICEPT) 10 MG tablet, Take 10 mg by mouth every night., Disp: , Rfl:   •  finasteride (PROSCAR) 5 MG tablet, Take 5 mg by mouth daily., Disp: , Rfl:   •  gabapentin (NEURONTIN) 800 MG tablet, Take 600 mg by mouth 3 (Three) Times a Day., Disp: , Rfl:   •  insulin glargine (LANTUS) 100 UNIT/ML injection, Inject 90 Units under the skin into the appropriate area as directed Daily., Disp: , Rfl:   •  isosorbide mononitrate (IMDUR) 30 MG 24 hr tablet, Take 0.5 tablets by mouth Every Night., Disp: 90 tablet, Rfl: 3  •  magnesium oxide (MAGOX) 400 (241.3 MG) MG tablet tablet, Take 400 mg by mouth daily., Disp: , Rfl:   •  memantine (NAMENDA) 5 MG tablet, Take 5 mg by mouth 2 (two) times a day., Disp: , Rfl:   •  metoprolol succinate XL (TOPROL-XL) 25 MG 24 hr tablet, , Disp: , Rfl:   •  nitroglycerin (NITROSTAT) 0.4 MG SL tablet, 1 under the tongue as needed  for angina, may repeat q5mins for up three doses, Disp: 30 tablet, Rfl: 3  •  nortriptyline (PAMELOR) 50 MG capsule, Take 50 mg by mouth Every Night., Disp: , Rfl:   •  Omega 3 1000 MG capsule, Take 1,000 mg by mouth Daily. 3000 daily, Disp: , Rfl:   •  omeprazole (PriLOSEC) 40 MG capsule, Take 40 mg by mouth daily., Disp: , Rfl:   •  ramipril (ALTACE) 10 MG capsule, Take 10 mg by mouth daily., Disp: , Rfl:   •  rosuvastatin (CRESTOR) 40 MG tablet, TAKE 1 TABLET EVERY NIGHT, Disp: 90 tablet, Rfl: 3  •  tamsulosin (FLOMAX) 0.4 MG capsule 24 hr capsule, Take 1 capsule by mouth every night., Disp: , Rfl:   •  Vitamin E 180 MG capsule, Take 180 mg by mouth Daily., Disp: , Rfl:   No Known Allergies  Social History     Socioeconomic History   • Marital status:      Spouse name: Not on file   • Number of children: Not on file   • Years of education: Not on file   • Highest education level: Not on file   Tobacco Use   • Smoking status: Former Smoker     Packs/day: 1.50     Years: 50.00     Pack years: 75.00     Quit date:      Years since quittin.5   • Smokeless tobacco: Never Used   Vaping Use   • Vaping Use: Never used   Substance and Sexual Activity   • Alcohol use: No   • Drug use: No   • Sexual activity: Defer     Family History   Problem Relation Age of Onset   • Heart failure Mother    • Heart disease Father    • Heart failure Father      Review of Systems   Constitutional: Positive for malaise/fatigue. Negative for chills, fever, night sweats and weight loss.   HENT: Negative for congestion, hearing loss, nosebleeds and odynophagia.    Cardiovascular: Positive for leg swelling. Negative for chest pain, claudication, dyspnea on exertion, orthopnea, palpitations and syncope.   Respiratory: Negative for cough, hemoptysis, shortness of breath and wheezing.    Endocrine: Negative for cold intolerance, heat intolerance, polydipsia, polyphagia and polyuria.   Hematologic/Lymphatic: Positive for bleeding  "problem. Bruises/bleeds easily.   Skin: Positive for poor wound healing. Negative for itching and rash.   Musculoskeletal: Positive for arthritis, back pain, joint pain (bilat. hips) and muscle cramps (millie horses). Negative for joint swelling and myalgias.   Gastrointestinal: Negative for abdominal pain, constipation, diarrhea, hematemesis, melena, nausea and vomiting.   Genitourinary: Positive for nocturia. Negative for dysuria, frequency, hematuria and urgency.   Neurological: Positive for dizziness, light-headedness and numbness (bilateral finger numbness). Negative for loss of balance.   Psychiatric/Behavioral: Negative for depression and suicidal ideas. The patient has insomnia. The patient is not nervous/anxious.    Allergic/Immunologic: Positive for environmental allergies. Negative for HIV exposure.     Vitals:    08/02/21 0908   BP: 106/64   Pulse: 91   Temp: 98.1 °F (36.7 °C)   SpO2: 98%   Weight: 87.1 kg (192 lb)   Height: 167.6 cm (66\")      Physical Exam   CONSTITUTIONAL: Alert and conversant, Well dressed, Well nourished, No acute distress  EYES: Sclera clean, Anicteric, Pupils equal  ENT: No nasal deviation, Trachea midline  NECK: No neck masses, Supple  LUNGS: No wheezing, Cough, non-congested  HEART: No rubs, No murmurs  GASTROINTESTINAL: Soft, non-distended, No masses, Non tender  to palpation, normal bowel sounds  NEURO: No motor deficits, No sensory deficits, Cranial Nerves 2 through 12 grossly intact  PSYCHIATRIC: Oriented to person, place and time, No memory deficits, Mood appropriate  VASCULAR: No carotid bruits, Femoral pulses palpable and symmetric the feet themselves are viable but are cool with shiny hyperemic skin.  Posterior tibial and dorsalis pedis pulses cannot be palpated but the feet are viable  MUSKULOSKELETAL: No extremity trauma or extremity asymmetry    The ROS, past medical history, surgical history, family history, social history and vitals were reviewed by myself and " corrected as needed.      Labs/Imaging:  I reviewed the CT angiogram images and there is severe bilateral superficial femoral artery disease with near occlusion in multiple segments.    Assessment/Plan:   The patient is a 66-year-old male who is being referred for peripheral arterial vascular disease. He has claudication in the calves, bilaterally, after walking 100 to 150 feet.  He states both legs hurt equally and he cannot indicate which one hurts first.  He also becomes profoundly short of breath at the time that his legs start cramping and then 15 to 20 seconds the leg pain and fatigue  resolve.  He is actually scheduled for a cardiac catheterization on Thursday of this week.  If he requires coronary stents I would like to wait 6 to 8 weeks before proceeding with bilateral femoral to popliteal bypasses.  If his cardiac catheterization is normal we may proceed with left femoropopliteal bypass and then again in the next week followed by right side at a later time.  He is aware that there is a risk of bleeding, infection, graft failure, infection, limb loss and death and is agreeable to proceed with our plan and we will contact him following his catheterization to find out the results.    Patient Active Problem List   Diagnosis   • Coronary artery disease involving native coronary artery of native heart with angina pectoris (CMS/[facility])   • Hyperlipemia   • Essential hypertension   • PAD (peripheral artery disease) (CMS/[facility])   • Type 2 diabetes mellitus without complication, without long-term current use of insulin (CMS/[facility])   • Shortness of breath   • Chest pain   • ADRIANA (obstructive sleep apnea)   • COPD with emphysema (CMS/[facility])   • Abnormal CT of the chest   • PSVT (paroxysmal supraventricular tachycardia) (CMS/[facility])       CC: MD Ericka Medel, ELIZABETH Napoles editing for Farhat Mann MD    I, Farhat Mann MD, have read and agree with the editing done by Sandy Napoles,  .

## 2021-08-05 ENCOUNTER — OUTSIDE FACILITY SERVICE (OUTPATIENT)
Dept: CARDIOLOGY | Facility: CLINIC | Age: 66
End: 2021-08-05

## 2021-08-05 PROCEDURE — 93458 L HRT ARTERY/VENTRICLE ANGIO: CPT | Performed by: INTERNAL MEDICINE

## 2021-08-05 PROCEDURE — 92928 PRQ TCAT PLMT NTRAC ST 1 LES: CPT | Performed by: INTERNAL MEDICINE

## 2021-08-16 ENCOUNTER — OFFICE VISIT (OUTPATIENT)
Dept: CARDIOLOGY | Facility: CLINIC | Age: 66
End: 2021-08-16

## 2021-08-16 VITALS
BODY MASS INDEX: 30.53 KG/M2 | OXYGEN SATURATION: 96 % | SYSTOLIC BLOOD PRESSURE: 133 MMHG | TEMPERATURE: 98 F | HEIGHT: 66 IN | DIASTOLIC BLOOD PRESSURE: 68 MMHG | HEART RATE: 85 BPM | WEIGHT: 190 LBS

## 2021-08-16 DIAGNOSIS — I10 ESSENTIAL HYPERTENSION: ICD-10-CM

## 2021-08-16 DIAGNOSIS — E78.5 HYPERLIPIDEMIA, UNSPECIFIED HYPERLIPIDEMIA TYPE: ICD-10-CM

## 2021-08-16 DIAGNOSIS — I65.23 BILATERAL CAROTID ARTERY STENOSIS: ICD-10-CM

## 2021-08-16 DIAGNOSIS — R06.02 SHORTNESS OF BREATH: ICD-10-CM

## 2021-08-16 DIAGNOSIS — I25.119 CORONARY ARTERY DISEASE INVOLVING NATIVE CORONARY ARTERY OF NATIVE HEART WITH ANGINA PECTORIS (HCC): Primary | ICD-10-CM

## 2021-08-16 DIAGNOSIS — R94.39 ABNORMAL STRESS TEST: ICD-10-CM

## 2021-08-16 DIAGNOSIS — I47.1 PSVT (PAROXYSMAL SUPRAVENTRICULAR TACHYCARDIA) (HCC): ICD-10-CM

## 2021-08-16 DIAGNOSIS — G47.33 OSA (OBSTRUCTIVE SLEEP APNEA): ICD-10-CM

## 2021-08-16 DIAGNOSIS — R60.9 PERIPHERAL EDEMA: ICD-10-CM

## 2021-08-16 PROBLEM — R60.0 PERIPHERAL EDEMA: Status: ACTIVE | Noted: 2021-08-16

## 2021-08-16 PROCEDURE — 99214 OFFICE O/P EST MOD 30 MIN: CPT | Performed by: NURSE PRACTITIONER

## 2021-08-16 RX ORDER — CLOPIDOGREL BISULFATE 75 MG/1
75 TABLET ORAL DAILY
Qty: 90 TABLET | Refills: 3 | Status: SHIPPED | OUTPATIENT
Start: 2021-08-16 | End: 2022-08-22

## 2021-08-16 NOTE — PROGRESS NOTES
Subjective   Mike Gusman is a 66 y.o. male     Chief Complaint   Patient presents with   • Follow-up   • Coronary Artery Disease       HPI    Problem List:    1. CAD stent x1 somewhere between 2004 and 2006 in [place]  1.1 CT chest 8/5/19-severe atherosclerotic plaque formation throughout the coronary arteries, aorta has mild atherosclerotic plaque formation without aneurysmal dilation, interstitial fibrosis with underlying emphysema, fatty liver  1.2 stress test 9/3/19-no evidence of ischemia, post-rest EF 60%  1.3 left heart cath 10/22/19- obtuse marginal small-caliber 70% proximal narrowing, obtuse marginal terminal 50 to 70%, small limb of the LAD 50 to 70%, EF 60%, LVEDP 22-24  1.4 Stress Test 1/6/2021 - anterolateral and lateral wall ischemia; post stress EF 61%; elevated transient ischemic dilation of unknown sig 1.43  1.5 left heart cath 8/5/2021-left main minor ostial distal tapering, 90% or greater stenosis in the ostium and proximal portion of the circumflex, which received a stent.  Right coronary artery no high-grade stenosis, LAD 20 to 40% stenosis, EF 55%, LVEDP 18  2. Hypertension  3. Hyperlipidemia   4. Viral Pericarditis 2009  5. Aortic valve disorder  6. Carotid Artery Disease   6.1 Carotid Artery U/S 7/29/13 - no sig stenosis or occlusion in either carotid system  6.2 Carotid artery ultrasound 9/3/19-16 to 49% stenosis in the bulb and bifurcation bilaterally, 16 to 49% stenosis mid right internal carotid artery and length of the left internal carotid artery, antegrade flow both vertebral arteries  6.3 carotid artery ultrasound 6/1/2021-16 to 49% stenosis in the right that is calcified but actually by laterally at the bifurcations, 16 to 49% stenosis in the mid right internal carotid artery and throughout the left her carotid artery, antegrade flow both vertebral arteries  7. PVD   7.1 JESSICA 3/7/14 - mild changes consistent with known PVD of BLE; probably diffuse   7.2 MRI BLE 4/7/14 - diffuse  narrowing of the right common iliac and right external iliac artery; high grade focal stenosis of the left common iliac artery   7.3 H/O left common iliac artery stenting   7.4 bilateral lower extremity arterial ultrasound 6/1/2021-less than or equal to 60% stenosis by Doppler in the mid left superficial femoral artery, severe segmental stenosis in the mid left superficial femoral artery with 50-99% stenosis by Doppler and approximate 80% segmental stenosis  7.5 CT of abdomen with runoff 6/24/2021-common femoral artery had up to 50% stenosis, three-vessel runoff to the ankle, extremely diminutive distal anterior tibial artery and DPA, severe left-sided SMA disease with multilevel severe stenosis and extensive plaque  8. Shortness of breath  8.1 echo 9/3/19 -diastolic dysfunction 2, mild to moderate left atrial enlargement, mild to moderate MR  9. H/O back surgery; per report   10. DM II  11. GERD  12. BPH   13. Brain Bleed 11/2011 s/p fall   14. ADRIANA - non compliant CPAP   15. Emphysema/Asthma   16. PSVT  16.1 Event Monitor 5/9-5/22/17 - NSR - ST with one episode of PSVT  17. Moderate periventricular and subcortical microangiopathy  17.1 MRI of the brain/MRI of orbitis 5/16/18-moderate.  Moderate periventricular and subcortical microangiopathic  18.  Emphysema/COPD, ELIZABETH Borrero    Patient is a 66-year-old male who presents today for follow-up status post left heart cath with stent placements with his wife is side.  He denies any chest pain, pressure, palpitations, fluttering, dizziness, presyncope, syncope, orthopnea or PND.  He does get lightheaded from time to time when he moves fast.  Patient says he does get swelling in his legs but typically resolves with elevation.  Patient says he does get short of breath with walking and does not has not changed.  He says that he feels like he can get a better breath but he just does not get good breath.  He says this has been since getting the stent.  Patient did  see Dr. Mann and the plan is for him to have bilateral femoropopliteal.    We went over stress, echo, carotid and bilateral lower extremity arterial ultrasound as well as CT of abdomen with runoff.    Current Outpatient Medications on File Prior to Visit   Medication Sig Dispense Refill   • Accu-Chek Bethany Plus test strip      • Anoro Ellipta 62.5-25 MCG/INH aerosol powder  inhaler Inhale 1 puff Daily.     • aspirin (aspirin) 81 MG EC tablet Take 1 tablet by mouth Daily. 90 tablet 3   • busPIRone (BUSPAR) 15 MG tablet Take 15 mg by mouth 2 (two) times a day. 1.5 tabs BID     • cholecalciferol (VITAMIN D3) 25 MCG (1000 UT) tablet Take 1,000 Units by mouth Daily.     • donepezil (ARICEPT) 10 MG tablet Take 10 mg by mouth every night.     • finasteride (PROSCAR) 5 MG tablet Take 5 mg by mouth daily.     • gabapentin (NEURONTIN) 800 MG tablet Take 600 mg by mouth 3 (Three) Times a Day.     • insulin glargine (LANTUS) 100 UNIT/ML injection Inject 90 Units under the skin into the appropriate area as directed Daily.     • isosorbide mononitrate (IMDUR) 30 MG 24 hr tablet Take 0.5 tablets by mouth Every Night. 90 tablet 3   • magnesium oxide (MAGOX) 400 (241.3 MG) MG tablet tablet Take 400 mg by mouth daily.     • memantine (NAMENDA) 5 MG tablet Take 5 mg by mouth Daily.     • metoprolol succinate XL (TOPROL-XL) 25 MG 24 hr tablet Take 25 mg by mouth Daily.     • nitroglycerin (NITROSTAT) 0.4 MG SL tablet 1 under the tongue as needed for angina, may repeat q5mins for up three doses 30 tablet 3   • nortriptyline (PAMELOR) 50 MG capsule Take 50 mg by mouth Every Night.     • Omega 3 1000 MG capsule Take 1,000 mg by mouth Daily. 3000 daily     • omeprazole (PriLOSEC) 40 MG capsule Take 40 mg by mouth daily.     • ramipril (ALTACE) 10 MG capsule Take 10 mg by mouth daily.     • rosuvastatin (CRESTOR) 40 MG tablet TAKE 1 TABLET EVERY NIGHT 90 tablet 3   • SITagliptin 100 MG tablet 100 mg, metFORMIN  MG tablet  sustained-release 24 hour 1,000 mg Take 1 dose by mouth 2 (two) times a day.     • tamsulosin (FLOMAX) 0.4 MG capsule 24 hr capsule Take 1 capsule by mouth every night.     • Vitamin E 180 MG capsule Take 180 mg by mouth Daily.     • [DISCONTINUED] clopidogrel (PLAVIX) 75 MG tablet Take 1 tablet by mouth Daily. 30 tablet 11     No current facility-administered medications on file prior to visit.       ALLERGIES    Patient has no known allergies.    Past Medical History:   Diagnosis Date   • COPD (chronic obstructive pulmonary disease) (CMS/[facility])    • COVID-19 vaccine administered  - 2021  [place]    • GERD (gastroesophageal reflux disease)    • Hyperlipidemia    • Hypertension    • Stroke (CMS/[facility])        Social History     Socioeconomic History   • Marital status:      Spouse name: Not on file   • Number of children: Not on file   • Years of education: Not on file   • Highest education level: Not on file   Tobacco Use   • Smoking status: Former Smoker     Packs/day: 1.50     Years: 50.00     Pack years: 75.00     Quit date:      Years since quittin.6   • Smokeless tobacco: Never Used   Vaping Use   • Vaping Use: Never used   Substance and Sexual Activity   • Alcohol use: No   • Drug use: No   • Sexual activity: Defer       Family History   Problem Relation Age of Onset   • Heart failure Mother    • Heart disease Father    • Heart failure Father        Review of Systems   Constitutional: Negative for appetite change, chills, diaphoresis, fatigue and fever.   HENT: Negative for congestion, rhinorrhea and tinnitus.    Eyes: Positive for visual disturbance (Driving ).   Respiratory: Positive for shortness of breath (at times walking at 50 -75 feet ). Negative for cough, chest tightness and wheezing.    Cardiovascular: Positive for leg swelling (legs, feet and ankles ). Negative for chest pain and palpitations.   Gastrointestinal: Negative for abdominal pain, blood in stool,  "constipation, diarrhea, nausea and vomiting.   Endocrine: Negative for cold intolerance and heat intolerance.   Genitourinary: Positive for frequency (several times in the night and due to drinking alot during the day ). Negative for difficulty urinating, dysuria, hematuria and urgency.   Musculoskeletal: Positive for arthralgias (back and hips ), back pain (lower ) and gait problem (uses a cane ). Negative for joint swelling, neck pain and neck stiffness.        Aching in legs    Skin: Negative for pallor, rash and wound.   Allergic/Immunologic: Negative for environmental allergies and food allergies.   Neurological: Positive for light-headedness (From time to time if he gets up to fast ) and numbness (fingers, feet ). Negative for dizziness, weakness and headaches.   Hematological: Bruises/bleeds easily (Brusies and bleeds ).   Psychiatric/Behavioral: Positive for sleep disturbance (Hard to go and hard to stay asleep pt tosses and turns he  is better since using the Cpac machine ).       Objective   /68 (BP Location: Left arm)   Pulse 85   Temp 98 °F (36.7 °C)   Ht 167.6 cm (66\")   Wt 86.2 kg (190 lb)   SpO2 96%   BMI 30.67 kg/m²   Vitals:    08/16/21 1303   BP: 133/68   BP Location: Left arm   Pulse: 85   Temp: 98 °F (36.7 °C)   SpO2: 96%   Weight: 86.2 kg (190 lb)   Height: 167.6 cm (66\")      Lab Results (most recent)     None        Physical Exam  Vitals reviewed.   Constitutional:       General: He is awake.      Appearance: Normal appearance. He is well-developed and well-groomed. He is obese.   HENT:      Head: Normocephalic.   Eyes:      General: Lids are normal.   Neck:      Vascular: No carotid bruit, hepatojugular reflux or JVD.   Cardiovascular:      Rate and Rhythm: Normal rate and regular rhythm.      Pulses:           Radial pulses are 2+ on the right side and 2+ on the left side.      Heart sounds: Normal heart sounds.      Comments: Decreased pedal pulses BLE   Pulmonary:      Effort: " Pulmonary effort is normal.      Breath sounds: Normal air entry. Examination of the right-lower field reveals decreased breath sounds. Examination of the left-lower field reveals decreased breath sounds. Decreased breath sounds present.   Abdominal:      General: Bowel sounds are normal.      Palpations: Abdomen is soft.   Musculoskeletal:      Right lower leg: No edema.      Left lower leg: No edema.      Comments: Uses a cane    Skin:     General: Skin is warm and dry.      Findings: Abrasion (left hand nuckle abrasion ) present.   Neurological:      Mental Status: He is alert and oriented to person, place, and time.   Psychiatric:         Attention and Perception: Attention and perception normal.         Mood and Affect: Mood and affect normal.         Speech: Speech normal.         Behavior: Behavior normal. Behavior is cooperative.         Thought Content: Thought content normal.         Cognition and Memory: Cognition and memory normal.         Judgment: Judgment normal.         Procedure   Procedures         Assessment/Plan      Diagnosis Plan   1. Coronary artery disease involving native coronary artery of native heart with angina pectoris (CMS/[facility])  clopidogrel (PLAVIX) 75 MG tablet   2. Essential hypertension     3. Hyperlipidemia, unspecified hyperlipidemia type     4. PSVT (paroxysmal supraventricular tachycardia) (CMS/[facility])     5. Shortness of breath     6. ADRIANA (obstructive sleep apnea)     7. Bilateral carotid artery stenosis     8. Peripheral edema     9. Abnormal stress test  clopidogrel (PLAVIX) 75 MG tablet       Return in about 6 months (around 2/16/2022).    CAD-patient's on aspirin, Plavix, beta and statin.  Hypertension-patient is on metoprolol and ramipril.  Hyperlipidemia-patient is on Crestor and omega-3.  Shortness of breath-stable.  PSVT-patient's on beta-blocker and doing well.  ADRIANA-patient is noncompliant with CPAP.  Bilateral carotid artery disease-patient's on aspirin and statin.   Peripheral edema-patient says it resolves with elevation.  He will continue his medication regimen.  He will follow-up in 6 months or sooner if any changes.       Mike Gusman  reports that he quit smoking about 16 years ago. He has a 75.00 pack-year smoking history. He has never used smokeless tobacco..Advance Care Planning   ACP discussion was declined by the patient. Patient does not have an advance directive, declines further assistance. Patient brought in medicine list to appointment, it's been reviewed with patient and med list was updated in the chart.     Electronically signed by:

## 2021-08-16 NOTE — PATIENT INSTRUCTIONS
Coronary Artery Disease, Male  Coronary artery disease (CAD) is a condition in which the arteries that lead to the heart (coronary arteries) become narrow or blocked. The narrowing or blockage can lead to decreased blood flow to the heart. Prolonged reduced blood flow can cause a heart attack (myocardial infarction or MI). This condition may also be called coronary heart disease.  Because CAD is the leading cause of death in men, it is important to understand what causes this condition and how it is treated.  What are the causes?  CAD is most often caused by atherosclerosis. This is the buildup of fat and cholesterol (plaque) on the inside of the arteries. Over time, the plaque may narrow or block the artery, reducing blood flow to the heart. Plaque can also become weak and break off within a coronary artery and cause a sudden blockage. Other less common causes of CAD include:  · A blood clot or a piece of a blood clot or other substance that blocks the flow of blood in a coronary artery (embolism).  · A tearing of the artery (spontaneous coronary artery dissection).  · An enlargement of an artery (aneurysm).  · Inflammation (vasculitis) in the artery wall.  What increases the risk?  The following factors may make you more likely to develop this condition:  · Age. Men over age 45 are at a greater risk of CAD.  · Family history of CAD.  · Gender. Men often develop CAD earlier in life than women.  · High blood pressure (hypertension).  · Diabetes.  · High cholesterol levels.  · Tobacco use.  · Excessive alcohol use.  · Lack of exercise.  · A diet high in saturated and trans fats, such as fried food and processed meat.  Other possible risk factors include:  · High stress levels.  · Depression.  · Obesity.  · Sleep apnea.  What are the signs or symptoms?  Many people do not have any symptoms during the early stages of CAD. As the condition progresses, symptoms may include:  · Chest pain (angina). The pain can:  ? Feel  like crushing or squeezing, or like a tightness, pressure, fullness, or heaviness in the chest.  ? Last more than a few minutes or can stop and recur. The pain tends to get worse with exercise or stress and to fade with rest.  · Pain in the arms, neck, jaw, ear, or back.  · Unexplained heartburn or indigestion.  · Shortness of breath.  · Nausea or vomiting.  · Sudden light-headedness.  · Sudden cold sweats.  · Fluttering or fast heartbeat (palpitations).  How is this diagnosed?  This condition is diagnosed based on:  · Your family and medical history.  · A physical exam.  · Tests, including:  ? A test to check the electrical signals in your heart (electrocardiogram).  ? Exercise stress test. This looks for signs of blockage when the heart is stressed with exercise, such as running on a treadmill.  ? Pharmacologic stress test. This test looks for signs of blockage when the heart is being stressed with a medicine.  ? Blood tests.  ? Coronary angiogram. This is a procedure to look at the coronary arteries to see if there is any blockage. During this test, a dye is injected into your arteries so they appear on an X-ray.  ? Coronary artery CT scan. This CT scan helps detect calcium deposits in your coronary arteries. Calcium deposits are an indicator of CAD.  ? A test that uses sound waves to take a picture of your heart (echocardiogram).  ? Chest X-ray.  How is this treated?  This condition may be treated by:  · Healthy lifestyle changes to reduce risk factors.  · Medicines such as:  ? Antiplatelet medicines and blood-thinning medicines, such as aspirin. These help to prevent blood clots.  ? Nitroglycerin.  ? Blood pressure medicines.  ? Cholesterol-lowering medicine.  · Coronary angioplasty and stenting. During this procedure, a thin, flexible tube is inserted through a blood vessel and into a blocked artery. A balloon or similar device on the end of the tube is inflated to open up the artery. In some cases, a small,  mesh tube (stent) is inserted into the artery to keep it open.  · Coronary artery bypass surgery. During this surgery, veins or arteries from other parts of the body are used to create a bypass around the blockage and allow blood to reach your heart.  Follow these instructions at home:  Medicines  · Take over-the-counter and prescription medicines only as told by your health care provider.  · Do not take the following medicines unless your health care provider approves:  ? NSAIDs, such as ibuprofen, naproxen, or celecoxib.  ? Vitamin supplements that contain vitamin A, vitamin E, or both.  Lifestyle  · Follow an exercise program approved by your health care provider. Aim for 150 minutes of moderate exercise or 75 minutes of vigorous exercise each week.  · Maintain a healthy weight or lose weight as approved by your health care provider.  · Learn to manage stress or try to limit your stress. Ask your health care provider for suggestions if you need help.  · Get screened for depression and seek treatment, if needed.  · Do not use any products that contain nicotine or tobacco, such as cigarettes, e-cigarettes, and chewing tobacco. If you need help quitting, ask your health care provider.  · Do not use illegal drugs.  Eating and drinking    · Follow a heart-healthy diet. A dietitian can help educate you about healthy food options and changes. In general, eat plenty of fruits and vegetables, lean meats, and whole grains.  · Avoid foods high in:  ? Sugar.  ? Salt (sodium).  ? Saturated fat, such as processed or fatty meat.  ? Trans fat, such as fried foods.  · Use healthy cooking methods such as roasting, grilling, broiling, baking, poaching, steaming, or stir-frying.  · Do not drink alcohol if your health care provider tells you not to drink.  · If you drink alcohol:  ? Limit how much you have to 0-2 drinks per day.  ? Be aware of how much alcohol is in your drink. In the [place] one drink equals one 12 oz bottle of beer  (355 mL), one 5 oz glass of wine (148 mL), or one 1½ oz glass of hard liquor (44 mL).  General instructions  · Manage any other health conditions, such as hypertension and diabetes. These conditions affect your heart.  · Your health care provider may ask you to monitor your blood pressure. Ideally, your blood pressure should be below 130/80.  · Keep all follow-up visits as told by your health care provider. This is important.  Get help right away if:  · You have pain in your chest, neck, ear, arm, jaw, stomach, or back that:  ? Lasts more than a few minutes.  ? Is recurring.  ? Is not relieved by taking medicine under your tongue (sublingual nitroglycerin).  · You have profuse sweating without cause.  · You have unexplained:  ? Heartburn or indigestion.  ? Shortness of breath or difficulty breathing.  ? Fluttering or fast heartbeat (palpitations).  ? Nausea or vomiting.  ? Fatigue.  ? Feelings of nervousness or anxiety.  ? Weakness.  ? Diarrhea.  · You have sudden light-headedness or dizziness.  · You faint.  · You feel like hurting yourself or think about taking your own life.  These symptoms may represent a serious problem that is an emergency. Do not wait to see if the symptoms will go away. Get medical help right away. Call your local emergency services (911 in the [place] Do not drive yourself to the hospital.  Summary  · Coronary artery disease (CAD) is a condition in which the arteries that lead to the heart (coronary arteries) become narrow or blocked. The narrowing or blockage can lead to a heart attack.  · Many people do not have any symptoms during the early stages of CAD.  · CAD can be treated with lifestyle changes, medicines, surgery, or a combination of these treatments.  This information is not intended to replace advice given to you by your health care provider. Make sure you discuss any questions you have with your health care provider.  Document Revised: 09/06/2019 Document Reviewed:  08/27/2019  [employer] Patient Education © 2021 [employer]    Advance Care Planning and Advance Directives     You make decisions on a daily basis - decisions about where you want to live, your career, your home, your life. Perhaps one of the most important decisions you face is your choice for future medical care. Take time to talk with your family and your healthcare team and start planning today.  Advance Care Planning is a process that can help you:  · Understand possible future healthcare decisions in light of your own experiences  · Reflect on those decision in light of your goals and values  · Discuss your decisions with those closest to you and the healthcare professionals that care for you  · Make a plan by creating a document that reflects your wishes    Surrogate Decision Maker  In the event of a medical emergency, which has left you unable to communicate or to make your own decisions, you would need someone to make decisions for you.  It is important to discuss your preferences for medical treatment with this person while you are in good health.     Qualities of a surrogate decision maker:  • Willing to take on this role and responsibility  • Knows what you want for future medical care  • Willing to follow your wishes even if they don't agree with them  • Able to make difficult medical decisions under stressful circumstances    Advance Directives  These are legal documents you can create that will guide your healthcare team and decision maker(s) when needed. These documents can be stored in the electronic medical record.    · Living Will - a legal document to guide your care if you have a terminal condition or a serious illness and are unable to communicate. States vary by statute in document names/types, but most forms may include one or more of the following:        -  Directions regarding life-prolonging treatments        -  Directions regarding artificially provided nutrition/hydration        -   Choosing a healthcare decision maker        -  Direction regarding organ/tissue donation    · Durable Power of  for Healthcare - this document names an -in-fact to make medical decisions for you, but it may also allow this person to make personal and financial decisions for you. Please seek the advice of an  if you need this type of document.    **Advance Directives are not required and no one may discriminate against you if you do not sign one.    Medical Orders  Many states allow specific forms/orders signed by your physician to record your wishes for medical treatment in your current state of health. This form, signed in personal communication with your physician, addresses resuscitation and other medical interventions that you may or may not want.      For more information or to schedule a time with a [facility] Advance Care Planning Facilitator contact: [email or web address] or call [phone] and someone will contact you directly.

## 2021-10-21 DIAGNOSIS — I73.9 PAD (PERIPHERAL ARTERY DISEASE) (HCC): Primary | ICD-10-CM

## 2021-11-01 ENCOUNTER — PREP FOR SURGERY (OUTPATIENT)
Dept: OTHER | Facility: HOSPITAL | Age: 66
End: 2021-11-01

## 2021-11-01 DIAGNOSIS — I73.9 PAD (PERIPHERAL ARTERY DISEASE) (HCC): Primary | ICD-10-CM

## 2021-11-01 RX ORDER — CHLORHEXIDINE GLUCONATE 500 MG/1
1 CLOTH TOPICAL EVERY 12 HOURS PRN
Status: CANCELLED | OUTPATIENT
Start: 2021-11-09

## 2021-11-03 ENCOUNTER — TELEPHONE (OUTPATIENT)
Dept: CARDIOLOGY | Facility: CLINIC | Age: 66
End: 2021-11-03

## 2021-11-03 NOTE — TELEPHONE ENCOUNTER
Received cardiac clearance request from  stating pt has left fem pop scheduled for 11/12/2021 and is requiring a cardiac clearance. Placed cardiac clearance request in Joy's inbox to review and address with provider.

## 2021-11-09 ENCOUNTER — PRE-ADMISSION TESTING (OUTPATIENT)
Dept: PREADMISSION TESTING | Facility: HOSPITAL | Age: 66
End: 2021-11-09

## 2021-11-09 ENCOUNTER — HOSPITAL ENCOUNTER (OUTPATIENT)
Dept: GENERAL RADIOLOGY | Facility: HOSPITAL | Age: 66
Discharge: HOME | End: 2021-11-09

## 2021-11-09 VITALS — WEIGHT: 171.52 LBS | BODY MASS INDEX: 27.57 KG/M2 | OXYGEN SATURATION: 97 % | HEIGHT: 66 IN

## 2021-11-09 DIAGNOSIS — I73.9 PAD (PERIPHERAL ARTERY DISEASE) (HCC): ICD-10-CM

## 2021-11-09 LAB
ABO GROUP BLD: NORMAL
ANION GAP SERPL CALCULATED.3IONS-SCNC: 13 MMOL/L (ref 5–15)
APTT PPP: 29.1 SECONDS (ref 22–39)
BLD GP AB SCN SERPL QL: NEGATIVE
BUN SERPL-MCNC: 14 MG/DL (ref 8–23)
BUN/CREAT SERPL: 12.8 (ref 7–25)
CALCIUM SPEC-SCNC: 10.7 MG/DL (ref 8.6–10.5)
CHLORIDE SERPL-SCNC: 100 MMOL/L (ref 98–107)
CO2 SERPL-SCNC: 21 MMOL/L (ref 22–29)
CREAT SERPL-MCNC: 1.09 MG/DL (ref 0.76–1.27)
DEPRECATED RDW RBC AUTO: 43.4 FL (ref 37–54)
ERYTHROCYTE [DISTWIDTH] IN BLOOD BY AUTOMATED COUNT: 14.1 % (ref 12.3–15.4)
GFR SERPL CREATININE-BSD FRML MDRD: 67 ML/MIN/1.73
GLUCOSE SERPL-MCNC: 162 MG/DL (ref 65–99)
HBA1C MFR BLD: 7.1 % (ref 4.8–5.6)
HCT VFR BLD AUTO: 39.8 % (ref 37.5–51)
HGB BLD-MCNC: 13.4 G/DL (ref 13–17.7)
INR PPP: 0.98 (ref 0.85–1.16)
MCH RBC QN AUTO: 28.3 PG (ref 26.6–33)
MCHC RBC AUTO-ENTMCNC: 33.7 G/DL (ref 31.5–35.7)
MCV RBC AUTO: 84.1 FL (ref 79–97)
PLATELET # BLD AUTO: 318 10*3/MM3 (ref 140–450)
PMV BLD AUTO: 9.9 FL (ref 6–12)
POTASSIUM SERPL-SCNC: 4.4 MMOL/L (ref 3.5–5.2)
PROTHROMBIN TIME: 12.7 SECONDS (ref 11.4–14.4)
RBC # BLD AUTO: 4.73 10*6/MM3 (ref 4.14–5.8)
RH BLD: POSITIVE
SARS-COV-2 RNA PNL SPEC NAA+PROBE: NOT DETECTED
SODIUM SERPL-SCNC: 134 MMOL/L (ref 136–145)
T&S EXPIRATION DATE: NORMAL
WBC # BLD AUTO: 16.37 10*3/MM3 (ref 3.4–10.8)

## 2021-11-09 PROCEDURE — 80048 BASIC METABOLIC PNL TOTAL CA: CPT

## 2021-11-09 PROCEDURE — 36415 COLL VENOUS BLD VENIPUNCTURE: CPT

## 2021-11-09 PROCEDURE — 86923 COMPATIBILITY TEST ELECTRIC: CPT

## 2021-11-09 PROCEDURE — U0005 INFEC AGEN DETEC AMPLI PROBE: HCPCS

## 2021-11-09 PROCEDURE — 85730 THROMBOPLASTIN TIME PARTIAL: CPT

## 2021-11-09 PROCEDURE — 85027 COMPLETE CBC AUTOMATED: CPT

## 2021-11-09 PROCEDURE — 85610 PROTHROMBIN TIME: CPT

## 2021-11-09 PROCEDURE — U0004 COV-19 TEST NON-CDC HGH THRU: HCPCS

## 2021-11-09 PROCEDURE — C9803 HOPD COVID-19 SPEC COLLECT: HCPCS

## 2021-11-09 PROCEDURE — 86850 RBC ANTIBODY SCREEN: CPT

## 2021-11-09 PROCEDURE — 86901 BLOOD TYPING SEROLOGIC RH(D): CPT

## 2021-11-09 PROCEDURE — 86900 BLOOD TYPING SEROLOGIC ABO: CPT

## 2021-11-09 PROCEDURE — 83036 HEMOGLOBIN GLYCOSYLATED A1C: CPT

## 2021-11-09 PROCEDURE — 71046 X-RAY EXAM CHEST 2 VIEWS: CPT

## 2021-11-09 RX ORDER — CETIRIZINE HYDROCHLORIDE 10 MG/1
10 TABLET ORAL DAILY
COMMUNITY
Start: 2021-09-09

## 2021-11-09 RX ORDER — FLUTICASONE PROPIONATE 50 MCG
SPRAY, SUSPENSION (ML) NASAL
COMMUNITY
Start: 2021-09-09 | End: 2023-02-01

## 2021-11-09 RX ORDER — CLOBETASOL PROPIONATE 0.46 MG/ML
SOLUTION TOPICAL
COMMUNITY
Start: 2021-10-18 | End: 2023-02-01

## 2021-11-09 NOTE — PAT
An arrival time for procedure was not given during PAT visit. If patient had any questions or concerns about their arrival time, they were instructed to contact their surgeon/physician.  Additionally, if the patient referred to an arrival time that was acquired from their my chart account, patient was encouraged to verify that time with their surgeon/physician.  NO arrival times given in Pre Admission Testing Department.    Patient to apply Chlorhexadine wipes  to surgical area (as instructed) the night before procedure and the AM of procedure. Wipes provided.    Blood bank bracelet applied to patient during Pre Admission Testing visit.  Patient instructed not to remove from arm until after procedure and they are discharged from the hospital.  Explained to patient that they may shower and get the bracelet wet, but not to immerse under water for longer periods (bathing, swimming, hand dishwashing, etc).  Patient verbalized understanding.    Patient directed to Radiology Department for CXR after Pre Admission Testing Appointment.     Per Anesthesia Request, patient instructed not to take their ACE/ARB medications on the AM of surgery.    EKG from 6/1/21.    COVID test done in PAT.

## 2021-11-11 ENCOUNTER — ANESTHESIA EVENT (OUTPATIENT)
Dept: PERIOP | Facility: HOSPITAL | Age: 66
End: 2021-11-11

## 2021-11-11 RX ORDER — FAMOTIDINE 10 MG/ML
20 INJECTION, SOLUTION INTRAVENOUS ONCE
Status: CANCELLED | OUTPATIENT
Start: 2021-11-11 | End: 2021-11-11

## 2021-11-12 ENCOUNTER — HOSPITAL ENCOUNTER (INPATIENT)
Facility: HOSPITAL | Age: 66
LOS: 2 days | Discharge: HOME | End: 2021-11-14
Attending: THORACIC SURGERY (CARDIOTHORACIC VASCULAR SURGERY) | Admitting: THORACIC SURGERY (CARDIOTHORACIC VASCULAR SURGERY)

## 2021-11-12 ENCOUNTER — ANESTHESIA (OUTPATIENT)
Dept: PERIOP | Facility: HOSPITAL | Age: 66
End: 2021-11-12

## 2021-11-12 DIAGNOSIS — I73.9 PAD (PERIPHERAL ARTERY DISEASE) (HCC): Primary | Chronic | ICD-10-CM

## 2021-11-12 DIAGNOSIS — I73.9 PAD (PERIPHERAL ARTERY DISEASE) (HCC): ICD-10-CM

## 2021-11-12 PROBLEM — Z87.828 HISTORY OF TRAUMATIC SUBDURAL HEMATOMA: Status: ACTIVE | Noted: 2021-11-12

## 2021-11-12 PROBLEM — Z87.891 FORMER SMOKER: Status: ACTIVE | Noted: 2021-11-12

## 2021-11-12 PROBLEM — E11.9 TYPE 2 DIABETES MELLITUS WITHOUT COMPLICATION, WITHOUT LONG-TERM CURRENT USE OF INSULIN: Chronic | Status: ACTIVE | Noted: 2017-03-15

## 2021-11-12 PROBLEM — Z96.89 S/P INSERTION OF SPINAL CORD STIMULATOR: Status: ACTIVE | Noted: 2021-11-12

## 2021-11-12 PROBLEM — I65.23 BILATERAL CAROTID ARTERY STENOSIS: Chronic | Status: ACTIVE | Noted: 2021-08-16

## 2021-11-12 PROBLEM — J43.9 COPD WITH EMPHYSEMA: Chronic | Status: ACTIVE | Noted: 2017-05-03

## 2021-11-12 PROBLEM — G47.33 OSA (OBSTRUCTIVE SLEEP APNEA): Chronic | Status: ACTIVE | Noted: 2017-05-03

## 2021-11-12 LAB
ABO GROUP BLD: NORMAL
GLUCOSE BLDC GLUCOMTR-MCNC: 172 MG/DL (ref 70–130)
GLUCOSE BLDC GLUCOMTR-MCNC: 178 MG/DL (ref 70–130)
GLUCOSE BLDC GLUCOMTR-MCNC: 181 MG/DL (ref 70–130)
GLUCOSE BLDC GLUCOMTR-MCNC: 344 MG/DL (ref 70–130)
RH BLD: POSITIVE

## 2021-11-12 PROCEDURE — 88311 DECALCIFY TISSUE: CPT | Performed by: THORACIC SURGERY (CARDIOTHORACIC VASCULAR SURGERY)

## 2021-11-12 PROCEDURE — 25010000002 PROTAMINE SULFATE PER 10 MG: Performed by: NURSE ANESTHETIST, CERTIFIED REGISTERED

## 2021-11-12 PROCEDURE — 04CN3ZZ EXTIRPATION OF MATTER FROM LEFT POPLITEAL ARTERY, PERCUTANEOUS APPROACH: ICD-10-PCS | Performed by: THORACIC SURGERY (CARDIOTHORACIC VASCULAR SURGERY)

## 2021-11-12 PROCEDURE — 0 CEFAZOLIN PER 500 MG: Performed by: THORACIC SURGERY (CARDIOTHORACIC VASCULAR SURGERY)

## 2021-11-12 PROCEDURE — C1768 GRAFT, VASCULAR: HCPCS | Performed by: THORACIC SURGERY (CARDIOTHORACIC VASCULAR SURGERY)

## 2021-11-12 PROCEDURE — 25010000002 DEXAMETHASONE PER 1 MG: Performed by: NURSE ANESTHETIST, CERTIFIED REGISTERED

## 2021-11-12 PROCEDURE — C2615 SEALANT, PULMONARY, LIQUID: HCPCS | Performed by: THORACIC SURGERY (CARDIOTHORACIC VASCULAR SURGERY)

## 2021-11-12 PROCEDURE — 82962 GLUCOSE BLOOD TEST: CPT

## 2021-11-12 PROCEDURE — 35303 RECHANNELING OF ARTERY: CPT | Performed by: PHYSICIAN ASSISTANT

## 2021-11-12 PROCEDURE — 25010000002 NEOSTIGMINE 10 MG/10ML SOLUTION: Performed by: NURSE ANESTHETIST, CERTIFIED REGISTERED

## 2021-11-12 PROCEDURE — 86900 BLOOD TYPING SEROLOGIC ABO: CPT

## 2021-11-12 PROCEDURE — 25010000002 FENTANYL CITRATE (PF) 50 MCG/ML SOLUTION

## 2021-11-12 PROCEDURE — S0260 H&P FOR SURGERY: HCPCS | Performed by: THORACIC SURGERY (CARDIOTHORACIC VASCULAR SURGERY)

## 2021-11-12 PROCEDURE — 25010000002 HYDROMORPHONE 1 MG/ML SOLUTION

## 2021-11-12 PROCEDURE — 63710000001 INSULIN LISPRO (HUMAN) PER 5 UNITS: Performed by: INTERNAL MEDICINE

## 2021-11-12 PROCEDURE — 86901 BLOOD TYPING SEROLOGIC RH(D): CPT

## 2021-11-12 PROCEDURE — 25010000002 DIPHENHYDRAMINE PER 50 MG

## 2021-11-12 PROCEDURE — 0 CEFUROXIME SODIUM 1.5 G RECONSTITUTED SOLUTION

## 2021-11-12 PROCEDURE — 35656 BPG FEMORAL-POPLITEAL: CPT | Performed by: THORACIC SURGERY (CARDIOTHORACIC VASCULAR SURGERY)

## 2021-11-12 PROCEDURE — 99222 1ST HOSP IP/OBS MODERATE 55: CPT | Performed by: INTERNAL MEDICINE

## 2021-11-12 PROCEDURE — 88304 TISSUE EXAM BY PATHOLOGIST: CPT | Performed by: THORACIC SURGERY (CARDIOTHORACIC VASCULAR SURGERY)

## 2021-11-12 PROCEDURE — 25010000002 FENTANYL CITRATE (PF) 50 MCG/ML SOLUTION: Performed by: NURSE ANESTHETIST, CERTIFIED REGISTERED

## 2021-11-12 PROCEDURE — 35303 RECHANNELING OF ARTERY: CPT | Performed by: THORACIC SURGERY (CARDIOTHORACIC VASCULAR SURGERY)

## 2021-11-12 PROCEDURE — 63710000001 INSULIN DETEMIR PER 5 UNITS: Performed by: INTERNAL MEDICINE

## 2021-11-12 PROCEDURE — 94640 AIRWAY INHALATION TREATMENT: CPT

## 2021-11-12 PROCEDURE — 25010000002 PROPOFOL 10 MG/ML EMULSION: Performed by: NURSE ANESTHETIST, CERTIFIED REGISTERED

## 2021-11-12 PROCEDURE — 25010000002 GENTAMICIN PER 80 MG: Performed by: THORACIC SURGERY (CARDIOTHORACIC VASCULAR SURGERY)

## 2021-11-12 PROCEDURE — 25010000002 ONDANSETRON PER 1 MG: Performed by: NURSE ANESTHETIST, CERTIFIED REGISTERED

## 2021-11-12 PROCEDURE — 35656 BPG FEMORAL-POPLITEAL: CPT | Performed by: PHYSICIAN ASSISTANT

## 2021-11-12 PROCEDURE — 0 CEFAZOLIN IN DEXTROSE 2-4 GM/100ML-% SOLUTION: Performed by: PHYSICIAN ASSISTANT

## 2021-11-12 PROCEDURE — 63710000001 INSULIN LISPRO (HUMAN) PER 5 UNITS: Performed by: PHYSICIAN ASSISTANT

## 2021-11-12 PROCEDURE — 041L0JL BYPASS LEFT FEMORAL ARTERY TO POPLITEAL ARTERY WITH SYNTHETIC SUBSTITUTE, OPEN APPROACH: ICD-10-PCS | Performed by: THORACIC SURGERY (CARDIOTHORACIC VASCULAR SURGERY)

## 2021-11-12 DEVICE — SEALANT PLURAL AIRLEAK PROGEL W/APPL 4ML: Type: IMPLANTABLE DEVICE | Site: FEMUR | Status: FUNCTIONAL

## 2021-11-12 DEVICE — PROPATEN VASCULAR GRAFT TW RR 8MMX80CM 70CM RINGS HEPARIN
Type: IMPLANTABLE DEVICE | Site: FEMUR | Status: FUNCTIONAL
Brand: GORE PROPATEN VASCULAR GRAFT

## 2021-11-12 RX ORDER — FENTANYL CITRATE 50 UG/ML
50 INJECTION, SOLUTION INTRAMUSCULAR; INTRAVENOUS
Status: DISCONTINUED | OUTPATIENT
Start: 2021-11-12 | End: 2021-11-12 | Stop reason: HOSPADM

## 2021-11-12 RX ORDER — DEXAMETHASONE SODIUM PHOSPHATE 4 MG/ML
INJECTION, SOLUTION INTRA-ARTICULAR; INTRALESIONAL; INTRAMUSCULAR; INTRAVENOUS; SOFT TISSUE AS NEEDED
Status: DISCONTINUED | OUTPATIENT
Start: 2021-11-12 | End: 2021-11-12 | Stop reason: SURG

## 2021-11-12 RX ORDER — ONDANSETRON 2 MG/ML
4 INJECTION INTRAMUSCULAR; INTRAVENOUS ONCE AS NEEDED
Status: DISCONTINUED | OUTPATIENT
Start: 2021-11-12 | End: 2021-11-12 | Stop reason: HOSPADM

## 2021-11-12 RX ORDER — METOPROLOL SUCCINATE 25 MG/1
25 TABLET, EXTENDED RELEASE ORAL
Status: COMPLETED | OUTPATIENT
Start: 2021-11-12 | End: 2021-11-12

## 2021-11-12 RX ORDER — EPHEDRINE SULFATE 50 MG/ML
5 INJECTION, SOLUTION INTRAVENOUS ONCE AS NEEDED
Status: DISCONTINUED | OUTPATIENT
Start: 2021-11-12 | End: 2021-11-12 | Stop reason: HOSPADM

## 2021-11-12 RX ORDER — MIDAZOLAM HYDROCHLORIDE 1 MG/ML
0.5 INJECTION INTRAMUSCULAR; INTRAVENOUS
Status: DISCONTINUED | OUTPATIENT
Start: 2021-11-12 | End: 2021-11-12 | Stop reason: HOSPADM

## 2021-11-12 RX ORDER — DIPHENHYDRAMINE HYDROCHLORIDE 50 MG/ML
12.5 INJECTION INTRAMUSCULAR; INTRAVENOUS ONCE
Status: COMPLETED | OUTPATIENT
Start: 2021-11-12 | End: 2021-11-12

## 2021-11-12 RX ORDER — BUDESONIDE AND FORMOTEROL FUMARATE DIHYDRATE 160; 4.5 UG/1; UG/1
2 AEROSOL RESPIRATORY (INHALATION)
Status: DISCONTINUED | OUTPATIENT
Start: 2021-11-12 | End: 2021-11-14 | Stop reason: HOSPADM

## 2021-11-12 RX ORDER — BUSPIRONE HYDROCHLORIDE 5 MG/1
15 TABLET ORAL EVERY 12 HOURS SCHEDULED
Status: DISCONTINUED | OUTPATIENT
Start: 2021-11-12 | End: 2021-11-14 | Stop reason: HOSPADM

## 2021-11-12 RX ORDER — PROTAMINE SULFATE 10 MG/ML
INJECTION, SOLUTION INTRAVENOUS AS NEEDED
Status: DISCONTINUED | OUTPATIENT
Start: 2021-11-12 | End: 2021-11-12 | Stop reason: SURG

## 2021-11-12 RX ORDER — ROSUVASTATIN CALCIUM 20 MG/1
40 TABLET, COATED ORAL NIGHTLY
Status: DISCONTINUED | OUTPATIENT
Start: 2021-11-12 | End: 2021-11-14 | Stop reason: HOSPADM

## 2021-11-12 RX ORDER — FLUTICASONE PROPIONATE 50 MCG
1 SPRAY, SUSPENSION (ML) NASAL DAILY
Status: DISCONTINUED | OUTPATIENT
Start: 2021-11-13 | End: 2021-11-14 | Stop reason: HOSPADM

## 2021-11-12 RX ORDER — GLYCOPYRROLATE 0.2 MG/ML
INJECTION INTRAMUSCULAR; INTRAVENOUS AS NEEDED
Status: DISCONTINUED | OUTPATIENT
Start: 2021-11-12 | End: 2021-11-12 | Stop reason: SURG

## 2021-11-12 RX ORDER — NALOXONE HCL 0.4 MG/ML
0.4 VIAL (ML) INJECTION AS NEEDED
Status: DISCONTINUED | OUTPATIENT
Start: 2021-11-12 | End: 2021-11-12 | Stop reason: HOSPADM

## 2021-11-12 RX ORDER — LIDOCAINE HYDROCHLORIDE 10 MG/ML
0.5 INJECTION, SOLUTION EPIDURAL; INFILTRATION; INTRACAUDAL; PERINEURAL ONCE AS NEEDED
Status: COMPLETED | OUTPATIENT
Start: 2021-11-12 | End: 2021-11-12

## 2021-11-12 RX ORDER — NICOTINE POLACRILEX 4 MG
15 LOZENGE BUCCAL
Status: DISCONTINUED | OUTPATIENT
Start: 2021-11-12 | End: 2021-11-12

## 2021-11-12 RX ORDER — LABETALOL HYDROCHLORIDE 5 MG/ML
INJECTION, SOLUTION INTRAVENOUS AS NEEDED
Status: DISCONTINUED | OUTPATIENT
Start: 2021-11-12 | End: 2021-11-12 | Stop reason: SURG

## 2021-11-12 RX ORDER — CETIRIZINE HYDROCHLORIDE 10 MG/1
10 TABLET ORAL DAILY
Status: DISCONTINUED | OUTPATIENT
Start: 2021-11-12 | End: 2021-11-14 | Stop reason: HOSPADM

## 2021-11-12 RX ORDER — SODIUM CHLORIDE, SODIUM LACTATE, POTASSIUM CHLORIDE, CALCIUM CHLORIDE 600; 310; 30; 20 MG/100ML; MG/100ML; MG/100ML; MG/100ML
100 INJECTION, SOLUTION INTRAVENOUS CONTINUOUS
Status: DISCONTINUED | OUTPATIENT
Start: 2021-11-12 | End: 2021-11-12

## 2021-11-12 RX ORDER — ONDANSETRON 2 MG/ML
INJECTION INTRAMUSCULAR; INTRAVENOUS AS NEEDED
Status: DISCONTINUED | OUTPATIENT
Start: 2021-11-12 | End: 2021-11-12 | Stop reason: SURG

## 2021-11-12 RX ORDER — NEOSTIGMINE METHYLSULFATE 1 MG/ML
INJECTION, SOLUTION INTRAVENOUS AS NEEDED
Status: DISCONTINUED | OUTPATIENT
Start: 2021-11-12 | End: 2021-11-12 | Stop reason: SURG

## 2021-11-12 RX ORDER — RAMIPRIL 5 MG/1
10 CAPSULE ORAL EVERY EVENING
Status: DISCONTINUED | OUTPATIENT
Start: 2021-11-12 | End: 2021-11-14 | Stop reason: HOSPADM

## 2021-11-12 RX ORDER — SODIUM CHLORIDE, SODIUM LACTATE, POTASSIUM CHLORIDE, CALCIUM CHLORIDE 600; 310; 30; 20 MG/100ML; MG/100ML; MG/100ML; MG/100ML
9 INJECTION, SOLUTION INTRAVENOUS CONTINUOUS
Status: DISCONTINUED | OUTPATIENT
Start: 2021-11-12 | End: 2021-11-12

## 2021-11-12 RX ORDER — TAMSULOSIN HYDROCHLORIDE 0.4 MG/1
0.4 CAPSULE ORAL NIGHTLY
Status: DISCONTINUED | OUTPATIENT
Start: 2021-11-12 | End: 2021-11-14 | Stop reason: HOSPADM

## 2021-11-12 RX ORDER — HYDROCODONE BITARTRATE AND ACETAMINOPHEN 7.5; 325 MG/1; MG/1
1 TABLET ORAL EVERY 4 HOURS PRN
Status: DISCONTINUED | OUTPATIENT
Start: 2021-11-12 | End: 2021-11-14 | Stop reason: HOSPADM

## 2021-11-12 RX ORDER — HYDROMORPHONE HYDROCHLORIDE 1 MG/ML
0.5 INJECTION, SOLUTION INTRAMUSCULAR; INTRAVENOUS; SUBCUTANEOUS
Status: DISCONTINUED | OUTPATIENT
Start: 2021-11-12 | End: 2021-11-12 | Stop reason: HOSPADM

## 2021-11-12 RX ORDER — DIPHENHYDRAMINE HYDROCHLORIDE 50 MG/ML
INJECTION INTRAMUSCULAR; INTRAVENOUS
Status: COMPLETED
Start: 2021-11-12 | End: 2021-11-12

## 2021-11-12 RX ORDER — PROPOFOL 10 MG/ML
VIAL (ML) INTRAVENOUS AS NEEDED
Status: DISCONTINUED | OUTPATIENT
Start: 2021-11-12 | End: 2021-11-12 | Stop reason: SURG

## 2021-11-12 RX ORDER — FAMOTIDINE 20 MG/1
20 TABLET, FILM COATED ORAL ONCE
Status: COMPLETED | OUTPATIENT
Start: 2021-11-12 | End: 2021-11-12

## 2021-11-12 RX ORDER — MORPHINE SULFATE 2 MG/ML
2 INJECTION, SOLUTION INTRAMUSCULAR; INTRAVENOUS
Status: DISCONTINUED | OUTPATIENT
Start: 2021-11-12 | End: 2021-11-14 | Stop reason: HOSPADM

## 2021-11-12 RX ORDER — ALBUTEROL SULFATE 90 UG/1
1 AEROSOL, METERED RESPIRATORY (INHALATION) EVERY 6 HOURS PRN
Status: DISCONTINUED | OUTPATIENT
Start: 2021-11-12 | End: 2021-11-14 | Stop reason: HOSPADM

## 2021-11-12 RX ORDER — CEFAZOLIN SODIUM 2 G/100ML
2 INJECTION, SOLUTION INTRAVENOUS EVERY 8 HOURS
Status: COMPLETED | OUTPATIENT
Start: 2021-11-12 | End: 2021-11-13

## 2021-11-12 RX ORDER — SODIUM CHLORIDE 0.9 % (FLUSH) 0.9 %
10 SYRINGE (ML) INJECTION EVERY 12 HOURS SCHEDULED
Status: DISCONTINUED | OUTPATIENT
Start: 2021-11-12 | End: 2021-11-12 | Stop reason: HOSPADM

## 2021-11-12 RX ORDER — MEPERIDINE HYDROCHLORIDE 25 MG/ML
12.5 INJECTION INTRAMUSCULAR; INTRAVENOUS; SUBCUTANEOUS
Status: DISCONTINUED | OUTPATIENT
Start: 2021-11-12 | End: 2021-11-12 | Stop reason: HOSPADM

## 2021-11-12 RX ORDER — METOPROLOL SUCCINATE 25 MG/1
25 TABLET, EXTENDED RELEASE ORAL EVERY MORNING
Status: DISCONTINUED | OUTPATIENT
Start: 2021-11-13 | End: 2021-11-14 | Stop reason: HOSPADM

## 2021-11-12 RX ORDER — HYDROCODONE BITARTRATE AND ACETAMINOPHEN 7.5; 325 MG/1; MG/1
TABLET ORAL
Status: COMPLETED
Start: 2021-11-12 | End: 2021-11-12

## 2021-11-12 RX ORDER — ASPIRIN 81 MG/1
81 TABLET ORAL DAILY
Status: DISCONTINUED | OUTPATIENT
Start: 2021-11-13 | End: 2021-11-14 | Stop reason: HOSPADM

## 2021-11-12 RX ORDER — PANTOPRAZOLE SODIUM 40 MG/1
40 TABLET, DELAYED RELEASE ORAL EVERY MORNING
Status: DISCONTINUED | OUTPATIENT
Start: 2021-11-13 | End: 2021-11-14 | Stop reason: HOSPADM

## 2021-11-12 RX ORDER — MEMANTINE HYDROCHLORIDE 5 MG/1
5 TABLET ORAL NIGHTLY
Status: DISCONTINUED | OUTPATIENT
Start: 2021-11-12 | End: 2021-11-14 | Stop reason: HOSPADM

## 2021-11-12 RX ORDER — NITROGLYCERIN 0.4 MG/1
0.4 TABLET SUBLINGUAL
Status: DISCONTINUED | OUTPATIENT
Start: 2021-11-12 | End: 2021-11-14 | Stop reason: HOSPADM

## 2021-11-12 RX ORDER — SODIUM CHLORIDE 0.9 % (FLUSH) 0.9 %
10 SYRINGE (ML) INJECTION AS NEEDED
Status: DISCONTINUED | OUTPATIENT
Start: 2021-11-12 | End: 2021-11-12 | Stop reason: HOSPADM

## 2021-11-12 RX ORDER — FENTANYL CITRATE 50 UG/ML
INJECTION, SOLUTION INTRAMUSCULAR; INTRAVENOUS AS NEEDED
Status: DISCONTINUED | OUTPATIENT
Start: 2021-11-12 | End: 2021-11-12 | Stop reason: SURG

## 2021-11-12 RX ORDER — DONEPEZIL HYDROCHLORIDE 10 MG/1
10 TABLET, FILM COATED ORAL NIGHTLY
Status: DISCONTINUED | OUTPATIENT
Start: 2021-11-12 | End: 2021-11-14 | Stop reason: HOSPADM

## 2021-11-12 RX ORDER — ROCURONIUM BROMIDE 10 MG/ML
INJECTION, SOLUTION INTRAVENOUS AS NEEDED
Status: DISCONTINUED | OUTPATIENT
Start: 2021-11-12 | End: 2021-11-12 | Stop reason: SURG

## 2021-11-12 RX ORDER — FINASTERIDE 5 MG/1
5 TABLET, FILM COATED ORAL NIGHTLY
Status: DISCONTINUED | OUTPATIENT
Start: 2021-11-12 | End: 2021-11-14 | Stop reason: HOSPADM

## 2021-11-12 RX ORDER — CLOPIDOGREL BISULFATE 75 MG/1
75 TABLET ORAL DAILY
Status: DISCONTINUED | OUTPATIENT
Start: 2021-11-13 | End: 2021-11-14 | Stop reason: HOSPADM

## 2021-11-12 RX ORDER — SODIUM CHLORIDE 9 MG/ML
INJECTION, SOLUTION INTRAVENOUS AS NEEDED
Status: DISCONTINUED | OUTPATIENT
Start: 2021-11-12 | End: 2021-11-12 | Stop reason: HOSPADM

## 2021-11-12 RX ORDER — LIDOCAINE HYDROCHLORIDE 10 MG/ML
INJECTION, SOLUTION EPIDURAL; INFILTRATION; INTRACAUDAL; PERINEURAL AS NEEDED
Status: DISCONTINUED | OUTPATIENT
Start: 2021-11-12 | End: 2021-11-12 | Stop reason: SURG

## 2021-11-12 RX ORDER — THROMBIN HUMAN AND FIBRINOGEN 2; 5.5 [USP'U]/1; MG/1
PATCH TOPICAL AS NEEDED
Status: DISCONTINUED | OUTPATIENT
Start: 2021-11-12 | End: 2021-11-12 | Stop reason: HOSPADM

## 2021-11-12 RX ORDER — SODIUM CHLORIDE 450 MG/100ML
75 INJECTION, SOLUTION INTRAVENOUS CONTINUOUS
Status: DISCONTINUED | OUTPATIENT
Start: 2021-11-12 | End: 2021-11-13

## 2021-11-12 RX ORDER — FENTANYL CITRATE 50 UG/ML
INJECTION, SOLUTION INTRAMUSCULAR; INTRAVENOUS
Status: COMPLETED
Start: 2021-11-12 | End: 2021-11-12

## 2021-11-12 RX ORDER — NORTRIPTYLINE HYDROCHLORIDE 25 MG/1
50 CAPSULE ORAL NIGHTLY
Status: DISCONTINUED | OUTPATIENT
Start: 2021-11-12 | End: 2021-11-14 | Stop reason: HOSPADM

## 2021-11-12 RX ORDER — ISOSORBIDE MONONITRATE 30 MG/1
15 TABLET, EXTENDED RELEASE ORAL NIGHTLY
Status: DISCONTINUED | OUTPATIENT
Start: 2021-11-12 | End: 2021-11-14 | Stop reason: HOSPADM

## 2021-11-12 RX ORDER — DEXTROSE MONOHYDRATE 25 G/50ML
25 INJECTION, SOLUTION INTRAVENOUS
Status: DISCONTINUED | OUTPATIENT
Start: 2021-11-12 | End: 2021-11-14 | Stop reason: HOSPADM

## 2021-11-12 RX ORDER — BUPIVACAINE HYDROCHLORIDE AND EPINEPHRINE 5; 5 MG/ML; UG/ML
INJECTION, SOLUTION PERINEURAL AS NEEDED
Status: DISCONTINUED | OUTPATIENT
Start: 2021-11-12 | End: 2021-11-12 | Stop reason: HOSPADM

## 2021-11-12 RX ORDER — CHLORHEXIDINE GLUCONATE 500 MG/1
1 CLOTH TOPICAL EVERY 12 HOURS PRN
Status: DISCONTINUED | OUTPATIENT
Start: 2021-11-12 | End: 2021-11-12 | Stop reason: HOSPADM

## 2021-11-12 RX ADMIN — PROTAMINE SULFATE 50 MG: 10 INJECTION, SOLUTION INTRAVENOUS at 10:03

## 2021-11-12 RX ADMIN — LIDOCAINE HYDROCHLORIDE 0.5 ML: 10 INJECTION, SOLUTION EPIDURAL; INFILTRATION; INTRACAUDAL; PERINEURAL at 08:00

## 2021-11-12 RX ADMIN — FENTANYL CITRATE 50 MCG: 50 INJECTION, SOLUTION INTRAMUSCULAR; INTRAVENOUS at 11:41

## 2021-11-12 RX ADMIN — ROCURONIUM BROMIDE 50 MG: 10 INJECTION, SOLUTION INTRAVENOUS at 09:10

## 2021-11-12 RX ADMIN — METOPROLOL SUCCINATE 25 MG: 25 TABLET, EXTENDED RELEASE ORAL at 08:36

## 2021-11-12 RX ADMIN — HYDROCODONE BITARTRATE AND ACETAMINOPHEN 1 TABLET: 7.5; 325 TABLET ORAL at 14:24

## 2021-11-12 RX ADMIN — FINASTERIDE 5 MG: 5 TABLET, FILM COATED ORAL at 20:33

## 2021-11-12 RX ADMIN — INSULIN LISPRO 5 UNITS: 100 INJECTION, SOLUTION INTRAVENOUS; SUBCUTANEOUS at 20:38

## 2021-11-12 RX ADMIN — ONDANSETRON 4 MG: 2 INJECTION INTRAMUSCULAR; INTRAVENOUS at 10:22

## 2021-11-12 RX ADMIN — LABETALOL HYDROCHLORIDE 10 MG: 5 INJECTION, SOLUTION INTRAVENOUS at 10:44

## 2021-11-12 RX ADMIN — HYDROMORPHONE HYDROCHLORIDE 0.5 MG: 1 INJECTION, SOLUTION INTRAMUSCULAR; INTRAVENOUS; SUBCUTANEOUS at 11:00

## 2021-11-12 RX ADMIN — FENTANYL CITRATE 50 MCG: 50 INJECTION, SOLUTION INTRAMUSCULAR; INTRAVENOUS at 11:09

## 2021-11-12 RX ADMIN — CEFAZOLIN SODIUM 2 G: 2 INJECTION, SOLUTION INTRAVENOUS at 17:57

## 2021-11-12 RX ADMIN — BUDESONIDE AND FORMOTEROL FUMARATE DIHYDRATE 2 PUFF: 160; 4.5 AEROSOL RESPIRATORY (INHALATION) at 18:59

## 2021-11-12 RX ADMIN — INSULIN DETEMIR 8 UNITS: 100 INJECTION, SOLUTION SUBCUTANEOUS at 20:38

## 2021-11-12 RX ADMIN — FAMOTIDINE 20 MG: 20 TABLET ORAL at 08:14

## 2021-11-12 RX ADMIN — SODIUM CHLORIDE 75 ML/HR: 4.5 INJECTION, SOLUTION INTRAVENOUS at 13:03

## 2021-11-12 RX ADMIN — GLYCOPYRROLATE 0.4 MG: 0.2 INJECTION INTRAMUSCULAR; INTRAVENOUS at 10:22

## 2021-11-12 RX ADMIN — TAMSULOSIN HYDROCHLORIDE 0.4 MG: 0.4 CAPSULE ORAL at 20:33

## 2021-11-12 RX ADMIN — NORTRIPTYLINE HYDROCHLORIDE 50 MG: 25 CAPSULE ORAL at 20:33

## 2021-11-12 RX ADMIN — HYDROCODONE BITARTRATE AND ACETAMINOPHEN 1 TABLET: 7.5; 325 TABLET ORAL at 20:30

## 2021-11-12 RX ADMIN — ISOSORBIDE MONONITRATE 15 MG: 30 TABLET, EXTENDED RELEASE ORAL at 20:31

## 2021-11-12 RX ADMIN — SODIUM CHLORIDE, POTASSIUM CHLORIDE, SODIUM LACTATE AND CALCIUM CHLORIDE 9 ML/HR: 600; 310; 30; 20 INJECTION, SOLUTION INTRAVENOUS at 08:04

## 2021-11-12 RX ADMIN — ROSUVASTATIN CALCIUM 40 MG: 20 TABLET, COATED ORAL at 20:31

## 2021-11-12 RX ADMIN — DIPHENHYDRAMINE HYDROCHLORIDE 12.5 MG: 50 INJECTION INTRAMUSCULAR; INTRAVENOUS at 11:38

## 2021-11-12 RX ADMIN — PROPOFOL 150 MG: 10 INJECTION, EMULSION INTRAVENOUS at 09:10

## 2021-11-12 RX ADMIN — SODIUM CHLORIDE 75 ML/HR: 4.5 INJECTION, SOLUTION INTRAVENOUS at 16:03

## 2021-11-12 RX ADMIN — BUSPIRONE HYDROCHLORIDE 15 MG: 5 TABLET ORAL at 20:33

## 2021-11-12 RX ADMIN — CETIRIZINE HYDROCHLORIDE 10 MG: 10 TABLET, FILM COATED ORAL at 16:35

## 2021-11-12 RX ADMIN — LIDOCAINE HYDROCHLORIDE 50 MG: 10 INJECTION, SOLUTION EPIDURAL; INFILTRATION; INTRACAUDAL; PERINEURAL at 09:10

## 2021-11-12 RX ADMIN — FENTANYL CITRATE 100 MCG: 50 INJECTION, SOLUTION INTRAMUSCULAR; INTRAVENOUS at 09:10

## 2021-11-12 RX ADMIN — NEOSTIGMINE METHYLSULFATE 3 MG: 0.5 INJECTION INTRAVENOUS at 10:22

## 2021-11-12 RX ADMIN — RAMIPRIL 10 MG: 5 CAPSULE ORAL at 16:34

## 2021-11-12 RX ADMIN — INSULIN LISPRO 3 UNITS: 100 INJECTION, SOLUTION INTRAVENOUS; SUBCUTANEOUS at 16:35

## 2021-11-12 RX ADMIN — DONEPEZIL HYDROCHLORIDE 10 MG: 10 TABLET, FILM COATED ORAL at 20:32

## 2021-11-12 RX ADMIN — MEMANTINE HYDROCHLORIDE 5 MG: 5 TABLET ORAL at 20:32

## 2021-11-12 RX ADMIN — DEXAMETHASONE SODIUM PHOSPHATE 8 MG: 4 INJECTION, SOLUTION INTRA-ARTICULAR; INTRALESIONAL; INTRAMUSCULAR; INTRAVENOUS; SOFT TISSUE at 09:10

## 2021-11-12 NOTE — PROGRESS NOTES
Martha Arias, APRN   APRN NOTE    Mike Gusman is a 66 y.o. male admitted to [facility] on 11/12 for scheduled left femoral-popliteal bypass per Dr. Mann.      The patient has complaints of worsening claudication leg pain found to have bilateral SFA disease with severe left-sided SMA stenosis with multilevel severe stenosis and extensive plaque. Surgical intervention was recommended but was delayed as the patient previously was scheduled for a [facility] in August noted to have left main minor ostial distal tapering, >90% circumflex ostium and proximal stenosis s/p stent, 20-40% LAD stenosis, and EF 55%.      COVID-19 testing on 11/9 negative. He completed the Moderna vaccination series in April.     [x]   Information obtained by review of electronic health records.  [x]   Information obtained by face-to-face contact with the patient.    INTENSIVIST   HOSPITAL VISIT NOTE     Hospital:  LOS: 0 days        S     Mike, 66 y.o. male is followed for:No chief complaint on file.       L Fem Pop Bypass 11/12/2021    Essential hypertension    T2DM on insulin and oral agents     ADRIANA w/CPAP non-compliance     Emphysema    Former smoker    As an Intensivist, we provide an integrated approach to the ICU patient and family, medical management of comorbid conditions, including but not limited to electrolytes, glycemic control, organ dysfunction, lead interdisciplinary rounds and coordinate the care with all other services, including those from other specialists.     HPI:  Mike is a 66 y.o. male, who presented to this Hospital on 11/12/2021 because of PAD.     POD: Day of Surgery     I saw him in 2H ICU upon admission.  Doing well  Pain well controlled    He has had symptoms of claudication which triggered further work up which found bilateral SFA disease and left SMA stenosis.    The patient has a COVID HM Topic on their chart, and they are fully vaccinated.     For further details see note above.     PMH: He  has a past medical  history of Anxiety and depression, Asthma, COPD (chronic obstructive pulmonary disease) ([facility]), Coronary artery disease, COVID-19 vaccine administered (1st - 03/08/201), Diabetes mellitus ([facility]), GERD (gastroesophageal reflux disease), Hyperlipidemia, Hypertension, Renal failure, Sleep apnea, and Stroke ([facility]) (2019).   PSxH: He  has a past surgical history that includes Total hip arthroplasty (Right); Spinal cord stimulator implant; Cardiac catheterization; Colonoscopy; Lumbar fusion; and Leg Surgery (Right).     Medications:  No current facility-administered medications on file prior to encounter.     Current Outpatient Medications on File Prior to Encounter   Medication Sig   • busPIRone (BUSPAR) 15 MG tablet Take 15 mg by mouth 2 (two) times a day. 1.5 tabs BID   • cholecalciferol (VITAMIN D3) 25 MCG (1000 UT) tablet Take 1,000 Units by mouth Daily.   • donepezil (ARICEPT) 10 MG tablet Take 10 mg by mouth every night.   • finasteride (PROSCAR) 5 MG tablet Take 5 mg by mouth daily.   • isosorbide mononitrate (IMDUR) 30 MG 24 hr tablet Take 0.5 tablets by mouth Every Night.   • magnesium oxide (MAGOX) 400 (241.3 MG) MG tablet tablet Take 400 mg by mouth daily.   • memantine (NAMENDA) 5 MG tablet Take 5 mg by mouth Daily.   • metoprolol succinate XL (TOPROL-XL) 25 MG 24 hr tablet Take 25 mg by mouth Every Morning.   • nortriptyline (PAMELOR) 50 MG capsule Take 50 mg by mouth Every Night.   • Omega 3 1000 MG capsule Take 1,000 mg by mouth Daily. 3000 daily   • omeprazole (PriLOSEC) 40 MG capsule Take 40 mg by mouth daily.   • ramipril (ALTACE) 10 MG capsule Take 10 mg by mouth Every Evening.   • rosuvastatin (CRESTOR) 40 MG tablet TAKE 1 TABLET EVERY NIGHT   • SITagliptin 100 MG tablet 100 mg, metFORMIN  MG tablet sustained-release 24 hour 1,000 mg Take 1 dose by mouth 2 (two) times a day.   • Vitamin E 180 MG capsule Take 180 mg by mouth Daily.   • Accu-Chek Bethany Plus test strip    • aspirin (aspirin) 81 MG EC  tablet Take 1 tablet by mouth Daily.   • clopidogrel (PLAVIX) 75 MG tablet Take 1 tablet by mouth Daily.   • nitroglycerin (NITROSTAT) 0.4 MG SL tablet 1 under the tongue as needed for angina, may repeat q5mins for up three doses   • tamsulosin (FLOMAX) 0.4 MG capsule 24 hr capsule Take 1 capsule by mouth every night.   • [DISCONTINUED] Anoro Ellipta 62.5-25 MCG/INH aerosol powder  inhaler Inhale 1 puff Daily.   • [DISCONTINUED] gabapentin (NEURONTIN) 800 MG tablet Take 600 mg by mouth 3 (Three) Times a Day.   • [DISCONTINUED] insulin glargine (LANTUS) 100 UNIT/ML injection Inject 90 Units under the skin into the appropriate area as directed Daily.        Allergies: He has No Known Allergies.   FH: His family history includes Heart disease in his father; Heart failure in his father and mother.   SH: He  reports that he quit smoking about 16 years ago. He has a 75.00 pack-year smoking history. He has never used smokeless tobacco. He reports that he does not drink alcohol and does not use drugs.     The patient's relevant past medical, surgical and social history were reviewed and updated in Epic as appropriate.        History     Last Reviewed by Goldy Lam MD on 11/12/2021 at  7:22 PM    Sections Reviewed    Medical, Family, Surgical, Tobacco, Alcohol, Drug Use, Sexual Activity,   Social Documentation    Problem list reviewed by Goldy Lam MD on 11/12/2021 at  7:22 PM  Medicines reviewed by Goldy Lam MD on 11/12/2021 at  7:22 PM  Allergies reviewed by Goldy Lam MD on 11/12/2021 at  7:22 PM       Review of Systems  Constitutional-- No fever, chills or sweats. + fatigue.  CV-- No chest pain, palpitation or syncope. +HTN, HLD CAD  Resp-- No cough, hemoptysis. +SOB, ADRIANA on cpap   Skin--No rashes or lesions  All other systems reviewed and are negative.       O     Vitals:  Temp: 98 °F (36.7 °C) (11/12/21 1800) Temp  Min: 96.6 °F (35.9 °C)  Max: 98 °F (36.7 °C)   Temp core:      BP: 110/49 (11/12/21  1830) BP  Min: 95/51  Max: 139/89   Pulse: 92 (11/12/21 1859) Pulse  Min: 68  Max: 96   Resp: 16 (11/12/21 1859) Resp  Min: 14  Max: 18   SpO2: 96 % (11/12/21 1859) SpO2  Min: 95 %  Max: 98 %   Device: room air (11/12/21 1859)    Flow Rate: 2 (11/12/21 1500) Flow (L/min)  Min: 2  Max: 4     Intake/Ouptut 24 hrs (7:00AM - 6:59 AM)    Intake/Output Summary (Last 24 hours) at 11/12/2021 1924  Last data filed at 11/12/2021 1800  Gross per 24 hour   Intake 1547 ml   Output 725 ml   Net 822 ml        Medications (drips):  lactated ringers, Last Rate: 9 mL/hr (11/12/21 0905)  lactated ringers  niCARdipine  sodium chloride, Last Rate: 75 mL/hr (11/12/21 1603)      Physical Examination    Telemetry:  Rhythm: normal sinus rhythm (11/12/21 1800)         Constitutional:  No acute distress.   Head: Normocephalic.   ENMT: No oral lesions.   Neck: No adenopathy. Supple.  Trachea midline.   Cardiovascular: RRR.   Normal heart sounds.  No murmurs, gallop or rub.   Respiratory: Normal breath sounds  No adventitious sounds.   Abdominal:  Soft with no tenderness.  No distension.   No HSM.   Extremities: Warm.  Dry.  No cyanosis.  No Edema   Neurological/Psych:   Alert, Oriented, Cooperative.  Best Eye Response: 4-->(E4) spontaneous (11/12/21 1800)  Best Motor Response: 6-->(M6) obeys commands (11/12/21 1800)  Best Verbal Response: 5-->(V5) oriented (11/12/21 1800)  Nimisha Coma Scale Score: 15 (11/12/21 1800)     Results Reviewed:  Laboratory  Microbiology  Radiology  Pathology    Hematology:  Results from last 7 days   Lab Units 11/09/21  1411   WBC 10*3/mm3 16.37*   HEMOGLOBIN g/dL 13.4   PLATELETS 10*3/mm3 318     Chemistry:  Estimated Creatinine Clearance: 63.7 mL/min (by C-G formula based on SCr of 1.09 mg/dL).  Results from last 7 days   Lab Units 11/09/21  1411   SODIUM mmol/L 134*   POTASSIUM mmol/L 4.4   CHLORIDE mmol/L 100   CO2 mmol/L 21.0*   BUN mg/dL 14   CREATININE mg/dL 1.09   GLUCOSE mg/dL 162*     Results from last 7  days   Lab Units 11/09/21  1411   CALCIUM mg/dL 10.7*       COVID-19  Lab Results   Lab Value Date/Time    COVID19 Not Detected 11/09/2021 1411     Images:  No radiology results for the last day    Echo:  Results for orders placed during the hospital encounter of 09/03/19    Adult Transthoracic Echo Complete W/ Cont if Necessary Per Protocol    Interpretation Summary  Technically limited study.    1.  Global LV systolic function is likely not critically depressed.  Formal regional wall motion assessment cannot be performed.  Borderline increased LV wall thickness with grade 2 diastolic dysfunction and mild to moderate left atrial enlargement.  Right heart chambers are grossly normal but are poorly visualized.    2.  Limited echo and Doppler sampling demonstrate grossly morphologically normal valves with no stenotic lesions, masses, or thrombi.  There is mild to moderate mitral regurgitation.    3.  No pericardial great vessel pathology on limited imaging.    4.  Normal pulmonary pressures.      Results: Reviewed.  I reviewed the patient's new laboratory and imaging results.  I independently reviewed the patient's new images.    Medications: Reviewed.    Assessment/Plan   A / P     Mike, is a 66 y.o. male admitted on 11/12/2021 with PAD (peripheral artery disease) ([facility]) [I73.9]:     PAD  Procedure(s) (LRB):  FEMORAL POPLITEAL BYPASS LEFT, ABOVE KNEE (Left)  Dr. Mann  11/12/21   Antibiotics: Cefuroxime periop.  CAD  C ([facility], 08/052021) left main minor ostial distal tapering, >90% circumflex ostium and proximal stenosis, s/p stent, 20-40% LAD stenosis, and EF 55%.   HTN  Dyslipidemia   ADRIANA on CPAP  Emphysema per history, no exacerbation  T2DM x 10-15 years. He has been on insulin but it was discontinued about 3 months ago.    Results from last 7 days   Lab Units 11/12/21  1602 11/12/21  1114 11/12/21  0803   GLUCOSE mg/dL 172* 181* 178*     Lab Results   Lab Value Date/Time     HGBA1C 7.10 (H) 11/09/2021 1411       Advance Directives: Code Status and Medical Interventions:   Ordered at: 11/12/21 0892     Code Status (Patient has no pulse and is not breathing):    CPR (Attempt to Resuscitate)     Medical Interventions (Patient has pulse or is breathing):    Full Support        Plan:    ICU post operative medical management.  Hemodynamic management. Adequate preload.  Urinary Output > = 0.5 mL/kg/hr  Watch for arrhythmias.  Watch for bleeding.   Goal: Glucose < 180 mg/dL.   Disposition: Admit to ICU     Plan of care and goals reviewed during interdisciplinary rounds.  I discussed the patient's findings and my recommendations with patient and nursing staff    Level of Risk is High due to:  illness with threat to life or bodily function.     [x]  Primary Attending  []  Consultant

## 2021-11-12 NOTE — OP NOTE
Operative Report    Preop Diagnosis: Claudication.  Peripheral arterial vascular disease.  Left superficial femoral artery occlusion.      Postoperative Diagnosis: Same      Procedure: Left femoral to above-knee popliteal bypass with 8 mm PTFE graft.  Endarterectomy of the left popliteal artery with specimen to pathology        Surgeons: Farhat Mann MD      Assistant: Ashwin Carter PA-C    The Assistant provided services of suctioning, irrigation and retraction.  Also, assisted in suture closure of parts of the skin incision.      Indication: Patient referred to me to above listed medical problems.  He understood that surgery had risk of stroke bleeding infection death limb loss with as well as graft failure and graft infection.  These risks have been explained on 2 separate occasions and he agreed to proceed        Description: Supine position.  Sterile prep and drape.  The left femoral artery was identified by cutdown.  There was a good quality vessel.  We identified the left popliteal artery in Reji's canal cutdown and it was heavily calcified.  An 8 mm PTFE graft was tunneled anatomically from the left common femoral to the left above-knee popliteal and then the patient was heparinized.  The left common femoral was opened and the proximal end of the 8 mm graft was sutured with 6-0 Prolene to the left common femoral.  We then open the left popliteal artery and had to perform a fairly extensive endarterectomy due to its significant calcification.  The specimen was sent to pathology.  The distal end of the 8 mm graft was sutured to the popliteal artery with 6-0 Prolene.  The clamps were released and the graft was flushed free of air and debris as it was being tied down.  Good hemostasis was obtained sponge and needle count was reported as correct after closing the incisions with multiple layers of suture.  Good quality Doppler signal was obtained distally.  Sponge and needle count was reported as  correct      Please note that portions of this note were completed with a voice recognition program. Efforts were made to edit the dictations, but occasionally words are mistranscribed.

## 2021-11-12 NOTE — ANESTHESIA PROCEDURE NOTES
Airway  Urgency: elective    Date/Time: 11/12/2021 9:11 AM  Airway not difficult    General Information and Staff    Patient location during procedure: OR  CRNA: Tomy Rowan CRNA    Indications and Patient Condition  Indications for airway management: airway protection    Preoxygenated: yes  MILS not maintained throughout  Mask difficulty assessment: 1 - vent by mask    Final Airway Details  Final airway type: endotracheal airway      Successful airway: ETT  Cuffed: yes   Successful intubation technique: direct laryngoscopy  Endotracheal tube insertion site: oral  Blade: Olivares  Blade size: 2  ETT size (mm): 7.5  Cormack-Lehane Classification: grade I - full view of glottis  Placement verified by: chest auscultation and capnometry   Cuff volume (mL): 5  Measured from: lips  ETT/EBT  to lips (cm): 22  Number of attempts at approach: 1  Assessment: lips, teeth, and gum same as pre-op and atraumatic intubation    Additional Comments  Negative epigastric sounds, Breath sound equal bilaterally with symmetric chest rise and fall

## 2021-11-12 NOTE — ANESTHESIA PREPROCEDURE EVALUATION
Anesthesia Evaluation     Patient summary reviewed and Nursing notes reviewed   NPO Solid Status: > 8 hours  NPO Liquid Status: > 2 hours           Airway   Mallampati: II  TM distance: >3 FB  Neck ROM: full  No difficulty expected  Dental      Pulmonary     breath sounds clear to auscultation  (+) COPD, shortness of breath, sleep apnea on CPAP,   Cardiovascular     ECG reviewed  Rhythm: regular  Rate: normal    (+) hypertension, CAD, cardiac stents within the past 12 months angina, murmur, PVD, hyperlipidemia,  carotid artery disease      Neuro/Psych  GI/Hepatic/Renal/Endo    (+)   renal disease, diabetes mellitus,     Musculoskeletal     Abdominal    Substance History      OB/GYN          Other        ROS/Med Hx Other: Last plavix approximately 36 hours; surgeon aware and desires to proceed    Coronary stent 8/21      Phys Exam Other: 2/6 ABBY RUSB              Anesthesia Plan    ASA 3     general     intravenous induction     Anesthetic plan, all risks, benefits, and alternatives have been provided, discussed and informed consent has been obtained with: patient.    Plan discussed with CRNA.

## 2021-11-12 NOTE — H&P
Pre-Op H&P  Mike Gusman  [id number]  1955      Chief complaint: Leg pain      Subjective:  Patient is a 66 y.o.male presents for scheduled surgery by Dr. Mann. He anticipates a FEMORAL POPLITEAL BYPASS LEFT, ABOVE KNEE today.    Per office note 8/2/21: (((The patient is a 66-year-old male who was referred to me to evaluate peripheral arterial vascular disease.  He states that his legs become weak and fatigued and his calf muscles start cramping walking as little as 100 to 150 feet.  When he rests for 15 to 20 seconds the cramping and fatigue resolve.  Also, he states that about the time his legs begin to cramp he becomes extremely short of breath. A CT angiogram demonstrates bilateral superficial femoral artery disease which is nearly occlusive in long sections.  He now indicates to me that he is actually scheduled for a heart catheterization on Thursday of this week.  I do not have the details of that in terms of stress test, nuclear test or echocardiogram.  He denies slow healing or nonhealing ulcers, but stated his feet are constantly cold.)))    Of note, he states he spent 3 days in the hospital in September due to renal failure that has since resolved.    Review of Systems:  Constitutional-- No fever, chills or sweats. + fatigue.  CV-- No chest pain, palpitation or syncope. +HTN, HLD CAD  Resp-- No cough, hemoptysis. +SOB, ADRIANA on cpap   Skin--No rashes or lesions      Allergies: No Known Allergies      Home Meds:  Medications Prior to Admission   Medication Sig Dispense Refill Last Dose   • Breo Ellipta 200-25 MCG/INH inhaler    11/12/2021 at Unknown time   • busPIRone (BUSPAR) 15 MG tablet Take 15 mg by mouth 2 (two) times a day. 1.5 tabs BID   11/11/2021 at Unknown time   • cholecalciferol (VITAMIN D3) 25 MCG (1000 UT) tablet Take 1,000 Units by mouth Daily.   11/11/2021 at Unknown time   • donepezil (ARICEPT) 10 MG tablet Take 10 mg by mouth every night.   11/11/2021 at Unknown time   •  finasteride (PROSCAR) 5 MG tablet Take 5 mg by mouth daily.   11/11/2021 at Unknown time   • isosorbide mononitrate (IMDUR) 30 MG 24 hr tablet Take 0.5 tablets by mouth Every Night. 90 tablet 3 11/11/2021 at Unknown time   • magnesium oxide (MAGOX) 400 (241.3 MG) MG tablet tablet Take 400 mg by mouth daily.   11/11/2021 at Unknown time   • memantine (NAMENDA) 5 MG tablet Take 5 mg by mouth Daily.   11/11/2021 at Unknown time   • metoprolol succinate XL (TOPROL-XL) 25 MG 24 hr tablet Take 25 mg by mouth Every Morning.   11/11/2021 at Unknown time   • nortriptyline (PAMELOR) 50 MG capsule Take 50 mg by mouth Every Night.   11/11/2021 at Unknown time   • Omega 3 1000 MG capsule Take 1,000 mg by mouth Daily. 3000 daily   11/11/2021 at Unknown time   • omeprazole (PriLOSEC) 40 MG capsule Take 40 mg by mouth daily.   11/11/2021 at Unknown time   • ramipril (ALTACE) 10 MG capsule Take 10 mg by mouth Every Evening.   Past Week at Unknown time   • rosuvastatin (CRESTOR) 40 MG tablet TAKE 1 TABLET EVERY NIGHT 90 tablet 3 11/11/2021 at Unknown time   • SITagliptin 100 MG tablet 100 mg, metFORMIN  MG tablet sustained-release 24 hour 1,000 mg Take 1 dose by mouth 2 (two) times a day.   Past Week at Unknown time   • Vitamin E 180 MG capsule Take 180 mg by mouth Daily.   11/11/2021 at Unknown time   • Accu-Chek Bethany Plus test strip       • aspirin (aspirin) 81 MG EC tablet Take 1 tablet by mouth Daily. 90 tablet 3 11/10/2021   • cetirizine (zyrTEC) 10 MG tablet    More than a month at Unknown time   • clobetasol (TEMOVATE) 0.05 % external solution       • clopidogrel (PLAVIX) 75 MG tablet Take 1 tablet by mouth Daily. 90 tablet 3 11/10/2021   • fluticasone (FLONASE) 50 MCG/ACT nasal spray       • nitroglycerin (NITROSTAT) 0.4 MG SL tablet 1 under the tongue as needed for angina, may repeat q5mins for up three doses 30 tablet 3    • ProAir  (90 Base) MCG/ACT inhaler       • tamsulosin (FLOMAX) 0.4 MG capsule 24 hr  "capsule Take 1 capsule by mouth every night.            PMH:   Past Medical History:   Diagnosis Date   • Anxiety and depression    • Asthma    • COPD (chronic obstructive pulmonary disease) (HCC)    • Coronary artery disease    • COVID-19 vaccine administered  -  - 2021  [place]    • Diabetes mellitus (HCC)    • GERD (gastroesophageal reflux disease)    • Hyperlipidemia    • Hypertension    • Renal failure    • Sleep apnea     non-compliant with CPAP   • Stroke (HCC)      PSH:    Past Surgical History:   Procedure Laterality Date   • BACK SURGERY     • CARDIAC CATHETERIZATION     • COLONOSCOPY     • SPINAL CORD STIMULATOR IMPLANT     • TOTAL HIP ARTHROPLASTY Right        Immunization History:  Influenza:   Pneumococcal: UTD  Tetanus: UTD  Covid x2:     Social History:   Tobacco:   Social History     Tobacco Use   Smoking Status Former Smoker   • Packs/day: 1.50   • Years: 50.00   • Pack years: 75.00   • Quit date:    • Years since quittin.8   Smokeless Tobacco Never Used      Alcohol:     Social History     Substance and Sexual Activity   Alcohol Use No         Physical Exam:/63 (BP Location: Right arm, Patient Position: Lying)   Pulse 90   Temp 97.8 °F (36.6 °C) (Tympanic)   Resp 18   Ht 167.6 cm (66\")   Wt 77.8 kg (171 lb 8.3 oz)   SpO2 96%   BMI 27.68 kg/m²       General Appearance:    Alert, cooperative, no distress, appears stated age   Head:    Normocephalic, without obvious abnormality, atraumatic   Lungs:     Clear to auscultation bilaterally, respirations unlabored    Heart:   Regular rate and rhythm, S1 and S2 normal    Abdomen:    Soft without tenderness   Extremities:   Extremities normal, atraumatic, no cyanosis or edema   Skin:   Skin color, texture, turgor normal, no rashes or lesions   Neurologic:   Grossly intact     Results Review:     LABS:  Lab Results   Component Value Date    WBC 16.37 (H) 2021    HGB 13.4 2021    HCT 39.8 " 11/09/2021    MCV 84.1 11/09/2021     11/09/2021    GLUCOSE 162 (H) 11/09/2021    BUN 14 11/09/2021    CREATININE 1.09 11/09/2021    EGFRIFNONA 67 11/09/2021     (L) 11/09/2021    K 4.4 11/09/2021     11/09/2021    CO2 21.0 (L) 11/09/2021    MG 1.8 05/20/2020    CALCIUM 10.7 (H) 11/09/2021       RADIOLOGY:  6/29/21 CTA:  CT angiogram images and there is severe bilateral superficial femoral artery disease with near occlusion in multiple segments.    I reviewed the patient's new clinical results.    Cancer Staging (if applicable)  Cancer Patient: __ yes __no __unknown; If yes, clinical stage T:__ N:__M:__, stage group or __N/A      Impression: Peripheral artery disease      Plan: FEMORAL POPLITEAL BYPASS LEFT, ABOVE KNEE  Greater the above.  Plan for left femoral to popliteal bypass.  Patient is aware the risk of bleeding infection  graft failure and graft infection limb loss and death and agrees to proceed      ELIZABETH Hendrickson   11/12/2021   07:54 EST

## 2021-11-12 NOTE — ANESTHESIA POSTPROCEDURE EVALUATION
Patient: Mike Gusman    Procedure Summary     Date: 11/12/21 Room / Location:  [facility] OR 14 /  [facility] OR    Anesthesia Start: 0905 Anesthesia Stop:     Procedure: FEMORAL POPLITEAL BYPASS LEFT, ABOVE KNEE (Left Thigh) Diagnosis:       PAD (peripheral artery disease) ([facility])      (PAD (peripheral artery disease) ([facility]) [I73.9])    Surgeons: Farhat Mann MD Provider: Nito Thomas MD    Anesthesia Type: general ASA Status: 3          Anesthesia Type: general    Vitals  Vitals Value Taken Time   /52 11/12/21 1050   Temp     Pulse 68 11/12/21 1052   Resp     SpO2 97 % 11/12/21 1052   Vitals shown include unvalidated device data.        Post Anesthesia Care and Evaluation    Patient location during evaluation: PACU  Patient participation: complete - patient participated  Level of consciousness: awake and alert  Pain score: 0  Pain management: adequate  Airway patency: patent  Anesthetic complications: No anesthetic complications  PONV Status: none  Cardiovascular status: hemodynamically stable and acceptable  Respiratory status: nonlabored ventilation, acceptable and nasal cannula  Hydration status: acceptable

## 2021-11-13 LAB
ANION GAP SERPL CALCULATED.3IONS-SCNC: 10 MMOL/L (ref 5–15)
BASOPHILS # BLD AUTO: 0.03 10*3/MM3 (ref 0–0.2)
BASOPHILS NFR BLD AUTO: 0.2 % (ref 0–1.5)
BH BB BLOOD EXPIRATION DATE: NORMAL
BH BB BLOOD EXPIRATION DATE: NORMAL
BH BB BLOOD TYPE BARCODE: 6200
BH BB BLOOD TYPE BARCODE: 6200
BH BB DISPENSE STATUS: NORMAL
BH BB DISPENSE STATUS: NORMAL
BH BB PRODUCT CODE: NORMAL
BH BB PRODUCT CODE: NORMAL
BH BB UNIT NUMBER: NORMAL
BH BB UNIT NUMBER: NORMAL
BUN SERPL-MCNC: 14 MG/DL (ref 8–23)
BUN/CREAT SERPL: 15.1 (ref 7–25)
CALCIUM SPEC-SCNC: 10.1 MG/DL (ref 8.6–10.5)
CHLORIDE SERPL-SCNC: 97 MMOL/L (ref 98–107)
CO2 SERPL-SCNC: 23 MMOL/L (ref 22–29)
CREAT SERPL-MCNC: 0.93 MG/DL (ref 0.76–1.27)
CROSSMATCH INTERPRETATION: NORMAL
CROSSMATCH INTERPRETATION: NORMAL
DEPRECATED RDW RBC AUTO: 43.3 FL (ref 37–54)
EOSINOPHIL # BLD AUTO: 0.03 10*3/MM3 (ref 0–0.4)
EOSINOPHIL NFR BLD AUTO: 0.2 % (ref 0.3–6.2)
ERYTHROCYTE [DISTWIDTH] IN BLOOD BY AUTOMATED COUNT: 14.2 % (ref 12.3–15.4)
GFR SERPL CREATININE-BSD FRML MDRD: 81 ML/MIN/1.73
GLUCOSE BLDC GLUCOMTR-MCNC: 137 MG/DL (ref 70–130)
GLUCOSE BLDC GLUCOMTR-MCNC: 160 MG/DL (ref 70–130)
GLUCOSE BLDC GLUCOMTR-MCNC: 166 MG/DL (ref 70–130)
GLUCOSE BLDC GLUCOMTR-MCNC: 244 MG/DL (ref 70–130)
GLUCOSE SERPL-MCNC: 116 MG/DL (ref 65–99)
HCT VFR BLD AUTO: 34.4 % (ref 37.5–51)
HGB BLD-MCNC: 11.7 G/DL (ref 13–17.7)
IMM GRANULOCYTES # BLD AUTO: 0.09 10*3/MM3 (ref 0–0.05)
IMM GRANULOCYTES NFR BLD AUTO: 0.5 % (ref 0–0.5)
LYMPHOCYTES # BLD AUTO: 2.2 10*3/MM3 (ref 0.7–3.1)
LYMPHOCYTES NFR BLD AUTO: 12.5 % (ref 19.6–45.3)
MCH RBC QN AUTO: 28.7 PG (ref 26.6–33)
MCHC RBC AUTO-ENTMCNC: 34 G/DL (ref 31.5–35.7)
MCV RBC AUTO: 84.5 FL (ref 79–97)
MONOCYTES # BLD AUTO: 1.25 10*3/MM3 (ref 0.1–0.9)
MONOCYTES NFR BLD AUTO: 7.1 % (ref 5–12)
NEUTROPHILS NFR BLD AUTO: 13.99 10*3/MM3 (ref 1.7–7)
NEUTROPHILS NFR BLD AUTO: 79.5 % (ref 42.7–76)
NRBC BLD AUTO-RTO: 0 /100 WBC (ref 0–0.2)
PLATELET # BLD AUTO: 264 10*3/MM3 (ref 140–450)
PMV BLD AUTO: 10.3 FL (ref 6–12)
POTASSIUM SERPL-SCNC: 4.1 MMOL/L (ref 3.5–5.2)
RBC # BLD AUTO: 4.07 10*6/MM3 (ref 4.14–5.8)
SODIUM SERPL-SCNC: 130 MMOL/L (ref 136–145)
UNIT  ABO: NORMAL
UNIT  ABO: NORMAL
UNIT  RH: NORMAL
UNIT  RH: NORMAL
WBC # BLD AUTO: 17.59 10*3/MM3 (ref 3.4–10.8)

## 2021-11-13 PROCEDURE — 85025 COMPLETE CBC W/AUTO DIFF WBC: CPT | Performed by: INTERNAL MEDICINE

## 2021-11-13 PROCEDURE — 99232 SBSQ HOSP IP/OBS MODERATE 35: CPT | Performed by: INTERNAL MEDICINE

## 2021-11-13 PROCEDURE — 63710000001 INSULIN DETEMIR PER 5 UNITS: Performed by: INTERNAL MEDICINE

## 2021-11-13 PROCEDURE — 80048 BASIC METABOLIC PNL TOTAL CA: CPT | Performed by: INTERNAL MEDICINE

## 2021-11-13 PROCEDURE — 0 CEFAZOLIN IN DEXTROSE 2-4 GM/100ML-% SOLUTION: Performed by: PHYSICIAN ASSISTANT

## 2021-11-13 PROCEDURE — 82962 GLUCOSE BLOOD TEST: CPT

## 2021-11-13 PROCEDURE — 63710000001 INSULIN LISPRO (HUMAN) PER 5 UNITS: Performed by: INTERNAL MEDICINE

## 2021-11-13 PROCEDURE — 94799 UNLISTED PULMONARY SVC/PX: CPT

## 2021-11-13 RX ORDER — AMOXICILLIN 250 MG
2 CAPSULE ORAL 2 TIMES DAILY PRN
Status: CANCELLED | OUTPATIENT
Start: 2021-11-13

## 2021-11-13 RX ORDER — BISACODYL 5 MG/1
10 TABLET, DELAYED RELEASE ORAL DAILY PRN
Status: CANCELLED | OUTPATIENT
Start: 2021-11-13

## 2021-11-13 RX ORDER — BISACODYL 10 MG
10 SUPPOSITORY, RECTAL RECTAL DAILY PRN
Status: CANCELLED | OUTPATIENT
Start: 2021-11-13

## 2021-11-13 RX ORDER — SODIUM CHLORIDE 0.9 % (FLUSH) 0.9 %
10 SYRINGE (ML) INJECTION EVERY 12 HOURS SCHEDULED
Status: CANCELLED | OUTPATIENT
Start: 2021-11-13

## 2021-11-13 RX ORDER — DOCUSATE SODIUM 100 MG/1
100 CAPSULE, LIQUID FILLED ORAL 2 TIMES DAILY PRN
Status: CANCELLED | OUTPATIENT
Start: 2021-11-13

## 2021-11-13 RX ORDER — SODIUM CHLORIDE 0.9 % (FLUSH) 0.9 %
10 SYRINGE (ML) INJECTION EVERY 8 HOURS SCHEDULED
Status: CANCELLED | OUTPATIENT
Start: 2021-11-13

## 2021-11-13 RX ADMIN — HYDROCODONE BITARTRATE AND ACETAMINOPHEN 1 TABLET: 7.5; 325 TABLET ORAL at 22:16

## 2021-11-13 RX ADMIN — INSULIN LISPRO 2 UNITS: 100 INJECTION, SOLUTION INTRAVENOUS; SUBCUTANEOUS at 08:30

## 2021-11-13 RX ADMIN — HYDROCODONE BITARTRATE AND ACETAMINOPHEN 1 TABLET: 7.5; 325 TABLET ORAL at 09:27

## 2021-11-13 RX ADMIN — INSULIN LISPRO 2 UNITS: 100 INJECTION, SOLUTION INTRAVENOUS; SUBCUTANEOUS at 12:05

## 2021-11-13 RX ADMIN — METOPROLOL SUCCINATE 25 MG: 25 TABLET, EXTENDED RELEASE ORAL at 06:20

## 2021-11-13 RX ADMIN — INSULIN DETEMIR 8 UNITS: 100 INJECTION, SOLUTION SUBCUTANEOUS at 15:30

## 2021-11-13 RX ADMIN — BUSPIRONE HYDROCHLORIDE 15 MG: 5 TABLET ORAL at 08:29

## 2021-11-13 RX ADMIN — BUDESONIDE AND FORMOTEROL FUMARATE DIHYDRATE 2 PUFF: 160; 4.5 AEROSOL RESPIRATORY (INHALATION) at 07:47

## 2021-11-13 RX ADMIN — ROSUVASTATIN CALCIUM 40 MG: 20 TABLET, COATED ORAL at 22:09

## 2021-11-13 RX ADMIN — NORTRIPTYLINE HYDROCHLORIDE 50 MG: 25 CAPSULE ORAL at 22:16

## 2021-11-13 RX ADMIN — TAMSULOSIN HYDROCHLORIDE 0.4 MG: 0.4 CAPSULE ORAL at 22:09

## 2021-11-13 RX ADMIN — INSULIN LISPRO 5 UNITS: 100 INJECTION, SOLUTION INTRAVENOUS; SUBCUTANEOUS at 08:31

## 2021-11-13 RX ADMIN — CEFAZOLIN SODIUM 2 G: 2 INJECTION, SOLUTION INTRAVENOUS at 01:22

## 2021-11-13 RX ADMIN — INSULIN DETEMIR 8 UNITS: 100 INJECTION, SOLUTION SUBCUTANEOUS at 22:16

## 2021-11-13 RX ADMIN — HYDROCODONE BITARTRATE AND ACETAMINOPHEN 1 TABLET: 7.5; 325 TABLET ORAL at 04:22

## 2021-11-13 RX ADMIN — SODIUM CHLORIDE 75 ML/HR: 4.5 INJECTION, SOLUTION INTRAVENOUS at 01:22

## 2021-11-13 RX ADMIN — BUDESONIDE AND FORMOTEROL FUMARATE DIHYDRATE 2 PUFF: 160; 4.5 AEROSOL RESPIRATORY (INHALATION) at 20:54

## 2021-11-13 RX ADMIN — INSULIN LISPRO 5 UNITS: 100 INJECTION, SOLUTION INTRAVENOUS; SUBCUTANEOUS at 12:05

## 2021-11-13 RX ADMIN — HYDROCODONE BITARTRATE AND ACETAMINOPHEN 1 TABLET: 7.5; 325 TABLET ORAL at 15:42

## 2021-11-13 RX ADMIN — ASPIRIN 81 MG: 81 TABLET, COATED ORAL at 08:30

## 2021-11-13 RX ADMIN — DONEPEZIL HYDROCHLORIDE 10 MG: 10 TABLET, FILM COATED ORAL at 22:09

## 2021-11-13 RX ADMIN — CLOPIDOGREL BISULFATE 75 MG: 75 TABLET ORAL at 08:30

## 2021-11-13 RX ADMIN — ISOSORBIDE MONONITRATE 15 MG: 30 TABLET, EXTENDED RELEASE ORAL at 22:09

## 2021-11-13 RX ADMIN — PANTOPRAZOLE SODIUM 40 MG: 40 TABLET, DELAYED RELEASE ORAL at 06:20

## 2021-11-13 RX ADMIN — CETIRIZINE HYDROCHLORIDE 10 MG: 10 TABLET, FILM COATED ORAL at 08:30

## 2021-11-13 RX ADMIN — INSULIN LISPRO 5 UNITS: 100 INJECTION, SOLUTION INTRAVENOUS; SUBCUTANEOUS at 16:52

## 2021-11-13 RX ADMIN — INSULIN LISPRO 3 UNITS: 100 INJECTION, SOLUTION INTRAVENOUS; SUBCUTANEOUS at 16:51

## 2021-11-13 RX ADMIN — BUSPIRONE HYDROCHLORIDE 15 MG: 5 TABLET ORAL at 22:09

## 2021-11-13 RX ADMIN — MEMANTINE HYDROCHLORIDE 5 MG: 5 TABLET ORAL at 22:16

## 2021-11-13 RX ADMIN — FINASTERIDE 5 MG: 5 TABLET, FILM COATED ORAL at 22:10

## 2021-11-13 RX ADMIN — RAMIPRIL 10 MG: 5 CAPSULE ORAL at 16:48

## 2021-11-13 NOTE — PROGRESS NOTES
INTENSIVIST   HOSPITAL VISIT NOTE     Hospital:  LOS: 1 day        ADRIANA Mckeon, 66 y.o. male is followed for:No chief complaint on file.       L Fem Pop Bypass 11/12/2021    Essential hypertension    T2DM on insulin and oral agents     ADRIANA w/CPAP non-compliance     Emphysema    Former smoker    As an Intensivist, we provide an integrated approach to the ICU patient and family, medical management of comorbid conditions, including but not limited to electrolytes, glycemic control, organ dysfunction, lead interdisciplinary rounds and coordinate the care with all other services, including those from other specialists.     Interval History:  POD: 1 Day Post-Op     Doing well.  No complications.    The patient has a COVID HM Topic on their chart, and they are fully vaccinated.     For further details see note above.        Allergies: He has No Known Allergies.   FH: His family history includes Heart disease in his father; Heart failure in his father and mother.   SH: He  reports that he quit smoking about 16 years ago. He has a 75.00 pack-year smoking history. He has never used smokeless tobacco. He reports that he does not drink alcohol and does not use drugs.     The patient's relevant past medical, surgical and social history were reviewed and updated in Epic as appropriate.        History     Last Reviewed by Goldy Lam MD on 11/12/2021 at  7:22 PM    Sections Reviewed    Medical, Family, Surgical, Tobacco, Alcohol, Drug Use, Sexual Activity,   Social Documentation      Problem list reviewed by Goldy Lam MD on 11/12/2021 at  7:22 PM  Medicines reviewed by Goldy Lam MD on 11/12/2021 at  7:22 PM  Allergies reviewed by Goldy Lam MD on 11/12/2021 at  7:22 PM        O     Vitals:  Temp: 97.9 °F (36.6 °C) (11/13/21 0400) Temp  Min: 96.6 °F (35.9 °C)  Max: 98 °F (36.7 °C)   Temp core:      BP: 97/55 (11/13/21 1300) BP  Min: 94/52  Max: 139/89   Pulse: 85 (11/13/21 1300) Pulse  Min: 63  Max: 96    Resp: 16 (11/13/21 0800) Resp  Min: 16  Max: 18   SpO2: 99 % (11/13/21 1300) SpO2  Min: 89 %  Max: 99 %   Device: room air (11/13/21 0800)    Flow Rate: 2 (11/12/21 1500) Flow (L/min)  Min: 2  Max: 2     Intake/Ouptut 24 hrs (7:00AM - 6:59 AM)    Intake/Output Summary (Last 24 hours) at 11/13/2021 1347  Last data filed at 11/13/2021 1343  Gross per 24 hour   Intake 2185 ml   Output 3525 ml   Net -1340 ml        Medications (drips):  niCARdipine  sodium chloride, Last Rate: 75 mL/hr (11/13/21 0122)      Physical Examination    Telemetry:  Rhythm: normal sinus rhythm (11/13/21 1200)         Constitutional:  No acute distress.   Head: Normocephalic.   ENMT: No oral lesions.   Neck: No adenopathy. Supple.  Trachea midline.   Cardiovascular: RRR.   Normal heart sounds.  No murmurs, gallop or rub.   Respiratory: Normal breath sounds  No adventitious sounds.   Abdominal:  Soft with no tenderness.  No distension.   No HSM.   Extremities: Warm.  Dry.  No cyanosis.  No Edema   Neurological/Psych:   Alert, Oriented, Cooperative.  Best Eye Response: 4-->(E4) spontaneous (11/12/21 2200)  Best Motor Response: 6-->(M6) obeys commands (11/12/21 2200)  Best Verbal Response: 5-->(V5) oriented (11/12/21 2200)  Stacy Coma Scale Score: 15 (11/12/21 2200)     Results Reviewed:  Laboratory  Microbiology  Radiology  Pathology    Hematology:  Results from last 7 days   Lab Units 11/13/21 0402 11/09/21  1411   WBC 10*3/mm3 17.59* 16.37*   HEMOGLOBIN g/dL 11.7* 13.4   PLATELETS 10*3/mm3 264 318   NEUTROS ABS 10*3/mm3 13.99*  --    LYMPHS ABS 10*3/mm3 2.20  --      Chemistry:  Estimated Creatinine Clearance: 74.6 mL/min (by C-G formula based on SCr of 0.93 mg/dL).  Results from last 7 days   Lab Units 11/13/21 0402 11/09/21  1411   SODIUM mmol/L 130* 134*   POTASSIUM mmol/L 4.1 4.4   CHLORIDE mmol/L 97* 100   CO2 mmol/L 23.0 21.0*   BUN mg/dL 14 14   CREATININE mg/dL 0.93 1.09   GLUCOSE mg/dL 116* 162*     Results from last 7 days   Lab  Units 11/13/21  0402 11/09/21  1411   CALCIUM mg/dL 10.1 10.7*     COVID-19  Lab Results   Lab Value Date/Time    COVID19 Not Detected 11/09/2021 1411     Results: Reviewed.  I reviewed the patient's new laboratory and imaging results.  I independently reviewed the patient's new images.    Medications: Reviewed.    Assessment/Plan   RUSS / P     Mike, is a 66 y.o. male admitted on 11/12/2021 with PAD (peripheral artery disease) ([facility]) [I73.9]:     PAD  Procedure(s) (LRB):  FEMORAL POPLITEAL BYPASS LEFT, ABOVE KNEE (Left)  Dr. Mann  11/12/21   Antibiotics: Cefuroxime periop.  CAD  [facility] ([facility], 08/05[facility]) left main minor ostial distal tapering, >90% circumflex ostium and proximal stenosis, s/p stent, 20-40% LAD stenosis, and EF 55%.   HTN  Dyslipidemia   ADRIANA on CPAP  Emphysema per history, no exacerbation  T2DM x 10-15 years. He has been on insulin but it was discontinued about 3 months ago.    Results from last 7 days   Lab Units 11/13/21  1117 11/13/21  0700 11/12/21  2016 11/12/21  1602 11/12/21  1114 11/12/21  0803   GLUCOSE mg/dL 160* 166* 344* 172* 181* 178*     Lab Results   Lab Value Date/Time    HGBA1C 7.10 (H) 11/09/2021 1411       Advance Directives: Code Status and Medical Interventions:   Ordered at: 11/12/21 1549     Code Status (Patient has no pulse and is not breathing):    CPR (Attempt to Resuscitate)     Medical Interventions (Patient has pulse or is breathing):    Full Support        Plan:    Goal: Glucose < 180 mg/dL.   No changes in insulin doses today.  Disposition: Transfer to Telemetry Unit - Probably home in AM as per CT surgery     Plan of care and goals reviewed during interdisciplinary rounds.  I discussed the patient's findings and my recommendations with patient and nursing staff    Level of Risk is High due to:  illness with threat to life or bodily function.     Time: 25 minutes, in direct patient care, with the patient and/or in the ICU or rivera  coordinating care with other health care providers.     I have spent > 50% percent of this time, counseling and discussing management.       OK to floor.    Hospitalist Team will assist with medical management, and follow as Consultant, once on the Floor.    Thank you.    [x]  Primary Attending  []  Consultant

## 2021-11-13 NOTE — PROGRESS NOTES
"Nutrition Services    Patient Name:  Mike Gusman  YOB: 1955  MRN: [id number]  Admit Date:  11/12/2021    Patient admitted with Nutrition Risk Screen of \"unintentional weight loss.\" Pt reports that he lost weight during hospitalization in September 2021. Once d/c'd home from hospital pt wanted to continue to lose weight so pt cut back on his serving sizes. Reports that he has lost 40 lb over the past 2 months and is now at his ideal body weight of 170-171 lb. Pt happy with weight loss and wants to maintain current weight. EMR weight hx reviewed.    Pt does not meet nutrition risk screen criteria at this time. Will follow per protocol.       Date Weight (kg) Weight (lbs) Weight Method VISIT REPORT   11/12/2021 77.8 kg 171 lb 8.3 oz - -   11/9/2021 77.8 kg 171 lb 8.3 oz Standing scale -   8/16/2021 86.183 kg 190 lb - Report   8/2/2021 87.091 kg 192 lb - Report   6/1/2021 87.998 kg 194 lb - Report   1/12/2021 102.513 kg 226 lb - Report   11/3/2020 92.08 kg 203 lb - Report   5/20/2020 91.173 kg 201 lb - Report   11/13/2019 95.618 kg 210 lb 12.8 oz - Report   9/11/2019 92.534 kg 204 lb - Report   9/3/2019 93.7 kg 206 lb 9.1 oz - -   8/13/2019 93.713 kg 206 lb 9.6 oz - Report   5/22/2019 94.983 kg 209 lb 6.4 oz - Report   11/28/2018 100.517 kg 221 lb 9.6 oz - Report   5/23/2018 98.34 kg 216 lb 12.8 oz - Report       Electronically signed by:  Natalie Ring MS RD/JAM [facility]  11/13/21 10:07 EST   "

## 2021-11-13 NOTE — PROGRESS NOTES
Cardiothoracic Surgery Progress Note      POD # 1 s/p Left femoral to above-knee popliteal bypass     LOS: 1 day      Subjective:  No acute events overnight    Objective:  Vital Signs  Temp:  [96.6 °F (35.9 °C)-98 °F (36.7 °C)] 97.9 °F (36.6 °C)  Heart Rate:  [63-96] 66  Resp:  [14-18] 16  BP: ()/(47-89) 101/53    Physical Exam:   General Appearance: alert, appears stated age and cooperative   Lungs: clear to auscultation, respirations regular, respirations even and respirations unlabored   Heart: regular rhythm & normal rate, normal S1, S2, no murmur, no gallop, no rub and no click   Extremities: dopplerable pulses      Results:  Results from last 7 days   Lab Units 11/13/21  0402   WBC 10*3/mm3 17.59*   HEMOGLOBIN g/dL 11.7*   HEMATOCRIT % 34.4*   PLATELETS 10*3/mm3 264     Results from last 7 days   Lab Units 11/13/21  0402   SODIUM mmol/L 130*   POTASSIUM mmol/L 4.1   CHLORIDE mmol/L 97*   CO2 mmol/L 23.0   BUN mg/dL 14   CREATININE mg/dL 0.93   GLUCOSE mg/dL 116*   CALCIUM mg/dL 10.1       Assessment:  POD # 1 s/p Left femoral to above-knee popliteal bypass    Plan:  Transfer to [facility]  Patient may get up to chair - do not sit at 90 degrees  Pulmonary toilet    Dee Dee Wheeler PA-C  11/13/21  09:32 EST

## 2021-11-13 NOTE — PLAN OF CARE
Goal Outcome Evaluation:      Improving    Pt neurologically intact. NSR, hemodynamically stable with no vasoactive drips. Can doppler pulses. Incision dressings are clean and intact. Tolerating diet. Adequate UOP. Remains on strict bedrest

## 2021-11-13 NOTE — PLAN OF CARE
Goal Outcome Evaluation:           Progress: improving  Outcome Summary: Pt doing well. Bedrest over at 0800, up to chair with minimal assist. Ambulated in baker 220 feet x2. Pain controlled with PO meds. Dressings CDI. Orders to tele. UOP more than adequate. Pulses per doppler. Waiting on tele bed, potential DC in AM.

## 2021-11-13 NOTE — PROGRESS NOTES
"Cardiothoracic Surgery Progress Note      POD # 1 s/p left femoropopliteal bypass       LOS: 1 day      Subjective:  Doing well pain controlled      Objective:  Vital Signs  Temp:  [96.6 °F (35.9 °C)-98 °F (36.7 °C)] 97.9 °F (36.6 °C)  Heart Rate:  [63-96] 66  Resp:  [14-18] 16  BP: ()/(47-89) 101/53      Output by Drain (mL) 11/12/21 0701 - 11/12/21 1900 11/12/21 1901 - 11/13/21 0700 11/13/21 0701 - 11/13/21 0932 Range Total   Patient has no LDAs of requested type attached.        Physical Exam:   General Appearance: alert, appears stated age and cooperative   Lungs: clear to auscultation, respirations regular, respirations even and respirations unlabored   Heart: regular rhythm & normal rate, normal S1, S2, no murmur, no gallop, no rub and no click   Skin: Incision c/d/i     Results:  Results from last 7 days   Lab Units 11/13/21  0402   WBC 10*3/mm3 17.59*   HEMOGLOBIN g/dL 11.7*   HEMATOCRIT % 34.4*   PLATELETS 10*3/mm3 264     Results from last 7 days   Lab Units 11/13/21  0402   SODIUM mmol/L 130*   POTASSIUM mmol/L 4.1   CHLORIDE mmol/L 97*   CO2 mmol/L 23.0   BUN mg/dL 14   CREATININE mg/dL 0.93   GLUCOSE mg/dL 116*   CALCIUM mg/dL 10.1         Assessment:    L Fem Pop Bypass 11/12/2021    CAD s/p stents     Hyperlipemia    Essential hypertension    T2DM on insulin and oral agents     ADRIANA w/CPAP non-compliance     COPD with emphysema    Moderate bilateral carotid artery stenosis    Former smoker    H/O traumatic \"brain bleed\" 2* fall     S/P spinal cord stimulator        Postop day 1 status post left femoropopliteal with PTFE    Plan:  Doing well can remove bedrest restrictions.  Continue to ambulate and transfer from ICU anticipate discharge tomorrow    Mike Tillman MD  11/13/21  09:32 EST    "

## 2021-11-14 ENCOUNTER — READMISSION MANAGEMENT (OUTPATIENT)
Dept: CALL CENTER | Facility: HOSPITAL | Age: 66
End: 2021-11-14

## 2021-11-14 VITALS
HEART RATE: 99 BPM | OXYGEN SATURATION: 93 % | DIASTOLIC BLOOD PRESSURE: 83 MMHG | TEMPERATURE: 99 F | HEIGHT: 66 IN | RESPIRATION RATE: 18 BRPM | BODY MASS INDEX: 27.57 KG/M2 | WEIGHT: 171.52 LBS | SYSTOLIC BLOOD PRESSURE: 106 MMHG

## 2021-11-14 LAB
ANION GAP SERPL CALCULATED.3IONS-SCNC: 11 MMOL/L (ref 5–15)
BASOPHILS # BLD AUTO: 0.07 10*3/MM3 (ref 0–0.2)
BASOPHILS NFR BLD AUTO: 0.4 % (ref 0–1.5)
BUN SERPL-MCNC: 16 MG/DL (ref 8–23)
BUN/CREAT SERPL: 16.3 (ref 7–25)
CALCIUM SPEC-SCNC: 10.3 MG/DL (ref 8.6–10.5)
CHLORIDE SERPL-SCNC: 99 MMOL/L (ref 98–107)
CO2 SERPL-SCNC: 24 MMOL/L (ref 22–29)
CREAT SERPL-MCNC: 0.98 MG/DL (ref 0.76–1.27)
DEPRECATED RDW RBC AUTO: 44.2 FL (ref 37–54)
EOSINOPHIL # BLD AUTO: 0.25 10*3/MM3 (ref 0–0.4)
EOSINOPHIL NFR BLD AUTO: 1.5 % (ref 0.3–6.2)
ERYTHROCYTE [DISTWIDTH] IN BLOOD BY AUTOMATED COUNT: 14.2 % (ref 12.3–15.4)
GFR SERPL CREATININE-BSD FRML MDRD: 76 ML/MIN/1.73
GLUCOSE BLDC GLUCOMTR-MCNC: 154 MG/DL (ref 70–130)
GLUCOSE SERPL-MCNC: 126 MG/DL (ref 65–99)
HCT VFR BLD AUTO: 36.8 % (ref 37.5–51)
HGB BLD-MCNC: 12.2 G/DL (ref 13–17.7)
IMM GRANULOCYTES # BLD AUTO: 0.08 10*3/MM3 (ref 0–0.05)
IMM GRANULOCYTES NFR BLD AUTO: 0.5 % (ref 0–0.5)
LYMPHOCYTES # BLD AUTO: 2.8 10*3/MM3 (ref 0.7–3.1)
LYMPHOCYTES NFR BLD AUTO: 16.7 % (ref 19.6–45.3)
MCH RBC QN AUTO: 28.6 PG (ref 26.6–33)
MCHC RBC AUTO-ENTMCNC: 33.2 G/DL (ref 31.5–35.7)
MCV RBC AUTO: 86.2 FL (ref 79–97)
MONOCYTES # BLD AUTO: 1.27 10*3/MM3 (ref 0.1–0.9)
MONOCYTES NFR BLD AUTO: 7.6 % (ref 5–12)
NEUTROPHILS NFR BLD AUTO: 12.25 10*3/MM3 (ref 1.7–7)
NEUTROPHILS NFR BLD AUTO: 73.3 % (ref 42.7–76)
NRBC BLD AUTO-RTO: 0 /100 WBC (ref 0–0.2)
PLATELET # BLD AUTO: 265 10*3/MM3 (ref 140–450)
PMV BLD AUTO: 10 FL (ref 6–12)
POTASSIUM SERPL-SCNC: 4.6 MMOL/L (ref 3.5–5.2)
RBC # BLD AUTO: 4.27 10*6/MM3 (ref 4.14–5.8)
SODIUM SERPL-SCNC: 134 MMOL/L (ref 136–145)
WBC # BLD AUTO: 16.72 10*3/MM3 (ref 3.4–10.8)

## 2021-11-14 PROCEDURE — 63710000001 INSULIN LISPRO (HUMAN) PER 5 UNITS: Performed by: INTERNAL MEDICINE

## 2021-11-14 PROCEDURE — 85025 COMPLETE CBC W/AUTO DIFF WBC: CPT | Performed by: INTERNAL MEDICINE

## 2021-11-14 PROCEDURE — 82962 GLUCOSE BLOOD TEST: CPT

## 2021-11-14 PROCEDURE — 94799 UNLISTED PULMONARY SVC/PX: CPT

## 2021-11-14 PROCEDURE — 80048 BASIC METABOLIC PNL TOTAL CA: CPT | Performed by: INTERNAL MEDICINE

## 2021-11-14 PROCEDURE — 63710000001 INSULIN DETEMIR PER 5 UNITS: Performed by: INTERNAL MEDICINE

## 2021-11-14 RX ORDER — HYDROCODONE BITARTRATE AND ACETAMINOPHEN 7.5; 325 MG/1; MG/1
1 TABLET ORAL EVERY 4 HOURS PRN
Qty: 30 TABLET | Refills: 0 | Status: SHIPPED | OUTPATIENT
Start: 2021-11-14 | End: 2021-11-22

## 2021-11-14 RX ADMIN — INSULIN LISPRO 2 UNITS: 100 INJECTION, SOLUTION INTRAVENOUS; SUBCUTANEOUS at 07:53

## 2021-11-14 RX ADMIN — HYDROCODONE BITARTRATE AND ACETAMINOPHEN 1 TABLET: 7.5; 325 TABLET ORAL at 08:12

## 2021-11-14 RX ADMIN — INSULIN LISPRO 5 UNITS: 100 INJECTION, SOLUTION INTRAVENOUS; SUBCUTANEOUS at 07:53

## 2021-11-14 RX ADMIN — BUSPIRONE HYDROCHLORIDE 15 MG: 5 TABLET ORAL at 08:00

## 2021-11-14 RX ADMIN — INSULIN DETEMIR 8 UNITS: 100 INJECTION, SOLUTION SUBCUTANEOUS at 08:00

## 2021-11-14 RX ADMIN — PANTOPRAZOLE SODIUM 40 MG: 40 TABLET, DELAYED RELEASE ORAL at 06:13

## 2021-11-14 RX ADMIN — ASPIRIN 81 MG: 81 TABLET, COATED ORAL at 08:00

## 2021-11-14 RX ADMIN — CLOPIDOGREL BISULFATE 75 MG: 75 TABLET ORAL at 08:00

## 2021-11-14 RX ADMIN — BUDESONIDE AND FORMOTEROL FUMARATE DIHYDRATE 2 PUFF: 160; 4.5 AEROSOL RESPIRATORY (INHALATION) at 08:05

## 2021-11-14 RX ADMIN — CETIRIZINE HYDROCHLORIDE 10 MG: 10 TABLET, FILM COATED ORAL at 08:00

## 2021-11-14 RX ADMIN — METOPROLOL SUCCINATE 25 MG: 25 TABLET, EXTENDED RELEASE ORAL at 06:13

## 2021-11-14 NOTE — PROGRESS NOTES
"Cardiothoracic Surgery Progress Note      POD # 2s/p left femoropopliteal bypass       LOS: 2 days      Subjective:  Doing well pain controlled and ambulated well      Objective:  Vital Signs  Temp:  [98.1 °F (36.7 °C)-99 °F (37.2 °C)] 99 °F (37.2 °C)  Heart Rate:  [69-95] 91  Resp:  [16-20] 18  BP: ()/(32-72) 106/63      Output by Drain (mL) 11/13/21 0701 - 11/13/21 1900 11/13/21 1901 - 11/14/21 0700 11/14/21 0701 - 11/14/21 0959 Range Total   Patient has no LDAs of requested type attached.        Physical Exam:   General Appearance: alert, appears stated age and cooperative   Lungs: clear to auscultation, respirations regular, respirations even and respirations unlabored   Heart: regular rhythm & normal rate, normal S1, S2, no murmur, no gallop, no rub and no click   Skin: Incision c/d/i     Results:    Results from last 7 days   Lab Units 11/14/21  0400   WBC 10*3/mm3 16.72*   HEMOGLOBIN g/dL 12.2*   HEMATOCRIT % 36.8*   PLATELETS 10*3/mm3 265     Results from last 7 days   Lab Units 11/14/21  0400   SODIUM mmol/L 134*   POTASSIUM mmol/L 4.6   CHLORIDE mmol/L 99   CO2 mmol/L 24.0   BUN mg/dL 16   CREATININE mg/dL 0.98   GLUCOSE mg/dL 126*   CALCIUM mg/dL 10.3         Assessment:    L Fem Pop Bypass 11/12/2021    CAD s/p stents     Hyperlipemia    Essential hypertension    T2DM on insulin and oral agents     ADRIANA w/CPAP non-compliance     COPD with emphysema    Moderate bilateral carotid artery stenosis    Former smoker    H/O traumatic \"brain bleed\" 2* fall     S/P spinal cord stimulator        Postop day 2 status post left femoropopliteal with PTFE    Plan:  Discharge today    Mike Tillman MD  11/14/21  09:59 EST    "

## 2021-11-14 NOTE — PLAN OF CARE
Goal Outcome Evaluation:  Plan of Care Reviewed With: patient        Progress: improving       Neuro: Pt AO x4 and neuro intact.    Resp: Pt on RA, SpO2 95%+. Lungs clear. No appreciable secretions.    Cardio: Pt remains in NSR, 70s-80s. SBP 90s-120s.  Pulses requiring doppler to assess at times.     GI: Active bowel sounds, no BM this shift.    : Pt using urinal, UOP adequate.    Skin: Groin site-Aquacel, soft, tender to palpation, no hematoma.  Knee incision-Aquacel.    Pain: Pain managed with prescribed PRN medication.

## 2021-11-14 NOTE — OUTREACH NOTE
Prep Survey      Responses   [facility] patient discharged from? [facility]   Is LACE score < 7 ? No   Emergency Room discharge w/ pulse ox? No   Eligibility Readm Mgmt   Discharge diagnosis FEMORAL POPLITEAL BYPASS LEFT   Does the patient have one of the following disease processes/diagnoses(primary or secondary)? General Surgery   Does the patient have Home health ordered? No   Is there a DME ordered? No   Prep survey completed? Yes          Delores Mccarty RN

## 2021-11-15 LAB
CYTO UR: NORMAL
LAB AP CASE REPORT: NORMAL
LAB AP CLINICAL INFORMATION: NORMAL
PATH REPORT.FINAL DX SPEC: NORMAL
PATH REPORT.GROSS SPEC: NORMAL

## 2021-11-17 ENCOUNTER — READMISSION MANAGEMENT (OUTPATIENT)
Dept: CALL CENTER | Facility: HOSPITAL | Age: 66
End: 2021-11-17

## 2021-11-17 NOTE — OUTREACH NOTE
General Surgery Week 1 Survey      Responses   [facility] patient discharged from? [place]   Does the patient have one of the following disease processes/diagnoses(primary or secondary)? General Surgery   Week 1 attempt successful? Yes   Call start time 0947   Call end time 0949   Discharge diagnosis FEMORAL POPLITEAL BYPASS LEFT   Is patient permission given to speak with other caregiver? Yes   List who call center can speak with spouse- Cira   Person spoke with today (if not patient) and relationship spouse   Meds reviewed with patient/caregiver? Yes   Is the patient having any side effects they believe may be caused by any medication additions or changes? No   Does the patient have all medications related to this admission filled (includes all antibiotics, pain medications, etc.) Yes   Is the patient taking all medications as directed (includes completed medication regime)? Yes   Does the patient have a follow up appointment scheduled with their surgeon? Yes   Has the patient kept scheduled appointments due by today? N/A   Comments f/u with CT surgeon on 12/6   Has home health visited the patient within 72 hours of discharge? N/A   Psychosocial issues? No   Did the patient receive a copy of their discharge instructions? Yes   Nursing interventions Reviewed instructions with patient   What is the patient's perception of their health status since discharge? Improving   Nursing interventions Nurse provided patient education   Is the patient /caregiver able to teach back basic post-op care? Continue use of incentive spirometry at least 1 week post discharge,  Practice 'cough and deep breath',  Drive as instructed by MD in discharge instructions,  Take showers only when approved by MD-sponge bathe until then,  No tub bath, swimming, or hot tub until instructed by MD,  Keep incision areas clean,dry and protected,  Do not remove steri-strips,  Lifting as instructed by MD in discharge instructions   Is the  patient/caregiver able to teach back signs and symptoms of incisional infection? Fever   Is the patient/caregiver able to teach back steps to recovery at home? Set small, achievable goals for return to baseline health,  Rest and rebuild strength, gradually increase activity   Is the patient/caregiver able to teach back the hierarchy of who to call/visit for symptoms/problems? PCP, Specialist, Home health nurse, Urgent Care, ED, 911 Yes   Week 1 call completed? Yes   Wrap up additional comments Per spouse, patient is doing well, no questions or concerns at this time, confirmed f/u appt with surgeon for 12/6.          Mamta Kaur RN

## 2021-11-18 ENCOUNTER — TELEPHONE (OUTPATIENT)
Dept: CARDIOLOGY | Facility: CLINIC | Age: 66
End: 2021-11-18

## 2021-11-18 NOTE — TELEPHONE ENCOUNTER
Pts wife called wanting to know if she could follow up on husbands Surgery(Femoral Popliteal Bypass) with Ericka instead of his surgeon, wife explained he needed staples removed I advised to her she would need to follow up with who did the surgery, then follow back up with Ericka.

## 2021-11-23 ENCOUNTER — READMISSION MANAGEMENT (OUTPATIENT)
Dept: CALL CENTER | Facility: HOSPITAL | Age: 66
End: 2021-11-23

## 2021-11-23 NOTE — OUTREACH NOTE
General Surgery Week 2 Survey      Responses   [facility] patient discharged from? [place]   Does the patient have one of the following disease processes/diagnoses(primary or secondary)? General Surgery   Week 2 attempt successful? Yes   Call start time 1711   Call end time 1713   Discharge diagnosis FEMORAL POPLITEAL BYPASS LEFT   Meds reviewed with patient/caregiver? Yes   Is the patient taking all medications as directed (includes completed medication regime)? Yes   Does the patient have a follow up appointment scheduled with their surgeon? Yes   Has the patient kept scheduled appointments due by today? N/A   Comments f/u with CT surgeon on 12/6   Psychosocial issues? No   Comments Pt feels doing well. Pt denies pain but does have constipation. WNL appetite.    What is the patient's perception of their health status since discharge? Improving   Week 2 call completed? Yes          Suzanna Askew RN

## 2021-11-24 NOTE — DISCHARGE SUMMARY
"CTS Discharge Summary    Patient Care Team:  Rex Shields MD as PCP - General (Internal Medicine)  Consults:   Consults     No orders found from 10/14/2021 to 11/13/2021.          Date of Admission: 11/12/2021  6:15 AM  Date of Discharge:  11/14/2021    Discharge Diagnosis  Past Medical History:   Diagnosis Date   • Anxiety and depression    • Asthma    • COPD (chronic obstructive pulmonary disease) ([facility])    • Coronary artery disease    • COVID-19 vaccine administered 1st - 03/08/201 2nd - 04/07/2021  [place]    • Diabetes mellitus ([facility])    • GERD (gastroesophageal reflux disease)    • Hyperlipidemia    • Hypertension    • Renal failure    • Sleep apnea     waiting for a new machine 11/2020   • Stroke ([facility]) 2019     Patient Active Problem List   Diagnosis   • CAD s/p stents    • Hyperlipemia   • Essential hypertension   • L Fem Pop Bypass 11/12/2021   • T2DM on insulin and oral agents    • Shortness of breath   • Chest pain   • ADRIANA w/CPAP non-compliance    • COPD with emphysema   • Abnormal CT of the chest   • PSVT (paroxysmal supraventricular tachycardia) ([facility])   • Moderate bilateral carotid artery stenosis   • Peripheral edema   • Former smoker   • H/O traumatic \"brain bleed\" 2* fall    • S/P spinal cord stimulator       PAD (peripheral artery disease) ([facility]) [I73.9]     Procedures Performed  Procedure(s):  FEMORAL POPLITEAL BYPASS LEFT, ABOVE KNEE     History of Present Illness  Patient is a 66 y.o. male who was referred to be evaluated for peripheral arterial vascular disease.  He states that his legs become weak and fatigued and his calf muscles start cramping walking as little as 100 to 150 feet.  When he rests for 15 to 20 seconds the cramping and fatigue resolve.  Also, he states that about the time his legs begin to cramp he becomes extremely short of breath. A CT angiogram demonstrates bilateral superficial femoral artery disease which is nearly occlusive in long sections.  He now indicates to " me that he is actually scheduled for a heart catheterization on Thursday of this week.  I do not have the details of that in terms of stress test, nuclear test or echocardiogram.  He denies slow healing or nonhealing ulcers, but stated his feet are constantly cold.     Of note, he states he spent 3 days in the hospital in September due to renal failure that has since resolved.      Hospital Course  Patient was taken to the operating room on 11/12/21 for left femoral to above knee popliteal bypass with a 8 mm PTFE graft and endarterectomy of the left popliteal artery.  Patient was extubated in the operating room and transported to recovery in stable condition.  POD 1: Feet warm and viable. Awaiting telemetry,  POD 2:  Pulses dopplerable. Feet warm and viable.  Patient met discharge criteria and was discharged to home.      Discharge Medications     Discharge Medications      Continue These Medications      Instructions Start Date   Accu-Chek [place] Plus test strip  Generic drug: glucose blood   No dose, route, or frequency recorded.      aspirin 81 MG EC tablet   81 mg, Oral, Daily      Breo Ellipta 200-25 MCG/INH inhaler  Generic drug: Fluticasone Furoate-Vilanterol   No dose, route, or frequency recorded.      busPIRone 15 MG tablet  Commonly known as: BUSPAR   15 mg, Oral, 2 times daily, 1.5 tabs BID      cetirizine 10 MG tablet  Commonly known as: zyrTEC   No dose, route, or frequency recorded.      cholecalciferol 25 MCG (1000 UT) tablet  Commonly known as: VITAMIN D3   1,000 Units, Oral, Daily      clobetasol 0.05 % external solution  Commonly known as: TEMOVATE   No dose, route, or frequency recorded.      clopidogrel 75 MG tablet  Commonly known as: PLAVIX   75 mg, Oral, Daily      donepezil 10 MG tablet  Commonly known as: ARICEPT   10 mg, Oral, Nightly      finasteride 5 MG tablet  Commonly known as: PROSCAR   5 mg, Oral, Daily      fluticasone 50 MCG/ACT nasal spray  Commonly known as: FLONASE   No dose,  route, or frequency recorded.      isosorbide mononitrate 30 MG 24 hr tablet  Commonly known as: IMDUR   15 mg, Oral, Nightly      magnesium oxide 400 (241.3 Mg) MG tablet tablet  Commonly known as: MAGOX   400 mg, Oral, Daily      memantine 5 MG tablet  Commonly known as: NAMENDA   5 mg, Oral, Daily      metoprolol succinate XL 25 MG 24 hr tablet  Commonly known as: TOPROL-XL   25 mg, Oral, Every Morning      nitroglycerin 0.4 MG SL tablet  Commonly known as: NITROSTAT   1 under the tongue as needed for angina, may repeat q5mins for up three doses      nortriptyline 50 MG capsule  Commonly known as: PAMELOR   50 mg, Oral, Nightly      Omega 3 1000 MG capsule   1,000 mg, Oral, Daily, 3000 daily      omeprazole 40 MG capsule  Commonly known as: priLOSEC   40 mg, Oral, Daily      ProAir  (90 Base) MCG/ACT inhaler  Generic drug: albuterol sulfate HFA   No dose, route, or frequency recorded.      ramipril 10 MG capsule  Commonly known as: ALTACE   10 mg, Oral, Every Evening      rosuvastatin 40 MG tablet  Commonly known as: CRESTOR   TAKE 1 TABLET EVERY NIGHT      SITagliptin 100 MG tablet 100 mg, metFORMIN  MG tablet sustained-release 24 hour 1,000 mg   1 dose, Oral, 2 times daily      tamsulosin 0.4 MG capsule 24 hr capsule  Commonly known as: FLOMAX   1 capsule, Oral, Nightly      Vitamin E 180 MG capsule   180 mg, Oral, Daily         ASK your doctor about these medications      Instructions Start Date   HYDROcodone-acetaminophen 7.5-325 MG per tablet  Commonly known as: NORCO  Ask about: Should I take this medication?   1 tablet, Oral, Every 4 Hours PRN             Discharge Diet:   Diet Instructions     Diet: Consistent Carbohydrate, Cardiac      Discharge Diet:  Consistent Carbohydrate  Cardiac             Activity at Discharge:   Activity Instructions     Bathing Restrictions      Type of Restriction: Bathing    Bathing Restrictions: No Tub Bath    Driving Restrictions      Type of Restriction:  Driving    Driving Restrictions: No Driving Until Next Appointment    Lifting Restrictions      Type of Restriction: Lifting    Lifting Restrictions: Lifting Restriction (Indicate Limit)    Weight Limit (Pounds): 10    Length of Lifting Restriction: until next appointment          Follow-up Appointments  Future Appointments   Date Time Provider Department Center   12/6/2021 10:30 AM Andria Diaz APRN [facility]   3/7/2022  1:15 PM Ericka Gamez APRN [facility]uero[facility] PA-C  11/24/21  14:52 EST

## 2021-12-06 ENCOUNTER — OFFICE VISIT (OUTPATIENT)
Dept: CARDIAC SURGERY | Facility: CLINIC | Age: 66
End: 2021-12-06

## 2021-12-06 VITALS
HEIGHT: 66 IN | TEMPERATURE: 97.3 F | SYSTOLIC BLOOD PRESSURE: 128 MMHG | HEART RATE: 83 BPM | DIASTOLIC BLOOD PRESSURE: 70 MMHG | OXYGEN SATURATION: 98 % | BODY MASS INDEX: 26.84 KG/M2 | WEIGHT: 167 LBS

## 2021-12-06 DIAGNOSIS — I73.9 PAD (PERIPHERAL ARTERY DISEASE) (HCC): Primary | Chronic | ICD-10-CM

## 2021-12-06 PROCEDURE — 99024 POSTOP FOLLOW-UP VISIT: CPT | Performed by: NURSE PRACTITIONER

## 2021-12-06 NOTE — PROGRESS NOTES
"     [facility] Cardiothoracic Surgery Office Follow Up Note     Date of Encounter: 2021     Name: Mike Gusman  : 1955     Referred By: No ref. provider found  PCP: Rex Shields MD    Chief Complaint:    Chief Complaint   Patient presents with   • Peripheral Vascular Disease     Hospital follow up s/p left fem pop on  w/ ROM        Subjective      History of Present Illness:    Mike Gusman is a 66 y.o. male former smoker, with a history of HTN, HLD on statin therapy, ADRIANA with CPAP noncompliance, COPD, spinal cord stimulator, type II DM (A1c 7.1), PSVT, CAD s/p stent on 2021 with Dr. Nelson, carotid artery stenosis, PVD s/p left femoral-popliteal bypass on 2021 with Dr. Mann. Patient had cardiac cath on 2021 that did not require any surgical intervention and patient continues medical management. Patient underwent left femoral to above-knee popliteal bypass and endarterectomy of the left popliteal artery on 2021 with Dr. Mann.  He had to wait for surgical intervention after having cardiac stents placed in 2021.  Patient had uncomplicated postoperative course and was discharged on POD #2 with no readmissions.  He presents today for postoperative follow-up. Patient denies any claudication symptoms in his LE and reports improvement in leg weakness and fatigue. He describes a \"knot\" that is likely hematoma vs seroma postoperatively around his left femoral incision that is improving in size. Patient reports he has been compliant with wound care of his left groin incision but has not been compliant with avoiding positions of 90 degrees and he sits in chair for long period of the day. He denies any issues with his popliteal incision site, any fevers or chills. He reports some LLE anterior shin sensitivity post-op that is improving. His preoperative CTA showed right superficial femoral artery disease with anticipation for right sided intervention.  "     Review of Systems:  Review of Systems   Constitutional: Negative for chills, decreased appetite, diaphoresis, fever, malaise/fatigue, night sweats, weight gain and weight loss.   HENT: Negative for hoarse voice.    Eyes: Negative for blurred vision, double vision and visual disturbance.   Cardiovascular: Positive for leg swelling (in left groin area ). Negative for chest pain, claudication, dyspnea on exertion, irregular heartbeat, near-syncope, orthopnea, palpitations, paroxysmal nocturnal dyspnea and syncope.   Respiratory: Negative for cough, hemoptysis, shortness of breath, sputum production and wheezing.    Hematologic/Lymphatic: Negative for adenopathy and bleeding problem. Does not bruise/bleed easily.   Skin: Negative for color change, nail changes, poor wound healing and rash.   Musculoskeletal: Negative for back pain, falls and muscle cramps.   Gastrointestinal: Negative for abdominal pain, dysphagia and heartburn.   Genitourinary: Negative for flank pain.   Neurological: Negative for brief paralysis, disturbances in coordination, dizziness, focal weakness, headaches, light-headedness, loss of balance, numbness, paresthesias, sensory change, vertigo and weakness.   Psychiatric/Behavioral: Negative for depression and suicidal ideas.   Allergic/Immunologic: Negative for persistent infections.       I have reviewed the following portions of the patient's history: allergies, current medications, past family history, past medical history, past social history, past surgical history, problem list and ROS and confirm it's accurate.    Allergies:  No Known Allergies    Medications:      Current Outpatient Medications:   •  Accu-Chek Bethany Plus test strip, , Disp: , Rfl:   •  aspirin (aspirin) 81 MG EC tablet, Take 1 tablet by mouth Daily., Disp: 90 tablet, Rfl: 3  •  Breo Ellipta 200-25 MCG/INH inhaler, , Disp: , Rfl:   •  busPIRone (BUSPAR) 15 MG tablet, Take 15 mg by mouth 2 (two) times a day. 1.5 tabs BID,  Disp: , Rfl:   •  cetirizine (zyrTEC) 10 MG tablet, , Disp: , Rfl:   •  cholecalciferol (VITAMIN D3) 25 MCG (1000 UT) tablet, Take 1,000 Units by mouth Daily., Disp: , Rfl:   •  clobetasol (TEMOVATE) 0.05 % external solution, , Disp: , Rfl:   •  clopidogrel (PLAVIX) 75 MG tablet, Take 1 tablet by mouth Daily., Disp: 90 tablet, Rfl: 3  •  donepezil (ARICEPT) 10 MG tablet, Take 10 mg by mouth every night., Disp: , Rfl:   •  finasteride (PROSCAR) 5 MG tablet, Take 5 mg by mouth daily., Disp: , Rfl:   •  fluticasone (FLONASE) 50 MCG/ACT nasal spray, , Disp: , Rfl:   •  isosorbide mononitrate (IMDUR) 30 MG 24 hr tablet, Take 0.5 tablets by mouth Every Night., Disp: 90 tablet, Rfl: 3  •  magnesium oxide (MAGOX) 400 (241.3 MG) MG tablet tablet, Take 400 mg by mouth daily., Disp: , Rfl:   •  memantine (NAMENDA) 5 MG tablet, Take 5 mg by mouth Daily., Disp: , Rfl:   •  metoprolol succinate XL (TOPROL-XL) 25 MG 24 hr tablet, Take 25 mg by mouth Every Morning., Disp: , Rfl:   •  nitroglycerin (NITROSTAT) 0.4 MG SL tablet, 1 under the tongue as needed for angina, may repeat q5mins for up three doses, Disp: 30 tablet, Rfl: 3  •  nortriptyline (PAMELOR) 50 MG capsule, Take 50 mg by mouth Every Night., Disp: , Rfl:   •  Omega 3 1000 MG capsule, Take 1,000 mg by mouth Daily. 3000 daily, Disp: , Rfl:   •  omeprazole (PriLOSEC) 40 MG capsule, Take 40 mg by mouth daily., Disp: , Rfl:   •  ProAir  (90 Base) MCG/ACT inhaler, , Disp: , Rfl:   •  ramipril (ALTACE) 10 MG capsule, Take 10 mg by mouth Every Evening., Disp: , Rfl:   •  rosuvastatin (CRESTOR) 40 MG tablet, TAKE 1 TABLET EVERY NIGHT, Disp: 90 tablet, Rfl: 3  •  SITagliptin 100 MG tablet 100 mg, metFORMIN  MG tablet sustained-release 24 hour 1,000 mg, Take 1 dose by mouth 2 (two) times a day., Disp: , Rfl:   •  tamsulosin (FLOMAX) 0.4 MG capsule 24 hr capsule, Take 1 capsule by mouth every night., Disp: , Rfl:   •  Vitamin E 180 MG capsule, Take 180 mg by mouth  "Daily., Disp: , Rfl:     History:   Past Medical History:   Diagnosis Date   • Anxiety and depression    • Asthma    • COPD (chronic obstructive pulmonary disease) (HCC)    • Coronary artery disease    • COVID-19 vaccine administered  -  - 2021  [place]    • Diabetes mellitus (HCC)    • GERD (gastroesophageal reflux disease)    • Hyperlipidemia    • Hypertension    • Renal failure    • Sleep apnea     waiting for a new machine 2020   • Stroke (HCC)        Past Surgical History:   Procedure Laterality Date   • CARDIAC CATHETERIZATION      Stent x1   • COLONOSCOPY     • FEMORAL POPLITEAL BYPASS Left 2021    Procedure: FEMORAL POPLITEAL BYPASS LEFT, ABOVE KNEE;  Surgeon: Farhat Mann MD;  Location: [facility];  Service: Vascular;  Laterality: Left;   • LEG SURGERY Right     Had rods/screws, but were removed when he had Right hip replacement   • LUMBAR FUSION     • SPINAL CORD STIMULATOR IMPLANT     • TOTAL HIP ARTHROPLASTY Right        Social History     Socioeconomic History   • Marital status:    Tobacco Use   • Smoking status: Former Smoker     Packs/day: 1.50     Years: 50.00     Pack years: 75.00     Quit date:      Years since quittin.9   • Smokeless tobacco: Never Used   Vaping Use   • Vaping Use: Never used   Substance and Sexual Activity   • Alcohol use: No   • Drug use: No   • Sexual activity: Defer        Family History   Problem Relation Age of Onset   • Heart failure Mother    • Heart disease Father    • Heart failure Father        Objective     Physical Exam:  Vitals:    21 1017   BP: 128/70   BP Location: Right arm   Patient Position: Sitting   Pulse: 83   Temp: 97.3 °F (36.3 °C)   SpO2: 98%   Weight: 75.8 kg (167 lb)   Height: 167.6 cm (66\")      Body mass index is 26.95 kg/m².    Physical Exam  Vitals and nursing note reviewed.   Constitutional:       General: He is awake.      Appearance: Normal appearance. He is well-developed. "   HENT:      Head: Normocephalic and atraumatic.   Eyes:      Pupils: Pupils are equal, round, and reactive to light.   Neck:      Vascular: No carotid bruit.   Cardiovascular:      Rate and Rhythm: Normal rate and regular rhythm.      Pulses: No decreased pulses.           Dorsalis pedis pulses are detected w/ Doppler on the right side and detected w/ Doppler on the left side.        Posterior tibial pulses are detected w/ Doppler on the right side and detected w/ Doppler on the left side.      Heart sounds: Normal heart sounds, S1 normal and S2 normal. No murmur heard.       Comments:  LLE graft site: dopplerable, biphasic at femoral and popliteal site  Pulmonary:      Effort: Pulmonary effort is normal.      Breath sounds: Normal breath sounds.   Abdominal:      Palpations: Abdomen is soft.   Musculoskeletal:         General: Normal range of motion.      Cervical back: Neck supple.      Right lower leg: No edema.      Left lower leg: No edema.   Feet:      Right foot:      Skin integrity: No ulcer, skin breakdown, callus or dry skin.      Left foot:      Skin integrity: No ulcer, skin breakdown, callus or dry skin.   Skin:     General: Skin is warm and dry.      Capillary Refill: Capillary refill takes less than 2 seconds.      Findings: No bruising.      Comments: Left Femoral site: well healing, no surrounding erythema, warmth, drainage, small area of dehiscence to distal portion of incision with fibrinous tissue present indicating wound healing, small area of palpated seroma vs hematoma to mid portion of incision.   Neurological:      General: No focal deficit present.      Mental Status: He is alert and oriented to person, place, and time. Mental status is at baseline.      GCS: GCS eye subscore is 4. GCS verbal subscore is 5. GCS motor subscore is 6.      Sensory: Sensation is intact.      Motor: Motor function is intact.      Coordination: Coordination is intact.      Gait: Gait is intact.   Psychiatric:          Mood and Affect: Mood normal.         Speech: Speech normal.         Behavior: Behavior normal. Behavior is cooperative.         Cognition and Memory: Cognition normal.         Imaging/Labs:    Chest X-Ray PA & Lateral    Result Date: 11/10/2021  Mild chronic changes of the lung fields without evidence of acute cardiopulmonary abnormality.  This report was finalized on 11/10/2021 11:14 AM by Moustapha Carpenter.         Results for orders placed during the hospital encounter of 06/01/21    Duplex Lower Extremity Art / Grafts - Bilateral CAR    Interpretation Summary  1.  Both common femoral arteries have diffuse atherosclerosis with 50% or lesser stenosis.    2.  The proximal portions of both profunda arteries are patent.    3.  Less than or equal to 60% stenosis by Doppler in the mid left superficial femoral artery.  Severe segmental stenosis in the mid left superficial femoral artery with 50 to 99% stenosis by Doppler in approximately 80% segmental stenosis on echo imaging.    4.  Both popliteal arteries are patent and are without obstruction or aneurysmal dilation.    5.  Distal vessels are patent were sampled.    Summary: Diffuse proximal disease bilaterally with potentially hemodynamically significant stenosis in the right common femoral artery, and left superficial femoral artery proximally.  There is severe stenosis in the midportion of the left superficial femoral artery.  Consider CTA for further evaluation.    CTA abdomen with runoff result date 6/24/2021:  Impression: Mesenteric and renal arteries widely patent.  Iliac disease with a patent left external iliac stent.  No significant stenosis in the common iliacs or external iliacs.  Critical stenosis of the origin of the left internal iliac which is patent distally.  Moderate right-sided SMA disease with extensive plaque.  Three-vessel runoff to the foot and ankle on the right, the very diminutive distal A tibia TA and DPA.  Severe left-sided SMA  disease with mult level severe stenosis in extensive plaque.  There is three-vessel runoff to the left foot and ankle, but extremely diminutive MAYURI distally and limited perfusion of extremely diminutive DPA.  Fatty infiltrated enlarged liver.  Subtle 19 mm low-attenuation focus in the inferomedial spleen.  Consider splenic ultrasound for more definitive evaluation.  Fibrosis vascular crowding atelectasis in the basis of what appears to be congestion.  Additional findings per report.  Assessment / Plan      Assessment / Plan:  Diagnoses and all orders for this visit:    1. L Fem Pop Bypass 11/12/2021 (Primary)       1. PVD s/p left femoral to above-knee popliteal bypass and endarterectomy of the left popliteal artery on 11/12/2021 with Dr. Mann: Patient progressing well from a postoperative standpoint, denies any acute complaints today.  Reports improvement in lower extremity claudication symptoms.  He is walking with cane today in clinic but reports that is due to his chronic SOA which is being evaluated by his PCP.  He does have small area of dehiscence to distal part of left femoral groin incision without evidence of drainage, erythema, warmth.  Staples removed today without issue.  He reports he has been sitting for prolonged periods of time.  Encourage patient to continue following position precautions including avoiding sitting positions at 90 degree angles for large periods of time.  Encouraged wound care with plain antibacterial soap and water twice a day.  Patient is ready to proceed with right-sided surgical intervention for femoral to popliteal bypass.  Instructed patient left groin incision needs to be completely healed before proceeding with surgery scheduling.  We will plan to see the patient back in 2 weeks for wound check and can discuss scheduling with Dr. Mann at that time.    Follow Up:   Return in about 2 weeks (around 12/20/2021).   Or sooner for any further concerns or worsening sign and  symptoms. If unable to reach us in the office please dial 911 or go to the nearest emergency department.      Andria JOHNSON  [facility] Cardiothoracic Surgery

## 2022-03-07 ENCOUNTER — OFFICE VISIT (OUTPATIENT)
Dept: CARDIOLOGY | Facility: CLINIC | Age: 67
End: 2022-03-07

## 2022-03-07 VITALS
SYSTOLIC BLOOD PRESSURE: 111 MMHG | WEIGHT: 173 LBS | HEIGHT: 66 IN | TEMPERATURE: 97.4 F | OXYGEN SATURATION: 100 % | HEART RATE: 81 BPM | BODY MASS INDEX: 27.8 KG/M2 | DIASTOLIC BLOOD PRESSURE: 66 MMHG

## 2022-03-07 DIAGNOSIS — I51.89 DIASTOLIC DYSFUNCTION: ICD-10-CM

## 2022-03-07 DIAGNOSIS — R06.02 SHORTNESS OF BREATH: ICD-10-CM

## 2022-03-07 DIAGNOSIS — R60.9 PERIPHERAL EDEMA: ICD-10-CM

## 2022-03-07 DIAGNOSIS — I10 ESSENTIAL HYPERTENSION: Chronic | ICD-10-CM

## 2022-03-07 DIAGNOSIS — I65.23 BILATERAL CAROTID ARTERY STENOSIS: Chronic | ICD-10-CM

## 2022-03-07 DIAGNOSIS — I25.119 CORONARY ARTERY DISEASE INVOLVING NATIVE CORONARY ARTERY OF NATIVE HEART WITH ANGINA PECTORIS: Primary | Chronic | ICD-10-CM

## 2022-03-07 DIAGNOSIS — I73.9 PAD (PERIPHERAL ARTERY DISEASE): Chronic | ICD-10-CM

## 2022-03-07 DIAGNOSIS — G47.33 OSA (OBSTRUCTIVE SLEEP APNEA): Chronic | ICD-10-CM

## 2022-03-07 DIAGNOSIS — E78.5 HYPERLIPIDEMIA, UNSPECIFIED HYPERLIPIDEMIA TYPE: Chronic | ICD-10-CM

## 2022-03-07 DIAGNOSIS — I47.1 PSVT (PAROXYSMAL SUPRAVENTRICULAR TACHYCARDIA): ICD-10-CM

## 2022-03-07 PROBLEM — Z23 COVID-19 VACCINE ADMINISTERED: Status: ACTIVE | Noted: 2022-03-07

## 2022-03-07 PROCEDURE — 99214 OFFICE O/P EST MOD 30 MIN: CPT | Performed by: NURSE PRACTITIONER

## 2022-03-07 RX ORDER — VALSARTAN 160 MG/1
160 TABLET ORAL DAILY
COMMUNITY

## 2022-03-07 RX ORDER — MULTIPLE VITAMINS W/ MINERALS TAB 9MG-400MCG
1 TAB ORAL DAILY
COMMUNITY

## 2022-03-07 NOTE — EXTERNAL PATIENT INSTRUCTIONS
Patient Education   Table of Contents       Peripheral Vascular Disease     To view videos and all your education online visit,   [email or web address]   or scan this QR code with your smartphone.                  Peripheral Vascular Disease        Peripheral vascular disease (PVD) is a disease of the blood vessels. PVD may also be called peripheral artery disease (PAD) or poor circulation. PVD is the blocking or hardening of the arteries anywhere within the circulatory system beyond the heart. This can result in a decreased supply of blood to the arms, legs, and internal organs, such as the stomach or kidneys. However, PVD most often affects a person's lower legs and feet. Without treatment, PVD often worsens.   PVD can lead to acute limb ischemia. This occurs when an arm or leg suddenly has trouble getting enough blood. This is a medical emergency.     What are the causes?   The most common cause of PVD is atherosclerosis. This is a buildup of fatty material and other substances (plaque)inside your arteries. Pieces of plaque can break off from the walls of an artery and become stuck in a smaller artery, blocking blood flow and possibly causing acute limb ischemia.    Other common causes of PVD include:       Blood clots that form inside the blood vessels.       Injuries to blood vessels.       Diseases that cause inflammation of blood vessels or cause blood vessel tightening (spasms).     What increases the risk?    The following factors may make you more likely to develop this condition:       A family history of PVD.      Common medical conditions, including:       High cholesterol.       Diabetes.       High blood pressure (hypertension).       Heart disease.       Known atherosclerotic disease in another area of the body.       Past injury, such as burns or a broken bone.      Other medical conditions, such as:       Buerger's disease. This is caused by inflamed blood vessels in your hands and feet.        Some forms of arthritis.       Birth defects that affect the arteries in your legs.       Kidney disease.       Using tobacco and nicotine products.       Not getting enough exercise.       Obesity.       Being age 65 or older, or being age 50 or older and having the other risk factors.     What are the signs or symptoms?    This condition may cause different symptoms. Your symptoms depend on what body part is not getting enough blood. Common signs and symptoms include:       Cramps in your buttocks, legs, and feet.       Intermittent claudication. This is pain and weakness in your legs during activity that resolves with rest.       Leg pain at rest and leg numbness, tingling, or weakness.       Coldness in a leg or foot, especially when compared to the other leg or foot.      Skin or hair changes. These can include:       Hair loss.       Shiny skin.       Pale or bluish skin.       Thick toenails.       Inability to get or maintain an erection (erectile dysfunction).       Tiredness (fatigue).       Weak pulse or no pulse in the feet.     People with PVD are more likely to develop open wounds (ulcers) and sores on their toes, feet, or legs. The ulcers or sores may take longer than normal to heal.   How is this diagnosed?    PVD is diagnosed based on your signs and symptoms, a physical exam, and your medical history. You may also have other tests to find the cause. Tests include:      Ankle-brachial index test.This test compares the blood pressure readings of the legs and arms.       This may also include an exercise ankle-brachial index test in which you walk on a treadmill to check your symptoms.       Doppler ultrasound. This takes pictures of blood flow through your blood vessels.      Imaging studies that use dye to show blood flow. These are:       CT angiogram.       Magnetic resonance angiogram, or MRA.     How is this treated?    Treatment for PVD depends on the cause of your condition, how severe  your symptoms are, and your age. Underlying causes need to be treated and controlled. These include long-term (chronic) conditions, such as diabetes, high cholesterol, and hypertension. Treatment may include:      Lifestyle changes, such as:       Quitting tobacco use.       Exercising regularly.       Following a low-fat, low-cholesterol diet.       Not drinking alcohol.      Taking medicines, such as:       Blood thinners to prevent blood clots.       Medicines to improve blood flow.       Medicines to improve cholesterol levels.      Procedures, such as:       Angioplasty. This uses an inflated balloon to open a blocked artery and improve blood flow.       Stent implant. This inserts a small mesh tube to keep a blocked artery open.       Peripheral bypass surgery. This reroutes blood flow around a blocked artery.       Surgery to remove dead tissue from an infected wound (debridement).       Amputation. This is surgical removal of the affected limb. It may be necessary in cases of acute limb ischemia when medical or surgical treatments have not helped.     Follow these instructions at home:   Medicines         Take over-the-counter and prescription medicines only as told by your health care provider.      If you are taking blood thinners:       Talk with your health care provider before you take any medicines that contain aspirin or NSAIDs, such as ibuprofen. These medicines increase your risk for dangerous bleeding.       Take your medicine exactly as told, at the same time every day.       Avoid activities that could cause injury or bruising, and follow instructions about how to prevent falls.       Wear a medical alert bracelet or carry a card that lists what medicines you take.     Lifestyle               Exercise regularly. Ask your health care provider about some good activities for you.       Talk with your health care provider about maintaining a healthy weight. If needed, ask about losing weight.        "Eat a diet that is low in fat and cholesterol. If you need help, talk with your health care provider.      Do not  drink alcohol.      Do not  use any products that contain nicotine or tobacco. These products include cigarettes, chewing tobacco, and vaping devices, such as e-cigarettes. If you need help quitting, ask your health care provider.       General instructions        Take good care of your feet. To do this:       Wear comfortable shoes that fit well.       Check your feet often for any cuts or sores.       Get an annual influenza vaccine.       Keep all follow-up visits. This is important.       Where to find more information         Society for Vascular Surgery: vascular.org         American Heart Association: heart.org         National Heart, Lung, and Blood [place]: nhlbi.nih.gov       Contact a health care provider if:         You have leg cramps while walking.       You have leg pain when you rest.       Your leg or foot feels cold.       Your skin changes color.       You have erectile dysfunction.       You have cuts or sores on your legs or feet that do not heal.     Get help right away if:        You have sudden changes in color and feeling of your arms or legs, such as:       Your arm or leg turns cold, numb, and blue.       Your arm or leg becomes red, warm, swollen, painful, or numb.      You have any symptoms of a stroke. \" BE FAST  \" is an easy way to remember the main warning signs of a stroke:      B - Balance.  Signs are dizziness, sudden trouble walking, or loss of balance.      E - Eyes.  Signs are trouble seeing or a sudden change in vision.      F - Face.  Signs are sudden weakness or numbness of the face, or the face or eyelid drooping on one side.      A - Arms.  Signs are weakness or numbness in an arm. This happens suddenly and usually on one side of the body.      S - Speech.  Signs are sudden trouble speaking, slurred speech, or trouble understanding what people say.      T - " Time.  Time to call emergency services. Write down what time symptoms started.      You have other signs of a stroke, such as:       A sudden, severe headache with no known cause.       Nausea or vomiting.       Seizure.       You have chest pain or trouble breathing.     These symptoms may represent a serious problem that is an emergency. Do not wait to see if the symptoms will go away. Get medical help right away. Call your local emergency services (911 in the [place] Do not drive yourself to the hospital.   Summary         Peripheral vascular disease (PVD) is a disease of the blood vessels.       PVD is the blocking or hardening of the arteries anywhere within the circulatory system beyond the heart.       PVD may cause different symptoms. Your symptoms depend on what part of your body is not getting enough blood.       Treatment for PVD depends on what caused it, how severe your symptoms are, and your age.     This information is not intended to replace advice given to you by your health care provider. Make sure you discuss any questions you have with your health care provider.     Document Released: 01/25/2006Document Revised: 06/21/2021Document Reviewed: 06/21/2021     [employer] Patient Education ? 2021 [employer]

## 2022-03-07 NOTE — PROGRESS NOTES
Subjective   Mike Gusman is a 66 y.o. male     Chief Complaint   Patient presents with   • Follow-up       HPI    Problem List:    1. CAD stent x1 somewhere between 2004 and 2006 in [place]  1.1 CT chest 8/5/19-severe atherosclerotic plaque formation throughout the coronary arteries, aorta has mild atherosclerotic plaque formation without aneurysmal dilation, interstitial fibrosis with underlying emphysema, fatty liver  1.2 stress test 9/3/19-no evidence of ischemia, post-rest EF 60%  1.3 left heart cath 10/22/19- obtuse marginal small-caliber 70% proximal narrowing, obtuse marginal terminal 50 to 70%, small limb of the LAD 50 to 70%, EF 60%, LVEDP 22-24  1.4 Stress Test 1/6/2021 - anterolateral and lateral wall ischemia; post stress EF 61%; elevated transient ischemic dilation of unknown sig 1.43  1.5 left heart cath 8/5/2021-left main minor ostial distal tapering, 90% or greater stenosis in the ostium and proximal portion of the circumflex, which received a stent.  Right coronary artery no high-grade stenosis, LAD 20 to 40% stenosis, EF 55%, LVEDP 18  2. Hypertension  3. Hyperlipidemia   4. Viral Pericarditis 2009  5. Aortic valve disorder  6. Carotid Artery Disease   6.1 Carotid Artery U/S 7/29/13 - no sig stenosis or occlusion in either carotid system  6.2 Carotid artery ultrasound 9/3/19-16 to 49% stenosis in the bulb and bifurcation bilaterally, 16 to 49% stenosis mid right internal carotid artery and length of the left internal carotid artery, antegrade flow both vertebral arteries  6.3 carotid artery ultrasound 6/1/2021-16 to 49% stenosis in the right that is calcified but actually by laterally at the bifurcations, 16 to 49% stenosis in the mid right internal carotid artery and throughout the left her carotid artery, antegrade flow both vertebral arteries  7. PVD   7.1 JESSICA 3/7/14 - mild changes consistent with known PVD of BLE; probably diffuse   7.2 MRI BLE 4/7/14 - diffuse narrowing of the right  common iliac and right external iliac artery; high grade focal stenosis of the left common iliac artery   7.3 H/O left common iliac artery stenting   7.4 bilateral lower extremity arterial ultrasound 6/1/2021-less than or equal to 60% stenosis by Doppler in the mid left superficial femoral artery, severe segmental stenosis in the mid left superficial femoral artery with 50-99% stenosis by Doppler and approximate 80% segmental stenosis  7.5 CT of abdomen with runoff 6/24/2021-common femoral artery had up to 50% stenosis, three-vessel runoff to the ankle, extremely diminutive distal anterior tibial artery and DPA, severe left-sided SMA disease with multilevel severe stenosis and extensive plaque  7.6  Left femoropopliteal 11/12/2021 Dr. Mann  8. Shortness of breath  8.1 echo 9/3/19 -diastolic dysfunction 2, mild to moderate left atrial enlargement, mild to moderate MR  8.2 Echo 9/5/2021-EF 55 to 60%, diastolic filling pattern indicates impaired laxation, mild aortic stenosis with a VTI of 1.9 cm², mild to moderate MR  9. H/O back surgery; per report   10. DM II  11. GERD  12. BPH   13. Brain Bleed 11/2011 s/p fall   14. ADRIANA - non compliant CPAP   15. Emphysema/Asthma   16. PSVT  16.1 Event Monitor 5/9-5/22/17 - NSR - ST with one episode of PSVT  17. Moderate periventricular and subcortical microangiopathy  17.1 MRI of the brain/MRI of orbitis 5/16/18-moderate.  Moderate periventricular and subcortical microangiopathic  18.  Emphysema/COPD, ELIZABETH Borrero    Patient is a 66-year-old male who presents today for follow-up with his wife at his side.  He denies any chest pain, pressure, palpitations, fluttering, dizziness, presyncope, syncope, orthopnea, PND or edema.  He does have some numbness on his left leg since having femoropopliteal.  Patient plans on having his right leg done this summer.  He says he has shortness of breath from time to time when he is overdoing it.  Patient was in the hospital in  September for acute renal failure with a creatinine of 8.41.  He had elevated cardiac markers (412, 391 and 224) respectively  however his EKG was normal and he had echocardiogram done.  He has no complaints of ACS at this time.  Patient said he did have his kidney function rechecked with Dr. Ruiz.    We went over hospital labs as well as echocardiogram.    Current Outpatient Medications on File Prior to Visit   Medication Sig Dispense Refill   • Accu-Chek Beth[place] Plus test strip      • aspirin (aspirin) 81 MG EC tablet Take 1 tablet by mouth Daily. 90 tablet 3   • Breo Ellipta 200-25 MCG/INH inhaler      • busPIRone (BUSPAR) 15 MG tablet Take 15 mg by mouth 2 (two) times a day. 1.5 tabs BID     • cetirizine (zyrTEC) 10 MG tablet      • cholecalciferol (VITAMIN D3) 25 MCG (1000 UT) tablet Take 1,000 Units by mouth Daily.     • clobetasol (TEMOVATE) 0.05 % external solution      • clopidogrel (PLAVIX) 75 MG tablet Take 1 tablet by mouth Daily. 90 tablet 3   • finasteride (PROSCAR) 5 MG tablet Take 5 mg by mouth daily.     • fluticasone (FLONASE) 50 MCG/ACT nasal spray      • isosorbide mononitrate (IMDUR) 30 MG 24 hr tablet Take 0.5 tablets by mouth Every Night. 90 tablet 3   • magnesium oxide (MAGOX) 400 (241.3 MG) MG tablet tablet Take 400 mg by mouth daily.     • metoprolol succinate XL (TOPROL-XL) 25 MG 24 hr tablet Take 25 mg by mouth Every Morning.     • multivitamin with minerals tablet tablet Take 1 tablet by mouth Daily.     • nitroglycerin (NITROSTAT) 0.4 MG SL tablet 1 under the tongue as needed for angina, may repeat q5mins for up three doses 30 tablet 3   • nortriptyline (PAMELOR) 50 MG capsule Take 50 mg by mouth Every Night.     • Omega 3 1000 MG capsule Take 2,000 mg by mouth Daily.     • omeprazole (PriLOSEC) 40 MG capsule Take 40 mg by mouth daily.     • ProAir  (90 Base) MCG/ACT inhaler      • rosuvastatin (CRESTOR) 40 MG tablet TAKE 1 TABLET EVERY NIGHT 90 tablet 3   • SITagliptin 100 MG  tablet 100 mg, metFORMIN  MG tablet sustained-release 24 hour 1,000 mg Take 1 dose by mouth 2 (two) times a day. 7.1000 mg     • tamsulosin (FLOMAX) 0.4 MG capsule 24 hr capsule Take 1 capsule by mouth every night.     • valsartan (DIOVAN) 160 MG tablet Take 160 mg by mouth Daily.     • Vitamin E 180 MG capsule Take 180 mg by mouth Daily.     • [DISCONTINUED] donepezil (ARICEPT) 10 MG tablet Take 10 mg by mouth every night.     • [DISCONTINUED] memantine (NAMENDA) 5 MG tablet Take 5 mg by mouth Daily.     • [DISCONTINUED] ramipril (ALTACE) 10 MG capsule Take 10 mg by mouth Every Evening.       No current facility-administered medications on file prior to visit.       ALLERGIES    Patient has no known allergies.    Past Medical History:   Diagnosis Date   • Anxiety and depression    • Asthma    • COPD (chronic obstructive pulmonary disease) ([facility])    • Coronary artery disease    • COVID-19 vaccine administered  -  - 2021  [place] ( Booster 2021 ) Pfizer   • Diabetes mellitus ([facility])    • GERD (gastroesophageal reflux disease)    • Hyperlipidemia    • Hypertension    • Kidney failure    • Renal failure    • Sleep apnea     waiting for a new machine 2020   • Stroke ([facility]) 2019       Social History     Socioeconomic History   • Marital status:    Tobacco Use   • Smoking status: Former Smoker     Packs/day: 1.50     Years: 50.00     Pack years: 75.00     Quit date:      Years since quittin.1   • Smokeless tobacco: Never Used   Vaping Use   • Vaping Use: Never used   Substance and Sexual Activity   • Alcohol use: No   • Drug use: No   • Sexual activity: Defer       Family History   Problem Relation Age of Onset   • Heart failure Mother    • Heart disease Father    • Heart failure Father        Review of Systems   Constitutional: Negative for appetite change, chills, diaphoresis, fatigue and fever.   HENT: Negative for congestion, rhinorrhea and sore throat.    Eyes:  "Positive for visual disturbance (driving at night ).   Respiratory: Positive for shortness of breath (from time to time when he is over doing it ). Negative for cough, chest tightness and wheezing.    Cardiovascular: Negative for chest pain, palpitations and leg swelling.   Gastrointestinal: Negative for abdominal pain, blood in stool, constipation, diarrhea, nausea and vomiting.   Endocrine: Negative for cold intolerance and heat intolerance.   Genitourinary: Positive for frequency (several times in the day and night he states that he is up at least every 2hrs a night ). Negative for difficulty urinating, dysuria, hematuria and urgency.   Musculoskeletal: Positive for back pain (chronic back ). Negative for arthralgias, joint swelling, neck pain and neck stiffness.   Skin: Positive for wound (longer to heal due to being a diabetic ). Negative for color change, pallor and rash.   Allergic/Immunologic: Positive for environmental allergies (seasonal ). Negative for food allergies.   Neurological: Positive for numbness (left leg ( numbness and tingles since havine SX)  ). Negative for dizziness, weakness, light-headedness and headaches.   Hematological: Bruises/bleeds easily (Bruises and bleeds ).   Psychiatric/Behavioral: Positive for sleep disturbance (cpac machine , he sleeps around 4 hrs a night its hard to stay alsleep ).       Objective   /66 (BP Location: Left arm, Patient Position: Sitting)   Pulse 81   Temp 97.4 °F (36.3 °C)   Ht 167.6 cm (66\")   Wt 78.5 kg (173 lb)   SpO2 100%   BMI 27.92 kg/m²   Vitals:    03/07/22 1301   BP: 111/66   BP Location: Left arm   Patient Position: Sitting   Pulse: 81   Temp: 97.4 °F (36.3 °C)   SpO2: 100%   Weight: 78.5 kg (173 lb)   Height: 167.6 cm (66\")      Lab Results (most recent)     None        Physical Exam  Constitutional:       General: He is awake.      Appearance: Normal appearance. He is well-developed, well-groomed and overweight.   HENT:      Head: " Normocephalic.   Eyes:      General: Lids are normal.   Neck:      Vascular: No carotid bruit, hepatojugular reflux or JVD.   Cardiovascular:      Rate and Rhythm: Normal rate and regular rhythm.      Pulses:           Radial pulses are 2+ on the right side and 2+ on the left side.        Dorsalis pedis pulses are 2+ on the left side.        Posterior tibial pulses are 2+ on the left side.      Heart sounds: Normal heart sounds.      Comments: Decreased pedal pulses RLE   Pulmonary:      Effort: Pulmonary effort is normal.      Breath sounds: Normal breath sounds and air entry.   Abdominal:      General: Bowel sounds are normal.      Palpations: Abdomen is soft.   Musculoskeletal:      Right lower leg: No edema.      Left lower leg: No edema.      Comments: Uses a cane    Skin:     General: Skin is warm and dry.   Neurological:      Mental Status: He is alert and oriented to person, place, and time.   Psychiatric:         Attention and Perception: Attention and perception normal.         Mood and Affect: Mood and affect normal.         Speech: Speech normal.         Behavior: Behavior normal. Behavior is cooperative.         Thought Content: Thought content normal.         Cognition and Memory: Cognition and memory normal.         Judgment: Judgment normal.         Procedure   Procedures         Assessment/Plan      Diagnosis Plan   1. CAD s/p stents      2. Essential hypertension     3. Hyperlipidemia, unspecified hyperlipidemia type     4. L Fem Pop Bypass 11/12/2021     5. Moderate bilateral carotid artery stenosis     6. PSVT (paroxysmal supraventricular tachycardia) (HCC)     7. Shortness of breath     8. ADRIANA w/CPAP non-compliance      9. Peripheral edema     10. Diastolic dysfunction         Return in about 6 months (around 9/7/2022).    CAD-patient's on aspirin, Plavix and statin.  Hypertension-patient is on metoprolol and Diovan and doing well.  Hyperlipidemia-patient's on Crestor and doing well.  Left  femoropopliteal-patient's on aspirin and statin.  Bilateral carotid artery disease-patient's on aspirin and statin.  PSVT-on beta-blocker with no complaints.  Shortness of breath-stable.  ADRIANA-intolerant to CPAP.  Peripheral edema/diastolic dysfunction-patient has no signs of failure.  He will continue his medication regimen.  He will follow-up in 6 months or sooner if any changes.       Mike Gusman  reports that he quit smoking about 17 years ago. He has a 75.00 pack-year smoking history. He has never used smokeless tobacco..Advance Care Planning   ACP discussion was declined by the patient. Patient does not have an advance directive, declines further assistance. Patient brought in medicine list to appointment, it's been reviewed with patient and med list was updated in the chart.     Electronically signed by:

## 2022-03-14 DIAGNOSIS — I25.119 CORONARY ARTERY DISEASE INVOLVING NATIVE CORONARY ARTERY OF NATIVE HEART WITH ANGINA PECTORIS: ICD-10-CM

## 2022-03-14 DIAGNOSIS — R07.2 PRECORDIAL PAIN: ICD-10-CM

## 2022-03-14 RX ORDER — ISOSORBIDE MONONITRATE 30 MG/1
TABLET, EXTENDED RELEASE ORAL
Qty: 45 TABLET | Refills: 3 | Status: SHIPPED | OUTPATIENT
Start: 2022-03-14 | End: 2023-03-22

## 2022-04-25 DIAGNOSIS — R94.39 ABNORMAL STRESS TEST: ICD-10-CM

## 2022-04-25 DIAGNOSIS — I25.119 CORONARY ARTERY DISEASE INVOLVING NATIVE CORONARY ARTERY OF NATIVE HEART WITH ANGINA PECTORIS: ICD-10-CM

## 2022-04-25 RX ORDER — ASPIRIN 81 MG/1
TABLET, COATED ORAL
Qty: 90 TABLET | Refills: 3 | Status: SHIPPED | OUTPATIENT
Start: 2022-04-25

## 2022-04-25 NOTE — TELEPHONE ENCOUNTER
Rx Refill Note  Requested Prescriptions     Pending Prescriptions Disp Refills   • Aspirin Low Dose 81 MG EC tablet [Pharmacy Med Name: ASPIRIN EC TABS 81MG] 90 tablet 3     Sig: TAKE 1 TABLET DAILY      Last office visit with prescribing clinician: 3/7/2022      Next office visit with prescribing clinician: 9/7/2022     {Ericka Solomon MA  04/25/22, 08:32 EDT

## 2022-05-10 DIAGNOSIS — E78.5 HYPERLIPIDEMIA, UNSPECIFIED HYPERLIPIDEMIA TYPE: ICD-10-CM

## 2022-05-10 DIAGNOSIS — I25.119 CORONARY ARTERY DISEASE INVOLVING NATIVE CORONARY ARTERY OF NATIVE HEART WITH ANGINA PECTORIS: ICD-10-CM

## 2022-05-10 RX ORDER — ROSUVASTATIN CALCIUM 40 MG/1
TABLET, COATED ORAL
Qty: 90 TABLET | Refills: 3 | Status: SHIPPED | OUTPATIENT
Start: 2022-05-10

## 2022-06-20 ENCOUNTER — OFFICE VISIT (OUTPATIENT)
Dept: CARDIAC SURGERY | Facility: CLINIC | Age: 67
End: 2022-06-20

## 2022-06-20 VITALS
SYSTOLIC BLOOD PRESSURE: 110 MMHG | TEMPERATURE: 97.3 F | HEART RATE: 85 BPM | BODY MASS INDEX: 27.64 KG/M2 | WEIGHT: 172 LBS | HEIGHT: 66 IN | DIASTOLIC BLOOD PRESSURE: 60 MMHG | OXYGEN SATURATION: 99 %

## 2022-06-20 DIAGNOSIS — I73.9 PVD (PERIPHERAL VASCULAR DISEASE): Primary | ICD-10-CM

## 2022-06-20 PROCEDURE — 99214 OFFICE O/P EST MOD 30 MIN: CPT | Performed by: NURSE PRACTITIONER

## 2022-06-20 RX ORDER — ERTUGLIFLOZIN AND METFORMIN HYDROCHLORIDE 7.5; 1 MG/1; MG/1
1 TABLET, FILM COATED ORAL DAILY
COMMUNITY

## 2022-06-20 NOTE — PROGRESS NOTES
"     [facility] Cardiothoracic Surgery Office Follow Up Note     Date of Encounter: 2022     Name: Mike Gusman  : 1955     Referred By: No ref. provider found  PCP: Rex Shields MD    Chief Complaint:    Chief Complaint   Patient presents with   • Follow-up     FU to Discuss Vascular Surgery-Hx of Left Fem Pop 21-PVD       Subjective      History of Present Illness:    Mike Gusman is a 67 y.o. male former smoker, with a history of HTN, HLD on statin therapy, ADRIANA with CPAP noncompliance, COPD, spinal cord stimulator, type II DM (A1c 7.1), PSVT, CAD s/p stent on 2021 with Dr. Nelson, carotid artery stenosis, PVD s/p left femoral-popliteal bypass on 2021 with Dr. Mann.  Patient was last seen in clinic by me on 2021, with concern for small area of dehiscence to his left femoral groin incision postoperatively and recommendations for 2-week follow-up.  Patient never followed up for wound check but contact our office for follow-up to rediscuss surgery for RLE intervention and presents today for evaluation. Patient denies any claudication symptoms in his LE and reports improvement in leg weakness and fatigue. He does report some RLE claudication symptoms \"at times\" with longer distances, but he typically has to stop because of his HAMPTON not his claudications symptoms, denies any rest pain or issues with nonhealing ulcerations. Claudication symptoms are not as frequent and patient reporting he is back because he \"just wants to get the surgery done.\" His preoperative CTA showed right superficial femoral artery disease with anticipation for right sided intervention per Dr. Mann when patient was seen in August of last year, but last imaging was performed 2021.     Review of Systems:  Review of Systems   Constitutional: Positive for malaise/fatigue. Negative for chills, fever, night sweats and weight loss.   HENT: Negative for hearing loss, odynophagia and sore " throat.    Cardiovascular: Positive for dyspnea on exertion and leg swelling. Negative for chest pain, orthopnea and palpitations.   Respiratory: Positive for shortness of breath. Negative for cough and hemoptysis.    Endocrine: Negative for cold intolerance, heat intolerance, polydipsia, polyphagia and polyuria.   Hematologic/Lymphatic: Bruises/bleeds easily.   Skin: Negative for itching and rash.   Musculoskeletal: Positive for joint pain (right hip). Negative for joint swelling and myalgias.   Gastrointestinal: Negative for abdominal pain, constipation, diarrhea, hematemesis, hematochezia, melena, nausea and vomiting.   Genitourinary: Negative for dysuria, frequency and hematuria.   Neurological: Negative for focal weakness, headaches, numbness and seizures.   Psychiatric/Behavioral: Negative for suicidal ideas.   All other systems reviewed and are negative.      I have reviewed the following portions of the patient's history: allergies, current medications, past family history, past medical history, past social history, past surgical history, problem list and ROS and confirm it's accurate.    Allergies:  No Known Allergies    Medications:      Current Outpatient Medications:   •  Accu-Chek Bethany Plus test strip, , Disp: , Rfl:   •  Aspirin Low Dose 81 MG EC tablet, TAKE 1 TABLET DAILY, Disp: 90 tablet, Rfl: 3  •  Breo Ellipta 200-25 MCG/INH inhaler, , Disp: , Rfl:   •  busPIRone (BUSPAR) 15 MG tablet, Take 15 mg by mouth 2 (two) times a day. 1.5 tabs BID, Disp: , Rfl:   •  cholecalciferol (VITAMIN D3) 25 MCG (1000 UT) tablet, Take 1,000 Units by mouth Daily., Disp: , Rfl:   •  clopidogrel (PLAVIX) 75 MG tablet, Take 1 tablet by mouth Daily., Disp: 90 tablet, Rfl: 3  •  Ertugliflozin-metFORMIN HCl (Segluromet) 7.5-1000 MG tablet, Take  by mouth Daily., Disp: , Rfl:   •  finasteride (PROSCAR) 5 MG tablet, Take 5 mg by mouth daily., Disp: , Rfl:   •  isosorbide mononitrate (IMDUR) 30 MG 24 hr tablet, TAKE ONE-HALF  (1/2) TABLET EVERY NIGHT, Disp: 45 tablet, Rfl: 3  •  magnesium oxide (MAGOX) 400 (241.3 MG) MG tablet tablet, Take 400 mg by mouth daily., Disp: , Rfl:   •  metoprolol succinate XL (TOPROL-XL) 25 MG 24 hr tablet, Take 25 mg by mouth Every Morning., Disp: , Rfl:   •  multivitamin with minerals tablet tablet, Take 1 tablet by mouth Daily., Disp: , Rfl:   •  nitroglycerin (NITROSTAT) 0.4 MG SL tablet, 1 under the tongue as needed for angina, may repeat q5mins for up three doses, Disp: 30 tablet, Rfl: 3  •  nortriptyline (PAMELOR) 50 MG capsule, Take 50 mg by mouth Every Night., Disp: , Rfl:   •  Omega 3 1000 MG capsule, Take 2,000 mg by mouth Daily., Disp: , Rfl:   •  omeprazole (PriLOSEC) 40 MG capsule, Take 40 mg by mouth daily., Disp: , Rfl:   •  rosuvastatin (CRESTOR) 40 MG tablet, TAKE 1 TABLET EVERY NIGHT, Disp: 90 tablet, Rfl: 3  •  tamsulosin (FLOMAX) 0.4 MG capsule 24 hr capsule, Take 1 capsule by mouth every night., Disp: , Rfl:   •  valsartan (DIOVAN) 160 MG tablet, Take 160 mg by mouth Daily., Disp: , Rfl:   •  Vitamin E 180 MG capsule, Take 400 mg by mouth Daily., Disp: , Rfl:   •  cetirizine (zyrTEC) 10 MG tablet, , Disp: , Rfl:   •  clobetasol (TEMOVATE) 0.05 % external solution, , Disp: , Rfl:   •  fluticasone (FLONASE) 50 MCG/ACT nasal spray, , Disp: , Rfl:   •  ProAir  (90 Base) MCG/ACT inhaler, , Disp: , Rfl:   •  SITagliptin 100 MG tablet 100 mg, metFORMIN  MG tablet sustained-release 24 hour 1,000 mg, Take 1 dose by mouth 2 (two) times a day. 7.5/1000 mg (Patient not taking: Reported on 6/20/2022), Disp: , Rfl:     History:   Past Medical History:   Diagnosis Date   • Anxiety and depression    • Asthma    • COPD (chronic obstructive pulmonary disease) (HCC)    • Coronary artery disease    • COVID-19 vaccine administered 1st - 03/08/201    2nd - 04/07/2021  Ana Luisa ( Booster 12/21/2021 ) [employer]   • Diabetes mellitus (HCC)    • GERD (gastroesophageal reflux disease)    • Hyperlipidemia  "   • Hypertension    • Kidney failure    • Peripheral vascular disease (HCC)    • Renal failure    • Sleep apnea     waiting for a new machine 2020   • Stroke (HCC) 2019       Past Surgical History:   Procedure Laterality Date   • CARDIAC CATHETERIZATION      Stent x1   • COLONOSCOPY     • FEMORAL POPLITEAL BYPASS Left 2021    Procedure: FEMORAL POPLITEAL BYPASS LEFT, ABOVE KNEE;  Surgeon: Farhat Mann MD;  Location: [facility];  Service: Vascular;  Laterality: Left;   • LEG SURGERY Right     Had rods/screws, but were removed when he had Right hip replacement   • LUMBAR FUSION     • SPINAL CORD STIMULATOR IMPLANT     • TOTAL HIP ARTHROPLASTY Right        Social History     Socioeconomic History   • Marital status:    • Number of children: 4   Tobacco Use   • Smoking status: Former Smoker     Packs/day: 1.50     Years: 50.00     Pack years: 75.00     Types: Cigarettes     Quit date:      Years since quittin.4   • Smokeless tobacco: Never Used   Vaping Use   • Vaping Use: Never used   Substance and Sexual Activity   • Alcohol use: No   • Drug use: No   • Sexual activity: Defer        Family History   Problem Relation Age of Onset   • Heart failure Mother    • Heart disease Father    • Heart failure Father        Objective     Physical Exam:  Vitals:    22 1113 22 1114   BP: 140/60 110/60   BP Location: Left arm Right arm   Patient Position: Sitting Sitting   Pulse: 85    Temp: 97.3 °F (36.3 °C)    SpO2: 99%    Weight: 78 kg (172 lb)    Height: 167.6 cm (66\")       Body mass index is 27.76 kg/m².    Physical Exam  Vitals and nursing note reviewed.   Constitutional:       General: He is awake.      Appearance: Normal appearance. He is well-developed.   HENT:      Head: Normocephalic and atraumatic.   Eyes:      Pupils: Pupils are equal, round, and reactive to light.   Neck:      Vascular: No carotid bruit.   Cardiovascular:      Rate and Rhythm: Normal rate and regular rhythm. "      Pulses: No decreased pulses.           Dorsalis pedis pulses are detected w/ Doppler on the right side and detected w/ Doppler on the left side.        Posterior tibial pulses are detected w/ Doppler on the right side and detected w/ Doppler on the left side.      Heart sounds: Normal heart sounds, S1 normal and S2 normal. No murmur heard.     Comments:  LLE graft site: dopplerable, biphasic at femoral and popliteal site  Pulmonary:      Effort: Pulmonary effort is normal.      Breath sounds: Normal breath sounds.   Abdominal:      Palpations: Abdomen is soft.   Musculoskeletal:         General: Normal range of motion.      Cervical back: Neck supple.      Right lower leg: No edema.      Left lower leg: No edema.   Feet:      Right foot:      Skin integrity: No ulcer, skin breakdown, callus or dry skin.      Left foot:      Skin integrity: No ulcer, skin breakdown, callus or dry skin.   Skin:     General: Skin is warm and dry.      Capillary Refill: Capillary refill takes less than 2 seconds.      Findings: No bruising.      Comments: Left Femoral site: completely healed   Neurological:      General: No focal deficit present.      Mental Status: He is alert and oriented to person, place, and time. Mental status is at baseline.      GCS: GCS eye subscore is 4. GCS verbal subscore is 5. GCS motor subscore is 6.      Sensory: Sensation is intact.      Motor: Motor function is intact.      Coordination: Coordination is intact.      Gait: Gait is intact.   Psychiatric:         Mood and Affect: Mood normal.         Speech: Speech normal.         Behavior: Behavior normal. Behavior is cooperative.         Cognition and Memory: Cognition normal.         Imaging/Labs:       CTA abdomen with runoff result date 6/24/2021:  Impression: Mesenteric and renal arteries widely patent.  Iliac disease with a patent left external iliac stent.  No significant stenosis in the common iliacs or external iliacs.  Critical stenosis of  "the origin of the left internal iliac which is patent distally.  Moderate right-sided SFA disease with extensive plaque.  Three-vessel runoff to the foot and ankle on the right, the very diminutive distal A tibia TA and DPA.  Severe left-sided SFA disease with mult level severe stenosis in extensive plaque.  There is three-vessel runoff to the left foot and ankle, but extremely diminutive MAYURI distally and limited perfusion of extremely diminutive DPA.  Fatty infiltrated enlarged liver.  Subtle 19 mm low-attenuation focus in the inferomedial spleen.  Consider splenic ultrasound for more definitive evaluation.  Fibrosis vascular crowding atelectasis in the basis of what appears to be congestion.  Additional findings per report.        Assessment / Plan      Assessment / Plan:  Diagnoses and all orders for this visit:    1. PVD (peripheral vascular disease) (HCC) (Primary)       1. PVD s/p left femoral to above-knee popliteal bypass and endarterectomy of the left popliteal artery on 11/12/2021 with Dr. Mann and previously known right SFA stenosis: Patient never followed up for wound check but contact our office for follow-up to rediscuss surgery for RLE intervention and presents today for evaluation. Patient denies any claudication symptoms in his LE and reports improvement in leg weakness and fatigue. He does report some RLE claudication symptoms \"at times\" with longer distances, but he typically has to stop because of his HAMPTON not his claudications symptoms, denies any rest pain or issues with nonhealing ulcerations. Claudication symptoms are not as frequent and patient reporting he is back because he \"just wants to get the surgery done.\" His preoperative CTA showed right superficial femoral artery disease with anticipation for right sided intervention per Dr. Mann when patient was seen in August of last year.  Patient has been compliant with ASA/Plavix therapy.  Patient has dopplerable bilateral DP/PT pulses " and along his left femoral to popliteal graft site.  Patient is not describing his symptoms as severe or constant in nature.  Patient has not had imaging since June 2021, we will plan to repeat CTA A/P with runoff and 3D recon at this time for comparison and possible operative planning.  Will review case and imaging with Dr. Mann for his recommendations.      Follow Up:   Return for F/u with imaging.   Or sooner for any further concerns or worsening sign and symptoms. If unable to reach us in the office please dial 911 or go to the nearest emergency department.      Andria JOHNSON  [facility] Cardiothoracic Surgery    Time Spent: I spent 32 minutes caring for Mike on this date of service. This time includes time spent by me in the following activities: preparing for the visit, reviewing tests, obtaining and/or reviewing a separately obtained history, performing a medically appropriate examination and/or evaluation, counseling and educating the patient/family/caregiver, ordering medications, tests, or procedures, documenting information in the medical record, independently interpreting results and communicating that information with the patient/family/caregiver and care coordination.

## 2022-06-21 DIAGNOSIS — I70.219 ATHEROSCLEROTIC PVD WITH INTERMITTENT CLAUDICATION: Primary | ICD-10-CM

## 2022-06-21 DIAGNOSIS — I70.211 ATHEROSCLEROSIS OF NATIVE ARTERIES OF EXTREMITIES WITH INTERMITTENT CLAUDICATION, RIGHT LEG: ICD-10-CM

## 2022-07-15 ENCOUNTER — HOSPITAL ENCOUNTER (OUTPATIENT)
Dept: CT IMAGING | Facility: HOSPITAL | Age: 67
Discharge: HOME | End: 2022-07-15
Admitting: NURSE PRACTITIONER

## 2022-07-15 DIAGNOSIS — I70.211 ATHEROSCLEROSIS OF NATIVE ARTERIES OF EXTREMITIES WITH INTERMITTENT CLAUDICATION, RIGHT LEG: ICD-10-CM

## 2022-07-15 DIAGNOSIS — I70.219 ATHEROSCLEROTIC PVD WITH INTERMITTENT CLAUDICATION: ICD-10-CM

## 2022-07-15 LAB — CREAT BLDA-MCNC: 1.2 MG/DL (ref 0.6–1.3)

## 2022-07-15 PROCEDURE — 75635 CT ANGIO ABDOMINAL ARTERIES: CPT | Performed by: RADIOLOGY

## 2022-07-15 PROCEDURE — 82565 ASSAY OF CREATININE: CPT

## 2022-07-15 PROCEDURE — 0 IOPAMIDOL PER 1 ML: Performed by: NURSE PRACTITIONER

## 2022-07-15 PROCEDURE — 75635 CT ANGIO ABDOMINAL ARTERIES: CPT

## 2022-07-15 RX ADMIN — IOPAMIDOL 88 ML: 755 INJECTION, SOLUTION INTRAVENOUS at 15:59

## 2022-08-22 ENCOUNTER — OFFICE VISIT (OUTPATIENT)
Dept: CARDIAC SURGERY | Facility: CLINIC | Age: 67
End: 2022-08-22

## 2022-08-22 VITALS
HEART RATE: 90 BPM | BODY MASS INDEX: 28.48 KG/M2 | HEIGHT: 66 IN | TEMPERATURE: 97.1 F | DIASTOLIC BLOOD PRESSURE: 58 MMHG | WEIGHT: 177.2 LBS | OXYGEN SATURATION: 98 % | SYSTOLIC BLOOD PRESSURE: 104 MMHG

## 2022-08-22 DIAGNOSIS — I73.9 PAD (PERIPHERAL ARTERY DISEASE): Chronic | ICD-10-CM

## 2022-08-22 DIAGNOSIS — I25.119 CORONARY ARTERY DISEASE INVOLVING NATIVE CORONARY ARTERY OF NATIVE HEART WITH ANGINA PECTORIS: ICD-10-CM

## 2022-08-22 DIAGNOSIS — I73.9 PVD (PERIPHERAL VASCULAR DISEASE): Primary | ICD-10-CM

## 2022-08-22 PROCEDURE — 99213 OFFICE O/P EST LOW 20 MIN: CPT | Performed by: NURSE PRACTITIONER

## 2022-08-22 RX ORDER — TRAZODONE HYDROCHLORIDE 50 MG/1
75 TABLET ORAL NIGHTLY
COMMUNITY
Start: 2022-08-01

## 2022-08-22 RX ORDER — CLOPIDOGREL BISULFATE 75 MG/1
TABLET ORAL
Qty: 90 TABLET | Refills: 3 | Status: SHIPPED | OUTPATIENT
Start: 2022-08-22

## 2022-08-22 NOTE — PROGRESS NOTES
[facility] Cardiothoracic Surgery Office Follow Up Note     Date of Encounter: 2022     Name: Mike Gusman  : 1955     Referred By: No ref. provider found  PCP: Rex Shields MD    Chief Complaint:    Chief Complaint   Patient presents with   • Follow-up     Follow up after CTA abd/ aorta for PVD. Pt states that he feels about same, always SOB and fatigued.        Subjective      History of Present Illness:    Mike Gusman is a 67 y.o. male former smoker, with a history of HTN, HLD on statin therapy, ADRIANA with CPAP noncompliance, COPD, spinal cord stimulator, type II DM (A1c 7.1), PSVT, CAD s/p stent on 2021 with Dr. Nelson, carotid artery stenosis, PVD s/p remote stenting to left iliac and left femoral-popliteal bypass on 2021 with Dr. Mann. Patient was seen in 2021 in postoperative follow up and instructions for sooner follow up for wound check. Previous plans for bilateral femoral-popliteal bypasses when patient was first seen by Dr. Mann. Patient was last seen in clinic 2022 to rediscuss surgery for RLE intervention. At that time, patient was reporting some RLE claudication symptoms with longer distances as well as lower back pain and bilateral hip pain.  He was interested in pursuing RLE intervention, but had not had imaging since 2021.  Patient presents today for follow-up after having updated CTA AA with runoff performed.  Patient continues to report RLE claudication symptoms including right hip pain and fatigue that he believes is slowly getting worse.  He denies any LLE symptoms.  He denies any new nonhealing ulcerations.  He has been compliant with ASA/Plavix therapy.    Review of Systems:  Review of Systems   Constitutional: Positive for malaise/fatigue. Negative for chills, decreased appetite, diaphoresis, fever, night sweats and weight loss.   HENT: Positive for congestion. Negative for hoarse voice, sore throat and stridor.     Cardiovascular: Positive for dyspnea on exertion. Negative for chest pain, claudication, irregular heartbeat, leg swelling, near-syncope, orthopnea, palpitations, paroxysmal nocturnal dyspnea and syncope.   Respiratory: Positive for cough and sputum production. Negative for hemoptysis, shortness of breath, sleep disturbances due to breathing, snoring and wheezing.    Hematologic/Lymphatic: Positive for bleeding problem. Negative for adenopathy. Bruises/bleeds easily.   Skin: Negative for color change, dry skin, itching, poor wound healing and rash.   Musculoskeletal: Positive for arthritis, back pain and joint pain. Negative for falls and muscle weakness.   Gastrointestinal: Negative for abdominal pain, anorexia, constipation, diarrhea, hematochezia, melena, nausea and vomiting.   Neurological: Negative for difficulty with concentration, disturbances in coordination, dizziness, loss of balance, numbness, seizures, vertigo and weakness.   Psychiatric/Behavioral: Negative for altered mental status, depression, memory loss and substance abuse. The patient has insomnia. The patient is not nervous/anxious.    Allergic/Immunologic: Positive for environmental allergies. Negative for persistent infections.       I have reviewed the following portions of the patient's history: allergies, current medications, past family history, past medical history, past social history, past surgical history, problem list and ROS and confirm it's accurate.    Allergies:  No Known Allergies    Medications:      Current Outpatient Medications:   •  Accu-Chek Bethany Plus test strip, , Disp: , Rfl:   •  Aspirin Low Dose 81 MG EC tablet, TAKE 1 TABLET DAILY, Disp: 90 tablet, Rfl: 3  •  Breo Ellipta 200-25 MCG/INH inhaler, , Disp: , Rfl:   •  busPIRone (BUSPAR) 15 MG tablet, Take 15 mg by mouth 2 (two) times a day. 1.5 tabs BID, Disp: , Rfl:   •  cetirizine (zyrTEC) 10 MG tablet, , Disp: , Rfl:   •  cholecalciferol (VITAMIN D3) 25 MCG (1000 UT)  tablet, Take 1,000 Units by mouth Daily., Disp: , Rfl:   •  clobetasol (TEMOVATE) 0.05 % external solution, , Disp: , Rfl:   •  clopidogrel (PLAVIX) 75 MG tablet, Take 1 tablet by mouth Daily., Disp: 90 tablet, Rfl: 3  •  Ertugliflozin-metFORMIN HCl (Segluromet) 7.5-1000 MG tablet, Take  by mouth Daily., Disp: , Rfl:   •  finasteride (PROSCAR) 5 MG tablet, Take 5 mg by mouth daily., Disp: , Rfl:   •  fluticasone (FLONASE) 50 MCG/ACT nasal spray, , Disp: , Rfl:   •  isosorbide mononitrate (IMDUR) 30 MG 24 hr tablet, TAKE ONE-HALF (1/2) TABLET EVERY NIGHT, Disp: 45 tablet, Rfl: 3  •  magnesium oxide (MAGOX) 400 (241.3 MG) MG tablet tablet, Take 400 mg by mouth daily., Disp: , Rfl:   •  metoprolol succinate XL (TOPROL-XL) 25 MG 24 hr tablet, Take 25 mg by mouth Every Morning., Disp: , Rfl:   •  multivitamin with minerals tablet tablet, Take 1 tablet by mouth Daily., Disp: , Rfl:   •  nitroglycerin (NITROSTAT) 0.4 MG SL tablet, 1 under the tongue as needed for angina, may repeat q5mins for up three doses, Disp: 30 tablet, Rfl: 3  •  nortriptyline (PAMELOR) 50 MG capsule, Take 50 mg by mouth Every Night., Disp: , Rfl:   •  Omega 3 1000 MG capsule, Take 2,000 mg by mouth Daily., Disp: , Rfl:   •  omeprazole (PriLOSEC) 40 MG capsule, Take 40 mg by mouth daily., Disp: , Rfl:   •  ProAir  (90 Base) MCG/ACT inhaler, , Disp: , Rfl:   •  rosuvastatin (CRESTOR) 40 MG tablet, TAKE 1 TABLET EVERY NIGHT, Disp: 90 tablet, Rfl: 3  •  tamsulosin (FLOMAX) 0.4 MG capsule 24 hr capsule, Take 1 capsule by mouth every night., Disp: , Rfl:   •  traZODone (DESYREL) 50 MG tablet, , Disp: , Rfl:   •  valsartan (DIOVAN) 160 MG tablet, Take 160 mg by mouth Daily., Disp: , Rfl:   •  Vitamin E 180 MG capsule, Take 400 mg by mouth Daily., Disp: , Rfl:   •  SITagliptin 100 MG tablet 100 mg, metFORMIN  MG tablet sustained-release 24 hour 1,000 mg, Take 1 dose by mouth 2 (two) times a day. 7.5/1000 mg (Patient not taking: No sig  "reported), Disp: , Rfl:     History:   Past Medical History:   Diagnosis Date   • Anxiety and depression    • Asthma    • COPD (chronic obstructive pulmonary disease) (HCC)    • Coronary artery disease    • COVID-19 vaccine administered  -  - 2021  [place] ( Booster 2021 ) Pfizer   • Diabetes mellitus (HCC)    • GERD (gastroesophageal reflux disease)    • Hyperlipidemia    • Hypertension    • Kidney failure    • Peripheral vascular disease (HCC)    • Renal failure    • Sleep apnea     waiting for a new machine 2020   • Stroke (HCC)        Past Surgical History:   Procedure Laterality Date   • CARDIAC CATHETERIZATION      Stent x1   • COLONOSCOPY     • FEMORAL POPLITEAL BYPASS Left 2021    Procedure: FEMORAL POPLITEAL BYPASS LEFT, ABOVE KNEE;  Surgeon: Farhat Mann MD;  Location: [facility];  Service: Vascular;  Laterality: Left;   • LEG SURGERY Right     Had rods/screws, but were removed when he had Right hip replacement   • LUMBAR FUSION     • SPINAL CORD STIMULATOR IMPLANT     • TOTAL HIP ARTHROPLASTY Right        Social History     Socioeconomic History   • Marital status:    • Number of children: 4   Tobacco Use   • Smoking status: Former Smoker     Packs/day: 1.50     Years: 50.00     Pack years: 75.00     Types: Cigarettes     Quit date:      Years since quittin.6   • Smokeless tobacco: Never Used   Vaping Use   • Vaping Use: Never used   Substance and Sexual Activity   • Alcohol use: No   • Drug use: No   • Sexual activity: Defer        Family History   Problem Relation Age of Onset   • Heart failure Mother    • Heart disease Father    • Heart failure Father        Objective     Physical Exam:  Vitals:    22 0852 22 0853   BP: 180/64 104/58   BP Location: Right arm Left arm   Pulse: 90    Temp: 97.1 °F (36.2 °C)    SpO2: 98%    Weight: 80.4 kg (177 lb 3.2 oz)    Height: 167.6 cm (66\")       Body mass index is 28.6 " kg/m².    Physical Exam  Vitals and nursing note reviewed.   Constitutional:       General: He is awake.      Appearance: Normal appearance. He is well-developed.   HENT:      Head: Normocephalic and atraumatic.   Eyes:      Pupils: Pupils are equal, round, and reactive to light.   Neck:      Vascular: No carotid bruit.   Cardiovascular:      Rate and Rhythm: Normal rate and regular rhythm.      Pulses: No decreased pulses.           Dorsalis pedis pulses are detected w/ Doppler on the right side and detected w/ Doppler on the left side.        Posterior tibial pulses are detected w/ Doppler on the right side and detected w/ Doppler on the left side.      Heart sounds: Normal heart sounds, S1 normal and S2 normal. No murmur heard.     Comments:  LLE graft site: dopplerable, biphasic at femoral and popliteal site  Pulmonary:      Effort: Pulmonary effort is normal.      Breath sounds: Normal breath sounds.   Abdominal:      Palpations: Abdomen is soft.   Musculoskeletal:         General: Normal range of motion.      Cervical back: Neck supple.      Right lower leg: No edema.      Left lower leg: No edema.   Feet:      Right foot:      Skin integrity: No ulcer, skin breakdown, callus or dry skin.      Left foot:      Skin integrity: No ulcer, skin breakdown, callus or dry skin.   Skin:     General: Skin is warm and dry.      Capillary Refill: Capillary refill takes less than 2 seconds.      Findings: No bruising.      Comments: Left Femoral site: completely healed   Neurological:      General: No focal deficit present.      Mental Status: He is alert and oriented to person, place, and time. Mental status is at baseline.      GCS: GCS eye subscore is 4. GCS verbal subscore is 5. GCS motor subscore is 6.      Sensory: Sensation is intact.      Motor: Motor function is intact.      Coordination: Coordination is intact.      Gait: Gait is intact.   Psychiatric:         Mood and Affect: Mood normal.         Speech: Speech  normal.         Behavior: Behavior normal. Behavior is cooperative.         Cognition and Memory: Cognition normal.         Imaging/Labs:    CTA AA with runoff 7/15/22:    IMPRESSION:  1.  Left external iliac artery stent appears patent.  2.  Mild stenosis of bilateral common iliac arteries.  3.  Left SFA graft appears patent. The native left superficial femoral  artery is nearly completely occluded throughout its entirety.  4.  Right proximal superficial femoral, and mid superficial femoral and  right distal superficial femoral moderate stenosis noted.  5.  Significant stenosis at the level of the left popliteal graft  anastomosis.  6.  Gallstones.  Gallbladder otherwise unremarkable.  7.  Severe bilateral subpleural pulmonary fibrotic change.  8.  Degenerative changes lumbar spine as described.        Assessment / Plan      Assessment / Plan:  Diagnoses and all orders for this visit:    1. PVD (peripheral vascular disease) (HCC) (Primary)    2. L Fem Pop Bypass 11/12/2021       1. PVD s/p remote stenting to left iliac and left femoral-popliteal bypass on 11/12/2021 with Dr. Mann: Patient was seen in December 2021 in postoperative follow up and instructions for sooner follow up for wound check. Previous plans for bilateral femoral-popliteal bypasses when patient was first seen by Dr. Mann. Patient was last seen in clinic June 2022 to rediscuss surgery for RLE intervention. At that time, patient was reporting some RLE claudication symptoms with longer distances as well as lower back pain and bilateral hip pain.  He was interested in pursuing RLE intervention, but had not had imaging since June 2021.  Patient presents today for follow-up after having updated CTA AA with runoff performed.  Patient continues to report RLE claudication symptoms including right hip pain and fatigue that he believes is slowly getting worse.  He denies any LLE symptoms.  He denies any new nonhealing ulcerations.  He has been  compliant with ASA/Plavix therapy. Patient with easily dopplerable BL DP/PT pulses and throughout his left fem-pop graft. Reviewed case and imaging with Dr. Mann with recommendations to proceed with right femoral to popliteal bypass if patient having significant symptoms. Patient has chronic lower back pain and hip pain related to osteoarthritis/previous hip replacement and back surgery. I told the patient that this surgery would not help those symptoms. He wants to proceed with surgery. Risks of surgery were discussed with the patient including: bleeding, infection, blood clots, loss of limb, kidney damage, stroke, heart attack, or death.  Patient understands risks and agrees to proceed. Reiterated to patient that this was an elective procedure and did not need to be done with his moderate symptoms and easily dopplerable pulses in his RLE. Patient was adamant that he wants to proceed with RLE intervention. Will schedule with Dr. Mann for Right femoral to popliteal bypass graft.     Patient Education:  A structured walking regiment is used to improve the circulation or blood flow in your legs. Recognize that walking may hurt at first - and that is good. In fact, the goal is to walk at a pace that causes mild or moderate pain or tightness in your legs. Pace yourself, stop to rest for a few minutes, but then resume walking. This discomfort triggers your body to improve your circulation. Repeat several times: Walk at a pace that causes mild or moderate leg pain, then rest, and keep going. Over time, you may find you are able to walk longer with less pain. That is a sign that your blood vessels are recovering. It may take months, so be patient and persistent.     Follow Up:   Return for Next scheduled follow up.   Or sooner for any further concerns or worsening sign and symptoms. If unable to reach us in the office please dial 911 or go to the nearest emergency department.      Andria JOHNSON  [employer]  [facility] Cardiothoracic Surgery    Time Spent: I spent 28 minutes caring for Mike on this date of service. This time includes time spent by me in the following activities: preparing for the visit, reviewing tests, obtaining and/or reviewing a separately obtained history, performing a medically appropriate examination and/or evaluation, counseling and educating the patient/family/caregiver, referring and communicating with other health care professionals, documenting information in the medical record, independently interpreting results and communicating that information with the patient/family/caregiver and care coordination.

## 2022-09-07 ENCOUNTER — OFFICE VISIT (OUTPATIENT)
Dept: CARDIOLOGY | Facility: CLINIC | Age: 67
End: 2022-09-07

## 2022-09-07 VITALS
OXYGEN SATURATION: 98 % | DIASTOLIC BLOOD PRESSURE: 54 MMHG | HEART RATE: 83 BPM | HEIGHT: 66 IN | SYSTOLIC BLOOD PRESSURE: 106 MMHG | BODY MASS INDEX: 28.45 KG/M2 | TEMPERATURE: 97.9 F | WEIGHT: 177 LBS

## 2022-09-07 DIAGNOSIS — I65.23 BILATERAL CAROTID ARTERY STENOSIS: Chronic | ICD-10-CM

## 2022-09-07 DIAGNOSIS — I51.89 DIASTOLIC DYSFUNCTION: ICD-10-CM

## 2022-09-07 DIAGNOSIS — G47.33 OSA ON CPAP: ICD-10-CM

## 2022-09-07 DIAGNOSIS — I10 ESSENTIAL HYPERTENSION: Chronic | ICD-10-CM

## 2022-09-07 DIAGNOSIS — R60.9 PERIPHERAL EDEMA: ICD-10-CM

## 2022-09-07 DIAGNOSIS — R06.02 SHORTNESS OF BREATH: ICD-10-CM

## 2022-09-07 DIAGNOSIS — I73.9 PVD (PERIPHERAL VASCULAR DISEASE): ICD-10-CM

## 2022-09-07 DIAGNOSIS — I47.1 PSVT (PAROXYSMAL SUPRAVENTRICULAR TACHYCARDIA): ICD-10-CM

## 2022-09-07 DIAGNOSIS — E78.5 HYPERLIPIDEMIA, UNSPECIFIED HYPERLIPIDEMIA TYPE: Chronic | ICD-10-CM

## 2022-09-07 DIAGNOSIS — R94.31 EKG ABNORMALITIES: ICD-10-CM

## 2022-09-07 DIAGNOSIS — I25.10 CORONARY ARTERY DISEASE INVOLVING NATIVE CORONARY ARTERY OF NATIVE HEART WITHOUT ANGINA PECTORIS: Primary | ICD-10-CM

## 2022-09-07 DIAGNOSIS — Z99.89 OSA ON CPAP: ICD-10-CM

## 2022-09-07 PROCEDURE — 93000 ELECTROCARDIOGRAM COMPLETE: CPT | Performed by: NURSE PRACTITIONER

## 2022-09-07 PROCEDURE — 99214 OFFICE O/P EST MOD 30 MIN: CPT | Performed by: NURSE PRACTITIONER

## 2022-09-07 RX ORDER — HYDROCODONE BITARTRATE AND ACETAMINOPHEN 5; 325 MG/1; MG/1
1 TABLET ORAL 4 TIMES DAILY
COMMUNITY
End: 2023-02-01

## 2022-09-07 NOTE — PROGRESS NOTES
Subjective   Mike Gusman is a 67 y.o. male     Chief Complaint   Patient presents with   • Coronary Artery Disease   • Follow-up       HPI    Problem List:    1. CAD stent x1 somewhere between 2004 and 2006 in [place]  1.1 CT chest 8/5/19-severe atherosclerotic plaque formation throughout the coronary arteries, aorta has mild atherosclerotic plaque formation without aneurysmal dilation, interstitial fibrosis with underlying emphysema, fatty liver  1.2 left heart cath 10/22/19- obtuse marginal small-caliber 70% proximal narrowing, obtuse marginal terminal 50 to 70%, small limb of the LAD 50 to 70%, EF 60%, LVEDP 22-24  1.3 left heart cath 8/5/2021-left main minor ostial distal tapering, 90% or greater stenosis in the ostium and proximal portion of the circumflex, which received a stent.  Right coronary artery no high-grade stenosis, LAD 20 to 40% stenosis, EF 55%, LVEDP 18  2. Hypertension  3. Hyperlipidemia   4. Viral Pericarditis 2009  5. Aortic valve disorder  6. Carotid Artery Disease   6.1 Carotid artery ultrasound 6/1/2021-16 to 49% stenosis in the right that is calcified but actually by laterally at the bifurcations, 16 to 49% stenosis in the mid right internal carotid artery and throughout the left her carotid artery, antegrade flow both vertebral arteries  7. PVD   7.1 H/O left common iliac artery stenting   7.2 CT of abdomen with runoff 6/24/2021-common femoral artery had up to 50% stenosis, three-vessel runoff to the ankle, extremely diminutive distal anterior tibial artery and DPA, severe left-sided SMA disease with multilevel severe stenosis and extensive plaque  7.3  Left femoropopliteal 11/12/2021 Dr. Mann  8. Shortness of breath  8.1 echo 9/3/19 -diastolic dysfunction 2, mild to moderate left atrial enlargement, mild to moderate MR  8.2 Echo 9/5/2021-EF 55 to 60%, diastolic filling pattern indicates impaired laxation, mild aortic stenosis with a VTI of 1.9 cm², mild to moderate MR  9. H/O  back surgery; per report   10. DM II  11. GERD  12. BPH   13. Brain Bleed 11/2011 s/p fall   14. ADRIANA - CPAP   15. Emphysema/Asthma   16. PSVT  16.1 Event Monitor 5/9-5/22/17 - NSR - ST with one episode of PSVT  17. Moderate periventricular and subcortical microangiopathy  17.1 MRI of the brain/MRI of orbitis 5/16/18-moderate.  Moderate periventricular and subcortical microangiopathic  18.  Emphysema/COPD, ELIZABETH Borrero    Patient is a 67-year-old male who presents today for a follow-up with his wife at his side.  He denies any chest pain, pressure, palpitations, fluttering, dizziness, presyncope, syncope, orthopnea, PND or edema.  He says he does have shortness of breath and fatigue with any activity but this has not changed.    We went over his EKG changes.  Patient is going to be going to have oral surgery as well as his lower extremity seizure with Dr. Mann.  Due to EKG changes we will have a stress test that he can be cleared for the procedures.    Current Outpatient Medications on File Prior to Visit   Medication Sig Dispense Refill   • Accu-Chek Bethany Plus test strip      • Aspirin Low Dose 81 MG EC tablet TAKE 1 TABLET DAILY 90 tablet 3   • Breo Ellipta 200-25 MCG/INH inhaler      • busPIRone (BUSPAR) 15 MG tablet Take 15 mg by mouth 2 (two) times a day. 1.5 tabs BID     • cetirizine (zyrTEC) 10 MG tablet      • cholecalciferol (VITAMIN D3) 25 MCG (1000 UT) tablet Take 1,000 Units by mouth Daily.     • clobetasol (TEMOVATE) 0.05 % external solution      • clopidogrel (PLAVIX) 75 MG tablet TAKE 1 TABLET DAILY 90 tablet 3   • Ertugliflozin-metFORMIN HCl (Segluromet) 7.5-1000 MG tablet Take  by mouth Daily.     • finasteride (PROSCAR) 5 MG tablet Take 5 mg by mouth daily.     • fluticasone (FLONASE) 50 MCG/ACT nasal spray      • HYDROcodone-acetaminophen (NORCO) 5-325 MG per tablet Take 1 tablet by mouth 4 (Four) Times a Day.     • isosorbide mononitrate (IMDUR) 30 MG 24 hr tablet TAKE ONE-HALF  (1/2) TABLET EVERY NIGHT 45 tablet 3   • magnesium oxide (MAGOX) 400 (241.3 MG) MG tablet tablet Take 400 mg by mouth daily.     • metoprolol succinate XL (TOPROL-XL) 25 MG 24 hr tablet Take 25 mg by mouth Every Morning.     • multivitamin with minerals tablet tablet Take 1 tablet by mouth Daily.     • nitroglycerin (NITROSTAT) 0.4 MG SL tablet 1 under the tongue as needed for angina, may repeat q5mins for up three doses 30 tablet 3   • nortriptyline (PAMELOR) 50 MG capsule Take 50 mg by mouth Every Night.     • Omega 3 1000 MG capsule Take 2,000 mg by mouth Daily.     • omeprazole (PriLOSEC) 40 MG capsule Take 40 mg by mouth daily.     • ProAir  (90 Base) MCG/ACT inhaler      • rosuvastatin (CRESTOR) 40 MG tablet TAKE 1 TABLET EVERY NIGHT 90 tablet 3   • tamsulosin (FLOMAX) 0.4 MG capsule 24 hr capsule Take 1 capsule by mouth every night.     • traZODone (DESYREL) 50 MG tablet      • valsartan (DIOVAN) 160 MG tablet Take 160 mg by mouth Daily.     • Vitamin E 180 MG capsule Take 400 mg by mouth Daily.     • [DISCONTINUED] SITagliptin 100 MG tablet 100 mg, metFORMIN  MG tablet sustained-release 24 hour 1,000 mg Take 1 dose by mouth 2 (two) times a day. 7.5/1000 mg (Patient not taking: No sig reported)       No current facility-administered medications on file prior to visit.       ALLERGIES    Patient has no known allergies.    Past Medical History:   Diagnosis Date   • Anxiety and depression    • Asthma    • COPD (chronic obstructive pulmonary disease) ([facility])    • Coronary artery disease    • COVID-19 vaccine administered 1st - 03/08/201 2nd - 04/07/2021  [place] ( Booster 12/21/2021 ) Pf[employer]   • Diabetes mellitus ([facility])    • GERD (gastroesophageal reflux disease)    • Hyperlipidemia    • Hypertension    • Kidney failure    • Peripheral vascular disease ([facility])    • Renal failure    • Sleep apnea     waiting for a new machine 11/2020   • Stroke (HCC) 2019       Social History     Socioeconomic History    • Marital status:    • Number of children: 4   Tobacco Use   • Smoking status: Former Smoker     Packs/day: 1.50     Years: 50.00     Pack years: 75.00     Types: Cigarettes     Quit date:      Years since quittin.6   • Smokeless tobacco: Never Used   Vaping Use   • Vaping Use: Never used   Substance and Sexual Activity   • Alcohol use: No   • Drug use: No   • Sexual activity: Defer       Family History   Problem Relation Age of Onset   • Heart failure Mother    • Heart disease Father    • Heart failure Father        Review of Systems   Constitutional: Positive for fatigue (fatigued all the time; no change ). Negative for appetite change, chills, diaphoresis and fever.   HENT: Negative for congestion, rhinorrhea and sore throat.    Eyes: Positive for visual disturbance (glasses when he drives at night ).   Respiratory: Positive for cough (coughs ( phlem ) ) and shortness of breath (walking or with any type of activity; no change ). Negative for chest tightness and wheezing.    Cardiovascular: Negative for chest pain, palpitations and leg swelling.   Gastrointestinal: Positive for constipation (due to medication ). Negative for abdominal pain, blood in stool, diarrhea, nausea and vomiting.   Endocrine: Negative for cold intolerance and heat intolerance.   Genitourinary: Positive for frequency (sveral times a day and night ( at least every 3 hrs ) ). Negative for difficulty urinating, dysuria, hematuria and urgency.   Musculoskeletal: Positive for back pain (chronic lower and middle of the back ). Negative for arthralgias, joint swelling, neck pain and neck stiffness.   Skin: Negative for color change, pallor, rash and wound.   Allergic/Immunologic: Negative for environmental allergies and food allergies.   Neurological: Negative for dizziness, syncope, weakness, light-headedness, numbness and headaches.   Hematological: Bruises/bleeds easily (Bruises easy ).   Psychiatric/Behavioral: Positive for  "sleep disturbance (cpac machine ).       Objective   /54 (BP Location: Left arm, Patient Position: Sitting)   Pulse 83   Temp 97.9 °F (36.6 °C)   Ht 167.6 cm (66\")   Wt 80.3 kg (177 lb)   SpO2 98%   BMI 28.57 kg/m²   Vitals:    09/07/22 1319   BP: 106/54   BP Location: Left arm   Patient Position: Sitting   Pulse: 83   Temp: 97.9 °F (36.6 °C)   SpO2: 98%   Weight: 80.3 kg (177 lb)   Height: 167.6 cm (66\")      Lab Results (most recent)     None        Physical Exam  Vitals reviewed.   Constitutional:       General: He is awake.      Appearance: Normal appearance. He is well-developed, well-groomed and overweight.   HENT:      Head: Normocephalic.   Eyes:      General: Lids are normal.   Neck:      Vascular: No carotid bruit, hepatojugular reflux or JVD.   Cardiovascular:      Rate and Rhythm: Normal rate and regular rhythm.      Pulses:           Radial pulses are 2+ on the right side and 2+ on the left side.        Dorsalis pedis pulses are 2+ on the left side.        Posterior tibial pulses are 2+ on the left side.      Heart sounds: Murmur heard.    Systolic (RUSB, LUSB and LLSB 1/6) murmur is present.     Comments: Decreased pedal pulses RLE  Pulmonary:      Effort: Pulmonary effort is normal.      Breath sounds: Normal breath sounds and air entry.   Abdominal:      General: Bowel sounds are normal.      Palpations: Abdomen is soft.   Musculoskeletal:      Right lower leg: No edema.      Left lower leg: No edema.   Skin:     General: Skin is warm and dry.   Neurological:      Mental Status: He is alert and oriented to person, place, and time.   Psychiatric:         Attention and Perception: Attention and perception normal.         Mood and Affect: Mood and affect normal.         Speech: Speech normal.         Behavior: Behavior normal. Behavior is cooperative.         Thought Content: Thought content normal.         Cognition and Memory: Cognition and memory normal.         Judgment: Judgment normal. "         Procedure     ECG 12 Lead    Date/Time: 9/7/2022 1:41 PM  Performed by: Ericka Gamez APRN  Authorized by: Ericka Gamez APRN   Comparison: compared with previous ECG from 9/5/2021  Comparison to previous ECG: T wave inversions and nonspecific ST and T wave changes today, incomplete right bundle branch block  Rhythm: sinus rhythm  Rate: normal  BPM: 83  Conduction: incomplete right bundle branch block  T inversion: III and aVF  QRS axis: normal  Other findings: non-specific ST-T wave changes    Clinical impression: abnormal EKG                 Assessment & Plan      Diagnosis Plan   1. Coronary artery disease involving native coronary artery of native heart without angina pectoris  ECG 12 Lead    Stress Test With Myocardial Perfusion One Day   2. Diastolic dysfunction     3. Essential hypertension  ECG 12 Lead    Stress Test With Myocardial Perfusion One Day   4. Hyperlipidemia, unspecified hyperlipidemia type  Stress Test With Myocardial Perfusion One Day   5. Moderate bilateral carotid artery stenosis     6. PSVT (paroxysmal supraventricular tachycardia) ([facility])  ECG 12 Lead    Stress Test With Myocardial Perfusion One Day   7. Shortness of breath  Stress Test With Myocardial Perfusion One Day   8. ADRIANA on CPAP     9. Peripheral edema     10. PVD (peripheral vascular disease) ([facility])     11. EKG abnormalities  Stress Test With Myocardial Perfusion One Day       Return in about 6 months (around 3/7/2023).    CAD-patient's on aspirin, Plavix, beta and statin.  Diastolic dysfunction/peripheral edema-patient has no signs of failure.  Hypertension-patient is on beta-blocker and doing well.  Hyperlipidemia-patient is on Crestor.  Carotid artery disease-patient's on aspirin and statin.  PSVT-patient's on beta-blocker.  Shortness of breath-stable.  ADRIANA-patient uses CPAP.  PVD-patient's on aspirin, Plavix and statin.  EKG abnormalities/CAD/hypertension/hyperlipidemia/shortness of breath/PSVT-patient will have a  Lexiscan stress test.  He will have this tomorrow so that we can get him cleared for his procedures.  He will continue his medication regimen for now.  He will follow-up in 6 months or sooner if any changes or if any abnormalities with testing.       Mike Gusman  reports that he quit smoking about 17 years ago. His smoking use included cigarettes. He has a 75.00 pack-year smoking history. He has never used smokeless tobacco..Advance Care Planning   ACP discussion was declined by the patient. Patient does not have an advance directive, declines further assistance. Patient brought in medicine list to appointment, it's been reviewed with patient and med list was updated in the chart.      Electronically signed by:

## 2022-09-08 ENCOUNTER — HOSPITAL ENCOUNTER (OUTPATIENT)
Dept: CARDIOLOGY | Facility: HOSPITAL | Age: 67
Discharge: HOME | End: 2022-09-08

## 2022-09-08 DIAGNOSIS — R06.02 SHORTNESS OF BREATH: ICD-10-CM

## 2022-09-08 DIAGNOSIS — R94.31 EKG ABNORMALITIES: ICD-10-CM

## 2022-09-08 DIAGNOSIS — E78.5 HYPERLIPIDEMIA, UNSPECIFIED HYPERLIPIDEMIA TYPE: ICD-10-CM

## 2022-09-08 DIAGNOSIS — I25.10 CORONARY ARTERY DISEASE INVOLVING NATIVE CORONARY ARTERY OF NATIVE HEART WITHOUT ANGINA PECTORIS: ICD-10-CM

## 2022-09-08 DIAGNOSIS — I47.1 PSVT (PAROXYSMAL SUPRAVENTRICULAR TACHYCARDIA): ICD-10-CM

## 2022-09-08 DIAGNOSIS — I10 ESSENTIAL HYPERTENSION: ICD-10-CM

## 2022-09-08 PROCEDURE — 0 TECHNETIUM SESTAMIBI: Performed by: INTERNAL MEDICINE

## 2022-09-08 PROCEDURE — 93017 CV STRESS TEST TRACING ONLY: CPT

## 2022-09-08 PROCEDURE — 78452 HT MUSCLE IMAGE SPECT MULT: CPT | Performed by: INTERNAL MEDICINE

## 2022-09-08 PROCEDURE — 93018 CV STRESS TEST I&R ONLY: CPT | Performed by: INTERNAL MEDICINE

## 2022-09-08 PROCEDURE — A9500 TC99M SESTAMIBI: HCPCS | Performed by: INTERNAL MEDICINE

## 2022-09-08 PROCEDURE — 25010000002 REGADENOSON 0.4 MG/5ML SOLUTION: Performed by: INTERNAL MEDICINE

## 2022-09-08 PROCEDURE — 78452 HT MUSCLE IMAGE SPECT MULT: CPT

## 2022-09-08 RX ADMIN — REGADENOSON 0.4 MG: 0.08 INJECTION, SOLUTION INTRAVENOUS at 11:21

## 2022-09-08 RX ADMIN — TECHNETIUM TC 99M SESTAMIBI 1 DOSE: 1 INJECTION INTRAVENOUS at 11:21

## 2022-09-08 RX ADMIN — TECHNETIUM TC 99M SESTAMIBI 1 DOSE: 1 INJECTION INTRAVENOUS at 09:09

## 2022-09-11 LAB
BH CV REST NUCLEAR ISOTOPE DOSE: 10 MCI
BH CV STRESS COMMENTS STAGE 1: NORMAL
BH CV STRESS DOSE REGADENOSON STAGE 1: 0.4
BH CV STRESS DURATION MIN STAGE 1: 0
BH CV STRESS DURATION SEC STAGE 1: 10
BH CV STRESS NUCLEAR ISOTOPE DOSE: 30 MCI
BH CV STRESS PROTOCOL 1: NORMAL
BH CV STRESS RECOVERY BP: NORMAL MMHG
BH CV STRESS RECOVERY HR: 85 BPM
BH CV STRESS STAGE 1: 1
MAXIMAL PREDICTED HEART RATE: 151 BPM
PERCENT MAX PREDICTED HR: 63.58 %
STRESS BASELINE BP: NORMAL MMHG
STRESS BASELINE HR: 72 BPM
STRESS PERCENT HR: 75 %
STRESS POST PEAK BP: NORMAL MMHG
STRESS POST PEAK HR: 96 BPM
STRESS TARGET HR: 128 BPM

## 2022-09-12 ENCOUNTER — TELEPHONE (OUTPATIENT)
Dept: CARDIOLOGY | Facility: CLINIC | Age: 67
End: 2022-09-12

## 2022-09-12 NOTE — TELEPHONE ENCOUNTER
----- Message from ELIZABETH Benítez sent at 9/12/2022  6:11 AM EDT -----  Please advise patient.  NO signs of ischemia, blockage.  Does indicate cannot r/o multivessel disease, however, he two areas of non high grade stenosis during his last [facility] last year.  If he feels he is doing fine no additional testing warranted.  Keep f/u.  If feels like he would like additional w/u, which would be [facility], then get him back in 1-2 weeks.               Stress test results :       No scintigraphic evidence of ischemia.     2.  Preserved post stress ejection fraction of 69% with no focal wall motion abnormalities.     3.  Markedly elevated transient ischemic dilation ratio of 1.36.  Multivessel coronary artery disease cannot be excluded but is not likely.  No evidence of increased lung uptake of radiopharmaceutical.      Pt states that he is doing ok at this time and he will let us know if he starts having problems       JAROD Johnson

## 2022-09-12 NOTE — PROGRESS NOTES
Please advise patient.  NO signs of ischemia, blockage.  Does indicate cannot r/o multivessel disease, however, he two areas of non high grade stenosis during his last [facility] last year.  If he feels he is doing fine no additional testing warranted.  Keep f/u.  If feels like he would like additional w/u, which would be [facility], then get him back in 1-2 weeks.

## 2022-10-21 DIAGNOSIS — I73.9 PAD (PERIPHERAL ARTERY DISEASE): Primary | ICD-10-CM

## 2022-10-27 ENCOUNTER — TELEPHONE (OUTPATIENT)
Dept: CARDIAC SURGERY | Facility: CLINIC | Age: 67
End: 2022-10-27

## 2022-10-27 NOTE — TELEPHONE ENCOUNTER
Patient's wife Cira called in to cancel patients surgery on 11/9. She stated that will not work for them. He is wanting to have this scheduled mid to late Jan. 2nd,3rd and 4th week.

## 2022-11-07 ENCOUNTER — APPOINTMENT (OUTPATIENT)
Dept: PREADMISSION TESTING | Facility: HOSPITAL | Age: 67
End: 2022-11-07

## 2023-01-18 ENCOUNTER — TELEPHONE (OUTPATIENT)
Dept: CARDIOLOGY | Facility: CLINIC | Age: 68
End: 2023-01-18
Payer: MEDICARE

## 2023-01-18 NOTE — TELEPHONE ENCOUNTER
Patient's wife called to request cardiac clearance for procedure on 2/2/23 by Dr Mnan. She thought he needed to hold Plavix one week. Explained normally they will message provider with request or fax clearance to the office. With being a [facility] provider/facility faxed clearance is not necessary. Unsure if Ericka is aware clearance is needed.       Yvette arredondo APRN [place], ELIZABETH Calderon 39 minutes ago (8:50 AM)     Dr Johnathan De La Torre's  has given instructions to patient and wife. No cardiac clearance was requested. And he is to continue to take his medication as directed, no need to stop plavix.       Informed patient of recommendation. No cardiac clearance needed. He is to take medications as directed, no need to stop Plavix.

## 2023-01-26 ENCOUNTER — PREP FOR SURGERY (OUTPATIENT)
Dept: OTHER | Facility: HOSPITAL | Age: 68
End: 2023-01-26
Payer: MEDICARE

## 2023-01-26 DIAGNOSIS — I73.9 PVD (PERIPHERAL VASCULAR DISEASE): Primary | ICD-10-CM

## 2023-01-26 RX ORDER — CHLORHEXIDINE GLUCONATE 500 MG/1
1 CLOTH TOPICAL EVERY 12 HOURS PRN
Status: CANCELLED | OUTPATIENT
Start: 2023-01-26

## 2023-01-26 RX ORDER — CEFAZOLIN SODIUM 2 G/100ML
2 INJECTION, SOLUTION INTRAVENOUS ONCE
Status: CANCELLED | OUTPATIENT
Start: 2023-02-01 | End: 2023-02-01

## 2023-02-01 ENCOUNTER — HOSPITAL ENCOUNTER (OUTPATIENT)
Dept: GENERAL RADIOLOGY | Facility: HOSPITAL | Age: 68
Discharge: HOME | DRG: 254 | End: 2023-02-01
Payer: MEDICARE

## 2023-02-01 ENCOUNTER — ANESTHESIA EVENT (OUTPATIENT)
Dept: PERIOP | Facility: HOSPITAL | Age: 68
DRG: 254 | End: 2023-02-01
Payer: MEDICARE

## 2023-02-01 ENCOUNTER — PRE-ADMISSION TESTING (OUTPATIENT)
Dept: PREADMISSION TESTING | Facility: HOSPITAL | Age: 68
DRG: 254 | End: 2023-02-01
Payer: MEDICARE

## 2023-02-01 VITALS — BODY MASS INDEX: 25.9 KG/M2 | WEIGHT: 161.16 LBS | HEIGHT: 66 IN

## 2023-02-01 DIAGNOSIS — I73.9 PVD (PERIPHERAL VASCULAR DISEASE): ICD-10-CM

## 2023-02-01 LAB
ABO GROUP BLD: NORMAL
AMPHET+METHAMPHET UR QL: NEGATIVE
AMPHETAMINES UR QL: NEGATIVE
ANION GAP SERPL CALCULATED.3IONS-SCNC: 13 MMOL/L (ref 5–15)
APTT PPP: 29.4 SECONDS (ref 22–39)
BARBITURATES UR QL SCN: NEGATIVE
BENZODIAZ UR QL SCN: NEGATIVE
BLD GP AB SCN SERPL QL: NEGATIVE
BUN SERPL-MCNC: 19 MG/DL (ref 8–23)
BUN/CREAT SERPL: 19 (ref 7–25)
BUPRENORPHINE SERPL-MCNC: NEGATIVE NG/ML
CALCIUM SPEC-SCNC: 10.5 MG/DL (ref 8.6–10.5)
CANNABINOIDS SERPL QL: NEGATIVE
CHLORIDE SERPL-SCNC: 102 MMOL/L (ref 98–107)
CO2 SERPL-SCNC: 22 MMOL/L (ref 22–29)
COCAINE UR QL: NEGATIVE
CREAT SERPL-MCNC: 1 MG/DL (ref 0.76–1.27)
DEPRECATED RDW RBC AUTO: 43.1 FL (ref 37–54)
EGFRCR SERPLBLD CKD-EPI 2021: 81.5 ML/MIN/1.73
ERYTHROCYTE [DISTWIDTH] IN BLOOD BY AUTOMATED COUNT: 13.2 % (ref 12.3–15.4)
GLUCOSE SERPL-MCNC: 110 MG/DL (ref 65–99)
HBA1C MFR BLD: 7 % (ref 4.8–5.6)
HCT VFR BLD AUTO: 46.5 % (ref 37.5–51)
HGB BLD-MCNC: 15.3 G/DL (ref 13–17.7)
INR PPP: 0.95 (ref 0.84–1.13)
MCH RBC QN AUTO: 29.3 PG (ref 26.6–33)
MCHC RBC AUTO-ENTMCNC: 32.9 G/DL (ref 31.5–35.7)
MCV RBC AUTO: 89.1 FL (ref 79–97)
METHADONE UR QL SCN: NEGATIVE
OPIATES UR QL: NEGATIVE
OXYCODONE UR QL SCN: NEGATIVE
PCP UR QL SCN: NEGATIVE
PLATELET # BLD AUTO: 224 10*3/MM3 (ref 140–450)
PMV BLD AUTO: 10.1 FL (ref 6–12)
POTASSIUM SERPL-SCNC: 4.6 MMOL/L (ref 3.5–5.2)
PROPOXYPH UR QL: NEGATIVE
PROTHROMBIN TIME: 12.5 SECONDS (ref 11.4–14.4)
RBC # BLD AUTO: 5.22 10*6/MM3 (ref 4.14–5.8)
RH BLD: POSITIVE
SODIUM SERPL-SCNC: 137 MMOL/L (ref 136–145)
T&S EXPIRATION DATE: NORMAL
TRICYCLICS UR QL SCN: POSITIVE
WBC NRBC COR # BLD: 12.51 10*3/MM3 (ref 3.4–10.8)

## 2023-02-01 PROCEDURE — 86923 COMPATIBILITY TEST ELECTRIC: CPT

## 2023-02-01 PROCEDURE — 80305 DRUG TEST PRSMV DIR OPT OBS: CPT | Performed by: PHYSICIAN ASSISTANT

## 2023-02-01 PROCEDURE — 83036 HEMOGLOBIN GLYCOSYLATED A1C: CPT

## 2023-02-01 PROCEDURE — 71046 X-RAY EXAM CHEST 2 VIEWS: CPT

## 2023-02-01 PROCEDURE — 80048 BASIC METABOLIC PNL TOTAL CA: CPT

## 2023-02-01 PROCEDURE — 86850 RBC ANTIBODY SCREEN: CPT

## 2023-02-01 PROCEDURE — 86901 BLOOD TYPING SEROLOGIC RH(D): CPT

## 2023-02-01 PROCEDURE — 85027 COMPLETE CBC AUTOMATED: CPT

## 2023-02-01 PROCEDURE — 80306 DRUG TEST PRSMV INSTRMNT: CPT | Performed by: PHYSICIAN ASSISTANT

## 2023-02-01 PROCEDURE — 85730 THROMBOPLASTIN TIME PARTIAL: CPT

## 2023-02-01 PROCEDURE — 36415 COLL VENOUS BLD VENIPUNCTURE: CPT

## 2023-02-01 PROCEDURE — 85610 PROTHROMBIN TIME: CPT

## 2023-02-01 PROCEDURE — 86900 BLOOD TYPING SEROLOGIC ABO: CPT

## 2023-02-01 RX ORDER — SODIUM CHLORIDE 0.9 % (FLUSH) 0.9 %
10 SYRINGE (ML) INJECTION AS NEEDED
Status: CANCELLED | OUTPATIENT
Start: 2023-02-01

## 2023-02-01 RX ORDER — SODIUM CHLORIDE 9 MG/ML
40 INJECTION, SOLUTION INTRAVENOUS AS NEEDED
Status: CANCELLED | OUTPATIENT
Start: 2023-02-01

## 2023-02-01 RX ORDER — BENZONATATE 100 MG/1
100 CAPSULE ORAL 3 TIMES DAILY PRN
COMMUNITY

## 2023-02-01 RX ORDER — CHLORHEXIDINE GLUCONATE 500 MG/1
1 CLOTH TOPICAL EVERY 12 HOURS PRN
Status: DISCONTINUED | OUTPATIENT
Start: 2023-02-01 | End: 2023-03-02

## 2023-02-01 RX ORDER — SODIUM CHLORIDE 0.9 % (FLUSH) 0.9 %
10 SYRINGE (ML) INJECTION EVERY 12 HOURS SCHEDULED
Status: CANCELLED | OUTPATIENT
Start: 2023-02-01

## 2023-02-01 RX ORDER — FAMOTIDINE 10 MG/ML
20 INJECTION, SOLUTION INTRAVENOUS ONCE
Status: CANCELLED | OUTPATIENT
Start: 2023-02-01 | End: 2023-02-01

## 2023-02-01 NOTE — PAT
The following information and instructions were given:    Do not eat, drink, smoke or chew gum after midnight the night before surgery. This includes no mints.  Take all routine, prescribed medications including heart and blood pressure medicines with a sip of water unless otherwise instructed by your physician.   Do NOT take diabetic medication unless instructed by your physician.    DO NOT shave for two days before your procedure.  Do not wear makeup.      DO NOT wear fingernail polish (gel/regular) and/or acrylic/artificial nails on the day of surgery. If you had a recent manicure and would rather not remove polish or artificial nails, the minimum requirement is that the polish/artificial nails must be removed from the middle finger on each hand.      If you are having surgery/procedure on an upper extremity, fingernail polish/artificial fingernails must be removed for surgery.  NO EXCEPTIONS.      If you are having surgery/procedure on a lower extremity, toenail polish on both extremities must be removed for surgery.  NO EXCEPTIONS.    Remove all jewelry (advise to go to jeweler if unable to remove).  Jewelry, especially rings, can no longer be taped for surgery.    Leave anything you consider valuable at home.    Leave your suitcase in the car until after your surgery if you are staying overnight.    Bring the following with you the day of your procedure (when applicable):       -Picture ID and insurance cards       -Co-pay/deductible required by insurance       -Medications in the original bottles (not a list) including all over-the-counter medications if not brought to PAT       -Copy of advance directive, living will or power of  documents if not brought to PAT       -CPAP or BIPAP mask and tubing (do not bring machine)       -Skin prep instruction(s) sheet       -PAT Pass    Educational handout or binder (joint replacements) related to procedure given to patient.  Educational handout also includes  general information related to the recovery that mentions signs and symptoms of infection and when to call the doctor.    When applicable, an ERAS handout was given to patient.    Respirex use and pneumonia prevention education provided in Pre Admission Testing general education video.    Information related to infection and hand hygiene mentioned in Pre Admission Testing general education video. Patient instructed to call their doctor if any of the following symptoms are noted during recovery:  Fever of 100.4 F or higher, incision that is warm or has increasing bleeding, redness or drainage.    DVT Prevention instructions given in general education video presentation during Pre Admission Testing appointment that stress the importance of ambulation to improve blood circulation.  Also encouraged patient to perform foot exercises when in bed and application of a sequential device may be applied to lower extremities to improve circulation.      Please apply Chlorhexidine wipes to surgical area (if instructed) the night before procedure and the AM of procedure and document date/time of applications on skin prep instruction sheet.    An arrival time for procedure was not provided during PAT visit. If patient had any questions or concerns about their arrival time, they were instructed to contact their surgeon/physician.  Additionally, if the patient referred to an arrival time that was acquired from their my chart account, patient was encouraged to verify that time with their surgeon/physician. Arrival times are NOT provided in Pre Admission Testing Department.        Blood bank bracelet applied to patient during Pre Admission Testing visit.  Patient instructed not to remove from arm until after procedure and they are discharged from the hospital.  Explained to patient that they may shower and get the bracelet wet, but not to immerse under water for longer periods (bathing, swimming, hand dishwashing, etc).  Patient  verbalized understanding.      Bactroban to be applied to each nostril during PAT visit if surgery the following day.  If surgery NOT the following day, then Bactroban supplied to patient with instructions both written and verbally to insert into each nares the night before surgery.

## 2023-02-02 ENCOUNTER — HOSPITAL ENCOUNTER (INPATIENT)
Facility: HOSPITAL | Age: 68
LOS: 2 days | Discharge: HOME | DRG: 254 | End: 2023-02-04
Attending: THORACIC SURGERY (CARDIOTHORACIC VASCULAR SURGERY) | Admitting: THORACIC SURGERY (CARDIOTHORACIC VASCULAR SURGERY)
Payer: MEDICARE

## 2023-02-02 ENCOUNTER — ANESTHESIA (OUTPATIENT)
Dept: PERIOP | Facility: HOSPITAL | Age: 68
DRG: 254 | End: 2023-02-02
Payer: MEDICARE

## 2023-02-02 DIAGNOSIS — I73.9 PAD (PERIPHERAL ARTERY DISEASE): ICD-10-CM

## 2023-02-02 DIAGNOSIS — I73.9 PVD (PERIPHERAL VASCULAR DISEASE): ICD-10-CM

## 2023-02-02 LAB
COTININE UR QL SCN: NEGATIVE NG/ML
GLUCOSE BLDC GLUCOMTR-MCNC: 126 MG/DL (ref 70–130)
GLUCOSE BLDC GLUCOMTR-MCNC: 154 MG/DL (ref 70–130)
HCT VFR BLD AUTO: 36.8 % (ref 37.5–51)
HGB BLD-MCNC: 12.3 G/DL (ref 13–17.7)
Lab: NORMAL

## 2023-02-02 PROCEDURE — 35656 BPG FEMORAL-POPLITEAL: CPT | Performed by: THORACIC SURGERY (CARDIOTHORACIC VASCULAR SURGERY)

## 2023-02-02 PROCEDURE — 25010000002 MORPHINE PER 10 MG: Performed by: THORACIC SURGERY (CARDIOTHORACIC VASCULAR SURGERY)

## 2023-02-02 PROCEDURE — 25010000002 KETOROLAC TROMETHAMINE PER 15 MG: Performed by: PHYSICIAN ASSISTANT

## 2023-02-02 PROCEDURE — 04CK0ZZ EXTIRPATION OF MATTER FROM RIGHT FEMORAL ARTERY, OPEN APPROACH: ICD-10-PCS | Performed by: THORACIC SURGERY (CARDIOTHORACIC VASCULAR SURGERY)

## 2023-02-02 PROCEDURE — 25010000002 ALBUMIN HUMAN 25% PER 50 ML: Performed by: PHYSICIAN ASSISTANT

## 2023-02-02 PROCEDURE — 35656 BPG FEMORAL-POPLITEAL: CPT | Performed by: PHYSICIAN ASSISTANT

## 2023-02-02 PROCEDURE — 25010000002 PROTAMINE SULFATE PER 10 MG: Performed by: NURSE ANESTHETIST, CERTIFIED REGISTERED

## 2023-02-02 PROCEDURE — C1768 GRAFT, VASCULAR: HCPCS | Performed by: THORACIC SURGERY (CARDIOTHORACIC VASCULAR SURGERY)

## 2023-02-02 PROCEDURE — 25010000002 FENTANYL CITRATE (PF) 100 MCG/2ML SOLUTION: Performed by: NURSE ANESTHETIST, CERTIFIED REGISTERED

## 2023-02-02 PROCEDURE — 25010000002 PROPOFOL 10 MG/ML EMULSION: Performed by: NURSE ANESTHETIST, CERTIFIED REGISTERED

## 2023-02-02 PROCEDURE — 25010000002 CEFOXITIN PER 1 G: Performed by: THORACIC SURGERY (CARDIOTHORACIC VASCULAR SURGERY)

## 2023-02-02 PROCEDURE — 041K0JL BYPASS RIGHT FEMORAL ARTERY TO POPLITEAL ARTERY WITH SYNTHETIC SUBSTITUTE, OPEN APPROACH: ICD-10-PCS | Performed by: THORACIC SURGERY (CARDIOTHORACIC VASCULAR SURGERY)

## 2023-02-02 PROCEDURE — 85014 HEMATOCRIT: CPT | Performed by: PHYSICIAN ASSISTANT

## 2023-02-02 PROCEDURE — 82962 GLUCOSE BLOOD TEST: CPT

## 2023-02-02 PROCEDURE — 25010000002 DEXAMETHASONE PER 1 MG: Performed by: NURSE ANESTHETIST, CERTIFIED REGISTERED

## 2023-02-02 PROCEDURE — C2615 SEALANT, PULMONARY, LIQUID: HCPCS | Performed by: THORACIC SURGERY (CARDIOTHORACIC VASCULAR SURGERY)

## 2023-02-02 PROCEDURE — 88304 TISSUE EXAM BY PATHOLOGIST: CPT | Performed by: THORACIC SURGERY (CARDIOTHORACIC VASCULAR SURGERY)

## 2023-02-02 PROCEDURE — 25010000002 CEFAZOLIN IN DEXTROSE 2-4 GM/100ML-% SOLUTION: Performed by: PHYSICIAN ASSISTANT

## 2023-02-02 PROCEDURE — 25010000002 HEPARIN (PORCINE) PER 1000 UNITS: Performed by: THORACIC SURGERY (CARDIOTHORACIC VASCULAR SURGERY)

## 2023-02-02 PROCEDURE — 25010000002 HEPARIN (PORCINE) PER 1000 UNITS: Performed by: NURSE ANESTHETIST, CERTIFIED REGISTERED

## 2023-02-02 PROCEDURE — S0260 H&P FOR SURGERY: HCPCS | Performed by: THORACIC SURGERY (CARDIOTHORACIC VASCULAR SURGERY)

## 2023-02-02 PROCEDURE — 88311 DECALCIFY TISSUE: CPT | Performed by: THORACIC SURGERY (CARDIOTHORACIC VASCULAR SURGERY)

## 2023-02-02 PROCEDURE — 25010000002 GENTAMICIN PER 80 MG: Performed by: THORACIC SURGERY (CARDIOTHORACIC VASCULAR SURGERY)

## 2023-02-02 PROCEDURE — 25010000002 ONDANSETRON PER 1 MG: Performed by: NURSE ANESTHETIST, CERTIFIED REGISTERED

## 2023-02-02 PROCEDURE — 85018 HEMOGLOBIN: CPT | Performed by: PHYSICIAN ASSISTANT

## 2023-02-02 PROCEDURE — P9047 ALBUMIN (HUMAN), 25%, 50ML: HCPCS | Performed by: PHYSICIAN ASSISTANT

## 2023-02-02 PROCEDURE — 25010000002 FENTANYL CITRATE (PF) 50 MCG/ML SOLUTION

## 2023-02-02 DEVICE — CLIP LIGAT VASC HORIZON TI SM/WD RD 6CT: Type: IMPLANTABLE DEVICE | Site: LEG | Status: FUNCTIONAL

## 2023-02-02 DEVICE — GRFT VASC PROPAT THNSTRCH REMVRNG8X80X70: Type: IMPLANTABLE DEVICE | Site: LEG | Status: FUNCTIONAL

## 2023-02-02 DEVICE — SEALANT PLURAL AIRLEAK PROGEL W/APPL 4ML: Type: IMPLANTABLE DEVICE | Site: LEG | Status: FUNCTIONAL

## 2023-02-02 DEVICE — CLIP LIGAT VASC HORIZON TI LG ORNG 6CT: Type: IMPLANTABLE DEVICE | Site: LEG | Status: FUNCTIONAL

## 2023-02-02 RX ORDER — BISACODYL 5 MG/1
5 TABLET, DELAYED RELEASE ORAL DAILY PRN
Status: DISCONTINUED | OUTPATIENT
Start: 2023-02-02 | End: 2023-02-04 | Stop reason: HOSPADM

## 2023-02-02 RX ORDER — FENTANYL CITRATE 50 UG/ML
INJECTION, SOLUTION INTRAMUSCULAR; INTRAVENOUS AS NEEDED
Status: DISCONTINUED | OUTPATIENT
Start: 2023-02-02 | End: 2023-02-02 | Stop reason: SURG

## 2023-02-02 RX ORDER — ESMOLOL HYDROCHLORIDE 10 MG/ML
INJECTION INTRAVENOUS AS NEEDED
Status: DISCONTINUED | OUTPATIENT
Start: 2023-02-02 | End: 2023-02-02 | Stop reason: SURG

## 2023-02-02 RX ORDER — HYDRALAZINE HYDROCHLORIDE 20 MG/ML
5 INJECTION INTRAMUSCULAR; INTRAVENOUS
Status: DISCONTINUED | OUTPATIENT
Start: 2023-02-02 | End: 2023-02-02 | Stop reason: HOSPADM

## 2023-02-02 RX ORDER — ONDANSETRON 2 MG/ML
4 INJECTION INTRAMUSCULAR; INTRAVENOUS EVERY 6 HOURS PRN
Status: DISCONTINUED | OUTPATIENT
Start: 2023-02-02 | End: 2023-02-04 | Stop reason: HOSPADM

## 2023-02-02 RX ORDER — ISOSORBIDE MONONITRATE 30 MG/1
15 TABLET, EXTENDED RELEASE ORAL NIGHTLY
Status: DISCONTINUED | OUTPATIENT
Start: 2023-02-02 | End: 2023-02-04 | Stop reason: HOSPADM

## 2023-02-02 RX ORDER — DEXAMETHASONE SODIUM PHOSPHATE 4 MG/ML
INJECTION, SOLUTION INTRA-ARTICULAR; INTRALESIONAL; INTRAMUSCULAR; INTRAVENOUS; SOFT TISSUE AS NEEDED
Status: DISCONTINUED | OUTPATIENT
Start: 2023-02-02 | End: 2023-02-02 | Stop reason: SURG

## 2023-02-02 RX ORDER — PROMETHAZINE HYDROCHLORIDE 25 MG/1
25 TABLET ORAL ONCE AS NEEDED
Status: DISCONTINUED | OUTPATIENT
Start: 2023-02-02 | End: 2023-02-02 | Stop reason: HOSPADM

## 2023-02-02 RX ORDER — ONDANSETRON 4 MG/1
4 TABLET, FILM COATED ORAL EVERY 6 HOURS PRN
Status: DISCONTINUED | OUTPATIENT
Start: 2023-02-02 | End: 2023-02-04 | Stop reason: HOSPADM

## 2023-02-02 RX ORDER — FINASTERIDE 5 MG/1
5 TABLET, FILM COATED ORAL DAILY
Status: DISCONTINUED | OUTPATIENT
Start: 2023-02-02 | End: 2023-02-04 | Stop reason: HOSPADM

## 2023-02-02 RX ORDER — METOPROLOL SUCCINATE 25 MG/1
25 TABLET, EXTENDED RELEASE ORAL EVERY MORNING
Status: DISCONTINUED | OUTPATIENT
Start: 2023-02-02 | End: 2023-02-04 | Stop reason: HOSPADM

## 2023-02-02 RX ORDER — VALSARTAN 160 MG/1
160 TABLET ORAL DAILY
Status: DISCONTINUED | OUTPATIENT
Start: 2023-02-02 | End: 2023-02-04 | Stop reason: HOSPADM

## 2023-02-02 RX ORDER — DROPERIDOL 2.5 MG/ML
0.62 INJECTION, SOLUTION INTRAMUSCULAR; INTRAVENOUS
Status: DISCONTINUED | OUTPATIENT
Start: 2023-02-02 | End: 2023-02-02 | Stop reason: HOSPADM

## 2023-02-02 RX ORDER — MEPERIDINE HYDROCHLORIDE 25 MG/ML
12.5 INJECTION INTRAMUSCULAR; INTRAVENOUS; SUBCUTANEOUS
Status: DISCONTINUED | OUTPATIENT
Start: 2023-02-02 | End: 2023-02-02 | Stop reason: HOSPADM

## 2023-02-02 RX ORDER — ROSUVASTATIN CALCIUM 20 MG/1
40 TABLET, COATED ORAL NIGHTLY
Status: DISCONTINUED | OUTPATIENT
Start: 2023-02-02 | End: 2023-02-04 | Stop reason: HOSPADM

## 2023-02-02 RX ORDER — NALOXONE HCL 0.4 MG/ML
0.4 VIAL (ML) INJECTION AS NEEDED
Status: DISCONTINUED | OUTPATIENT
Start: 2023-02-02 | End: 2023-02-02 | Stop reason: HOSPADM

## 2023-02-02 RX ORDER — LIDOCAINE HYDROCHLORIDE 10 MG/ML
INJECTION, SOLUTION EPIDURAL; INFILTRATION; INTRACAUDAL; PERINEURAL AS NEEDED
Status: DISCONTINUED | OUTPATIENT
Start: 2023-02-02 | End: 2023-02-02 | Stop reason: SURG

## 2023-02-02 RX ORDER — PROPOFOL 10 MG/ML
VIAL (ML) INTRAVENOUS AS NEEDED
Status: DISCONTINUED | OUTPATIENT
Start: 2023-02-02 | End: 2023-02-02 | Stop reason: SURG

## 2023-02-02 RX ORDER — SODIUM CHLORIDE 0.9 % (FLUSH) 0.9 %
3 SYRINGE (ML) INJECTION EVERY 12 HOURS SCHEDULED
Status: DISCONTINUED | OUTPATIENT
Start: 2023-02-02 | End: 2023-02-02 | Stop reason: HOSPADM

## 2023-02-02 RX ORDER — FENTANYL CITRATE 50 UG/ML
50 INJECTION, SOLUTION INTRAMUSCULAR; INTRAVENOUS
Status: DISCONTINUED | OUTPATIENT
Start: 2023-02-02 | End: 2023-02-02 | Stop reason: HOSPADM

## 2023-02-02 RX ORDER — BUDESONIDE AND FORMOTEROL FUMARATE DIHYDRATE 160; 4.5 UG/1; UG/1
2 AEROSOL RESPIRATORY (INHALATION)
Status: DISCONTINUED | OUTPATIENT
Start: 2023-02-02 | End: 2023-02-04 | Stop reason: HOSPADM

## 2023-02-02 RX ORDER — ONDANSETRON 2 MG/ML
4 INJECTION INTRAMUSCULAR; INTRAVENOUS ONCE AS NEEDED
Status: DISCONTINUED | OUTPATIENT
Start: 2023-02-02 | End: 2023-02-02 | Stop reason: HOSPADM

## 2023-02-02 RX ORDER — PHENYLEPHRINE HCL IN 0.9% NACL 1 MG/10 ML
SYRINGE (ML) INTRAVENOUS AS NEEDED
Status: DISCONTINUED | OUTPATIENT
Start: 2023-02-02 | End: 2023-02-02 | Stop reason: SURG

## 2023-02-02 RX ORDER — IPRATROPIUM BROMIDE AND ALBUTEROL SULFATE 2.5; .5 MG/3ML; MG/3ML
3 SOLUTION RESPIRATORY (INHALATION) ONCE AS NEEDED
Status: DISCONTINUED | OUTPATIENT
Start: 2023-02-02 | End: 2023-02-02 | Stop reason: HOSPADM

## 2023-02-02 RX ORDER — NORTRIPTYLINE HYDROCHLORIDE 25 MG/1
50 CAPSULE ORAL NIGHTLY
Status: DISCONTINUED | OUTPATIENT
Start: 2023-02-02 | End: 2023-02-04 | Stop reason: HOSPADM

## 2023-02-02 RX ORDER — SODIUM CHLORIDE 0.9 % (FLUSH) 0.9 %
3-10 SYRINGE (ML) INJECTION AS NEEDED
Status: DISCONTINUED | OUTPATIENT
Start: 2023-02-02 | End: 2023-02-02 | Stop reason: HOSPADM

## 2023-02-02 RX ORDER — NITROGLYCERIN 0.4 MG/1
0.4 TABLET SUBLINGUAL
Status: DISCONTINUED | OUTPATIENT
Start: 2023-02-02 | End: 2023-02-04 | Stop reason: HOSPADM

## 2023-02-02 RX ORDER — CLOPIDOGREL BISULFATE 75 MG/1
75 TABLET ORAL DAILY
Status: DISCONTINUED | OUTPATIENT
Start: 2023-02-03 | End: 2023-02-04 | Stop reason: HOSPADM

## 2023-02-02 RX ORDER — TRAZODONE HYDROCHLORIDE 50 MG/1
75 TABLET ORAL NIGHTLY
Status: DISCONTINUED | OUTPATIENT
Start: 2023-02-02 | End: 2023-02-04 | Stop reason: HOSPADM

## 2023-02-02 RX ORDER — FAMOTIDINE 10 MG/ML
20 INJECTION, SOLUTION INTRAVENOUS EVERY 12 HOURS SCHEDULED
Status: DISCONTINUED | OUTPATIENT
Start: 2023-02-02 | End: 2023-02-04 | Stop reason: HOSPADM

## 2023-02-02 RX ORDER — SODIUM CHLORIDE 450 MG/100ML
50 INJECTION, SOLUTION INTRAVENOUS CONTINUOUS
Status: DISCONTINUED | OUTPATIENT
Start: 2023-02-02 | End: 2023-02-04 | Stop reason: HOSPADM

## 2023-02-02 RX ORDER — MIDAZOLAM HYDROCHLORIDE 1 MG/ML
0.5 INJECTION INTRAMUSCULAR; INTRAVENOUS
Status: DISCONTINUED | OUTPATIENT
Start: 2023-02-02 | End: 2023-02-02 | Stop reason: HOSPADM

## 2023-02-02 RX ORDER — ONDANSETRON 2 MG/ML
INJECTION INTRAMUSCULAR; INTRAVENOUS AS NEEDED
Status: DISCONTINUED | OUTPATIENT
Start: 2023-02-02 | End: 2023-02-02 | Stop reason: SURG

## 2023-02-02 RX ORDER — ALBUMIN (HUMAN) 12.5 G/50ML
50 SOLUTION INTRAVENOUS ONCE
Status: COMPLETED | OUTPATIENT
Start: 2023-02-02 | End: 2023-02-02

## 2023-02-02 RX ORDER — FAMOTIDINE 20 MG/1
20 TABLET, FILM COATED ORAL ONCE
Status: COMPLETED | OUTPATIENT
Start: 2023-02-02 | End: 2023-02-02

## 2023-02-02 RX ORDER — MORPHINE SULFATE 4 MG/ML
4 INJECTION, SOLUTION INTRAMUSCULAR; INTRAVENOUS
Status: DISPENSED | OUTPATIENT
Start: 2023-02-02 | End: 2023-02-03

## 2023-02-02 RX ORDER — BENZONATATE 100 MG/1
100 CAPSULE ORAL 3 TIMES DAILY PRN
Status: DISCONTINUED | OUTPATIENT
Start: 2023-02-02 | End: 2023-02-04 | Stop reason: HOSPADM

## 2023-02-02 RX ORDER — HYDROCODONE BITARTRATE AND ACETAMINOPHEN 5; 325 MG/1; MG/1
1 TABLET ORAL EVERY 6 HOURS PRN
Status: DISCONTINUED | OUTPATIENT
Start: 2023-02-02 | End: 2023-02-04 | Stop reason: HOSPADM

## 2023-02-02 RX ORDER — FENTANYL CITRATE 50 UG/ML
INJECTION, SOLUTION INTRAMUSCULAR; INTRAVENOUS
Status: COMPLETED
Start: 2023-02-02 | End: 2023-02-02

## 2023-02-02 RX ORDER — BUSPIRONE HYDROCHLORIDE 15 MG/1
15 TABLET ORAL EVERY 12 HOURS SCHEDULED
Status: DISCONTINUED | OUTPATIENT
Start: 2023-02-02 | End: 2023-02-04 | Stop reason: HOSPADM

## 2023-02-02 RX ORDER — POLYETHYLENE GLYCOL 3350 17 G/17G
17 POWDER, FOR SOLUTION ORAL DAILY PRN
Status: DISCONTINUED | OUTPATIENT
Start: 2023-02-02 | End: 2023-02-04 | Stop reason: HOSPADM

## 2023-02-02 RX ORDER — FAMOTIDINE 20 MG/1
20 TABLET, FILM COATED ORAL EVERY 12 HOURS SCHEDULED
Status: DISCONTINUED | OUTPATIENT
Start: 2023-02-02 | End: 2023-02-04 | Stop reason: HOSPADM

## 2023-02-02 RX ORDER — KETOROLAC TROMETHAMINE 15 MG/ML
15 INJECTION, SOLUTION INTRAMUSCULAR; INTRAVENOUS ONCE
Status: COMPLETED | OUTPATIENT
Start: 2023-02-02 | End: 2023-02-02

## 2023-02-02 RX ORDER — ROCURONIUM BROMIDE 10 MG/ML
INJECTION, SOLUTION INTRAVENOUS AS NEEDED
Status: DISCONTINUED | OUTPATIENT
Start: 2023-02-02 | End: 2023-02-02 | Stop reason: SURG

## 2023-02-02 RX ORDER — AMOXICILLIN 250 MG
2 CAPSULE ORAL 2 TIMES DAILY
Status: DISCONTINUED | OUTPATIENT
Start: 2023-02-02 | End: 2023-02-04 | Stop reason: HOSPADM

## 2023-02-02 RX ORDER — SODIUM CHLORIDE 9 MG/ML
40 INJECTION, SOLUTION INTRAVENOUS AS NEEDED
Status: DISCONTINUED | OUTPATIENT
Start: 2023-02-02 | End: 2023-02-02 | Stop reason: HOSPADM

## 2023-02-02 RX ORDER — HYDROCODONE BITARTRATE AND ACETAMINOPHEN 5; 325 MG/1; MG/1
1 TABLET ORAL ONCE AS NEEDED
Status: DISCONTINUED | OUTPATIENT
Start: 2023-02-02 | End: 2023-02-02 | Stop reason: HOSPADM

## 2023-02-02 RX ORDER — HEPARIN SODIUM 1000 [USP'U]/ML
INJECTION, SOLUTION INTRAVENOUS; SUBCUTANEOUS AS NEEDED
Status: DISCONTINUED | OUTPATIENT
Start: 2023-02-02 | End: 2023-02-02 | Stop reason: SURG

## 2023-02-02 RX ORDER — PROMETHAZINE HYDROCHLORIDE 25 MG/1
25 SUPPOSITORY RECTAL ONCE AS NEEDED
Status: DISCONTINUED | OUTPATIENT
Start: 2023-02-02 | End: 2023-02-02 | Stop reason: HOSPADM

## 2023-02-02 RX ORDER — HYDROCODONE BITARTRATE AND ACETAMINOPHEN 7.5; 325 MG/1; MG/1
1 TABLET ORAL EVERY 6 HOURS PRN
Status: DISCONTINUED | OUTPATIENT
Start: 2023-02-02 | End: 2023-02-04 | Stop reason: HOSPADM

## 2023-02-02 RX ORDER — EPHEDRINE SULFATE 50 MG/ML
INJECTION, SOLUTION INTRAVENOUS AS NEEDED
Status: DISCONTINUED | OUTPATIENT
Start: 2023-02-02 | End: 2023-02-02 | Stop reason: SURG

## 2023-02-02 RX ORDER — CEFAZOLIN SODIUM 2 G/100ML
2 INJECTION, SOLUTION INTRAVENOUS ONCE
Status: COMPLETED | OUTPATIENT
Start: 2023-02-02 | End: 2023-02-02

## 2023-02-02 RX ORDER — PROTAMINE SULFATE 10 MG/ML
INJECTION, SOLUTION INTRAVENOUS AS NEEDED
Status: DISCONTINUED | OUTPATIENT
Start: 2023-02-02 | End: 2023-02-02 | Stop reason: SURG

## 2023-02-02 RX ORDER — CEFAZOLIN SODIUM 2 G/100ML
2 INJECTION, SOLUTION INTRAVENOUS EVERY 8 HOURS
Status: COMPLETED | OUTPATIENT
Start: 2023-02-02 | End: 2023-02-03

## 2023-02-02 RX ORDER — MORPHINE SULFATE 2 MG/ML
2 INJECTION, SOLUTION INTRAMUSCULAR; INTRAVENOUS
Status: DISPENSED | OUTPATIENT
Start: 2023-02-02 | End: 2023-02-03

## 2023-02-02 RX ORDER — BISACODYL 10 MG
10 SUPPOSITORY, RECTAL RECTAL DAILY PRN
Status: DISCONTINUED | OUTPATIENT
Start: 2023-02-02 | End: 2023-02-04 | Stop reason: HOSPADM

## 2023-02-02 RX ORDER — SODIUM CHLORIDE, SODIUM LACTATE, POTASSIUM CHLORIDE, CALCIUM CHLORIDE 600; 310; 30; 20 MG/100ML; MG/100ML; MG/100ML; MG/100ML
9 INJECTION, SOLUTION INTRAVENOUS CONTINUOUS
Status: DISCONTINUED | OUTPATIENT
Start: 2023-02-02 | End: 2023-02-04 | Stop reason: HOSPADM

## 2023-02-02 RX ORDER — ASPIRIN 81 MG/1
81 TABLET ORAL NIGHTLY
Status: DISCONTINUED | OUTPATIENT
Start: 2023-02-02 | End: 2023-02-04 | Stop reason: HOSPADM

## 2023-02-02 RX ORDER — DROPERIDOL 2.5 MG/ML
0.62 INJECTION, SOLUTION INTRAMUSCULAR; INTRAVENOUS ONCE AS NEEDED
Status: DISCONTINUED | OUTPATIENT
Start: 2023-02-02 | End: 2023-02-02 | Stop reason: HOSPADM

## 2023-02-02 RX ORDER — LABETALOL HYDROCHLORIDE 5 MG/ML
5 INJECTION, SOLUTION INTRAVENOUS
Status: DISCONTINUED | OUTPATIENT
Start: 2023-02-02 | End: 2023-02-02 | Stop reason: HOSPADM

## 2023-02-02 RX ORDER — VASOPRESSIN 20 [USP'U]/ML
INJECTION, SOLUTION INTRAVENOUS AS NEEDED
Status: DISCONTINUED | OUTPATIENT
Start: 2023-02-02 | End: 2023-02-02 | Stop reason: SURG

## 2023-02-02 RX ORDER — LIDOCAINE HYDROCHLORIDE 10 MG/ML
0.5 INJECTION, SOLUTION EPIDURAL; INFILTRATION; INTRACAUDAL; PERINEURAL ONCE AS NEEDED
Status: COMPLETED | OUTPATIENT
Start: 2023-02-02 | End: 2023-02-02

## 2023-02-02 RX ADMIN — FENTANYL CITRATE 50 MCG: 50 INJECTION, SOLUTION INTRAMUSCULAR; INTRAVENOUS at 08:45

## 2023-02-02 RX ADMIN — MORPHINE SULFATE 2 MG: 2 INJECTION, SOLUTION INTRAMUSCULAR; INTRAVENOUS at 12:25

## 2023-02-02 RX ADMIN — SODIUM CHLORIDE 50 ML/HR: 4.5 INJECTION, SOLUTION INTRAVENOUS at 13:03

## 2023-02-02 RX ADMIN — VASOPRESSIN 1 UNITS: 20 INJECTION INTRAVENOUS at 09:36

## 2023-02-02 RX ADMIN — FENTANYL CITRATE 50 MCG: 50 INJECTION, SOLUTION INTRAMUSCULAR; INTRAVENOUS at 10:49

## 2023-02-02 RX ADMIN — HYDROCODONE BITARTRATE AND ACETAMINOPHEN 1 TABLET: 5; 325 TABLET ORAL at 13:03

## 2023-02-02 RX ADMIN — DEXAMETHASONE SODIUM PHOSPHATE 4 MG: 4 INJECTION, SOLUTION INTRAMUSCULAR; INTRAVENOUS at 08:56

## 2023-02-02 RX ADMIN — Medication 2 G: at 18:08

## 2023-02-02 RX ADMIN — EPHEDRINE SULFATE 5 MG: 50 INJECTION INTRAVENOUS at 09:14

## 2023-02-02 RX ADMIN — PROPOFOL 50 MG: 10 INJECTION, EMULSION INTRAVENOUS at 09:53

## 2023-02-02 RX ADMIN — EPHEDRINE SULFATE 15 MG: 50 INJECTION INTRAVENOUS at 09:31

## 2023-02-02 RX ADMIN — SUGAMMADEX 200 MG: 100 INJECTION, SOLUTION INTRAVENOUS at 09:54

## 2023-02-02 RX ADMIN — KETOROLAC TROMETHAMINE 15 MG: 15 INJECTION, SOLUTION INTRAMUSCULAR; INTRAVENOUS at 15:01

## 2023-02-02 RX ADMIN — CEFAZOLIN SODIUM 2 G: 2 INJECTION, SOLUTION INTRAVENOUS at 08:39

## 2023-02-02 RX ADMIN — TRAZODONE HYDROCHLORIDE 75 MG: 50 TABLET ORAL at 20:50

## 2023-02-02 RX ADMIN — VALSARTAN 160 MG: 160 TABLET, FILM COATED ORAL at 13:03

## 2023-02-02 RX ADMIN — NICARDIPINE HYDROCHLORIDE 5 MG/HR: 25 INJECTION, SOLUTION INTRAVENOUS at 19:23

## 2023-02-02 RX ADMIN — LIDOCAINE HYDROCHLORIDE 0.2 ML: 10 INJECTION, SOLUTION EPIDURAL; INFILTRATION; INTRACAUDAL; PERINEURAL at 07:40

## 2023-02-02 RX ADMIN — Medication 100 MCG: at 09:31

## 2023-02-02 RX ADMIN — FAMOTIDINE 20 MG: 20 TABLET, FILM COATED ORAL at 08:01

## 2023-02-02 RX ADMIN — MORPHINE SULFATE 4 MG: 4 INJECTION, SOLUTION INTRAMUSCULAR; INTRAVENOUS at 16:06

## 2023-02-02 RX ADMIN — PROPOFOL 150 MG: 10 INJECTION, EMULSION INTRAVENOUS at 08:36

## 2023-02-02 RX ADMIN — ONDANSETRON 4 MG: 2 INJECTION INTRAMUSCULAR; INTRAVENOUS at 09:49

## 2023-02-02 RX ADMIN — PROTAMINE SULFATE 75 MG: 10 INJECTION, SOLUTION INTRAVENOUS at 09:32

## 2023-02-02 RX ADMIN — ASPIRIN 81 MG: 81 TABLET, COATED ORAL at 20:50

## 2023-02-02 RX ADMIN — BUSPIRONE HYDROCHLORIDE 15 MG: 15 TABLET ORAL at 13:04

## 2023-02-02 RX ADMIN — HEPARIN SODIUM 7000 UNITS: 1000 INJECTION, SOLUTION INTRAVENOUS; SUBCUTANEOUS at 08:53

## 2023-02-02 RX ADMIN — EPHEDRINE SULFATE 5 MG: 50 INJECTION INTRAVENOUS at 09:16

## 2023-02-02 RX ADMIN — Medication 50 MCG: at 09:10

## 2023-02-02 RX ADMIN — SODIUM CHLORIDE, POTASSIUM CHLORIDE, SODIUM LACTATE AND CALCIUM CHLORIDE 9 ML/HR: 600; 310; 30; 20 INJECTION, SOLUTION INTRAVENOUS at 07:40

## 2023-02-02 RX ADMIN — NORTRIPTYLINE HYDROCHLORIDE 50 MG: 25 CAPSULE ORAL at 20:51

## 2023-02-02 RX ADMIN — ROCURONIUM BROMIDE 50 MG: 10 INJECTION, SOLUTION INTRAVENOUS at 08:36

## 2023-02-02 RX ADMIN — FAMOTIDINE 20 MG: 20 TABLET, FILM COATED ORAL at 20:50

## 2023-02-02 RX ADMIN — SENNOSIDES AND DOCUSATE SODIUM 2 TABLET: 8.6; 5 TABLET ORAL at 20:54

## 2023-02-02 RX ADMIN — Medication 100 MCG: at 09:14

## 2023-02-02 RX ADMIN — MAGNESIUM OXIDE 400 MG (241.3 MG MAGNESIUM) TABLET 400 MG: TABLET at 13:03

## 2023-02-02 RX ADMIN — ESMOLOL HYDROCHLORIDE 100 MG: 10 INJECTION, SOLUTION INTRAVENOUS at 08:36

## 2023-02-02 RX ADMIN — FENTANYL CITRATE 50 MCG: 50 INJECTION, SOLUTION INTRAMUSCULAR; INTRAVENOUS at 10:28

## 2023-02-02 RX ADMIN — SENNOSIDES AND DOCUSATE SODIUM 2 TABLET: 8.6; 5 TABLET ORAL at 13:03

## 2023-02-02 RX ADMIN — Medication 100 MCG: at 09:19

## 2023-02-02 RX ADMIN — FENTANYL CITRATE 50 MCG: 50 INJECTION, SOLUTION INTRAMUSCULAR; INTRAVENOUS at 10:03

## 2023-02-02 RX ADMIN — NICARDIPINE HYDROCHLORIDE 7.5 MG/HR: 25 INJECTION, SOLUTION INTRAVENOUS at 10:20

## 2023-02-02 RX ADMIN — Medication 100 MCG: at 09:08

## 2023-02-02 RX ADMIN — EPHEDRINE SULFATE 10 MG: 50 INJECTION INTRAVENOUS at 09:19

## 2023-02-02 RX ADMIN — LIDOCAINE HYDROCHLORIDE 50 MG: 10 INJECTION, SOLUTION EPIDURAL; INFILTRATION; INTRACAUDAL; PERINEURAL at 08:36

## 2023-02-02 RX ADMIN — FINASTERIDE 5 MG: 5 TABLET, FILM COATED ORAL at 13:03

## 2023-02-02 RX ADMIN — ROSUVASTATIN CALCIUM 40 MG: 20 TABLET, COATED ORAL at 20:50

## 2023-02-02 RX ADMIN — BUSPIRONE HYDROCHLORIDE 15 MG: 15 TABLET ORAL at 20:51

## 2023-02-02 RX ADMIN — NICARDIPINE HYDROCHLORIDE 5 MG/HR: 25 INJECTION, SOLUTION INTRAVENOUS at 14:20

## 2023-02-02 RX ADMIN — ALBUMIN (HUMAN) 50 G: 0.25 INJECTION, SOLUTION INTRAVENOUS at 22:49

## 2023-02-02 NOTE — ANESTHESIA PREPROCEDURE EVALUATION
Anesthesia Evaluation     Patient summary reviewed and Nursing notes reviewed   NPO Solid Status: > 8 hours  NPO Liquid Status: > 8 hours           Airway   Mallampati: II  TM distance: >3 FB  Neck ROM: full  No difficulty expected  Comment: Previous grade 1 with Olivares 2  Dental    (+) partials and upper dentures    Pulmonary - normal exam   (+) a smoker Former, COPD, asthma,shortness of breath, sleep apnea (no cpap),   Cardiovascular     ECG reviewed    (+) hypertension, valvular problems/murmurs murmur, CAD, cardiac stents more than 12 months ago dysrhythmias (PSVT), murmur, PVD, hyperlipidemia,  carotid artery disease carotid bilateral  (-) angina    ROS comment: Interpretation Summary 2019 Echo    Technically limited study.     1.  Global LV systolic function is likely not critically depressed.  Formal regional wall motion assessment cannot be performed.  Borderline increased LV wall thickness with grade 2 diastolic dysfunction and mild to moderate left atrial enlargement.  Right heart chambers are grossly normal but are poorly visualized.     2.  Limited echo and Doppler sampling demonstrate grossly morphologically normal valves with no stenotic lesions, masses, or thrombi.  There is mild to moderate mitral regurgitation.     3.  No pericardial great vessel pathology on limited imaging.     4.  Normal pulmonary pressures.      Neuro/Psych  (+) CVA, psychiatric history Anxiety and Depression,      ROS Comment: H/o subdural after a fall  Spinal cord stimulator  GI/Hepatic/Renal/Endo    (+)  GERD well controlled,  renal disease (BOWEN after heart cath from contrast 2021), diabetes mellitus type 2,     Musculoskeletal     Abdominal    Substance History      OB/GYN          Other                      Anesthesia Plan    ASA 3     general     intravenous induction     Anesthetic plan, risks, benefits, and alternatives have been provided, discussed and informed consent has been obtained with: patient.    Plan discussed  with CRNA.        CODE STATUS:

## 2023-02-02 NOTE — ANESTHESIA PROCEDURE NOTES
Airway  Urgency: elective    Date/Time: 2/2/2023 8:38 AM  Airway not difficult    General Information and Staff    Patient location during procedure: OR  SRNA: Yesi Gipson SRNA  Indications and Patient Condition  Indications for airway management: airway protection    Preoxygenated: yes  MILS not maintained throughout  Mask difficulty assessment: 1 - vent by mask    Final Airway Details  Final airway type: endotracheal airway      Successful airway: ETT  Cuffed: yes   Successful intubation technique: direct laryngoscopy  Facilitating devices/methods: anterior pressure/BURP  Endotracheal tube insertion site: oral  Blade: Sophy  Blade size: 3  ETT size (mm): 7.5  Cormack-Lehane Classification: grade IIb - view of arytenoids or posterior of glottis only  Placement verified by: chest auscultation and capnometry   Measured from: lips  ETT/EBT  to lips (cm): 20  Number of attempts at approach: 1  Assessment: lips, teeth, and gum same as pre-op and atraumatic intubation    Additional Comments  Negative epigastric sounds, Breath sound equal bilaterally with symmetric chest rise and fall

## 2023-02-02 NOTE — OP NOTE
Operative Report    Preop Diagnosis: Peripheral arterial vascular disease with an occluded right superficial femoral artery and claudication.    Results of STS risk score discussed with patient and family  Smoking cessation was not discussed as patient discontinued smoking nearly 18 years ago.  He denies advanced directive    Postoperative Diagnosis: Same      Procedure: Right femoral artery to above-knee popliteal bypass with 8 mm PTFE propatent graft.  Right common femoral artery endarterectomy with specimen to pathology.        Surgeons: Farhat Mann MD      Assistant: Dee Dee Wheeler PA-C    The Assistant provided services of suctioning, irrigation and retraction.  Also, assisted in suture closure of parts of the skin incision.      Indication: Patient was referred with an occluded right superficial femoral artery and claudication he had previously undergone a left femoral to popliteal bypass with significant improvement in his symptoms.  He was well apprised of the risk of bleeding infection graft failure and infection and agreed to proceed.        Description: Supine position sterile prep and drape.  Antibiotics given general endotracheal anesthesia.  Right common femoral artery was identified by cutdown as well as the right above-knee popliteal artery identified by cutdown in the abductor canal.  An 8 mm ringed PTFE propatent graft was tunneled anatomically and the patient was heparinized.  The right common femoral artery was an extensively calcified vessel it was opened and endarterectomized to a smooth surface.  The plaque was sent to pathology.  The 8 mm PTFE graft was fashioned in a cobra head configuration and sutured to the right femoral arteriotomy with 6-0 Prolene suture.  We then I turned our attention distally and fashioned the graft to the distal popliteal which was an extensively calcified vessel.  This anastomosis was also performed with 6-0 Prolene.  The graft was flushed free of air and  debris with a good quality Doppler signal distal to the popliteal anastomoses.  It was a good quality to achieve hemostasis pro gel vascular sealant was also used to aid in hemostasis.  The incisions were each closed multiple layers of Vicryl suture and the sponge and needle count reported as correct.  Estimated blood loss less than 150 mL and there was no apparent early complications.      Please note that portions of this note were completed with a voice recognition program. Efforts were made to edit the dictations, but occasionally words are mistranscribed.

## 2023-02-02 NOTE — H&P
Pre-Op H&P  Mike Gusman  [id number]  1955      Chief complaint: Leg pain      Subjective:  Patient is a 67 y.o.male presents for scheduled surgery by Dr. Mann. He anticipates a FEMORAL POPLITEAL BYPASS, ABOVE KNEE RIGHT today. He reports occasional RLE pain and discoloration. No non-healing ulcers. He takes aspirin and plavix.       Review of Systems:  Constitutional-- No fever, chills or sweats. + fatigue.  CV-- No chest pain, palpitation or syncope. +HTN, HLD, CAD, PVD  Resp-- No cough, hemoptysis. +SOB, COPD, ADRIANA on cpap   Skin--No rashes or lesions      Allergies: No Known Allergies      Home Meds:  Medications Prior to Admission   Medication Sig Dispense Refill Last Dose   • Aspirin Low Dose 81 MG EC tablet TAKE 1 TABLET DAILY (Patient taking differently: Take 81 mg by mouth Every Night.) 90 tablet 3 2/1/2023 at 2130   • Breo Ellipta 200-25 MCG/INH inhaler Inhale 1 puff Daily.   2/2/2023   • busPIRone (BUSPAR) 15 MG tablet Take 15 mg by mouth 2 (two) times a day. 1.5 tabs BID   2/1/2023   • cetirizine (zyrTEC) 10 MG tablet Take 10 mg by mouth Daily.   2/1/2023   • cholecalciferol (VITAMIN D3) 25 MCG (1000 UT) tablet Take 1,000 Units by mouth Daily.   2/1/2023   • clopidogrel (PLAVIX) 75 MG tablet TAKE 1 TABLET DAILY (Patient taking differently: Take 75 mg by mouth Daily.) 90 tablet 3 2/1/2023   • Ertugliflozin-metFORMIN HCl (Segluromet) 7.5-1000 MG tablet Take 1 tablet/day by mouth Daily.   2/1/2023   • isosorbide mononitrate (IMDUR) 30 MG 24 hr tablet TAKE ONE-HALF (1/2) TABLET EVERY NIGHT (Patient taking differently: 15 mg Every Night.) 45 tablet 3 2/1/2023   • magnesium oxide (MAGOX) 400 (241.3 MG) MG tablet tablet Take 400 mg by mouth daily.   2/1/2023   • metoprolol succinate XL (TOPROL-XL) 25 MG 24 hr tablet Take 25 mg by mouth Every Morning.   2/2/2023   • multivitamin with minerals tablet tablet Take 1 tablet by mouth Daily.   2/1/2023   • nortriptyline (PAMELOR) 50 MG capsule Take 50  mg by mouth Every Night.   2/1/2023   • Omega 3 1000 MG capsule Take 2,000 mg by mouth Daily.   2/1/2023   • omeprazole (PriLOSEC) 40 MG capsule Take 40 mg by mouth daily.   2/1/2023   • rosuvastatin (CRESTOR) 40 MG tablet TAKE 1 TABLET EVERY NIGHT (Patient taking differently: Take 40 mg by mouth Daily.) 90 tablet 3 2/1/2023   • traZODone (DESYREL) 50 MG tablet Take 75 mg by mouth Every Night.   2/1/2023   • valsartan (DIOVAN) 160 MG tablet Take 160 mg by mouth Daily.   2/1/2023   • Vitamin E 180 MG capsule Take 400 mg by mouth Daily.   2/1/2023   • benzonatate (TESSALON) 100 MG capsule Take 100 mg by mouth 3 (Three) Times a Day As Needed for Cough.   1/26/2023   • finasteride (PROSCAR) 5 MG tablet Take 5 mg by mouth daily.   Unknown   • nitroglycerin (NITROSTAT) 0.4 MG SL tablet 1 under the tongue as needed for angina, may repeat q5mins for up three doses 30 tablet 3 More than a month         PMH:   Past Medical History:   Diagnosis Date   • Anxiety and depression    • Asthma    • COPD (chronic obstructive pulmonary disease) ([facility])    • Coronary artery disease    • COVID-19 vaccine administered 1st - 03/08/201 2nd - 04/07/2021  [place] ( Booster 12/21/2021 ) [employer]   • Diabetes mellitus ([facility])    • GERD (gastroesophageal reflux disease)    • Hyperlipidemia    • Hypertension    • Kidney failure    • Peripheral vascular disease ([facility])    • Renal failure    • Sleep apnea     waiting for a new machine 11/2020   • Stroke ([facility]) 2019    n o residual     PSH:    Past Surgical History:   Procedure Laterality Date   • CARDIAC CATHETERIZATION      Stent x1   • COLONOSCOPY     • FEMORAL POPLITEAL BYPASS Left 11/12/2021    Procedure: FEMORAL POPLITEAL BYPASS LEFT, ABOVE KNEE;  Surgeon: Farhat Mann MD;  Location: [facility];  Service: Vascular;  Laterality: Left;   • LEG SURGERY Right     Had rods/screws, but were removed when he had Right hip replacement   • LUMBAR FUSION     • SPINAL CORD STIMULATOR IMPLANT     • TOTAL  "HIP ARTHROPLASTY Right        Immunization History:  Influenza:   Pneumococcal: No  Tetanus: UTD  Covid x3:     Social History:   Tobacco:   Social History     Tobacco Use   Smoking Status Former   • Packs/day: 1.50   • Years: 50.00   • Pack years: 75.00   • Types: Cigarettes   • Quit date:    • Years since quittin.0   Smokeless Tobacco Never      Alcohol:     Social History     Substance and Sexual Activity   Alcohol Use No         Physical Exam:/75 (BP Location: Right arm, Patient Position: Sitting)   Pulse 73   Temp 97.9 °F (36.6 °C) (Temporal)   Resp 18   Ht 166.4 cm (65.5\")   Wt 73 kg (161 lb)   SpO2 99%   BMI 26.38 kg/m²       General Appearance:    Alert, cooperative, no distress, appears stated age   Head:    Normocephalic, without obvious abnormality, atraumatic   Lungs:     Clear to auscultation bilaterally, respirations unlabored    Heart:   Regular rate and rhythm, S1 and S2 normal    Abdomen:    Soft without tenderness   Extremities:   Extremities normal, atraumatic, no cyanosis or edema   Skin:   Skin color, texture, turgor normal, no rashes or lesions   Neurologic:   Grossly intact     Results Review:     LABS:  Lab Results   Component Value Date    WBC 12.51 (H) 2023    HGB 15.3 2023    HCT 46.5 2023    MCV 89.1 2023     2023    NEUTROABS 12.25 (H) 2021    GLUCOSE 110 (H) 2023    BUN 19 2023    CREATININE 1.00 2023    EGFRIFNONA 76 2021     2023    K 4.6 2023     2023    CO2 22.0 2023    MG 1.8 2020    CALCIUM 10.5 2023       RADIOLOGY:  Imaging Results (Last 72 Hours)     ** No results found for the last 72 hours. **          I reviewed the patient's new clinical results.    Cancer Staging (if applicable)  Cancer Patient: __ yes __no __unknown; If yes, clinical stage T:__ N:__M:__, stage group or __N/A      Impression: Left fem pop bypass 21; " peripheral vascular disease       Plan: FEMORAL POPLITEAL BYPASS, ABOVE KNEE RIGHT  Agree the above.  Plan for right sided femoral to popliteal bypass grafting.  Patient has been well apprised that there is a risk of bleeding infection limb loss death graft failure and graft infection.  He also understands that although I performed surgery on his contralateral leg previously and he had a good result from that, that in no way guarantees a satisfactory result this time as there is always risks and involved he understands  ELIZABETH Hendrickson   2/2/2023   08:15 EST

## 2023-02-03 LAB
ANION GAP SERPL CALCULATED.3IONS-SCNC: 11 MMOL/L (ref 5–15)
BASOPHILS # BLD AUTO: 0.03 10*3/MM3 (ref 0–0.2)
BASOPHILS NFR BLD AUTO: 0.2 % (ref 0–1.5)
BUN SERPL-MCNC: 16 MG/DL (ref 8–23)
BUN/CREAT SERPL: 16.3 (ref 7–25)
CALCIUM SPEC-SCNC: 9.8 MG/DL (ref 8.6–10.5)
CHLORIDE SERPL-SCNC: 103 MMOL/L (ref 98–107)
CO2 SERPL-SCNC: 21 MMOL/L (ref 22–29)
CREAT SERPL-MCNC: 0.98 MG/DL (ref 0.76–1.27)
CYTO UR: NORMAL
DEPRECATED RDW RBC AUTO: 44.5 FL (ref 37–54)
EGFRCR SERPLBLD CKD-EPI 2021: 83.5 ML/MIN/1.73
EOSINOPHIL # BLD AUTO: 0.06 10*3/MM3 (ref 0–0.4)
EOSINOPHIL NFR BLD AUTO: 0.4 % (ref 0.3–6.2)
ERYTHROCYTE [DISTWIDTH] IN BLOOD BY AUTOMATED COUNT: 13.5 % (ref 12.3–15.4)
GLUCOSE SERPL-MCNC: 159 MG/DL (ref 65–99)
HCT VFR BLD AUTO: 35.4 % (ref 37.5–51)
HGB BLD-MCNC: 11.8 G/DL (ref 13–17.7)
IMM GRANULOCYTES # BLD AUTO: 0.08 10*3/MM3 (ref 0–0.05)
IMM GRANULOCYTES NFR BLD AUTO: 0.5 % (ref 0–0.5)
LAB AP CASE REPORT: NORMAL
LAB AP CLINICAL INFORMATION: NORMAL
LYMPHOCYTES # BLD AUTO: 2.1 10*3/MM3 (ref 0.7–3.1)
LYMPHOCYTES NFR BLD AUTO: 12.4 % (ref 19.6–45.3)
MCH RBC QN AUTO: 30.2 PG (ref 26.6–33)
MCHC RBC AUTO-ENTMCNC: 33.3 G/DL (ref 31.5–35.7)
MCV RBC AUTO: 90.5 FL (ref 79–97)
MONOCYTES # BLD AUTO: 0.72 10*3/MM3 (ref 0.1–0.9)
MONOCYTES NFR BLD AUTO: 4.2 % (ref 5–12)
NEUTROPHILS NFR BLD AUTO: 13.98 10*3/MM3 (ref 1.7–7)
NEUTROPHILS NFR BLD AUTO: 82.3 % (ref 42.7–76)
NRBC BLD AUTO-RTO: 0 /100 WBC (ref 0–0.2)
PATH REPORT.FINAL DX SPEC: NORMAL
PATH REPORT.GROSS SPEC: NORMAL
PLATELET # BLD AUTO: 166 10*3/MM3 (ref 140–450)
PMV BLD AUTO: 10.7 FL (ref 6–12)
POTASSIUM SERPL-SCNC: 4.4 MMOL/L (ref 3.5–5.2)
RBC # BLD AUTO: 3.91 10*6/MM3 (ref 4.14–5.8)
SODIUM SERPL-SCNC: 135 MMOL/L (ref 136–145)
WBC NRBC COR # BLD: 16.97 10*3/MM3 (ref 3.4–10.8)

## 2023-02-03 PROCEDURE — 94761 N-INVAS EAR/PLS OXIMETRY MLT: CPT

## 2023-02-03 PROCEDURE — 25010000002 MORPHINE PER 10 MG: Performed by: THORACIC SURGERY (CARDIOTHORACIC VASCULAR SURGERY)

## 2023-02-03 PROCEDURE — 99024 POSTOP FOLLOW-UP VISIT: CPT | Performed by: THORACIC SURGERY (CARDIOTHORACIC VASCULAR SURGERY)

## 2023-02-03 PROCEDURE — 94799 UNLISTED PULMONARY SVC/PX: CPT

## 2023-02-03 PROCEDURE — 85025 COMPLETE CBC W/AUTO DIFF WBC: CPT | Performed by: PHYSICIAN ASSISTANT

## 2023-02-03 PROCEDURE — 80048 BASIC METABOLIC PNL TOTAL CA: CPT | Performed by: PHYSICIAN ASSISTANT

## 2023-02-03 RX ADMIN — BUDESONIDE AND FORMOTEROL FUMARATE DIHYDRATE 2 PUFF: 160; 4.5 AEROSOL RESPIRATORY (INHALATION) at 21:05

## 2023-02-03 RX ADMIN — VALSARTAN 160 MG: 160 TABLET, FILM COATED ORAL at 08:18

## 2023-02-03 RX ADMIN — TRAZODONE HYDROCHLORIDE 75 MG: 50 TABLET ORAL at 20:43

## 2023-02-03 RX ADMIN — ROSUVASTATIN CALCIUM 40 MG: 20 TABLET, COATED ORAL at 20:43

## 2023-02-03 RX ADMIN — BUSPIRONE HYDROCHLORIDE 15 MG: 15 TABLET ORAL at 08:18

## 2023-02-03 RX ADMIN — HYDROCODONE BITARTRATE AND ACETAMINOPHEN 1 TABLET: 7.5; 325 TABLET ORAL at 21:40

## 2023-02-03 RX ADMIN — FAMOTIDINE 20 MG: 20 TABLET, FILM COATED ORAL at 20:43

## 2023-02-03 RX ADMIN — BISACODYL 5 MG: 5 TABLET, COATED ORAL at 21:40

## 2023-02-03 RX ADMIN — FINASTERIDE 5 MG: 5 TABLET, FILM COATED ORAL at 08:19

## 2023-02-03 RX ADMIN — ISOSORBIDE MONONITRATE 15 MG: 30 TABLET, EXTENDED RELEASE ORAL at 20:43

## 2023-02-03 RX ADMIN — HYDROCODONE BITARTRATE AND ACETAMINOPHEN 1 TABLET: 5; 325 TABLET ORAL at 15:55

## 2023-02-03 RX ADMIN — BUSPIRONE HYDROCHLORIDE 15 MG: 15 TABLET ORAL at 20:43

## 2023-02-03 RX ADMIN — NORTRIPTYLINE HYDROCHLORIDE 50 MG: 25 CAPSULE ORAL at 20:44

## 2023-02-03 RX ADMIN — BUDESONIDE AND FORMOTEROL FUMARATE DIHYDRATE 2 PUFF: 160; 4.5 AEROSOL RESPIRATORY (INHALATION) at 07:53

## 2023-02-03 RX ADMIN — CLOPIDOGREL BISULFATE 75 MG: 75 TABLET ORAL at 08:18

## 2023-02-03 RX ADMIN — ASPIRIN 81 MG: 81 TABLET, COATED ORAL at 20:43

## 2023-02-03 RX ADMIN — MAGNESIUM OXIDE 400 MG (241.3 MG MAGNESIUM) TABLET 400 MG: TABLET at 08:18

## 2023-02-03 RX ADMIN — SENNOSIDES AND DOCUSATE SODIUM 2 TABLET: 8.6; 5 TABLET ORAL at 08:18

## 2023-02-03 RX ADMIN — FAMOTIDINE 20 MG: 20 TABLET, FILM COATED ORAL at 08:18

## 2023-02-03 RX ADMIN — SENNOSIDES AND DOCUSATE SODIUM 2 TABLET: 8.6; 5 TABLET ORAL at 20:43

## 2023-02-03 RX ADMIN — METOPROLOL SUCCINATE 25 MG: 25 TABLET, EXTENDED RELEASE ORAL at 08:18

## 2023-02-03 RX ADMIN — MORPHINE SULFATE 2 MG: 2 INJECTION, SOLUTION INTRAMUSCULAR; INTRAVENOUS at 06:35

## 2023-02-03 RX ADMIN — Medication 2 G: at 00:55

## 2023-02-03 NOTE — PROGRESS NOTES
CTS Progress Note       LOS: 1 day   Patient Care Team:  Mirza Grubbs Jr., MD as PCP - General (Family Medicine)  Daniel Ruiz DO (Internal Medicine)    Chief Complaint: PAD (peripheral artery disease) ([facility])    Vital Signs:  Temp:  [97.1 °F (36.2 °C)-99 °F (37.2 °C)] 97.3 °F (36.3 °C)  Heart Rate:  [] 73  Resp:  [16-20] 18  BP: ()/(46-76) 107/55    Physical Exam: Foot warm and viable       Results:   Results from last 7 days   Lab Units 02/02/23  2235 02/01/23  1231   WBC 10*3/mm3  --  12.51*   HEMOGLOBIN g/dL 12.3* 15.3   HEMATOCRIT % 36.8* 46.5   PLATELETS 10*3/mm3  --  224     Results from last 7 days   Lab Units 02/01/23  1231   SODIUM mmol/L 137   POTASSIUM mmol/L 4.6   CHLORIDE mmol/L 102   CO2 mmol/L 22.0   BUN mg/dL 19   CREATININE mg/dL 1.00   GLUCOSE mg/dL 110*   CALCIUM mg/dL 10.5           Imaging Results (Last 24 Hours)     ** No results found for the last 24 hours. **          Assessment      L Fem Pop Bypass 11/12/2021    PVD (peripheral vascular disease) ([facility])        Plan   May transfer to regular telemetry  Tomorrow begin Plavix 75 mg p.o. daily  May get out of bed with assistance but do not sit at 90 degrees      Please note that portions of this note were completed with a voice recognition program. Efforts were made to edit the dictations, but occasionally words are mistranscribed.    Farhat Mann MD  02/03/23  06:44 EST
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
## 2023-02-03 NOTE — PLAN OF CARE
Goal Outcome Evaluation:  Pt rested well over night with 0-1 rated pain through out the shift. Surgical site dressings are still dry and intact and site are still soft. Pt did have a drop in BP at start of shift with Systolic BP going into the mid to high 80s and was given albumin per orders. Pt remained in bed for bed rest all night with no complaints

## 2023-02-03 NOTE — PLAN OF CARE
Goal Outcome Evaluation:               Uneventful shift. VSS. Sites clean, dry, intact. Pulses dopplered. Cardene drip at 5 mg/hr. .45% NS running. PRN morphine and norco.

## 2023-02-03 NOTE — PROGRESS NOTES
CTS Progress Note      POD #2 s/p Right Femoropopliteal Bypass      Subjective  No acute events overnight.  Doing well without complaints and denies any lower extremity pain, claudication or pelvic or abdominal pain      Objective    Physical Exam:   Vital Signs   Temp:  [98.1 °F (36.7 °C)-99.5 °F (37.5 °C)] 98.9 °F (37.2 °C)  Heart Rate:  [73-85] 75  Resp:  [14-18] 16  BP: (106-137)/(53-68) 111/63   GEN: NAD   RESP: Respirations even and unlabored and clear to auscultation bilaterally no wheezes, rales or rhonchi    CV: Regular rate and rhythm no murmurs, rubs or gallops   ABD: Soft, nontender/nondistended with normoactive bowel sounds    EXT: Warm with good color well-perfused with no bilateral lower extremity edema and 1+ dorsalis pedis and posterior tibialis pulses bilaterally   INT: Incision dressings c/d/i        Intake/Output Summary (Last 24 hours) at 2/4/2023 0732  Last data filed at 2/4/2023 0600  Gross per 24 hour   Intake 1001 ml   Output 3200 ml   Net -2199 ml     Results     Results from last 7 days   Lab Units 02/03/23  0925   WBC 10*3/mm3 16.97*   HEMOGLOBIN g/dL 11.8*   HEMATOCRIT % 35.4*   PLATELETS 10*3/mm3 166     Results from last 7 days   Lab Units 02/03/23  0925   SODIUM mmol/L 135*   POTASSIUM mmol/L 4.4   CHLORIDE mmol/L 103   CO2 mmol/L 21.0*   BUN mg/dL 16   CREATININE mg/dL 0.98   GLUCOSE mg/dL 159*   CALCIUM mg/dL 9.8     Results from last 7 days   Lab Units 02/01/23  1231   INR  0.95   APTT seconds 29.4         Assessment & Plan       L Fem Pop Bypass 11/12/2021    PVD (peripheral vascular disease) ([facility])    POD #2 s/p Right Femoropopliteal Bypass    Plan   Aggressive pulmonary toilet  Ambulate  PT/OT  ASA and Plavix  Patient to remove incisional dressings on Wednesday, 2/8/2023  DC home    JOHNNY Scales  02/04/23  07:32 EST

## 2023-02-04 ENCOUNTER — READMISSION MANAGEMENT (OUTPATIENT)
Dept: CALL CENTER | Facility: HOSPITAL | Age: 68
End: 2023-02-04
Payer: MEDICARE

## 2023-02-04 VITALS
DIASTOLIC BLOOD PRESSURE: 69 MMHG | RESPIRATION RATE: 16 BRPM | WEIGHT: 161 LBS | HEIGHT: 66 IN | BODY MASS INDEX: 25.88 KG/M2 | TEMPERATURE: 98.9 F | SYSTOLIC BLOOD PRESSURE: 135 MMHG | OXYGEN SATURATION: 95 % | HEART RATE: 85 BPM

## 2023-02-04 PROCEDURE — 94664 DEMO&/EVAL PT USE INHALER: CPT

## 2023-02-04 PROCEDURE — 94799 UNLISTED PULMONARY SVC/PX: CPT

## 2023-02-04 PROCEDURE — 99024 POSTOP FOLLOW-UP VISIT: CPT | Performed by: PHYSICIAN ASSISTANT

## 2023-02-04 RX ORDER — HYDROCODONE BITARTRATE AND ACETAMINOPHEN 7.5; 325 MG/1; MG/1
1 TABLET ORAL EVERY 6 HOURS PRN
Qty: 20 TABLET | Refills: 0 | Status: SHIPPED | OUTPATIENT
Start: 2023-02-04 | End: 2023-02-09

## 2023-02-04 RX ORDER — HYDROCODONE BITARTRATE AND ACETAMINOPHEN 7.5; 325 MG/1; MG/1
1 TABLET ORAL EVERY 6 HOURS PRN
Qty: 30 TABLET | Refills: 0 | Status: SHIPPED | OUTPATIENT
Start: 2023-02-04 | End: 2023-02-04 | Stop reason: HOSPADM

## 2023-02-04 RX ADMIN — MAGNESIUM OXIDE 400 MG (241.3 MG MAGNESIUM) TABLET 400 MG: TABLET at 09:41

## 2023-02-04 RX ADMIN — FINASTERIDE 5 MG: 5 TABLET, FILM COATED ORAL at 09:41

## 2023-02-04 RX ADMIN — BUSPIRONE HYDROCHLORIDE 15 MG: 15 TABLET ORAL at 09:41

## 2023-02-04 RX ADMIN — FAMOTIDINE 20 MG: 20 TABLET, FILM COATED ORAL at 09:41

## 2023-02-04 RX ADMIN — SENNOSIDES AND DOCUSATE SODIUM 2 TABLET: 8.6; 5 TABLET ORAL at 09:41

## 2023-02-04 RX ADMIN — VALSARTAN 160 MG: 160 TABLET, FILM COATED ORAL at 09:41

## 2023-02-04 RX ADMIN — BUDESONIDE AND FORMOTEROL FUMARATE DIHYDRATE 2 PUFF: 160; 4.5 AEROSOL RESPIRATORY (INHALATION) at 08:37

## 2023-02-04 RX ADMIN — HYDROCODONE BITARTRATE AND ACETAMINOPHEN 1 TABLET: 7.5; 325 TABLET ORAL at 09:41

## 2023-02-04 RX ADMIN — CLOPIDOGREL BISULFATE 75 MG: 75 TABLET ORAL at 09:41

## 2023-02-04 RX ADMIN — METOPROLOL SUCCINATE 25 MG: 25 TABLET, EXTENDED RELEASE ORAL at 06:05

## 2023-02-04 NOTE — PLAN OF CARE
Goal Outcome Evaluation:              Outcome Evaluation: Pt axo, vss, room air, walked 3x today, slight 5/10 pain relieved with norco, groin and knee site soft to touch dressing still in place, pulses present with doppler on bilateral.

## 2023-02-04 NOTE — PLAN OF CARE
Problem: Adult Inpatient Plan of Care  Goal: Plan of Care Review  Outcome: Met  Goal: Patient-Specific Goal (Individualized)  Outcome: Met  Goal: Absence of Hospital-Acquired Illness or Injury  Outcome: Met  Goal: Optimal Comfort and Wellbeing  Outcome: Met  Goal: Readiness for Transition of Care  Outcome: Met     Problem: Asthma Comorbidity  Goal: Maintenance of Asthma Control  Outcome: Met     Problem: Behavioral Health Comorbidity  Goal: Maintenance of Behavioral Health Symptom Control  Outcome: Met     Problem: COPD (Chronic Obstructive Pulmonary Disease) Comorbidity  Goal: Maintenance of COPD Symptom Control  Outcome: Met   Goal Outcome Evaluation:

## 2023-02-05 LAB
BH BB BLOOD EXPIRATION DATE: NORMAL
BH BB BLOOD EXPIRATION DATE: NORMAL
BH BB BLOOD TYPE BARCODE: 6200
BH BB BLOOD TYPE BARCODE: 6200
BH BB DISPENSE STATUS: NORMAL
BH BB DISPENSE STATUS: NORMAL
BH BB PRODUCT CODE: NORMAL
BH BB PRODUCT CODE: NORMAL
BH BB UNIT NUMBER: NORMAL
BH BB UNIT NUMBER: NORMAL
CROSSMATCH INTERPRETATION: NORMAL
CROSSMATCH INTERPRETATION: NORMAL
UNIT  ABO: NORMAL
UNIT  ABO: NORMAL
UNIT  RH: NORMAL
UNIT  RH: NORMAL

## 2023-02-05 NOTE — OUTREACH NOTE
Prep Survey    Flowsheet Row Responses   [facility] facility patient discharged from? [place]   Is LACE score < 7 ? No   Eligibility Readm Mgmt   Discharge diagnosis FEMORAL POPLITEAL BYPASS   Does the patient have one of the following disease processes/diagnoses(primary or secondary)? General Surgery   Does the patient have Home health ordered? No   Is there a DME ordered? No   Prep survey completed? Yes          ANDREW WRIGHT - Registered Nurse

## 2023-02-06 NOTE — CASE MANAGEMENT/SOCIAL WORK
Case Management Discharge Note      Final Note: Patient discharged home.  No needs noted by CM.         Selected Continued Care - Discharged on 2/4/2023 Admission date: 2/2/2023 - Discharge disposition: Home or Self Care    Destination    No services have been selected for the patient.              Durable Medical Equipment    No services have been selected for the patient.              Dialysis/Infusion    No services have been selected for the patient.              Home Medical Care    No services have been selected for the patient.              Therapy    No services have been selected for the patient.              Community Resources    No services have been selected for the patient.              Community & DME    No services have been selected for the patient.                       Final Discharge Disposition Code: 01 - home or self-care

## 2023-02-08 ENCOUNTER — READMISSION MANAGEMENT (OUTPATIENT)
Dept: CALL CENTER | Facility: HOSPITAL | Age: 68
End: 2023-02-08
Payer: MEDICARE

## 2023-02-13 NOTE — DISCHARGE SUMMARY
CTS Discharge Summary    Patient Care Team:  Mirza Grubbs Jr., MD as PCP - General (Family Medicine)  Daniel Ruiz DO (Internal Medicine)      Date of Admission: 2/2/2023  5:22 AM  Date of Discharge: 2/4/2023    Discharge Diagnosis  Past Medical History:   Diagnosis Date   • Anxiety and depression    • Asthma    • COPD (chronic obstructive pulmonary disease) ([facility])    • Coronary artery disease    • COVID-19 vaccine administered 1st - 03/08/201    2nd - 04/07/2021  Monderna ( Booster 12/21/2021 ) [employer]   • Diabetes mellitus ([facility])    • GERD (gastroesophageal reflux disease)    • Hyperlipidemia    • Hypertension    • Kidney failure    • Peripheral vascular disease ([facility])    • Renal failure    • Sleep apnea     waiting for a new machine 11/2020   • Stroke ([facility]) 2019    n o residual         L Fem Pop Bypass 11/12/2021    PVD (peripheral vascular disease) ([facility])      History of Present Illness  Patient is a 67 y.o.male presents for scheduled surgery by Dr. Mann. He anticipates a right above-knee femoropopliteal bypass today. He reports occasional RLE pain and discoloration. No non-healing ulcers. He takes aspirin and plavix.       Hospital Course  The patient is a 67-year-old male who was admitted on 2/2/2023 and underwent a right femoral above-knee popliteal bypass with an 8 mm PTFE propatent graft performed by Dr. Farhat Mann.  The patient tolerated the procedure well without any immediate complications and was transferred to the intensive care unit in stable condition.  On postoperative day #1, the patient was doing well having previously been extubated. The patient was hemodynamically stable and ready for transfer to telemetry.  On postoperative day #2, the patient had been ambulating independently, voiding spontaneously had met criteria discharge and was discharged home without difficulty.    Procedures Performed  Procedure(s):  FEMORAL POPLITEAL BYPASS, ABOVE KNEE RIGHT       Consults:    Intensivist    Discharge Medications     Discharge Medications      Changes to Medications      Instructions Start Date   Aspirin Low Dose 81 MG EC tablet  Generic drug: aspirin  What changed:   · how much to take  · when to take this   TAKE 1 TABLET DAILY      isosorbide mononitrate 30 MG 24 hr tablet  Commonly known as: IMDUR  What changed: how to take this   TAKE ONE-HALF (1/2) TABLET EVERY NIGHT      rosuvastatin 40 MG tablet  Commonly known as: CRESTOR  What changed: when to take this   TAKE 1 TABLET EVERY NIGHT         Continue These Medications      Instructions Start Date   benzonatate 100 MG capsule  Commonly known as: TESSALON   100 mg, Oral, 3 Times Daily PRN      Breo Ellipta 200-25 MCG/ACT inhaler  Generic drug: Fluticasone Furoate-Vilanterol   1 puff, Inhalation, Daily - RT      busPIRone 15 MG tablet  Commonly known as: BUSPAR   15 mg, Oral, 2 times daily, 1.5 tabs BID      cetirizine 10 MG tablet  Commonly known as: zyrTEC   10 mg, Oral, Daily      cholecalciferol 25 MCG (1000 UT) tablet  Commonly known as: VITAMIN D3   1,000 Units, Oral, Daily      clopidogrel 75 MG tablet  Commonly known as: PLAVIX   TAKE 1 TABLET DAILY      finasteride 5 MG tablet  Commonly known as: PROSCAR   5 mg, Oral, Daily      magnesium oxide 400 (241.3 Mg) MG tablet tablet  Commonly known as: MAGOX   400 mg, Oral, Daily      metoprolol succinate XL 25 MG 24 hr tablet  Commonly known as: TOPROL-XL   25 mg, Oral, Every Morning      multivitamin with minerals tablet tablet   1 tablet, Oral, Daily      nitroglycerin 0.4 MG SL tablet  Commonly known as: NITROSTAT   1 under the tongue as needed for angina, may repeat q5mins for up three doses      nortriptyline 50 MG capsule  Commonly known as: PAMELOR   50 mg, Oral, Nightly      Omega 3 1000 MG capsule   2,000 mg, Oral, Daily      omeprazole 40 MG capsule  Commonly known as: priLOSEC   40 mg, Oral, Daily      Segluromet 7.5-1000 MG tablet  Generic drug: Ertugliflozin-metFORMIN  HCl   1 tablet/day, Oral, Daily      traZODone 50 MG tablet  Commonly known as: DESYREL   75 mg, Oral, Nightly      valsartan 160 MG tablet  Commonly known as: DIOVAN   160 mg, Oral, Daily      Vitamin E 180 MG capsule   400 mg, Oral, Daily         ASK your doctor about these medications      Instructions Start Date   HYDROcodone-acetaminophen 7.5-325 MG per tablet  Commonly known as: NORCO  Ask about: Should I take this medication?   1 tablet, Oral, Every 6 Hours PRN             Discharge Diet:   Diet Instructions     Diet: Cardiac, Consistent Carbohydrate      Discharge Diet:  Cardiac  Consistent Carbohydrate             Activity at Discharge:   Activity Instructions     Activity as Tolerated      Other Activity Instructions      Activity Instructions: No lifting greater than 10 pounds and no driving until follow-up appointment.  No driving while taking narcotics.        Do not drive while taking narcotics    Wound Care:  Monitor surgical wounds daily. Keep incisons clean and dry.   Call CT Surgery office [phone]  with any questions or concerns, specifically let them know of increased redness, drainage, or opening up of incision.       Follow-up Appointments  Future Appointments   Date Time Provider Department Center   3/2/2023 10:30 AM Aleja Fernández APRN [facility]   4/19/2023  9:15 AM Ericka Gardner APRN [facility]  02/13/23  08:35 EST

## 2023-03-01 NOTE — PROGRESS NOTES
[facility] Cardiothoracic Surgery Office Follow Up Note     Date of Encounter: 2023     Name: Mike Gusman  : 1955     Referred By: No ref. provider found  PCP: Mirza Grubbs Jr., MD    Chief Complaint:    Chief Complaint   Patient presents with   • Post-op     Hospital follow up s/p right fem-pop bypass 23.       Subjective      History of Present Illness:    Mike Gusman is a 67 y.o. male s/p right femoral above-knee popliteal bypass per Dr. Mann utilizing 8 mm PTFE propatent graft on 2023. PMH: former smoker, HTN, HLD on statin therapy, ADRIANA with CPAP noncompliance, COPD, spinal cord stimulator, type II DM (A1c 7.0), PSVT, CAD s/p stent on 2021 with Dr. Nelson, carotid artery stenosis, PVD s/p remote stenting to left iliac and left femoral-popliteal bypass on 2021 also per Dr. Mann. CT home POD #2. No postoperative complications.  No readmissions or ER visits.    Patient states his lungs are the biggest barrier to walking but he walks to his barn and back at least twice a day without claudication.      Review of Systems:  Review of Systems   Constitutional: Negative. Negative for chills, decreased appetite, fever and malaise/fatigue.   Cardiovascular: Positive for dyspnea on exertion. Negative for chest pain, claudication, irregular heartbeat, leg swelling, near-syncope, orthopnea, palpitations and syncope.   Respiratory: Positive for cough and shortness of breath. Negative for hemoptysis, sputum production and wheezing.    Hematologic/Lymphatic: Negative for bleeding problem. Bruises/bleeds easily.   Skin: Negative.  Negative for color change, poor wound healing and rash.   Musculoskeletal: Positive for back pain. Negative for falls and joint pain.   Gastrointestinal: Negative.  Negative for abdominal pain, constipation, diarrhea, nausea and vomiting.   Neurological: Negative.  Negative for focal weakness, numbness and paresthesias.    Psychiatric/Behavioral: Negative.  Negative for depression. The patient does not have insomnia.        I have reviewed the following portions of the patient's history: allergies, current medications, past family history, past medical history, past social history, past surgical history, problem list and ROS and confirm it's accurate.    Allergies:  No Known Allergies    Medications:      Current Outpatient Medications:   •  Aspirin Low Dose 81 MG EC tablet, TAKE 1 TABLET DAILY (Patient taking differently: Take 1 tablet by mouth Every Night.), Disp: 90 tablet, Rfl: 3  •  benzonatate (TESSALON) 100 MG capsule, Take 1 capsule by mouth 3 (Three) Times a Day As Needed for Cough., Disp: , Rfl:   •  Breo Ellipta 200-25 MCG/INH inhaler, Inhale 1 puff Daily., Disp: , Rfl:   •  busPIRone (BUSPAR) 15 MG tablet, Take 1 tablet by mouth 2 (two) times a day. 1.5 tabs BID, Disp: , Rfl:   •  cetirizine (zyrTEC) 10 MG tablet, Take 1 tablet by mouth Daily., Disp: , Rfl:   •  cholecalciferol (VITAMIN D3) 25 MCG (1000 UT) tablet, Take 1 tablet by mouth Daily., Disp: , Rfl:   •  clopidogrel (PLAVIX) 75 MG tablet, TAKE 1 TABLET DAILY (Patient taking differently: Take 1 tablet by mouth Daily.), Disp: 90 tablet, Rfl: 3  •  Ertugliflozin-metFORMIN HCl (Segluromet) 7.5-1000 MG tablet, Take 1 tablet/day by mouth Daily., Disp: , Rfl:   •  finasteride (PROSCAR) 5 MG tablet, Take 1 tablet by mouth Daily., Disp: , Rfl:   •  isosorbide mononitrate (IMDUR) 30 MG 24 hr tablet, TAKE ONE-HALF (1/2) TABLET EVERY NIGHT (Patient taking differently: 15 mg Every Night.), Disp: 45 tablet, Rfl: 3  •  magnesium oxide (MAGOX) 400 (241.3 MG) MG tablet tablet, Take 1 tablet by mouth Daily., Disp: , Rfl:   •  metoprolol succinate XL (TOPROL-XL) 25 MG 24 hr tablet, Take 1 tablet by mouth Every Morning., Disp: , Rfl:   •  multivitamin with minerals tablet tablet, Take 1 tablet by mouth Daily., Disp: , Rfl:   •  nitroglycerin (NITROSTAT) 0.4 MG SL tablet, 1 under  the tongue as needed for angina, may repeat q5mins for up three doses, Disp: 30 tablet, Rfl: 3  •  nortriptyline (PAMELOR) 50 MG capsule, Take 1 capsule by mouth Every Night., Disp: , Rfl:   •  Omega 3 1000 MG capsule, Take 2,000 mg by mouth Daily., Disp: , Rfl:   •  omeprazole (PriLOSEC) 40 MG capsule, Take 1 capsule by mouth Daily., Disp: , Rfl:   •  rosuvastatin (CRESTOR) 40 MG tablet, TAKE 1 TABLET EVERY NIGHT (Patient taking differently: Take 1 tablet by mouth Daily.), Disp: 90 tablet, Rfl: 3  •  traZODone (DESYREL) 50 MG tablet, Take 1.5 tablets by mouth Every Night., Disp: , Rfl:   •  valsartan (DIOVAN) 160 MG tablet, Take 1 tablet by mouth Daily., Disp: , Rfl:   •  Vitamin E 180 MG capsule, Take 400 mg by mouth Daily., Disp: , Rfl:   No current facility-administered medications for this visit.    History:   Past Medical History:   Diagnosis Date   • Anxiety and depression    • Asthma    • COPD (chronic obstructive pulmonary disease) ([facility])    • Coronary artery disease    • COVID-19 vaccine administered 1st - 03/08/201 2nd - 04/07/2021  Monderna ( Booster 12/21/2021 ) [employer]   • Diabetes mellitus ([facility])    • GERD (gastroesophageal reflux disease)    • Hyperlipidemia    • Hypertension    • Kidney failure    • Peripheral vascular disease ([facility])    • Renal failure    • Sleep apnea     waiting for a new machine 11/2020   • Stroke ([facility]) 2019    n o residual       Past Surgical History:   Procedure Laterality Date   • CARDIAC CATHETERIZATION      Stent x1   • COLONOSCOPY     • FEMORAL POPLITEAL BYPASS Left 11/12/2021    Procedure: FEMORAL POPLITEAL BYPASS LEFT, ABOVE KNEE;  Surgeon: Farhat Mann MD;  Location:  [facility];  Service: Vascular;  Laterality: Left;   • FEMORAL POPLITEAL BYPASS Right 2/2/2023    Procedure: FEMORAL POPLITEAL BYPASS, ABOVE KNEE RIGHT;  Surgeon: Farhat Mann MD;  Location:  [facility];  Service: Vascular;  Laterality: Right;   • LEG SURGERY Right     Had rods/screws, but were  "removed when he had Right hip replacement   • LUMBAR FUSION     • SPINAL CORD STIMULATOR IMPLANT     • TOTAL HIP ARTHROPLASTY Right        Social History     Socioeconomic History   • Marital status:    • Number of children: 4   Tobacco Use   • Smoking status: Former     Packs/day: 1.50     Years: 50.00     Pack years: 75.00     Types: Cigarettes     Quit date:      Years since quittin.1   • Smokeless tobacco: Never   Vaping Use   • Vaping Use: Never used   Substance and Sexual Activity   • Alcohol use: No   • Drug use: No   • Sexual activity: Defer        Family History   Problem Relation Age of Onset   • Heart failure Mother    • Heart disease Father    • Heart failure Father        Objective   Physical Exam:  Vitals:    23 1031 23 1032   BP: 129/51 111/53   BP Location: Right arm Left arm   Patient Position: Sitting Sitting   Pulse: 76    Temp: 98 °F (36.7 °C)    SpO2: 98%    Weight: 72.6 kg (160 lb)    Height: 167.6 cm (66\")  Comment: patient reports       Body mass index is 25.82 kg/m².    Physical Exam  Vitals reviewed.   Constitutional:       General: He is not in acute distress.     Appearance: He is not toxic-appearing.   HENT:      Head: Normocephalic and atraumatic.   Eyes:      General: Lids are normal.      Conjunctiva/sclera: Conjunctivae normal.      Pupils: Pupils are equal, round, and reactive to light.   Cardiovascular:      Rate and Rhythm: Normal rate and regular rhythm.      Pulses:           Femoral pulses are 2+ on the right side and 2+ on the left side.       Dorsalis pedis pulses are detected w/ Doppler on the right side and detected w/ Doppler on the left side.        Posterior tibial pulses are detected w/ Doppler on the right side and detected w/ Doppler on the left side.      Heart sounds: S1 normal and S2 normal. Murmur heard.      Comments: Biphasic doppler signal bilateral pedal pulses.  Doppler flow verified along each medial thigh graft incision sites.  " Femoral pulses +2 bilaterally.    Pulmonary:      Effort: Pulmonary effort is normal. No respiratory distress.      Breath sounds: No decreased breath sounds, wheezing, rhonchi or rales.   Musculoskeletal:         General: Normal range of motion.      Cervical back: Normal range of motion and neck supple.      Right lower leg: No edema.      Left lower leg: No edema.   Skin:     General: Skin is warm and dry.      Capillary Refill: Capillary refill takes less than 2 seconds.      Comments: Right femoral cutdown site staples removed without complication.  Mild dehiscence along distal aspect of incision.  No warmth, induration, or purulent exudate. Right medial thigh incision well approximated/ essentially healed.  No tenderness, warmth, induration, or dehiscence.  Palpable graft proximal to right thigh incision non-tender.   Neurological:      General: No focal deficit present.      Mental Status: He is alert and oriented to person, place, and time.   Psychiatric:         Attention and Perception: Attention normal.         Mood and Affect: Mood normal.         Speech: Speech normal.         Behavior: Behavior normal. Behavior is cooperative.             Imaging/Labs:  NA    Assessment / Plan      Assessment / Plan:  1. PVD (peripheral vascular disease) ([facility]) ([facility])  - 67 y.o. male s/p right femoral above-knee popliteal bypass per Dr. Mann utilizing 8 mm PTFE propatent graft on 2/2/2023.   - PMH: former smoker, HTN, HLD on statin therapy, ADRIANA with CPAP noncompliance, COPD, spinal cord stimulator, type II DM (A1c 7.0), PSVT, CAD s/p stent on 8/5/2021 with Dr. Nelson, carotid artery stenosis, PVD s/p remote stenting to left iliac and left femoral-popliteal bypass on 11/12/2021 also per Dr. Mann.   - No readmissions or ER visits.  - Ambulating at least BID without claudication  - Compliant with ASA, plavix, statin therapy  - Good doppler flow verified bilaterally on exam today  - Right femoral incision  healing well.  Staples removed w/o complication  - Reminded patient to continue to avoid 90 degree sitting except meals and toilet visits another 2-4 weeks until right groin is completely healed  -Call/ return to the office PRN concerns for remainder of incision healing       Patient Education: Wound Care: continue to keep your incisions clean and dry. You may use plain antibacterial soap (i.e. dial) and water to clean and pat dry. No lotions, creams, oils, powders, salves, or ointments. Please watch for signs and symptoms of infection which can include but are not limited to: increased pain or tenderness around your incision, warmth to the site or fever, redness or red streaking, and foul smelling or appearing drainage. Clear drainage and bloody drainage are not worrisome. If your wound opens up please contact our office.      Follow Up:   Return in about 1 year (around 3/2/2024) for PVD follow up.   Or sooner for any further concerns or worsening sign and symptoms. If unable to reach us in the office please dial 911 or go to the nearest emergency department.      ELIZABETH Sanz  [facility] Cardiothoracic Surgery    Time Spent: I spent 34 minutes caring for Mike on this date of service. This time includes time spent by me in the following activities: preparing for the visit, obtaining and/or reviewing a separately obtained history, performing a medically appropriate examination and/or evaluation, counseling and educating the patient/family/caregiver, documenting information in the medical record and care coordination.    Answers for HPI/ROS submitted by the patient on 2/23/2023  What is the primary reason for your visit?: Other  Please describe your symptoms.: Post op  Have you had these symptoms before?: No  How long have you been having these symptoms?: Greater than 2 weeks

## 2023-03-02 ENCOUNTER — OFFICE VISIT (OUTPATIENT)
Dept: CARDIAC SURGERY | Facility: CLINIC | Age: 68
End: 2023-03-02
Payer: MEDICARE

## 2023-03-02 VITALS
DIASTOLIC BLOOD PRESSURE: 53 MMHG | HEART RATE: 76 BPM | TEMPERATURE: 98 F | OXYGEN SATURATION: 98 % | WEIGHT: 160 LBS | BODY MASS INDEX: 25.71 KG/M2 | SYSTOLIC BLOOD PRESSURE: 111 MMHG | HEIGHT: 66 IN

## 2023-03-02 DIAGNOSIS — I73.9 PVD (PERIPHERAL VASCULAR DISEASE): Primary | ICD-10-CM

## 2023-03-02 PROCEDURE — 99024 POSTOP FOLLOW-UP VISIT: CPT | Performed by: NURSE PRACTITIONER

## 2023-03-22 DIAGNOSIS — R07.2 PRECORDIAL PAIN: ICD-10-CM

## 2023-03-22 RX ORDER — ISOSORBIDE MONONITRATE 30 MG/1
TABLET, EXTENDED RELEASE ORAL
Qty: 45 TABLET | Refills: 3 | Status: SHIPPED | OUTPATIENT
Start: 2023-03-22

## 2023-05-09 DIAGNOSIS — E78.5 HYPERLIPIDEMIA, UNSPECIFIED HYPERLIPIDEMIA TYPE: ICD-10-CM

## 2023-05-09 DIAGNOSIS — I25.119 CORONARY ARTERY DISEASE INVOLVING NATIVE CORONARY ARTERY OF NATIVE HEART WITH ANGINA PECTORIS: ICD-10-CM

## 2023-05-09 RX ORDER — ROSUVASTATIN CALCIUM 40 MG/1
40 TABLET, COATED ORAL DAILY
Qty: 90 TABLET | Refills: 3 | Status: SHIPPED | OUTPATIENT
Start: 2023-05-09

## 2023-08-03 ENCOUNTER — HOSPITAL ENCOUNTER (OUTPATIENT)
Dept: CARDIOLOGY | Facility: HOSPITAL | Age: 68
Discharge: HOME | End: 2023-08-03
Payer: MEDICARE

## 2023-08-03 DIAGNOSIS — I73.9 CLAUDICATION: ICD-10-CM

## 2023-08-03 DIAGNOSIS — R06.02 SHORTNESS OF BREATH: ICD-10-CM

## 2023-08-03 DIAGNOSIS — I25.119 CORONARY ARTERY DISEASE INVOLVING NATIVE CORONARY ARTERY OF NATIVE HEART WITH ANGINA PECTORIS: ICD-10-CM

## 2023-08-03 PROCEDURE — 93926 LOWER EXTREMITY STUDY: CPT

## 2023-08-03 PROCEDURE — 93926 LOWER EXTREMITY STUDY: CPT | Performed by: RADIOLOGY

## 2023-08-21 ENCOUNTER — TELEPHONE (OUTPATIENT)
Dept: CARDIOLOGY | Facility: CLINIC | Age: 68
End: 2023-08-21
Payer: MEDICARE

## 2023-08-21 NOTE — TELEPHONE ENCOUNTER
Pt wife contacts office requesting US results. Discussed w/ Jean-Pierre Adler. VO: CTA. BMP. Pt wife made aware. Agreeable.

## 2023-08-22 DIAGNOSIS — I73.9 PVD (PERIPHERAL VASCULAR DISEASE): ICD-10-CM

## 2023-08-22 DIAGNOSIS — I73.9 CLAUDICATION: Primary | ICD-10-CM

## 2023-08-22 DIAGNOSIS — I70.201 FEMORAL ARTERY OCCLUSION, RIGHT: ICD-10-CM

## 2023-08-22 DIAGNOSIS — I73.9 PAD (PERIPHERAL ARTERY DISEASE): ICD-10-CM

## 2023-08-22 DIAGNOSIS — I10 ESSENTIAL HYPERTENSION: ICD-10-CM

## 2023-08-22 DIAGNOSIS — Z01.810 PRE-PROCEDURAL CARDIOVASCULAR EXAMINATION: ICD-10-CM

## 2023-08-22 NOTE — PROGRESS NOTES
I have the print out of a Duplex the patient had and on it jean-pierre wants to order a CTA Abdominal Aorta bilateral with runoff, I spoke with Aj yesterday about it, but there is no order per Aj Alvarenga MA   Medical Assistant          Encounter Date: 8/21/2023     Signed         Pt wife contacts office requesting US results. Discussed w/ Jean-Pierre Adler. VO: CTA. BMP. Pt wife made aware. Agreeable.

## 2023-08-24 DIAGNOSIS — I25.119 CORONARY ARTERY DISEASE INVOLVING NATIVE CORONARY ARTERY OF NATIVE HEART WITH ANGINA PECTORIS: ICD-10-CM

## 2023-08-25 RX ORDER — CLOPIDOGREL BISULFATE 75 MG/1
TABLET ORAL
Qty: 90 TABLET | Refills: 3 | Status: SHIPPED | OUTPATIENT
Start: 2023-08-25

## 2023-09-05 DIAGNOSIS — I10 ESSENTIAL HYPERTENSION: ICD-10-CM

## 2023-09-05 DIAGNOSIS — I73.9 PAD (PERIPHERAL ARTERY DISEASE): ICD-10-CM

## 2023-09-05 DIAGNOSIS — I70.201 FEMORAL ARTERY OCCLUSION, RIGHT: ICD-10-CM

## 2023-09-05 DIAGNOSIS — I73.9 PVD (PERIPHERAL VASCULAR DISEASE): ICD-10-CM

## 2023-09-05 DIAGNOSIS — I73.9 CLAUDICATION: ICD-10-CM

## 2023-09-08 ENCOUNTER — TELEPHONE (OUTPATIENT)
Dept: CARDIOLOGY | Facility: CLINIC | Age: 68
End: 2023-09-08
Payer: MEDICARE

## 2023-09-08 ENCOUNTER — TELEPHONE (OUTPATIENT)
Dept: CARDIAC SURGERY | Facility: CLINIC | Age: 68
End: 2023-09-08

## 2023-09-08 NOTE — PROGRESS NOTES
[facility] Cardiothoracic Surgery Office Follow Up Note     Date of Encounter: 2023     Name: Mike Gusman  : 1955     Referred By: No ref. provider found  PCP: Nba Randall MD    Chief Complaint:    Chief Complaint   Patient presents with    Peripheral Vascular Disease     Follow-up after CTA with runoff. Patient has right lower extremity pain and edema.        Subjective      History of Present Illness:    Mike Gusman is a 68 y.o. male followed by Dr. Mann for PVD.  PMH: Right femoral above-knee to popliteal bypass utilizing 8 mm PTFE propatent graft 23, former smoker, HTN, hyperlipidemia, ADRIANA with CPAP noncompliance, COPD, chronic back pain with spinal cord stimulator, type 2 diabetes (most recent hemoglobin A1c 7%), PSVT, CAD s/p PCI 2021, EL followed by cardiology, PVD s/p remote stenting to left iliac and left femoral popliteal bypass per Dr. Mann 2021.  Patient was last seen in clinic 3/2/2023 relating improved exercise tolerance, healing right femoral cutdown, and dopplerable bilateral pedal pulses.    Patient reported to primary cardiology at clinic visit 2023 that he had begun to notice recurrent claudication symptoms in the right lower extremity with sensation of coolness at times.  Cardiology ordered CT angio abdominal aorta with runoff.  This was performed on 2023.  Patient called to request a clinic visit.  Patient states the right foot/ankle area feels numb with constant discomfort for about the last 2 weeks.  This disrupts sleep and he only sleeps 1-2 hours at night.  Patient used WC to get to clinic today and also walking with a cane.  He states compliance with asa, Plavix, and statin therapy.      Review of Systems:  Review of Systems   Constitutional: Negative for chills, decreased appetite, diaphoresis, fever, malaise/fatigue, night sweats, weight gain and weight loss.   HENT:  Negative for hoarse voice.    Eyes:   Negative for blurred vision, double vision and visual disturbance.   Cardiovascular:  Positive for claudication, dyspnea on exertion and leg swelling. Negative for chest pain, irregular heartbeat, near-syncope, orthopnea, palpitations, paroxysmal nocturnal dyspnea and syncope.   Respiratory:  Positive for shortness of breath. Negative for cough, hemoptysis, sputum production and wheezing.    Hematologic/Lymphatic: Negative for adenopathy and bleeding problem. Does not bruise/bleed easily.   Skin:  Positive for color change. Negative for nail changes, poor wound healing and rash.   Musculoskeletal:  Positive for back pain and joint swelling. Negative for falls and muscle cramps.   Gastrointestinal:  Negative for abdominal pain, dysphagia and heartburn.   Genitourinary:  Negative for flank pain.   Neurological:  Negative for brief paralysis, disturbances in coordination, dizziness, focal weakness, headaches, light-headedness, loss of balance, numbness, paresthesias, sensory change, vertigo and weakness.   Psychiatric/Behavioral:  Negative for depression and suicidal ideas. The patient is not nervous/anxious.    Allergic/Immunologic: Negative for persistent infections.     I have reviewed the following portions of the patient's history: problem list, current medications, allergies, past surgical history, past medical history, past social history, past family history, and ROS and confirm it's accurate.    Allergies:  No Known Allergies    Medications:      Current Outpatient Medications:     Aspirin Low Dose 81 MG EC tablet, TAKE 1 TABLET DAILY (Patient taking differently: Take 1 tablet by mouth Every Night.), Disp: 90 tablet, Rfl: 3    benzonatate (TESSALON) 100 MG capsule, Take 1 capsule by mouth 3 (Three) Times a Day As Needed for Cough., Disp: , Rfl:     Breo Ellipta 200-25 MCG/INH inhaler, Inhale 1 puff Daily., Disp: , Rfl:     busPIRone (BUSPAR) 15 MG tablet, Take 1 tablet by mouth 2 (two) times a day. 1.5 tabs  BID, Disp: , Rfl:     cetirizine (zyrTEC) 10 MG tablet, Take 1 tablet by mouth Daily., Disp: , Rfl:     cholecalciferol (VITAMIN D3) 25 MCG (1000 UT) tablet, Take 1 tablet by mouth Daily., Disp: , Rfl:     clopidogrel (PLAVIX) 75 MG tablet, TAKE 1 TABLET DAILY, Disp: 90 tablet, Rfl: 3    Ertugliflozin-metFORMIN HCl (Segluromet) 7.5-1000 MG tablet, Take 1 tablet/day by mouth Daily., Disp: , Rfl:     finasteride (PROSCAR) 5 MG tablet, Take 1 tablet by mouth Daily., Disp: , Rfl:     fluticasone (FLONASE) 50 MCG/ACT nasal spray, , Disp: , Rfl:     isosorbide mononitrate (IMDUR) 30 MG 24 hr tablet, TAKE ONE-HALF (1/2) TABLET EVERY NIGHT, Disp: 45 tablet, Rfl: 3    magnesium oxide (MAGOX) 400 (241.3 MG) MG tablet tablet, Take 1 tablet by mouth Daily., Disp: , Rfl:     metoprolol succinate XL (TOPROL-XL) 25 MG 24 hr tablet, Take 1 tablet by mouth Every Morning., Disp: , Rfl:     multivitamin with minerals tablet tablet, Take 1 tablet by mouth Daily., Disp: , Rfl:     nitroglycerin (NITROSTAT) 0.4 MG SL tablet, 1 under the tongue as needed for angina, may repeat q5mins for up three doses, Disp: 30 tablet, Rfl: 3    nortriptyline (PAMELOR) 50 MG capsule, Take 1 capsule by mouth Every Night., Disp: , Rfl:     Omega 3 1000 MG capsule, Take 2,000 mg by mouth Daily., Disp: , Rfl:     omeprazole (PriLOSEC) 40 MG capsule, Take 1 capsule by mouth Daily., Disp: , Rfl:     rosuvastatin (CRESTOR) 40 MG tablet, Take 1 tablet by mouth Daily., Disp: 90 tablet, Rfl: 3    traZODone (DESYREL) 50 MG tablet, Take 1.5 tablets by mouth Every Night., Disp: , Rfl:     valsartan (DIOVAN) 160 MG tablet, Take 1 tablet by mouth Daily., Disp: , Rfl:     Vitamin E 180 MG capsule, Take 400 mg by mouth Daily., Disp: , Rfl:     History:   Past Medical History:   Diagnosis Date    Anxiety and depression     Asthma     Clotting disorder     COPD (chronic obstructive pulmonary disease)     Coronary artery disease     COVID-19 vaccine administered 1st -      2nd - 2021  Edelmirana ( Booster 2021 ) Pfizer    Diabetes mellitus     GERD (gastroesophageal reflux disease)     Hyperlipidemia     Hypertension     Kidney failure     Peripheral vascular disease     Renal failure     Sleep apnea     waiting for a new machine 2020    Stroke 2019    n o residual       Past Surgical History:   Procedure Laterality Date    CARDIAC CATHETERIZATION      Stent x1    COLONOSCOPY      FEMORAL POPLITEAL BYPASS Left 2021    Procedure: FEMORAL POPLITEAL BYPASS LEFT, ABOVE KNEE;  Surgeon: Farhat Mann MD;  Location:  [facility];  Service: Vascular;  Laterality: Left;    FEMORAL POPLITEAL BYPASS Right 2023    Procedure: FEMORAL POPLITEAL BYPASS, ABOVE KNEE RIGHT;  Surgeon: Farhat Mann MD;  Location:  [facility];  Service: Vascular;  Laterality: Right;    LEG SURGERY Right     Had rods/screws, but were removed when he had Right hip replacement    LUMBAR FUSION      RENAL ARTERY STENT      SPINAL CORD STIMULATOR IMPLANT      TOTAL HIP ARTHROPLASTY Right     VEIN SURGERY         Social History     Socioeconomic History    Marital status:     Number of children: 4   Tobacco Use    Smoking status: Former     Packs/day: 2.00     Years: 20.00     Pack years: 40.00     Types: Cigarettes     Quit date: 3/2/2005     Years since quittin.5    Smokeless tobacco: Never   Vaping Use    Vaping Use: Never used   Substance and Sexual Activity    Alcohol use: No    Drug use: No    Sexual activity: Not Currently     Partners: Female     Birth control/protection: Surgical        Family History   Problem Relation Age of Onset    Heart failure Mother     Heart disease Father     Heart failure Father     Asthma Sister        Objective   Physical Exam:  Vitals:    23 0843 23 0844   BP: 116/56 115/60   BP Location: Right arm Left arm   Patient Position: Sitting Sitting   Pulse: 69    Temp: 97.5 °F (36.4 °C)    SpO2: 99%    Weight: 73 kg (161 lb)        Body mass index is 25.99 kg/m².    Physical Exam  Vitals reviewed.   Constitutional:       General: He is not in acute distress.  HENT:      Head: Normocephalic and atraumatic.   Eyes:      General: Lids are normal.      Conjunctiva/sclera: Conjunctivae normal.      Pupils: Pupils are equal, round, and reactive to light.   Cardiovascular:      Rate and Rhythm: Normal rate and regular rhythm.      Pulses:           Femoral pulses are 1+ on the right side and 1+ on the left side.       Dorsalis pedis pulses are detected w/ Doppler on the right side and detected w/ Doppler on the left side.        Posterior tibial pulses are detected w/ Doppler on the right side and detected w/ Doppler on the left side.      Heart sounds: S1 normal and S2 normal. No murmur heard.     Comments: Weak monophasic pulses @ DP/PT areas on the right.  Biphasic doppler signals left DP/PT.  No doppler signal across right fem-pop graft.  Palpable +1 bilateral femoral pulses.  Biphasic doppler signal also obtained in the right popliteal fossa  Pulmonary:      Effort: Pulmonary effort is normal. No respiratory distress.      Breath sounds: Normal breath sounds.   Musculoskeletal:         General: Normal range of motion.      Cervical back: Normal range of motion and neck supple.      Right lower leg: No edema.      Left lower leg: No edema.   Feet:      Comments: Hyperemic forefoot and toes with dusky area right medial great toe and @ right heel.    Skin:     General: Skin is warm and dry.      Capillary Refill: Capillary refill takes less than 2 seconds.   Neurological:      General: No focal deficit present.      Mental Status: He is alert and oriented to person, place, and time.   Psychiatric:         Attention and Perception: Attention normal.         Mood and Affect: Mood normal.         Speech: Speech normal.         Behavior: Behavior normal. Behavior is cooperative.       Imaging/Labs:  CT angio abdominal aorta with runoff 9/5/2023 @  [facility] (Personally reviewed.  Agree w/ occluded right femoral-popliteal graft)  Impression:  Multiple areas of hemodynamically significant stenosis.  Occluded right femoral-popliteal graft.    Duplex Lower Extremity Art / Grafts - Right 8/3/23  EXAM:US Duplex Right Lower Extremity Arteries  FINDINGS:  Diminished velocities within the dorsalis pedis on the right side.  The right femoral to popliteal graft appears thrombosed or occluded.  The more distal vessel shows monophasic dampened waveform patterns.    Impression  1.  Diminished velocities within the dorsalis pedis on the right side.  2.  The right femoral to popliteal graft appears thrombosed or occluded.  The more distal vessel shows monophasic dampened waveform patterns.  This report was finalized on 8/3/2023 9:28 AM by Dr. Gerardo Sebastian MD.      Assessment / Plan      Assessment / Plan:  1. PVD (peripheral vascular disease) (Primary)  - 68 y.o. male followed by Dr. Mann for PVD.    - PMH: Right femoral above-knee to popliteal bypass utilizing 8 mm PTFE propatent graft 2/2/23, former smoker, HTN, hyperlipidemia, ADRIANA with CPAP noncompliance, COPD, chronic back pain with spinal cord stimulator, type 2 diabetes (most recent hemoglobin A1c 7%), PSVT, CAD s/p PCI 8/5/2021, EL followed by cardiology, PVD s/p remote stenting to left iliac and left femoral popliteal bypass per Dr. Mann 11/12/2021.    - Last seen in clinic 3/2/2023 relating improved exercise tolerance, healing right femoral cutdown, and dopplerable bilateral pedal pulses.  -Patient reported to primary cardiology at clinic visit 6/21/2023 that he had begun to notice recurrent claudication symptoms in the right lower extremity with sensation of coolness at times.    - Cardiology ordered CT angio abdominal aorta with runoff.  This was performed on 9/5/2023.    - Patient presents for clinic visit today:  states right foot/ankle area feels numb with constant discomfort for about the last 2 weeks.   This disrupts sleep and he only sleeps 1-2 hours at night.   Walks w/ cane.   - States compliance with asa, statin, and Plavix.   - Exam w/ very weak and monophasic right pedal pulses w/ dusky discoloration along right great toe and right heel.  - Spoke w/ on call physician, Dr. More, since Dr. Mann out this week.  Dr. More recommended hospital admission per Hospitalist Service with Vascular Surgery eval while inpatient for potential intervention later this week.          Follow Up:   Return pending outcome of admission.   Or sooner for any further concerns or worsening sign and symptoms. If unable to reach us in the office please dial 911 or go to the nearest emergency department.      ELIZABETH Sanz  [facility] Cardiothoracic Surgery    Time Spent: I spent 30 minutes caring for Mike on this date of service. This time includes time spent by me in the following activities: preparing for the visit, reviewing tests, obtaining and/or reviewing a separately obtained history, performing a medically appropriate examination and/or evaluation, referring and communicating with other health care professionals, documenting information in the medical record, independently interpreting results and communicating that information with the patient/family/caregiver, and care coordination.

## 2023-09-08 NOTE — TELEPHONE ENCOUNTER
----- Message from JOHNNY Rosenthal sent at 9/6/2023 10:28 PM EDT -----  Refer to Dr. Mann.  ----- Message -----  From: Ilene Costa RegSched Rep  Sent: 9/5/2023   4:38 PM EDT  To: JOHNNY Rosenthal

## 2023-09-08 NOTE — TELEPHONE ENCOUNTER
Results of cta discussed with patient's spouse Per Yesi Campuzano, patient is already established with Dr. Mann no referral needed.

## 2023-09-08 NOTE — TELEPHONE ENCOUNTER
Caller: Cira Gusman    Relationship to patient: Emergency Contact    Best call back number: [phone]     Chief complaint: CT ON  9/5/23 SHOWED BLOCKAGE     PATIENT IS EXPERIENCING PAIN, SWELLING,  AND REDNESS IN RIGHT LEG/FOOT.     Type of visit: FOLLOW UP     Requested date: NEXT AVAILABLE/   LATE MORNING PREFERRED     If rescheduling, when is the original appointment: LAST SEEN  3/7/24    Additional notes:PATIENTS WIFE WOULD LIKE A CALL BACK.

## 2023-09-11 ENCOUNTER — APPOINTMENT (OUTPATIENT)
Dept: GENERAL RADIOLOGY | Facility: HOSPITAL | Age: 68
DRG: 271 | End: 2023-09-11
Payer: MEDICARE

## 2023-09-11 ENCOUNTER — OFFICE VISIT (OUTPATIENT)
Dept: CARDIAC SURGERY | Facility: CLINIC | Age: 68
End: 2023-09-11
Payer: MEDICARE

## 2023-09-11 ENCOUNTER — HOSPITAL ENCOUNTER (INPATIENT)
Facility: HOSPITAL | Age: 68
LOS: 3 days | Discharge: HOME | DRG: 271 | End: 2023-09-14
Attending: HOSPITALIST | Admitting: FAMILY MEDICINE
Payer: MEDICARE

## 2023-09-11 VITALS
WEIGHT: 161 LBS | HEART RATE: 69 BPM | BODY MASS INDEX: 25.99 KG/M2 | OXYGEN SATURATION: 99 % | DIASTOLIC BLOOD PRESSURE: 60 MMHG | SYSTOLIC BLOOD PRESSURE: 115 MMHG | TEMPERATURE: 97.5 F

## 2023-09-11 DIAGNOSIS — I70.201 FEMORAL ARTERY OCCLUSION, RIGHT: ICD-10-CM

## 2023-09-11 DIAGNOSIS — I73.9 PERIPHERAL VASCULAR DISEASE: ICD-10-CM

## 2023-09-11 DIAGNOSIS — I73.9 CLAUDICATION: Primary | ICD-10-CM

## 2023-09-11 DIAGNOSIS — I73.9 PVD (PERIPHERAL VASCULAR DISEASE): ICD-10-CM

## 2023-09-11 DIAGNOSIS — I73.9 PVD (PERIPHERAL VASCULAR DISEASE): Primary | ICD-10-CM

## 2023-09-11 DIAGNOSIS — I73.9 PAD (PERIPHERAL ARTERY DISEASE): ICD-10-CM

## 2023-09-11 LAB
ABO GROUP BLD: NORMAL
ALBUMIN SERPL-MCNC: 4.3 G/DL (ref 3.5–5.2)
ALBUMIN/GLOB SERPL: 1.3 G/DL
ALP SERPL-CCNC: 87 U/L (ref 39–117)
ALT SERPL W P-5'-P-CCNC: 20 U/L (ref 1–41)
ANION GAP SERPL CALCULATED.3IONS-SCNC: 14 MMOL/L (ref 5–15)
APTT PPP: 29.5 SECONDS (ref 60–90)
AST SERPL-CCNC: 28 U/L (ref 1–40)
BASOPHILS # BLD AUTO: 0.04 10*3/MM3 (ref 0–0.2)
BASOPHILS # BLD AUTO: 0.04 10*3/MM3 (ref 0–0.2)
BASOPHILS NFR BLD AUTO: 0.4 % (ref 0–1.5)
BASOPHILS NFR BLD AUTO: 0.4 % (ref 0–1.5)
BILIRUB SERPL-MCNC: 0.3 MG/DL (ref 0–1.2)
BLD GP AB SCN SERPL QL: NEGATIVE
BUN SERPL-MCNC: 12 MG/DL (ref 8–23)
BUN/CREAT SERPL: 9.8 (ref 7–25)
CALCIUM SPEC-SCNC: 10.4 MG/DL (ref 8.6–10.5)
CHLORIDE SERPL-SCNC: 103 MMOL/L (ref 98–107)
CO2 SERPL-SCNC: 21 MMOL/L (ref 22–29)
CREAT SERPL-MCNC: 1.22 MG/DL (ref 0.76–1.27)
DEPRECATED RDW RBC AUTO: 41.2 FL (ref 37–54)
DEPRECATED RDW RBC AUTO: 42 FL (ref 37–54)
EGFRCR SERPLBLD CKD-EPI 2021: 63.8 ML/MIN/1.73
EOSINOPHIL # BLD AUTO: 0.33 10*3/MM3 (ref 0–0.4)
EOSINOPHIL # BLD AUTO: 0.34 10*3/MM3 (ref 0–0.4)
EOSINOPHIL NFR BLD AUTO: 3 % (ref 0.3–6.2)
EOSINOPHIL NFR BLD AUTO: 3.6 % (ref 0.3–6.2)
ERYTHROCYTE [DISTWIDTH] IN BLOOD BY AUTOMATED COUNT: 12.7 % (ref 12.3–15.4)
ERYTHROCYTE [DISTWIDTH] IN BLOOD BY AUTOMATED COUNT: 12.9 % (ref 12.3–15.4)
GLOBULIN UR ELPH-MCNC: 3.4 GM/DL
GLUCOSE BLDC GLUCOMTR-MCNC: 101 MG/DL (ref 70–130)
GLUCOSE BLDC GLUCOMTR-MCNC: 102 MG/DL (ref 70–130)
GLUCOSE BLDC GLUCOMTR-MCNC: 193 MG/DL (ref 70–130)
GLUCOSE SERPL-MCNC: 104 MG/DL (ref 65–99)
HBA1C MFR BLD: 7 % (ref 4.8–5.6)
HCT VFR BLD AUTO: 46.6 % (ref 37.5–51)
HCT VFR BLD AUTO: 47.6 % (ref 37.5–51)
HGB BLD-MCNC: 15.5 G/DL (ref 13–17.7)
HGB BLD-MCNC: 15.5 G/DL (ref 13–17.7)
IMM GRANULOCYTES # BLD AUTO: 0.03 10*3/MM3 (ref 0–0.05)
IMM GRANULOCYTES # BLD AUTO: 0.04 10*3/MM3 (ref 0–0.05)
IMM GRANULOCYTES NFR BLD AUTO: 0.3 % (ref 0–0.5)
IMM GRANULOCYTES NFR BLD AUTO: 0.4 % (ref 0–0.5)
INR PPP: 0.94 (ref 0.89–1.12)
LYMPHOCYTES # BLD AUTO: 2.13 10*3/MM3 (ref 0.7–3.1)
LYMPHOCYTES # BLD AUTO: 2.42 10*3/MM3 (ref 0.7–3.1)
LYMPHOCYTES NFR BLD AUTO: 18.7 % (ref 19.6–45.3)
LYMPHOCYTES NFR BLD AUTO: 26.2 % (ref 19.6–45.3)
MAGNESIUM SERPL-MCNC: 2.4 MG/DL (ref 1.6–2.4)
MCH RBC QN AUTO: 29 PG (ref 26.6–33)
MCH RBC QN AUTO: 29.5 PG (ref 26.6–33)
MCHC RBC AUTO-ENTMCNC: 32.6 G/DL (ref 31.5–35.7)
MCHC RBC AUTO-ENTMCNC: 33.3 G/DL (ref 31.5–35.7)
MCV RBC AUTO: 88.6 FL (ref 79–97)
MCV RBC AUTO: 89 FL (ref 79–97)
MONOCYTES # BLD AUTO: 0.7 10*3/MM3 (ref 0.1–0.9)
MONOCYTES # BLD AUTO: 0.72 10*3/MM3 (ref 0.1–0.9)
MONOCYTES NFR BLD AUTO: 6.3 % (ref 5–12)
MONOCYTES NFR BLD AUTO: 7.6 % (ref 5–12)
NEUTROPHILS NFR BLD AUTO: 5.71 10*3/MM3 (ref 1.7–7)
NEUTROPHILS NFR BLD AUTO: 61.9 % (ref 42.7–76)
NEUTROPHILS NFR BLD AUTO: 71.2 % (ref 42.7–76)
NEUTROPHILS NFR BLD AUTO: 8.1 10*3/MM3 (ref 1.7–7)
NRBC BLD AUTO-RTO: 0 /100 WBC (ref 0–0.2)
NRBC BLD AUTO-RTO: 0 /100 WBC (ref 0–0.2)
PLATELET # BLD AUTO: 229 10*3/MM3 (ref 140–450)
PLATELET # BLD AUTO: 257 10*3/MM3 (ref 140–450)
PMV BLD AUTO: 10 FL (ref 6–12)
PMV BLD AUTO: 10.2 FL (ref 6–12)
POTASSIUM SERPL-SCNC: 4.6 MMOL/L (ref 3.5–5.2)
PROT SERPL-MCNC: 7.7 G/DL (ref 6–8.5)
PROTHROMBIN TIME: 12.6 SECONDS (ref 12.2–14.5)
RBC # BLD AUTO: 5.26 10*6/MM3 (ref 4.14–5.8)
RBC # BLD AUTO: 5.35 10*6/MM3 (ref 4.14–5.8)
RH BLD: POSITIVE
SODIUM SERPL-SCNC: 138 MMOL/L (ref 136–145)
T&S EXPIRATION DATE: NORMAL
TSH SERPL DL<=0.05 MIU/L-ACNC: 0.94 UIU/ML (ref 0.27–4.2)
UFH PPP CHRO-ACNC: 0.1 IU/ML (ref 0.3–0.7)
UFH PPP CHRO-ACNC: 0.1 IU/ML (ref 0.3–0.7)
WBC NRBC COR # BLD: 11.37 10*3/MM3 (ref 3.4–10.8)
WBC NRBC COR # BLD: 9.23 10*3/MM3 (ref 3.4–10.8)

## 2023-09-11 PROCEDURE — 85730 THROMBOPLASTIN TIME PARTIAL: CPT | Performed by: PHYSICIAN ASSISTANT

## 2023-09-11 PROCEDURE — 04CH0ZZ EXTIRPATION OF MATTER FROM RIGHT EXTERNAL ILIAC ARTERY, OPEN APPROACH: ICD-10-PCS | Performed by: STUDENT IN AN ORGANIZED HEALTH CARE EDUCATION/TRAINING PROGRAM

## 2023-09-11 PROCEDURE — 86923 COMPATIBILITY TEST ELECTRIC: CPT

## 2023-09-11 PROCEDURE — 99214 OFFICE O/P EST MOD 30 MIN: CPT | Performed by: NURSE PRACTITIONER

## 2023-09-11 PROCEDURE — 86901 BLOOD TYPING SEROLOGIC RH(D): CPT | Performed by: PHYSICIAN ASSISTANT

## 2023-09-11 PROCEDURE — 85520 HEPARIN ASSAY: CPT | Performed by: PHYSICIAN ASSISTANT

## 2023-09-11 PROCEDURE — 04UK0KZ SUPPLEMENT RIGHT FEMORAL ARTERY WITH NONAUTOLOGOUS TISSUE SUBSTITUTE, OPEN APPROACH: ICD-10-PCS | Performed by: STUDENT IN AN ORGANIZED HEALTH CARE EDUCATION/TRAINING PROGRAM

## 2023-09-11 PROCEDURE — 25010000002 HEPARIN (PORCINE) PER 1000 UNITS

## 2023-09-11 PROCEDURE — 047M3Z1 DILATION OF RIGHT POPLITEAL ARTERY USING DRUG-COATED BALLOON, PERCUTANEOUS APPROACH: ICD-10-PCS | Performed by: STUDENT IN AN ORGANIZED HEALTH CARE EDUCATION/TRAINING PROGRAM

## 2023-09-11 PROCEDURE — 1159F MED LIST DOCD IN RCRD: CPT | Performed by: NURSE PRACTITIONER

## 2023-09-11 PROCEDURE — 83735 ASSAY OF MAGNESIUM: CPT | Performed by: HOSPITALIST

## 2023-09-11 PROCEDURE — 83036 HEMOGLOBIN GLYCOSYLATED A1C: CPT | Performed by: HOSPITALIST

## 2023-09-11 PROCEDURE — 1160F RVW MEDS BY RX/DR IN RCRD: CPT | Performed by: NURSE PRACTITIONER

## 2023-09-11 PROCEDURE — 86900 BLOOD TYPING SEROLOGIC ABO: CPT | Performed by: PHYSICIAN ASSISTANT

## 2023-09-11 PROCEDURE — 63710000001 INSULIN LISPRO (HUMAN) PER 5 UNITS: Performed by: HOSPITALIST

## 2023-09-11 PROCEDURE — 71045 X-RAY EXAM CHEST 1 VIEW: CPT

## 2023-09-11 PROCEDURE — 3078F DIAST BP <80 MM HG: CPT | Performed by: NURSE PRACTITIONER

## 2023-09-11 PROCEDURE — 99222 1ST HOSP IP/OBS MODERATE 55: CPT | Performed by: THORACIC SURGERY (CARDIOTHORACIC VASCULAR SURGERY)

## 2023-09-11 PROCEDURE — 25010000002 HEPARIN (PORCINE) 25000-0.45 UT/250ML-% SOLUTION: Performed by: PHYSICIAN ASSISTANT

## 2023-09-11 PROCEDURE — 94640 AIRWAY INHALATION TREATMENT: CPT

## 2023-09-11 PROCEDURE — 3074F SYST BP LT 130 MM HG: CPT | Performed by: NURSE PRACTITIONER

## 2023-09-11 PROCEDURE — 94664 DEMO&/EVAL PT USE INHALER: CPT

## 2023-09-11 PROCEDURE — 80053 COMPREHEN METABOLIC PANEL: CPT | Performed by: HOSPITALIST

## 2023-09-11 PROCEDURE — B41F1ZZ FLUOROSCOPY OF RIGHT LOWER EXTREMITY ARTERIES USING LOW OSMOLAR CONTRAST: ICD-10-PCS | Performed by: STUDENT IN AN ORGANIZED HEALTH CARE EDUCATION/TRAINING PROGRAM

## 2023-09-11 PROCEDURE — 85025 COMPLETE CBC W/AUTO DIFF WBC: CPT | Performed by: PHYSICIAN ASSISTANT

## 2023-09-11 PROCEDURE — 047K3Z1 DILATION OF RIGHT FEMORAL ARTERY USING DRUG-COATED BALLOON, PERCUTANEOUS APPROACH: ICD-10-PCS | Performed by: STUDENT IN AN ORGANIZED HEALTH CARE EDUCATION/TRAINING PROGRAM

## 2023-09-11 PROCEDURE — 25010000002 MORPHINE PER 10 MG: Performed by: HOSPITALIST

## 2023-09-11 PROCEDURE — 85025 COMPLETE CBC W/AUTO DIFF WBC: CPT | Performed by: HOSPITALIST

## 2023-09-11 PROCEDURE — 85610 PROTHROMBIN TIME: CPT | Performed by: PHYSICIAN ASSISTANT

## 2023-09-11 PROCEDURE — 82948 REAGENT STRIP/BLOOD GLUCOSE: CPT

## 2023-09-11 PROCEDURE — 84443 ASSAY THYROID STIM HORMONE: CPT | Performed by: HOSPITALIST

## 2023-09-11 PROCEDURE — 86850 RBC ANTIBODY SCREEN: CPT | Performed by: PHYSICIAN ASSISTANT

## 2023-09-11 PROCEDURE — 85520 HEPARIN ASSAY: CPT

## 2023-09-11 PROCEDURE — 04CK0ZZ EXTIRPATION OF MATTER FROM RIGHT FEMORAL ARTERY, OPEN APPROACH: ICD-10-PCS | Performed by: STUDENT IN AN ORGANIZED HEALTH CARE EDUCATION/TRAINING PROGRAM

## 2023-09-11 RX ORDER — ONDANSETRON 2 MG/ML
4 INJECTION INTRAMUSCULAR; INTRAVENOUS EVERY 6 HOURS PRN
Status: DISCONTINUED | OUTPATIENT
Start: 2023-09-11 | End: 2023-09-12

## 2023-09-11 RX ORDER — IBUPROFEN 600 MG/1
1 TABLET ORAL
Status: DISCONTINUED | OUTPATIENT
Start: 2023-09-11 | End: 2023-09-14 | Stop reason: HOSPADM

## 2023-09-11 RX ORDER — BUDESONIDE AND FORMOTEROL FUMARATE DIHYDRATE 160; 4.5 UG/1; UG/1
2 AEROSOL RESPIRATORY (INHALATION)
Status: DISCONTINUED | OUTPATIENT
Start: 2023-09-11 | End: 2023-09-14 | Stop reason: HOSPADM

## 2023-09-11 RX ORDER — BENZONATATE 100 MG/1
100 CAPSULE ORAL 3 TIMES DAILY PRN
Status: DISCONTINUED | OUTPATIENT
Start: 2023-09-11 | End: 2023-09-14 | Stop reason: HOSPADM

## 2023-09-11 RX ORDER — AMOXICILLIN 250 MG
2 CAPSULE ORAL 2 TIMES DAILY
Status: DISCONTINUED | OUTPATIENT
Start: 2023-09-11 | End: 2023-09-14 | Stop reason: HOSPADM

## 2023-09-11 RX ORDER — VALSARTAN 160 MG/1
160 TABLET ORAL DAILY
Status: DISCONTINUED | OUTPATIENT
Start: 2023-09-11 | End: 2023-09-14 | Stop reason: HOSPADM

## 2023-09-11 RX ORDER — INSULIN LISPRO 100 [IU]/ML
2-9 INJECTION, SOLUTION INTRAVENOUS; SUBCUTANEOUS
Status: DISCONTINUED | OUTPATIENT
Start: 2023-09-11 | End: 2023-09-14 | Stop reason: HOSPADM

## 2023-09-11 RX ORDER — NITROGLYCERIN 0.4 MG/1
0.4 TABLET SUBLINGUAL
Status: DISCONTINUED | OUTPATIENT
Start: 2023-09-11 | End: 2023-09-12 | Stop reason: SDUPTHER

## 2023-09-11 RX ORDER — ONDANSETRON 4 MG/1
4 TABLET, FILM COATED ORAL EVERY 6 HOURS PRN
Status: DISCONTINUED | OUTPATIENT
Start: 2023-09-11 | End: 2023-09-12

## 2023-09-11 RX ORDER — MELATONIN
1000 DAILY
Status: DISCONTINUED | OUTPATIENT
Start: 2023-09-11 | End: 2023-09-14 | Stop reason: HOSPADM

## 2023-09-11 RX ORDER — NALOXONE HCL 0.4 MG/ML
0.4 VIAL (ML) INJECTION
Status: DISCONTINUED | OUTPATIENT
Start: 2023-09-11 | End: 2023-09-14 | Stop reason: HOSPADM

## 2023-09-11 RX ORDER — BISACODYL 5 MG/1
5 TABLET, DELAYED RELEASE ORAL DAILY PRN
Status: DISCONTINUED | OUTPATIENT
Start: 2023-09-11 | End: 2023-09-14 | Stop reason: HOSPADM

## 2023-09-11 RX ORDER — HEPARIN SODIUM 1000 [USP'U]/ML
25 INJECTION, SOLUTION INTRAVENOUS; SUBCUTANEOUS AS NEEDED
Status: DISCONTINUED | OUTPATIENT
Start: 2023-09-11 | End: 2023-09-11 | Stop reason: DRUGHIGH

## 2023-09-11 RX ORDER — BISACODYL 10 MG
10 SUPPOSITORY, RECTAL RECTAL DAILY PRN
Status: DISCONTINUED | OUTPATIENT
Start: 2023-09-11 | End: 2023-09-14 | Stop reason: HOSPADM

## 2023-09-11 RX ORDER — HEPARIN SODIUM 1000 [USP'U]/ML
3000 INJECTION, SOLUTION INTRAVENOUS; SUBCUTANEOUS ONCE
Status: COMPLETED | OUTPATIENT
Start: 2023-09-11 | End: 2023-09-11

## 2023-09-11 RX ORDER — FINASTERIDE 5 MG/1
5 TABLET, FILM COATED ORAL DAILY
Status: DISCONTINUED | OUTPATIENT
Start: 2023-09-11 | End: 2023-09-14 | Stop reason: HOSPADM

## 2023-09-11 RX ORDER — SODIUM CHLORIDE 9 MG/ML
40 INJECTION, SOLUTION INTRAVENOUS AS NEEDED
Status: DISCONTINUED | OUTPATIENT
Start: 2023-09-11 | End: 2023-09-14 | Stop reason: HOSPADM

## 2023-09-11 RX ORDER — SODIUM CHLORIDE 0.9 % (FLUSH) 0.9 %
10 SYRINGE (ML) INJECTION EVERY 12 HOURS SCHEDULED
Status: DISCONTINUED | OUTPATIENT
Start: 2023-09-11 | End: 2023-09-14 | Stop reason: HOSPADM

## 2023-09-11 RX ORDER — METOPROLOL SUCCINATE 25 MG/1
25 TABLET, EXTENDED RELEASE ORAL EVERY MORNING
Status: DISCONTINUED | OUTPATIENT
Start: 2023-09-11 | End: 2023-09-14 | Stop reason: HOSPADM

## 2023-09-11 RX ORDER — CETIRIZINE HYDROCHLORIDE 10 MG/1
10 TABLET ORAL DAILY
Status: DISCONTINUED | OUTPATIENT
Start: 2023-09-11 | End: 2023-09-14 | Stop reason: HOSPADM

## 2023-09-11 RX ORDER — ACETAMINOPHEN 160 MG/5ML
650 SOLUTION ORAL EVERY 4 HOURS PRN
Status: DISCONTINUED | OUTPATIENT
Start: 2023-09-11 | End: 2023-09-14 | Stop reason: HOSPADM

## 2023-09-11 RX ORDER — POLYETHYLENE GLYCOL 3350 17 G/17G
17 POWDER, FOR SOLUTION ORAL DAILY PRN
Status: DISCONTINUED | OUTPATIENT
Start: 2023-09-11 | End: 2023-09-14 | Stop reason: HOSPADM

## 2023-09-11 RX ORDER — ACETAMINOPHEN 325 MG/1
650 TABLET ORAL EVERY 4 HOURS PRN
Status: DISCONTINUED | OUTPATIENT
Start: 2023-09-11 | End: 2023-09-14 | Stop reason: HOSPADM

## 2023-09-11 RX ORDER — TRAZODONE HYDROCHLORIDE 50 MG/1
75 TABLET ORAL NIGHTLY
Status: DISCONTINUED | OUTPATIENT
Start: 2023-09-11 | End: 2023-09-14 | Stop reason: HOSPADM

## 2023-09-11 RX ORDER — ROSUVASTATIN CALCIUM 20 MG/1
40 TABLET, COATED ORAL NIGHTLY
Status: DISCONTINUED | OUTPATIENT
Start: 2023-09-11 | End: 2023-09-14 | Stop reason: HOSPADM

## 2023-09-11 RX ORDER — ACETAMINOPHEN 650 MG/1
650 SUPPOSITORY RECTAL EVERY 4 HOURS PRN
Status: DISCONTINUED | OUTPATIENT
Start: 2023-09-11 | End: 2023-09-14 | Stop reason: HOSPADM

## 2023-09-11 RX ORDER — ISOSORBIDE MONONITRATE 30 MG/1
15 TABLET, EXTENDED RELEASE ORAL NIGHTLY
Status: DISCONTINUED | OUTPATIENT
Start: 2023-09-11 | End: 2023-09-14 | Stop reason: HOSPADM

## 2023-09-11 RX ORDER — SODIUM CHLORIDE 0.9 % (FLUSH) 0.9 %
10 SYRINGE (ML) INJECTION AS NEEDED
Status: DISCONTINUED | OUTPATIENT
Start: 2023-09-11 | End: 2023-09-14 | Stop reason: HOSPADM

## 2023-09-11 RX ORDER — PANTOPRAZOLE SODIUM 40 MG/1
40 TABLET, DELAYED RELEASE ORAL
Status: DISCONTINUED | OUTPATIENT
Start: 2023-09-11 | End: 2023-09-14 | Stop reason: HOSPADM

## 2023-09-11 RX ORDER — NORTRIPTYLINE HYDROCHLORIDE 25 MG/1
50 CAPSULE ORAL NIGHTLY
Status: DISCONTINUED | OUTPATIENT
Start: 2023-09-11 | End: 2023-09-14 | Stop reason: HOSPADM

## 2023-09-11 RX ORDER — HEPARIN SODIUM 10000 [USP'U]/100ML
17 INJECTION, SOLUTION INTRAVENOUS
Status: DISCONTINUED | OUTPATIENT
Start: 2023-09-11 | End: 2023-09-12

## 2023-09-11 RX ORDER — HEPARIN SODIUM 1000 [USP'U]/ML
50 INJECTION, SOLUTION INTRAVENOUS; SUBCUTANEOUS AS NEEDED
Status: DISCONTINUED | OUTPATIENT
Start: 2023-09-11 | End: 2023-09-11 | Stop reason: DRUGHIGH

## 2023-09-11 RX ORDER — MORPHINE SULFATE 2 MG/ML
2 INJECTION, SOLUTION INTRAMUSCULAR; INTRAVENOUS EVERY 4 HOURS PRN
Status: DISCONTINUED | OUTPATIENT
Start: 2023-09-11 | End: 2023-09-14 | Stop reason: HOSPADM

## 2023-09-11 RX ORDER — NICOTINE POLACRILEX 4 MG
15 LOZENGE BUCCAL
Status: DISCONTINUED | OUTPATIENT
Start: 2023-09-11 | End: 2023-09-14 | Stop reason: HOSPADM

## 2023-09-11 RX ORDER — DEXTROSE MONOHYDRATE 25 G/50ML
25 INJECTION, SOLUTION INTRAVENOUS
Status: DISCONTINUED | OUTPATIENT
Start: 2023-09-11 | End: 2023-09-14 | Stop reason: HOSPADM

## 2023-09-11 RX ORDER — FLUTICASONE PROPIONATE 50 MCG
SPRAY, SUSPENSION (ML) NASAL
COMMUNITY
Start: 2023-08-28

## 2023-09-11 RX ADMIN — TRAZODONE HYDROCHLORIDE 75 MG: 50 TABLET ORAL at 20:58

## 2023-09-11 RX ADMIN — BUSPIRONE HYDROCHLORIDE 15 MG: 10 TABLET ORAL at 20:54

## 2023-09-11 RX ADMIN — MORPHINE SULFATE 2 MG: 2 INJECTION, SOLUTION INTRAMUSCULAR; INTRAVENOUS at 17:16

## 2023-09-11 RX ADMIN — HEPARIN SODIUM 3000 UNITS: 1000 INJECTION INTRAVENOUS; SUBCUTANEOUS at 22:28

## 2023-09-11 RX ADMIN — SENNOSIDES AND DOCUSATE SODIUM 2 TABLET: 8.6; 5 TABLET ORAL at 14:31

## 2023-09-11 RX ADMIN — ROSUVASTATIN 40 MG: 20 TABLET, FILM COATED ORAL at 20:56

## 2023-09-11 RX ADMIN — MORPHINE SULFATE 2 MG: 2 INJECTION, SOLUTION INTRAMUSCULAR; INTRAVENOUS at 22:31

## 2023-09-11 RX ADMIN — MORPHINE SULFATE 2 MG: 2 INJECTION, SOLUTION INTRAMUSCULAR; INTRAVENOUS at 13:34

## 2023-09-11 RX ADMIN — BUDESONIDE AND FORMOTEROL FUMARATE DIHYDRATE 2 PUFF: 160; 4.5 AEROSOL RESPIRATORY (INHALATION) at 21:39

## 2023-09-11 RX ADMIN — HEPARIN SODIUM 12 UNITS/KG/HR: 10000 INJECTION, SOLUTION INTRAVENOUS at 14:21

## 2023-09-11 RX ADMIN — INSULIN LISPRO 2 UNITS: 100 INJECTION, SOLUTION INTRAVENOUS; SUBCUTANEOUS at 17:02

## 2023-09-11 RX ADMIN — NORTRIPTYLINE HYDROCHLORIDE 50 MG: 25 CAPSULE ORAL at 20:58

## 2023-09-11 RX ADMIN — ISOSORBIDE MONONITRATE 15 MG: 30 TABLET, EXTENDED RELEASE ORAL at 20:56

## 2023-09-11 NOTE — PLAN OF CARE
Problem: Adult Inpatient Plan of Care  Goal: Optimal Comfort and Wellbeing  Outcome: Ongoing, Progressing  Intervention: Monitor Pain and Promote Comfort  Description: Assess pain level, treatment efficacy and patient response at regular intervals using a consistent pain scale.  Consider the presence and impact of preexisting chronic pain.  Encourage patient and caregiver involvement in pain assessment, interventions and safety measures.  Recent Flowsheet Documentation  Taken 9/11/2023 1716 by Madhavi Mccoy RN  Pain Management Interventions: see MAR  Taken 9/11/2023 1334 by Madhavi Mccoy RN  Pain Management Interventions: see MAR     Problem: Tissue Perfusion Altered  Goal: Improved Tissue Perfusion  Outcome: Ongoing, Progressing  Intervention: Optimize Tissue Perfusion  Description: Keep extremities warm; protect from injury.  Anticipate and implement nonpharmacologic and pharmacologic therapy to maintain or improve perfusion (e.g., fluids, oxygen therapy, medication).  Evaluate pain level trends; change may indicate improved or decreased tissue perfusion.  Avoid positions or devices that impede circulation; encourage frequent position changes to minimize pressure points.  Encourage mobility; apply size-appropriate antiembolism device to improve venous return.  Prepare for procedural or surgical interventions to improve and restore blood flow.  Flowsheets (Taken 9/11/2023 1840)  Pressure Reduction Techniques:   frequent weight shift encouraged   heels elevated off bed   Goal Outcome Evaluation:

## 2023-09-11 NOTE — PROGRESS NOTES
HEPARIN INFUSION  Mike Gusman is a  68 y.o. male receiving heparin infusion.     Therapy for (Cardiac):   Other pvd  Patient Weight: 73kg  Initial Bolus (Y/N):   no  Any Bolus (Y/N):   yes        Signs or Symptoms of Bleeding: new start    Cardiac or Other (Not VTE)   Initial Bolus: 60 units/kg (Max 4,000 units)  Initial rate: 12 units/kg/hr (Max 1,000 units/hr)   Anti Xa Rebolus Infusion Hold time Change infusion Dose (Units/kg/hr) Next Anti Xa or aPTT Level Due   < 0.11 50 Units/kg  (4000 Units Max) None Increase by  3 Units/kg/hr 6 hours   0.11- 0.19 25 Units/kg  (2000 Units Max) None Increase by  2 Units/kg/hr 6 hours   0.2 - 0.29 0 None Increase by  1 Units/kg/hr 6 hours   0.3 - 0.5 0 None No Change 6 hours (after 2 consecutive levels in range check qAM)   0.51 - 0.6 0 None Decrease by  1 Units/kg/hr 6 hours   0.61 - 0.8 0 30 Minutes Decrease by  2 Units/kg/hr 6 hours   0.81 - 1 0 60 Minutes Decrease by  3 Units/kg/hr 6 hours   >1 0 Hold  After Anti Xa less than 0.5 decrease previous rate by  4 Units/kg/hr  Every 2 hours until Anti Xa  less than 0.5 then when infusion restarts in 6 hours     Recommend Xa every 6 hours.     Results from last 7 days   Lab Units 09/11/23  1048   HEMOGLOBIN g/dL 15.5   HEMATOCRIT % 46.6   PLATELETS 10*3/mm3 257          Date   Time   Anti-Xa Current Rate (Unit/kg/hr) Bolus   (Units) Rate Change   (Unit/kg/hr) New Rate (Unit/kg/hr) Next   Anti-Xa Comments  Pump Check Daily   9/11 1252 pending New start No initial +12 12 2000 D/w RN                                                                                                                                                                                                                                 Gagan Warner, PharmD  9/11/2023  12:51 EDT

## 2023-09-11 NOTE — H&P
[facility] Medicine Services  HISTORY AND PHYSICAL    Patient Name: Mike Gusman  : 1955  MRN: [id number]  Primary Care Physician: Nba Randall MD  Date of admission: 2023      Subjective   Subjective     Chief Complaint:  Direct admission for CTS due to claudication and cool RLE    HPI:  Mike Gusman is a 68 y.o. male with a past medical history of a right femoral above-knee to popliteal bypass on 23 by Dr. Mann, is a former smoker that quit in , HTN, hyperlipidemia, ADRIANA with CPAP noncompliance, COPD, chronic back pain with spinal cord stimulator, type 2 diabetes, PSVT, CAD s/p PCI 2021, EL followed by cardiology, PVD s/p remote stenting to left iliac and left femoral popliteal bypass per Dr. Mann on 2021. He presented to [facility] as a direct admission from the CT surgery outpatient office due to concern for ongoing claudications symptoms in his right lower extremity with coolness of the right foot. The patient has had ongoing claudication since 2023. The patient states he thought he had a bad millie horse at first but it has continued to worsen. He had a CTA abdomen with runoffs on 2023 and an arterial duplex on 8/3/23 concerning for right thrombosed or occluded right femoral to popliteal bypass graft. The patient continues to have right foot/ankle numbness with constant discomfort and cramping like aching in his right calf. He states he is now having to use a cane to walk due to the discomfort. Aleja Fernández, the outpatient CTS APRN that evaluated the patient in clinic today, contacted Dr. More and he asked that the patient be admitted for further evaluation. The patient will be admitted to the hospitalist service for further evaluation and consultation by CTS.      Personal History     Past Medical History:   Diagnosis Date    Anxiety and depression     Asthma     Clotting disorder     COPD (chronic obstructive  pulmonary disease)     Coronary artery disease     COVID-19 vaccine administered 1st - 03/08/201    2nd - 04/07/2021  Monderna ( Booster 12/21/2021 ) Pfizer    Diabetes mellitus     GERD (gastroesophageal reflux disease)     Hyperlipidemia     Hypertension     Kidney failure     Peripheral vascular disease     Renal failure     Sleep apnea     waiting for a new machine 11/2020    Stroke 2019    n o residual             Past Surgical History:   Procedure Laterality Date    CARDIAC CATHETERIZATION      Stent x1    COLONOSCOPY      FEMORAL POPLITEAL BYPASS Left 11/12/2021    Procedure: FEMORAL POPLITEAL BYPASS LEFT, ABOVE KNEE;  Surgeon: Farhat Mann MD;  Location:  [facility];  Service: Vascular;  Laterality: Left;    FEMORAL POPLITEAL BYPASS Right 02/02/2023    Procedure: FEMORAL POPLITEAL BYPASS, ABOVE KNEE RIGHT;  Surgeon: Farhat Mann MD;  Location:  [facility];  Service: Vascular;  Laterality: Right;    LEG SURGERY Right     Had rods/screws, but were removed when he had Right hip replacement    LUMBAR FUSION      RENAL ARTERY STENT      SPINAL CORD STIMULATOR IMPLANT      TOTAL HIP ARTHROPLASTY Right     VEIN SURGERY         Family History: family history includes Asthma in his sister; Heart disease in his father; Heart failure in his father and mother.     Social History:  reports that he quit smoking about 18 years ago. His smoking use included cigarettes. He has a 40.00 pack-year smoking history. He has never used smokeless tobacco. He reports that he does not drink alcohol and does not use drugs.  Social History     Social History Narrative    Lives in [place].        Medications:  Available home medication information reviewed.  Medications Prior to Admission   Medication Sig Dispense Refill Last Dose    Aspirin Low Dose 81 MG EC tablet TAKE 1 TABLET DAILY (Patient taking differently: Take 1 tablet by mouth Every Night.) 90 tablet 3 9/10/2023 at 2100    Breo Ellipta 200-25 MCG/INH inhaler Inhale 1  puff Daily.   9/11/2023 at 0800    busPIRone (BUSPAR) 15 MG tablet Take 1 tablet by mouth 2 (two) times a day. 1.5 tabs BID   9/11/2023 at 0800    cetirizine (zyrTEC) 10 MG tablet Take 1 tablet by mouth Daily.   9/11/2023 at 0800    cholecalciferol (VITAMIN D3) 25 MCG (1000 UT) tablet Take 1 tablet by mouth Daily.   9/11/2023 at 0800    clopidogrel (PLAVIX) 75 MG tablet TAKE 1 TABLET DAILY 90 tablet 3 9/10/2023 at 2100    Ertugliflozin-metFORMIN HCl (Segluromet) 7.5-1000 MG tablet Take 1 tablet/day by mouth Daily.   9/11/2023 at 0800    finasteride (PROSCAR) 5 MG tablet Take 1 tablet by mouth Daily.   9/10/2023 at 2100    fluticasone (FLONASE) 50 MCG/ACT nasal spray    9/11/2023 at 0800    isosorbide mononitrate (IMDUR) 30 MG 24 hr tablet TAKE ONE-HALF (1/2) TABLET EVERY NIGHT 45 tablet 3 9/10/2023 at 2100    magnesium oxide (MAGOX) 400 (241.3 MG) MG tablet tablet Take 1 tablet by mouth Daily.   9/10/2023 at 2100    metoprolol succinate XL (TOPROL-XL) 25 MG 24 hr tablet Take 1 tablet by mouth Every Morning.   9/11/2023 at 0800    multivitamin with minerals tablet tablet Take 1 tablet by mouth Daily.   9/10/2023 at 2100    nortriptyline (PAMELOR) 50 MG capsule Take 1 capsule by mouth Every Night.   9/11/2023 at 2100    Omega 3 1000 MG capsule Take 2,000 mg by mouth Daily.   9/11/2023 at 0800    omeprazole (PriLOSEC) 40 MG capsule Take 1 capsule by mouth Daily.   9/11/2023 at 0800    rosuvastatin (CRESTOR) 40 MG tablet Take 1 tablet by mouth Daily. 90 tablet 3 9/11/2023 at 2100    traZODone (DESYREL) 50 MG tablet Take 1.5 tablets by mouth Every Night.   9/10/2023 at 2100    valsartan (DIOVAN) 160 MG tablet Take 1 tablet by mouth Daily.   9/11/2023 at 2100    Vitamin E 180 MG capsule Take 400 mg by mouth Daily.   9/11/2023 at 0800    benzonatate (TESSALON) 100 MG capsule Take 1 capsule by mouth 3 (Three) Times a Day As Needed for Cough.       nitroglycerin (NITROSTAT) 0.4 MG SL tablet 1 under the tongue as needed for  angina, may repeat q5mins for up three doses 30 tablet 3        No Known Allergies    Objective   Objective     Vital Signs:   Temp:  [97.5 °F (36.4 °C)] 97.5 °F (36.4 °C)  Heart Rate:  [63-69] 64  Resp:  [16] 16  BP: (109-116)/(56-60) 109/59       Physical Exam   Constitutional: Awake, alert  Eyes: PERRLA, sclerae anicteric, no conjunctival injection  HENT: NCAT, mucous membranes moist  Neck: Supple, no thyromegaly, no lymphadenopathy, trachea midline  Respiratory: Clear to auscultation bilaterally, nonlabored respirations   Cardiovascular: RRR, no murmurs, rubs, or gallops, palpable pedal pulses bilaterally  Gastrointestinal: Positive bowel sounds, soft, nontender, nondistended  Musculoskeletal: weak monophasic pulse @ DP/PT area of right foot, left pulses intact at DP/PT  Psychiatric: Appropriate affect, cooperative  Neurologic: Oriented x 3, strength symmetric in all extremities, Cranial Nerves grossly intact to confrontation, speech clear  Skin: right toes cool to touch, dusky around right great toe and right heel      Photo from outpatient CTS clinic on 9/11/23    Result Review:  I have personally reviewed the results from the time of this admission to 9/11/2023 13:00 EDT and agree with these findings:  [x]  Laboratory list / accordion  []  Microbiology  [x]  Radiology  []  EKG/Telemetry   []  Cardiology/Vascular   []  Pathology  [x]  Old records  []  Other:    LAB RESULTS:      Lab 09/11/23  1048   WBC 11.37*   HEMOGLOBIN 15.5   HEMATOCRIT 46.6   PLATELETS 257   NEUTROS ABS 8.10*   IMMATURE GRANS (ABS) 0.04   LYMPHS ABS 2.13   MONOS ABS 0.72   EOS ABS 0.34   MCV 88.6         Lab 09/11/23  1048   SODIUM 138   POTASSIUM 4.6   CHLORIDE 103   CO2 21.0*   ANION GAP 14.0   BUN 12   CREATININE 1.22   EGFR 63.8   GLUCOSE 104*   CALCIUM 10.4   MAGNESIUM 2.4   HEMOGLOBIN A1C 7.00*   TSH 0.937         Lab 09/11/23  1048   TOTAL PROTEIN 7.7   ALBUMIN 4.3   GLOBULIN 3.4   ALT (SGPT) 20   AST (SGOT) 28   BILIRUBIN 0.3    ALK PHOS 87                         Microbiology Results (last 10 days)       ** No results found for the last 240 hours. **            No radiology results from the last 24 hrs    Results for orders placed during the hospital encounter of 09/03/19    Adult Transthoracic Echo Complete W/ Cont if Necessary Per Protocol    Interpretation Summary  Technically limited study.    1.  Global LV systolic function is likely not critically depressed.  Formal regional wall motion assessment cannot be performed.  Borderline increased LV wall thickness with grade 2 diastolic dysfunction and mild to moderate left atrial enlargement.  Right heart chambers are grossly normal but are poorly visualized.    2.  Limited echo and Doppler sampling demonstrate grossly morphologically normal valves with no stenotic lesions, masses, or thrombi.  There is mild to moderate mitral regurgitation.    3.  No pericardial great vessel pathology on limited imaging.    4.  Normal pulmonary pressures.      Assessment & Plan   Assessment & Plan     Active Hospital Problems    Diagnosis  POA    **PVD (peripheral vascular disease) with claudication [I73.9]  Yes    S/P spinal cord stimulator [Z96.89]  Not Applicable    Moderate bilateral carotid artery stenosis [I65.23]  Yes    ADRIANA w/CPAP non-compliance  [G47.33]  Yes    COPD with emphysema [J43.9]  Yes    L Fem Pop Bypass 11/12/2021 [I73.9]  Yes     H/O left common iliac artery stenting in 2014       T2DM on insulin and oral agents  [E11.9]  Yes    Hyperlipemia [E78.5]  Yes    Essential hypertension [I10]  Yes    Coronary artery disease involving native coronary artery of native heart without angina pectoris [I25.10]  Yes     Mike Gusman is a 68 y.o. male with a past medical history of a right femoral above-knee to popliteal bypass on 2/2/23 by Dr. Mann, is a former smoker that quit in 2005, HTN, hyperlipidemia, ADRIANA with CPAP noncompliance, COPD, chronic back pain with spinal cord  stimulator, type 2 diabetes, PSVT, CAD s/p PCI 8/5/2021, EL followed by cardiology, PVD s/p remote stenting to left iliac and left femoral popliteal bypass per Dr. Mann on 11/12/2021. He presented to [facility] as a direct admission from the CT surgery outpatient office due to concern for ongoing claudications symptoms in his right lower extremity with coolness of the right foot.     PVD with claudication  - s/p remote stenting to left iliac and left femoral popliteal bypass per Dr. Mann 11/12/2021  - s/p Right femoral above-knee to popliteal bypass 2/2/23  - recurrent claudication began in June 2023  - new CT angio 9/5/23 showing multiple areas of hemodynamically significant stenosis.  Occluded right femoral-popliteal graft.   - weak monophasic right pedal pulses with dusky discoloration to the right great toe and heel  - direct admission from CTS office today  - start Heparin gtt  - Dr. More following- possible surgical intervention later this week  - prn pain medication ordered    HLD  - continue statin  - lipid panel pending    HTN  - continue home medications    DM2  - consistent carb diet  - A1C of 7  - SSI  - hold home PO meds    ADRIANA  - does not use CPAP at home    COPD with emphysema  - no acute exacerbation  - monitor    B/l Carotid artery stenosis  - managed outpatient by cardiology    CAD  - s/p PCI 8/5/2021    Chronic back pain  - s/p spinal cord stimulator    DVT prophylaxis:  Heparin gtt      CODE STATUS:  Full/CPR  Code Status and Medical Interventions:   Ordered at: 09/11/23 1030     Level Of Support Discussed With:    Patient     Code Status (Patient has no pulse and is not breathing):    CPR (Attempt to Resuscitate)     Medical Interventions (Patient has pulse or is breathing):    Full Support       Expected Discharge   Expected discharge date/ time has not been documented.     Lynnette Corral DO  09/11/23

## 2023-09-11 NOTE — CONSULTS
CTS Consult    Patient Care Team:  Nba Randall MD as PCP - General (Internal Medicine)  Daniel Ruiz DO (Internal Medicine)      Reason for Consult:  Peripheral Vascular Disease    HPI  Patient is a 68 y.o. male  followed by Dr. Mann for PVD.  PMH: Right femoral above-knee to popliteal bypass utilizing 8 mm PTFE propatent graft 2/2/23, former smoker, HTN, hyperlipidemia, ADRIANA with CPAP noncompliance, COPD, chronic back pain with spinal cord stimulator, type 2 diabetes (most recent hemoglobin A1c 7%), PSVT, CAD s/p PCI 8/5/2021, EL followed by cardiology, PVD s/p remote stenting to left iliac and left femoral popliteal bypass per Dr. Mann 11/12/2021.  Patient was last seen in clinic 3/2/2023 relating improved exercise tolerance, healing right femoral cutdown, and dopplerable bilateral pedal pulses.    Patient reported to primary cardiology at clinic visit 6/21/2023 that he noticed recurrent claudication symptoms in the right lower extremity with coolness of extremity.  CTA abdomen with runoffs on 9/5/2023 and Arterial duplex on 8/3/23 concerning for right thrombosed or occluded right femoral to popliteal bypass graft.  Patient called to request a clinic visit and was evaluated today in CT surgery office with complaints of right foot/ankle area numbness with constant discomfort for about the last 2 weeks.  Patient used WC to get to clinic today and also walking with a cane.  Patient was admitted to [facility] for further vascular evaluation and possible intervention. He states compliance with asa, Plavix, and statin therapy.          Review of Systems  Constitutional: Negative for chills, decreased appetite, diaphoresis, fever, malaise/fatigue, night sweats, weight gain and weight loss.   HENT:  Negative for hoarse voice.    Eyes:  Negative for blurred vision, double vision and visual disturbance.   Cardiovascular:  Positive for claudication, dyspnea on exertion and leg swelling. Negative for chest pain,  irregular heartbeat, near-syncope, orthopnea, palpitations, paroxysmal nocturnal dyspnea and syncope.   Respiratory:  Positive for shortness of breath. Negative for cough, hemoptysis, sputum production and wheezing.    Hematologic/Lymphatic: Negative for adenopathy and bleeding problem. Does not bruise/bleed easily.   Skin:  Positive for color change. Negative for nail changes, poor wound healing and rash.   Musculoskeletal:  Positive for back pain and joint swelling. Negative for falls and muscle cramps.   Gastrointestinal:  Negative for abdominal pain, dysphagia and heartburn.   Genitourinary:  Negative for flank pain.   Neurological:  Negative for brief paralysis, disturbances in coordination, dizziness, focal weakness, headaches, light-headedness, loss of balance, numbness, paresthesias, sensory change, vertigo and weakness.   Psychiatric/Behavioral:  Negative for depression and suicidal ideas. The patient is not nervous/anxious.    Allergic/Immunologic: Negative for persistent infections.     History  Past Medical History:   Diagnosis Date    Anxiety and depression     Asthma     Clotting disorder     COPD (chronic obstructive pulmonary disease)     Coronary artery disease     COVID-19 vaccine administered 1st - 03/08/201 2nd - 04/07/2021  Monderna ( Booster 12/21/2021 ) Pfizer    Diabetes mellitus     GERD (gastroesophageal reflux disease)     Hyperlipidemia     Hypertension     Kidney failure     Peripheral vascular disease     Renal failure     Sleep apnea     waiting for a new machine 11/2020    Stroke 2019    n o residual     Past Surgical History:   Procedure Laterality Date    CARDIAC CATHETERIZATION      Stent x1    COLONOSCOPY      FEMORAL POPLITEAL BYPASS Left 11/12/2021    Procedure: FEMORAL POPLITEAL BYPASS LEFT, ABOVE KNEE;  Surgeon: Farhat Mann MD;  Location: [facility];  Service: Vascular;  Laterality: Left;    FEMORAL POPLITEAL BYPASS Right 02/02/2023    Procedure: FEMORAL POPLITEAL  BYPASS, ABOVE KNEE RIGHT;  Surgeon: Farhat Mann MD;  Location: [facility];  Service: Vascular;  Laterality: Right;    LEG SURGERY Right     Had rods/screws, but were removed when he had Right hip replacement    LUMBAR FUSION      RENAL ARTERY STENT      SPINAL CORD STIMULATOR IMPLANT      TOTAL HIP ARTHROPLASTY Right     VEIN SURGERY       Family History   Problem Relation Age of Onset    Heart failure Mother     Heart disease Father     Heart failure Father     Asthma Sister      Social History     Tobacco Use    Smoking status: Former     Packs/day: 2.00     Years: 20.00     Pack years: 40.00     Types: Cigarettes     Quit date: 3/2/2005     Years since quittin.5    Smokeless tobacco: Never   Vaping Use    Vaping Use: Never used   Substance Use Topics    Alcohol use: No    Drug use: No     Medications Prior to Admission   Medication Sig Dispense Refill Last Dose    Aspirin Low Dose 81 MG EC tablet TAKE 1 TABLET DAILY (Patient taking differently: Take 1 tablet by mouth Every Night.) 90 tablet 3 9/10/2023 at 2100    Breo Ellipta 200-25 MCG/INH inhaler Inhale 1 puff Daily.   2023 at 0800    busPIRone (BUSPAR) 15 MG tablet Take 1 tablet by mouth 2 (two) times a day. 1.5 tabs BID   2023 at 0800    cetirizine (zyrTEC) 10 MG tablet Take 1 tablet by mouth Daily.   2023 at 0800    cholecalciferol (VITAMIN D3) 25 MCG (1000 UT) tablet Take 1 tablet by mouth Daily.   2023 at 0800    clopidogrel (PLAVIX) 75 MG tablet TAKE 1 TABLET DAILY 90 tablet 3 9/10/2023 at 2100    Ertugliflozin-metFORMIN HCl (Segluromet) 7.5-1000 MG tablet Take 1 tablet/day by mouth Daily.   2023 at 0800    finasteride (PROSCAR) 5 MG tablet Take 1 tablet by mouth Daily.   9/10/2023 at 2100    fluticasone (FLONASE) 50 MCG/ACT nasal spray    2023 at 0800    isosorbide mononitrate (IMDUR) 30 MG 24 hr tablet TAKE ONE-HALF (1/2) TABLET EVERY NIGHT 45 tablet 3 9/10/2023 at 2100    magnesium oxide (MAGOX) 400 (241.3 MG)  MG tablet tablet Take 1 tablet by mouth Daily.   9/10/2023 at 2100    metoprolol succinate XL (TOPROL-XL) 25 MG 24 hr tablet Take 1 tablet by mouth Every Morning.   9/11/2023 at 0800    multivitamin with minerals tablet tablet Take 1 tablet by mouth Daily.   9/10/2023 at 2100    nortriptyline (PAMELOR) 50 MG capsule Take 1 capsule by mouth Every Night.   9/11/2023 at 2100    Omega 3 1000 MG capsule Take 2,000 mg by mouth Daily.   9/11/2023 at 0800    omeprazole (PriLOSEC) 40 MG capsule Take 1 capsule by mouth Daily.   9/11/2023 at 0800    rosuvastatin (CRESTOR) 40 MG tablet Take 1 tablet by mouth Daily. 90 tablet 3 9/11/2023 at 2100    traZODone (DESYREL) 50 MG tablet Take 1.5 tablets by mouth Every Night.   9/10/2023 at 2100    valsartan (DIOVAN) 160 MG tablet Take 1 tablet by mouth Daily.   9/11/2023 at 2100    Vitamin E 180 MG capsule Take 400 mg by mouth Daily.   9/11/2023 at 0800    benzonatate (TESSALON) 100 MG capsule Take 1 capsule by mouth 3 (Three) Times a Day As Needed for Cough.       nitroglycerin (NITROSTAT) 0.4 MG SL tablet 1 under the tongue as needed for angina, may repeat q5mins for up three doses 30 tablet 3      Allergies:  Patient has no known allergies.    Objective    Vital Signs  Temp:  [97.5 °F (36.4 °C)] 97.5 °F (36.4 °C)  Heart Rate:  [63-69] 63  Resp:  [16] 16  BP: (109-116)/(56-60) 109/59    Physical Exam:  Constitutional:       General: He is not in acute distress.  HENT:      Head: Normocephalic and atraumatic.   Eyes:      General: Lids are normal.      Conjunctiva/sclera: Conjunctivae normal.      Pupils: Pupils are equal, round, and reactive to light.   Cardiovascular:      Rate and Rhythm: Normal rate and regular rhythm.      Pulses:           Femoral pulses are 1+ on the right side and 1+ on the left side.       Dorsalis pedis pulses are detected w/ Doppler on the right side and detected w/ Doppler on the left side.        Posterior tibial pulses are detected w/ Doppler on the  right side and detected w/ Doppler on the left side.      Heart sounds: S1 normal and S2 normal. No murmur heard.     Comments: Weak monophasic pulses @ DP/PT areas on the right.  Biphasic doppler signals left DP/PT.  No doppler signal across right fem-pop graft.  Palpable +1 bilateral femoral pulses.  Biphasic doppler signal also obtained in the right popliteal fossa  Pulmonary:      Effort: Pulmonary effort is normal. No respiratory distress.      Breath sounds: Normal breath sounds.   Musculoskeletal:         General: Normal range of motion.      Cervical back: Normal range of motion and neck supple.      Right lower leg: No edema.      Left lower leg: No edema.   Feet:      Comments: Hyperemic forefoot and toes with dusky area right medial great toe and @ right heel.    Skin:     General: Skin is warm and dry.      Capillary Refill: Capillary refill takes less than 2 seconds.   Neurological:      General: No focal deficit present.      Mental Status: He is alert and oriented to person, place, and time.   Psychiatric:         Attention and Perception: Attention normal.         Mood and Affect: Mood normal.         Speech: Speech normal.         Behavior: Behavior normal. Behavior is cooperative.           Data Review:  Results from last 7 days   Lab Units 09/11/23  1048   WBC 10*3/mm3 11.37*   HEMOGLOBIN g/dL 15.5   HEMATOCRIT % 46.6   PLATELETS 10*3/mm3 257     Results from last 7 days   Lab Units 09/11/23  1048   SODIUM mmol/L 138   POTASSIUM mmol/L 4.6   CHLORIDE mmol/L 103   CO2 mmol/L 21.0*   BUN mg/dL 12   CREATININE mg/dL 1.22   GLUCOSE mg/dL 104*   CALCIUM mg/dL 10.4     Coagulation: No results found for: INR, APTT  Cardiac markers:     ABGs:       Invalid input(s): PO2      Imaging Results (Last 72 Hours)       ** No results found for the last 72 hours. **          Imaging/Labs:  CT angio abdominal aorta with runoff 9/5/2023 @ [facility] (Personally reviewed.  Agree w/ occluded right femoral-popliteal  graft)  Impression:  Multiple areas of hemodynamically significant stenosis.  Occluded right femoral-popliteal graft.     Duplex Lower Extremity Art / Grafts - Right 8/3/23  EXAM:US Duplex Right Lower Extremity Arteries  FINDINGS:  Diminished velocities within the dorsalis pedis on the right side.  The right femoral to popliteal graft appears thrombosed or occluded.  The more distal vessel shows monophasic dampened waveform patterns.     Impression  1.  Diminished velocities within the dorsalis pedis on the right side.  2.  The right femoral to popliteal graft appears thrombosed or occluded.  The more distal vessel shows monophasic dampened waveform patterns.  This report was finalized on 8/3/2023 9:28 AM by Dr. Gerardo Sebastian MD.              Assessment/Plan:        PVD (peripheral vascular disease) with claudication    Coronary artery disease involving native coronary artery of native heart without angina pectoris    Hyperlipemia    Essential hypertension    L Fem Pop Bypass 11/12/2021    T2DM on insulin and oral agents     ADRIANA w/CPAP non-compliance     COPD with emphysema    Moderate bilateral carotid artery stenosis    S/P spinal cord stimulator    68 y.o. male followed by Dr. Mann for PVD s/p Right femoral above-knee to popliteal bypass utilizing 8 mm PTFE propatent graft 2/2/23 and s/p remote stenting to left iliac and left femoral popliteal bypass per Dr. Mann 11/12/2021. Weak monophasic right pedal pulses with dusky discoloration at right great toe and heel with arterial duplex and CTA concerning for thrombosed or occluded right femoral popliteal bypass graft.  Initiate heparin gtt.  Possible surgical intervention later this week with Dr. More.      Usha Figueroa PA-C  09/11/23  12:14 EDT

## 2023-09-12 ENCOUNTER — APPOINTMENT (OUTPATIENT)
Dept: GENERAL RADIOLOGY | Facility: HOSPITAL | Age: 68
DRG: 271 | End: 2023-09-12
Payer: MEDICARE

## 2023-09-12 ENCOUNTER — TELEPHONE (OUTPATIENT)
Dept: CARDIOLOGY | Facility: CLINIC | Age: 68
End: 2023-09-12
Payer: MEDICARE

## 2023-09-12 ENCOUNTER — ANESTHESIA (OUTPATIENT)
Dept: PERIOP | Facility: HOSPITAL | Age: 68
DRG: 271 | End: 2023-09-12
Payer: MEDICARE

## 2023-09-12 ENCOUNTER — ANESTHESIA EVENT (OUTPATIENT)
Dept: PERIOP | Facility: HOSPITAL | Age: 68
DRG: 271 | End: 2023-09-12
Payer: MEDICARE

## 2023-09-12 LAB
ACT BLD: 257 SECONDS (ref 82–152)
ACT BLD: 257 SECONDS (ref 82–152)
ALBUMIN SERPL-MCNC: 3.8 G/DL (ref 3.5–5.2)
ALBUMIN/GLOB SERPL: 1.1 G/DL
ALP SERPL-CCNC: 76 U/L (ref 39–117)
ALT SERPL W P-5'-P-CCNC: 17 U/L (ref 1–41)
ANION GAP SERPL CALCULATED.3IONS-SCNC: 11 MMOL/L (ref 5–15)
AST SERPL-CCNC: 22 U/L (ref 1–40)
BASOPHILS # BLD AUTO: 0.05 10*3/MM3 (ref 0–0.2)
BASOPHILS NFR BLD AUTO: 0.4 % (ref 0–1.5)
BILIRUB SERPL-MCNC: 0.3 MG/DL (ref 0–1.2)
BUN SERPL-MCNC: 21 MG/DL (ref 8–23)
BUN/CREAT SERPL: 15.1 (ref 7–25)
CALCIUM SPEC-SCNC: 10.1 MG/DL (ref 8.6–10.5)
CHLORIDE SERPL-SCNC: 102 MMOL/L (ref 98–107)
CHOLEST SERPL-MCNC: 137 MG/DL (ref 0–200)
CO2 SERPL-SCNC: 23 MMOL/L (ref 22–29)
CREAT SERPL-MCNC: 1.39 MG/DL (ref 0.76–1.27)
DEPRECATED RDW RBC AUTO: 42.1 FL (ref 37–54)
EGFRCR SERPLBLD CKD-EPI 2021: 54.5 ML/MIN/1.73
EOSINOPHIL # BLD AUTO: 0.41 10*3/MM3 (ref 0–0.4)
EOSINOPHIL NFR BLD AUTO: 2.9 % (ref 0.3–6.2)
ERYTHROCYTE [DISTWIDTH] IN BLOOD BY AUTOMATED COUNT: 12.8 % (ref 12.3–15.4)
GLOBULIN UR ELPH-MCNC: 3.4 GM/DL
GLUCOSE BLDC GLUCOMTR-MCNC: 114 MG/DL (ref 70–130)
GLUCOSE BLDC GLUCOMTR-MCNC: 122 MG/DL (ref 70–130)
GLUCOSE BLDC GLUCOMTR-MCNC: 140 MG/DL (ref 70–130)
GLUCOSE BLDC GLUCOMTR-MCNC: 143 MG/DL (ref 70–130)
GLUCOSE BLDC GLUCOMTR-MCNC: 193 MG/DL (ref 70–130)
GLUCOSE SERPL-MCNC: 156 MG/DL (ref 65–99)
HCT VFR BLD AUTO: 45.9 % (ref 37.5–51)
HDLC SERPL-MCNC: 43 MG/DL (ref 40–60)
HGB BLD-MCNC: 15.2 G/DL (ref 13–17.7)
IMM GRANULOCYTES # BLD AUTO: 0.05 10*3/MM3 (ref 0–0.05)
IMM GRANULOCYTES NFR BLD AUTO: 0.4 % (ref 0–0.5)
LDLC SERPL CALC-MCNC: 48 MG/DL (ref 0–100)
LDLC/HDLC SERPL: 0.77 {RATIO}
LYMPHOCYTES # BLD AUTO: 2.62 10*3/MM3 (ref 0.7–3.1)
LYMPHOCYTES NFR BLD AUTO: 18.8 % (ref 19.6–45.3)
MCH RBC QN AUTO: 29.5 PG (ref 26.6–33)
MCHC RBC AUTO-ENTMCNC: 33.1 G/DL (ref 31.5–35.7)
MCV RBC AUTO: 89 FL (ref 79–97)
MONOCYTES # BLD AUTO: 0.83 10*3/MM3 (ref 0.1–0.9)
MONOCYTES NFR BLD AUTO: 6 % (ref 5–12)
NEUTROPHILS NFR BLD AUTO: 71.5 % (ref 42.7–76)
NEUTROPHILS NFR BLD AUTO: 9.97 10*3/MM3 (ref 1.7–7)
NRBC BLD AUTO-RTO: 0 /100 WBC (ref 0–0.2)
PLATELET # BLD AUTO: 258 10*3/MM3 (ref 140–450)
PMV BLD AUTO: 10.3 FL (ref 6–12)
POTASSIUM SERPL-SCNC: 4.5 MMOL/L (ref 3.5–5.2)
PROT SERPL-MCNC: 7.2 G/DL (ref 6–8.5)
RBC # BLD AUTO: 5.16 10*6/MM3 (ref 4.14–5.8)
SODIUM SERPL-SCNC: 136 MMOL/L (ref 136–145)
TRIGL SERPL-MCNC: 305 MG/DL (ref 0–150)
UFH PPP CHRO-ACNC: 0.15 IU/ML (ref 0.3–0.7)
UFH PPP CHRO-ACNC: 0.17 IU/ML (ref 0.3–0.7)
VLDLC SERPL-MCNC: 46 MG/DL (ref 5–40)
WBC NRBC COR # BLD: 13.93 10*3/MM3 (ref 3.4–10.8)

## 2023-09-12 PROCEDURE — 88311 DECALCIFY TISSUE: CPT | Performed by: STUDENT IN AN ORGANIZED HEALTH CARE EDUCATION/TRAINING PROGRAM

## 2023-09-12 PROCEDURE — 25010000002 ONDANSETRON PER 1 MG: Performed by: HOSPITALIST

## 2023-09-12 PROCEDURE — C1894 INTRO/SHEATH, NON-LASER: HCPCS | Performed by: STUDENT IN AN ORGANIZED HEALTH CARE EDUCATION/TRAINING PROGRAM

## 2023-09-12 PROCEDURE — 88304 TISSUE EXAM BY PATHOLOGIST: CPT | Performed by: STUDENT IN AN ORGANIZED HEALTH CARE EDUCATION/TRAINING PROGRAM

## 2023-09-12 PROCEDURE — C2623 CATH, TRANSLUMIN, DRUG-COAT: HCPCS | Performed by: STUDENT IN AN ORGANIZED HEALTH CARE EDUCATION/TRAINING PROGRAM

## 2023-09-12 PROCEDURE — 85025 COMPLETE CBC W/AUTO DIFF WBC: CPT | Performed by: PHYSICIAN ASSISTANT

## 2023-09-12 PROCEDURE — 80061 LIPID PANEL: CPT | Performed by: HOSPITALIST

## 2023-09-12 PROCEDURE — 25010000002 HEPARIN (PORCINE) PER 1000 UNITS

## 2023-09-12 PROCEDURE — 37224 PR REVSC OPN/PRG FEM/POP W/ANGIOPLASTY UNI: CPT | Performed by: STUDENT IN AN ORGANIZED HEALTH CARE EDUCATION/TRAINING PROGRAM

## 2023-09-12 PROCEDURE — 75710 ARTERY X-RAYS ARM/LEG: CPT | Performed by: STUDENT IN AN ORGANIZED HEALTH CARE EDUCATION/TRAINING PROGRAM

## 2023-09-12 PROCEDURE — 25010000002 NEOSTIGMINE 10 MG/10ML SOLUTION: Performed by: ANESTHESIOLOGY

## 2023-09-12 PROCEDURE — C1769 GUIDE WIRE: HCPCS | Performed by: STUDENT IN AN ORGANIZED HEALTH CARE EDUCATION/TRAINING PROGRAM

## 2023-09-12 PROCEDURE — 80053 COMPREHEN METABOLIC PANEL: CPT | Performed by: HOSPITALIST

## 2023-09-12 PROCEDURE — C1887 CATHETER, GUIDING: HCPCS | Performed by: STUDENT IN AN ORGANIZED HEALTH CARE EDUCATION/TRAINING PROGRAM

## 2023-09-12 PROCEDURE — 85520 HEPARIN ASSAY: CPT

## 2023-09-12 PROCEDURE — 25010000002 DEXAMETHASONE PER 1 MG: Performed by: ANESTHESIOLOGY

## 2023-09-12 PROCEDURE — 85347 COAGULATION TIME ACTIVATED: CPT

## 2023-09-12 PROCEDURE — 25010000002 ESMOLOL 100 MG/10ML SOLUTION: Performed by: ANESTHESIOLOGY

## 2023-09-12 PROCEDURE — 25010000002 FENTANYL CITRATE (PF) 50 MCG/ML SOLUTION

## 2023-09-12 PROCEDURE — 25010000002 CEFAZOLIN IN DEXTROSE 2000 MG/ 100 ML SOLUTION: Performed by: PHYSICIAN ASSISTANT

## 2023-09-12 PROCEDURE — 25010000002 FENTANYL CITRATE (PF) 100 MCG/2ML SOLUTION: Performed by: ANESTHESIOLOGY

## 2023-09-12 PROCEDURE — 25010000002 PHENYLEPHRINE 10 MG/ML SOLUTION 1 ML VIAL: Performed by: ANESTHESIOLOGY

## 2023-09-12 PROCEDURE — 94761 N-INVAS EAR/PLS OXIMETRY MLT: CPT

## 2023-09-12 PROCEDURE — C1725 CATH, TRANSLUMIN NON-LASER: HCPCS | Performed by: STUDENT IN AN ORGANIZED HEALTH CARE EDUCATION/TRAINING PROGRAM

## 2023-09-12 PROCEDURE — 35355 RECHANNELING OF ARTERY: CPT | Performed by: STUDENT IN AN ORGANIZED HEALTH CARE EDUCATION/TRAINING PROGRAM

## 2023-09-12 PROCEDURE — 82948 REAGENT STRIP/BLOOD GLUCOSE: CPT

## 2023-09-12 PROCEDURE — C1768 GRAFT, VASCULAR: HCPCS | Performed by: STUDENT IN AN ORGANIZED HEALTH CARE EDUCATION/TRAINING PROGRAM

## 2023-09-12 PROCEDURE — 94799 UNLISTED PULMONARY SVC/PX: CPT

## 2023-09-12 PROCEDURE — 94664 DEMO&/EVAL PT USE INHALER: CPT

## 2023-09-12 PROCEDURE — 99024 POSTOP FOLLOW-UP VISIT: CPT | Performed by: STUDENT IN AN ORGANIZED HEALTH CARE EDUCATION/TRAINING PROGRAM

## 2023-09-12 PROCEDURE — 25010000002 HEPARIN (PORCINE) PER 1000 UNITS: Performed by: ANESTHESIOLOGY

## 2023-09-12 PROCEDURE — 25010000002 MORPHINE PER 10 MG: Performed by: HOSPITALIST

## 2023-09-12 PROCEDURE — 25010000002 HEPARIN (PORCINE) PER 1000 UNITS: Performed by: STUDENT IN AN ORGANIZED HEALTH CARE EDUCATION/TRAINING PROGRAM

## 2023-09-12 PROCEDURE — 0 IODIXANOL PER 1 ML: Performed by: STUDENT IN AN ORGANIZED HEALTH CARE EDUCATION/TRAINING PROGRAM

## 2023-09-12 PROCEDURE — 25010000002 PROTAMINE SULFATE PER 10 MG: Performed by: ANESTHESIOLOGY

## 2023-09-12 PROCEDURE — 25010000002 PROPOFOL 10 MG/ML EMULSION: Performed by: ANESTHESIOLOGY

## 2023-09-12 DEVICE — CLIP LIGAT VASC HORIZON TI LG ORNG 6CT: Type: IMPLANTABLE DEVICE | Site: LEG | Status: FUNCTIONAL

## 2023-09-12 DEVICE — CLIP LIGAT VASC HORIZON TI MD BLU 6CT: Type: IMPLANTABLE DEVICE | Site: LEG | Status: FUNCTIONAL

## 2023-09-12 DEVICE — VASCU-GUARD IS PREPARED FROM BOVINE PERICARDIUM CROSS-LINKED WITH GLUTARALDEHYDE, AND TREATED WITH 1 MOLAR SODIUM HYDROXIDE FOR A MINIMUM OF 60 MINUTES AT 20-25 DEGREES C. VASCU-GUARD IS TERMINALLY STERILIZED USING GAMMA IRRADIATION. AND PACKAGED WITHIN A DOUBLE-POUCH SYSTEM. THE CONTENTS OF THE UNOPENED, UNDAMAGED OUTER POUCH ARE STERILE.
Type: IMPLANTABLE DEVICE | Site: ARTERY FEMORAL | Status: FUNCTIONAL
Brand: VASCU-GUARD

## 2023-09-12 RX ORDER — CEFAZOLIN SODIUM 2 G/100ML
2000 INJECTION, SOLUTION INTRAVENOUS ONCE
Status: DISCONTINUED | OUTPATIENT
Start: 2023-09-12 | End: 2023-09-12 | Stop reason: SDUPTHER

## 2023-09-12 RX ORDER — CEFAZOLIN SODIUM 2 G/100ML
2000 INJECTION, SOLUTION INTRAVENOUS EVERY 8 HOURS
Status: COMPLETED | OUTPATIENT
Start: 2023-09-13 | End: 2023-09-13

## 2023-09-12 RX ORDER — NEOSTIGMINE METHYLSULFATE 1 MG/ML
INJECTION, SOLUTION INTRAVENOUS AS NEEDED
Status: DISCONTINUED | OUTPATIENT
Start: 2023-09-12 | End: 2023-09-12 | Stop reason: SURG

## 2023-09-12 RX ORDER — GLYCOPYRROLATE 0.2 MG/ML
INJECTION INTRAMUSCULAR; INTRAVENOUS AS NEEDED
Status: DISCONTINUED | OUTPATIENT
Start: 2023-09-12 | End: 2023-09-12 | Stop reason: SURG

## 2023-09-12 RX ORDER — CLOPIDOGREL BISULFATE 75 MG/1
75 TABLET ORAL DAILY
Status: DISCONTINUED | OUTPATIENT
Start: 2023-09-13 | End: 2023-09-14 | Stop reason: HOSPADM

## 2023-09-12 RX ORDER — FENTANYL CITRATE 50 UG/ML
INJECTION, SOLUTION INTRAMUSCULAR; INTRAVENOUS AS NEEDED
Status: DISCONTINUED | OUTPATIENT
Start: 2023-09-12 | End: 2023-09-12 | Stop reason: SURG

## 2023-09-12 RX ORDER — SODIUM CHLORIDE 0.9 % (FLUSH) 0.9 %
10 SYRINGE (ML) INJECTION AS NEEDED
Status: DISCONTINUED | OUTPATIENT
Start: 2023-09-12 | End: 2023-09-12 | Stop reason: HOSPADM

## 2023-09-12 RX ORDER — ACETAMINOPHEN 325 MG/1
650 TABLET ORAL EVERY 4 HOURS PRN
Status: DISCONTINUED | OUTPATIENT
Start: 2023-09-12 | End: 2023-09-12 | Stop reason: SDUPTHER

## 2023-09-12 RX ORDER — SODIUM CHLORIDE 450 MG/100ML
50 INJECTION, SOLUTION INTRAVENOUS CONTINUOUS
Status: DISCONTINUED | OUTPATIENT
Start: 2023-09-12 | End: 2023-09-13

## 2023-09-12 RX ORDER — FENTANYL CITRATE 50 UG/ML
INJECTION, SOLUTION INTRAMUSCULAR; INTRAVENOUS
Status: COMPLETED
Start: 2023-09-12 | End: 2023-09-12

## 2023-09-12 RX ORDER — SODIUM CHLORIDE 9 MG/ML
40 INJECTION, SOLUTION INTRAVENOUS AS NEEDED
Status: DISCONTINUED | OUTPATIENT
Start: 2023-09-12 | End: 2023-09-12 | Stop reason: HOSPADM

## 2023-09-12 RX ORDER — SODIUM CHLORIDE, SODIUM LACTATE, POTASSIUM CHLORIDE, CALCIUM CHLORIDE 600; 310; 30; 20 MG/100ML; MG/100ML; MG/100ML; MG/100ML
INJECTION, SOLUTION INTRAVENOUS CONTINUOUS PRN
Status: DISCONTINUED | OUTPATIENT
Start: 2023-09-12 | End: 2023-09-12 | Stop reason: SURG

## 2023-09-12 RX ORDER — CEFAZOLIN SODIUM 2 G/100ML
2000 INJECTION, SOLUTION INTRAVENOUS ONCE
Status: COMPLETED | OUTPATIENT
Start: 2023-09-12 | End: 2023-09-12

## 2023-09-12 RX ORDER — CLOPIDOGREL BISULFATE 75 MG/1
300 TABLET ORAL ONCE
Status: COMPLETED | OUTPATIENT
Start: 2023-09-12 | End: 2023-09-12

## 2023-09-12 RX ORDER — BUPIVACAINE HCL/0.9 % NACL/PF 0.125 %
PLASTIC BAG, INJECTION (ML) EPIDURAL AS NEEDED
Status: DISCONTINUED | OUTPATIENT
Start: 2023-09-12 | End: 2023-09-12 | Stop reason: SURG

## 2023-09-12 RX ORDER — LIDOCAINE HYDROCHLORIDE 10 MG/ML
INJECTION, SOLUTION EPIDURAL; INFILTRATION; INTRACAUDAL; PERINEURAL AS NEEDED
Status: DISCONTINUED | OUTPATIENT
Start: 2023-09-12 | End: 2023-09-12 | Stop reason: HOSPADM

## 2023-09-12 RX ORDER — PROPOFOL 10 MG/ML
VIAL (ML) INTRAVENOUS AS NEEDED
Status: DISCONTINUED | OUTPATIENT
Start: 2023-09-12 | End: 2023-09-12 | Stop reason: SURG

## 2023-09-12 RX ORDER — DEXAMETHASONE SODIUM PHOSPHATE 4 MG/ML
INJECTION, SOLUTION INTRA-ARTICULAR; INTRALESIONAL; INTRAMUSCULAR; INTRAVENOUS; SOFT TISSUE AS NEEDED
Status: DISCONTINUED | OUTPATIENT
Start: 2023-09-12 | End: 2023-09-12 | Stop reason: SURG

## 2023-09-12 RX ORDER — DEXMEDETOMIDINE HYDROCHLORIDE 100 UG/ML
INJECTION, SOLUTION INTRAVENOUS AS NEEDED
Status: DISCONTINUED | OUTPATIENT
Start: 2023-09-12 | End: 2023-09-12 | Stop reason: SURG

## 2023-09-12 RX ORDER — NITROGLYCERIN 0.4 MG/1
0.4 TABLET SUBLINGUAL
Status: DISCONTINUED | OUTPATIENT
Start: 2023-09-12 | End: 2023-09-14 | Stop reason: HOSPADM

## 2023-09-12 RX ORDER — LIDOCAINE HYDROCHLORIDE 10 MG/ML
INJECTION, SOLUTION EPIDURAL; INFILTRATION; INTRACAUDAL; PERINEURAL AS NEEDED
Status: DISCONTINUED | OUTPATIENT
Start: 2023-09-12 | End: 2023-09-12 | Stop reason: SURG

## 2023-09-12 RX ORDER — IODIXANOL 320 MG/ML
INJECTION, SOLUTION INTRAVASCULAR AS NEEDED
Status: DISCONTINUED | OUTPATIENT
Start: 2023-09-12 | End: 2023-09-12 | Stop reason: HOSPADM

## 2023-09-12 RX ORDER — ROCURONIUM BROMIDE 10 MG/ML
INJECTION, SOLUTION INTRAVENOUS AS NEEDED
Status: DISCONTINUED | OUTPATIENT
Start: 2023-09-12 | End: 2023-09-12 | Stop reason: SURG

## 2023-09-12 RX ORDER — FENTANYL CITRATE 50 UG/ML
50 INJECTION, SOLUTION INTRAMUSCULAR; INTRAVENOUS ONCE
Status: COMPLETED | OUTPATIENT
Start: 2023-09-12 | End: 2023-09-12

## 2023-09-12 RX ORDER — HYDROCODONE BITARTRATE AND ACETAMINOPHEN 5; 325 MG/1; MG/1
1 TABLET ORAL EVERY 6 HOURS PRN
Status: DISCONTINUED | OUTPATIENT
Start: 2023-09-12 | End: 2023-09-14 | Stop reason: HOSPADM

## 2023-09-12 RX ORDER — HEPARIN SODIUM 1000 [USP'U]/ML
INJECTION, SOLUTION INTRAVENOUS; SUBCUTANEOUS AS NEEDED
Status: DISCONTINUED | OUTPATIENT
Start: 2023-09-12 | End: 2023-09-12 | Stop reason: SURG

## 2023-09-12 RX ORDER — ONDANSETRON 2 MG/ML
4 INJECTION INTRAMUSCULAR; INTRAVENOUS EVERY 6 HOURS PRN
Status: DISCONTINUED | OUTPATIENT
Start: 2023-09-12 | End: 2023-09-14 | Stop reason: HOSPADM

## 2023-09-12 RX ORDER — HEPARIN SODIUM 1000 [USP'U]/ML
1800 INJECTION, SOLUTION INTRAVENOUS; SUBCUTANEOUS ONCE
Status: COMPLETED | OUTPATIENT
Start: 2023-09-12 | End: 2023-09-12

## 2023-09-12 RX ORDER — PROTAMINE SULFATE 10 MG/ML
INJECTION, SOLUTION INTRAVENOUS AS NEEDED
Status: DISCONTINUED | OUTPATIENT
Start: 2023-09-12 | End: 2023-09-12 | Stop reason: SURG

## 2023-09-12 RX ORDER — ASPIRIN 81 MG/1
81 TABLET ORAL DAILY
Status: DISCONTINUED | OUTPATIENT
Start: 2023-09-13 | End: 2023-09-14 | Stop reason: HOSPADM

## 2023-09-12 RX ORDER — ESMOLOL HYDROCHLORIDE 10 MG/ML
INJECTION INTRAVENOUS AS NEEDED
Status: DISCONTINUED | OUTPATIENT
Start: 2023-09-12 | End: 2023-09-12 | Stop reason: SURG

## 2023-09-12 RX ORDER — SODIUM CHLORIDE 9 MG/ML
9 INJECTION, SOLUTION INTRAVENOUS CONTINUOUS PRN
Status: DISCONTINUED | OUTPATIENT
Start: 2023-09-12 | End: 2023-09-14 | Stop reason: HOSPADM

## 2023-09-12 RX ORDER — ONDANSETRON 4 MG/1
4 TABLET, FILM COATED ORAL EVERY 6 HOURS PRN
Status: DISCONTINUED | OUTPATIENT
Start: 2023-09-12 | End: 2023-09-14 | Stop reason: HOSPADM

## 2023-09-12 RX ORDER — FAMOTIDINE 10 MG/ML
20 INJECTION, SOLUTION INTRAVENOUS
Status: COMPLETED | OUTPATIENT
Start: 2023-09-12 | End: 2023-09-12

## 2023-09-12 RX ORDER — SODIUM CHLORIDE 0.9 % (FLUSH) 0.9 %
10 SYRINGE (ML) INJECTION EVERY 12 HOURS SCHEDULED
Status: DISCONTINUED | OUTPATIENT
Start: 2023-09-12 | End: 2023-09-12 | Stop reason: HOSPADM

## 2023-09-12 RX ADMIN — Medication 100 MCG: at 17:09

## 2023-09-12 RX ADMIN — CETIRIZINE HYDROCHLORIDE TABLETS 10 MG: 10 TABLET, FILM COATED ORAL at 08:13

## 2023-09-12 RX ADMIN — BUSPIRONE HYDROCHLORIDE 15 MG: 10 TABLET ORAL at 08:13

## 2023-09-12 RX ADMIN — SENNOSIDES AND DOCUSATE SODIUM 2 TABLET: 8.6; 5 TABLET ORAL at 22:29

## 2023-09-12 RX ADMIN — NORTRIPTYLINE HYDROCHLORIDE 50 MG: 25 CAPSULE ORAL at 23:14

## 2023-09-12 RX ADMIN — BUDESONIDE AND FORMOTEROL FUMARATE DIHYDRATE 2 PUFF: 160; 4.5 AEROSOL RESPIRATORY (INHALATION) at 08:57

## 2023-09-12 RX ADMIN — SENNOSIDES AND DOCUSATE SODIUM 2 TABLET: 8.6; 5 TABLET ORAL at 08:13

## 2023-09-12 RX ADMIN — Medication 10 ML: at 22:30

## 2023-09-12 RX ADMIN — ROCURONIUM BROMIDE 50 MG: 10 SOLUTION INTRAVENOUS at 16:30

## 2023-09-12 RX ADMIN — CEFAZOLIN SODIUM 2000 MG: 2 INJECTION, SOLUTION INTRAVENOUS at 16:34

## 2023-09-12 RX ADMIN — ESMOLOL HYDROCHLORIDE 20 MG: 100 INJECTION, SOLUTION INTRAVENOUS at 16:41

## 2023-09-12 RX ADMIN — PROTAMINE SULFATE 15 MG: 10 INJECTION, SOLUTION INTRAVENOUS at 18:43

## 2023-09-12 RX ADMIN — FENTANYL CITRATE 50 MCG: 50 INJECTION, SOLUTION INTRAMUSCULAR; INTRAVENOUS at 19:38

## 2023-09-12 RX ADMIN — Medication 100 MCG: at 16:48

## 2023-09-12 RX ADMIN — ROSUVASTATIN 40 MG: 20 TABLET, FILM COATED ORAL at 22:29

## 2023-09-12 RX ADMIN — Medication 100 MCG: at 16:57

## 2023-09-12 RX ADMIN — HEPARIN SODIUM 2500 UNITS: 1000 INJECTION INTRAVENOUS; SUBCUTANEOUS at 18:15

## 2023-09-12 RX ADMIN — NEOSTIGMINE 4 MG: 1 INJECTION INTRAVENOUS at 19:05

## 2023-09-12 RX ADMIN — BUDESONIDE AND FORMOTEROL FUMARATE DIHYDRATE 2 PUFF: 160; 4.5 AEROSOL RESPIRATORY (INHALATION) at 21:51

## 2023-09-12 RX ADMIN — DEXAMETHASONE SODIUM PHOSPHATE 4 MG: 4 INJECTION, SOLUTION INTRAMUSCULAR; INTRAVENOUS at 16:43

## 2023-09-12 RX ADMIN — BUSPIRONE HYDROCHLORIDE 15 MG: 10 TABLET ORAL at 22:28

## 2023-09-12 RX ADMIN — ROCURONIUM BROMIDE 20 MG: 10 SOLUTION INTRAVENOUS at 17:17

## 2023-09-12 RX ADMIN — PHENYLEPHRINE HYDROCHLORIDE 30 MCG/MIN: 10 INJECTION INTRAVENOUS at 16:58

## 2023-09-12 RX ADMIN — HEPARIN SODIUM 5000 UNITS: 1000 INJECTION INTRAVENOUS; SUBCUTANEOUS at 17:48

## 2023-09-12 RX ADMIN — FENTANYL CITRATE 100 MCG: 50 INJECTION, SOLUTION INTRAMUSCULAR; INTRAVENOUS at 16:30

## 2023-09-12 RX ADMIN — VALSARTAN 160 MG: 160 TABLET, FILM COATED ORAL at 08:13

## 2023-09-12 RX ADMIN — ONDANSETRON 4 MG: 2 INJECTION INTRAMUSCULAR; INTRAVENOUS at 18:45

## 2023-09-12 RX ADMIN — HEPARIN SODIUM 5000 UNITS: 1000 INJECTION INTRAVENOUS; SUBCUTANEOUS at 17:18

## 2023-09-12 RX ADMIN — Medication 100 MCG: at 17:00

## 2023-09-12 RX ADMIN — NICARDIPINE HYDROCHLORIDE 5 MG/HR: 25 INJECTION, SOLUTION INTRAVENOUS at 21:45

## 2023-09-12 RX ADMIN — MORPHINE SULFATE 2 MG: 2 INJECTION, SOLUTION INTRAMUSCULAR; INTRAVENOUS at 11:05

## 2023-09-12 RX ADMIN — GLYCOPYRROLATE 0.8 MG: 0.4 INJECTION INTRAMUSCULAR; INTRAVENOUS at 19:05

## 2023-09-12 RX ADMIN — ISOSORBIDE MONONITRATE 15 MG: 30 TABLET, EXTENDED RELEASE ORAL at 22:29

## 2023-09-12 RX ADMIN — SODIUM CHLORIDE 50 ML/HR: 4.5 INJECTION, SOLUTION INTRAVENOUS at 20:38

## 2023-09-12 RX ADMIN — SODIUM CHLORIDE, POTASSIUM CHLORIDE, SODIUM LACTATE AND CALCIUM CHLORIDE: 600; 310; 30; 20 INJECTION, SOLUTION INTRAVENOUS at 16:24

## 2023-09-12 RX ADMIN — Medication 10 ML: at 08:13

## 2023-09-12 RX ADMIN — CLOPIDOGREL BISULFATE 300 MG: 75 TABLET ORAL at 22:37

## 2023-09-12 RX ADMIN — Medication 100 MCG: at 16:55

## 2023-09-12 RX ADMIN — SODIUM CHLORIDE 9 ML/HR: 9 INJECTION, SOLUTION INTRAVENOUS at 14:15

## 2023-09-12 RX ADMIN — FINASTERIDE 5 MG: 5 TABLET, FILM COATED ORAL at 08:13

## 2023-09-12 RX ADMIN — PROPOFOL 150 MG: 10 INJECTION, EMULSION INTRAVENOUS at 16:30

## 2023-09-12 RX ADMIN — Medication 100 MCG: at 16:52

## 2023-09-12 RX ADMIN — FAMOTIDINE 20 MG: 10 INJECTION INTRAVENOUS at 14:33

## 2023-09-12 RX ADMIN — HEPARIN SODIUM 1800 UNITS: 1000 INJECTION INTRAVENOUS; SUBCUTANEOUS at 06:42

## 2023-09-12 RX ADMIN — DEXMEDETOMIDINE HYDROCHLORIDE 8 MCG: 100 INJECTION, SOLUTION INTRAVENOUS at 16:41

## 2023-09-12 RX ADMIN — Medication 100 MCG: at 17:15

## 2023-09-12 RX ADMIN — PANTOPRAZOLE SODIUM 40 MG: 40 TABLET, DELAYED RELEASE ORAL at 06:43

## 2023-09-12 RX ADMIN — TRAZODONE HYDROCHLORIDE 75 MG: 50 TABLET ORAL at 22:32

## 2023-09-12 RX ADMIN — PROTAMINE SULFATE 15 MG: 10 INJECTION, SOLUTION INTRAVENOUS at 18:47

## 2023-09-12 RX ADMIN — Medication 1000 UNITS: at 08:13

## 2023-09-12 RX ADMIN — LIDOCAINE HYDROCHLORIDE 50 MG: 10 INJECTION, SOLUTION EPIDURAL; INFILTRATION; INTRACAUDAL; PERINEURAL at 16:30

## 2023-09-12 NOTE — PLAN OF CARE
Problem: Adult Inpatient Plan of Care  Goal: Plan of Care Review  Outcome: Ongoing, Not Progressing  Goal: Patient-Specific Goal (Individualized)  Outcome: Ongoing, Not Progressing  Goal: Absence of Hospital-Acquired Illness or Injury  Outcome: Ongoing, Not Progressing  Intervention: Identify and Manage Fall Risk  Recent Flowsheet Documentation  Taken 9/12/2023 1600 by Alma Delia Grant RN  Safety Promotion/Fall Prevention: patient off unit  Taken 9/12/2023 1400 by Alma Delia Grant RN  Safety Promotion/Fall Prevention:   activity supervised   assistive device/personal items within reach   clutter free environment maintained   fall prevention program maintained   nonskid shoes/slippers when out of bed   safety round/check completed   toileting scheduled  Taken 9/12/2023 1200 by Alma Delia Grant RN  Safety Promotion/Fall Prevention:   activity supervised   assistive device/personal items within reach   clutter free environment maintained   fall prevention program maintained   nonskid shoes/slippers when out of bed   toileting scheduled   safety round/check completed  Taken 9/12/2023 1000 by Alma Delia Grant RN  Safety Promotion/Fall Prevention:   activity supervised   assistive device/personal items within reach   clutter free environment maintained   fall prevention program maintained   nonskid shoes/slippers when out of bed   safety round/check completed   toileting scheduled  Taken 9/12/2023 0800 by Alma Delia Grant RN  Safety Promotion/Fall Prevention:   activity supervised   assistive device/personal items within reach   clutter free environment maintained   fall prevention program maintained   nonskid shoes/slippers when out of bed   safety round/check completed   toileting scheduled  Intervention: Prevent Skin Injury  Recent Flowsheet Documentation  Taken 9/12/2023 1400 by Alma Delia Grant RN  Body Position: position changed independently  Skin Protection:   adhesive use limited   incontinence pads utilized    tubing/devices free from skin contact  Taken 9/12/2023 1200 by Alma Delia Grant RN  Body Position:   position changed independently   supine, legs elevated  Skin Protection:   adhesive use limited   incontinence pads utilized   tubing/devices free from skin contact  Taken 9/12/2023 1000 by Alma Delia Grant RN  Body Position:   position changed independently   side-lying   left  Skin Protection:   adhesive use limited   incontinence pads utilized   tubing/devices free from skin contact  Taken 9/12/2023 0800 by Alma Delia Grant RN  Body Position:   position changed independently   weight shifting   tilted   side-lying   right  Skin Protection:   adhesive use limited   incontinence pads utilized   tubing/devices free from skin contact  Intervention: Prevent and Manage VTE (Venous Thromboembolism) Risk  Recent Flowsheet Documentation  Taken 9/12/2023 1400 by Alma Delia Grant RN  Activity Management: activity encouraged  Taken 9/12/2023 1200 by Alma Delia Grant RN  Activity Management: activity encouraged  Taken 9/12/2023 1000 by Alma Delia Grant RN  Activity Management: activity encouraged  Taken 9/12/2023 0800 by Alma Delia Grant RN  Activity Management: activity encouraged  VTE Prevention/Management: (heparin gtt) other (see comments)  Range of Motion: active ROM (range of motion) encouraged  Intervention: Prevent Infection  Recent Flowsheet Documentation  Taken 9/12/2023 1400 by Alma Delia Grant RN  Infection Prevention: environmental surveillance performed  Taken 9/12/2023 1000 by Alma Delia Grant RN  Infection Prevention: environmental surveillance performed  Taken 9/12/2023 0800 by Alma Delia Grant RN  Infection Prevention: environmental surveillance performed  Goal: Optimal Comfort and Wellbeing  Outcome: Ongoing, Not Progressing  Intervention: Provide Person-Centered Care  Recent Flowsheet Documentation  Taken 9/12/2023 0800 by Alma Delia Grant RN  Trust Relationship/Rapport:   care explained   choices  provided   thoughts/feelings acknowledged  Goal: Readiness for Transition of Care  Outcome: Ongoing, Not Progressing   Goal Outcome Evaluation:

## 2023-09-12 NOTE — BRIEF OP NOTE
Mike Flemingi  9/12/2023    Pre-op Diagnosis:   Acute limb ischemia [place] 2B classification right lower extremity in the setting of right femoral to popliteal artery bypass occlusion       Post-Op Diagnosis Codes:  Same    Procedure/CPT® Codes:        Procedure(s):  Redo endarterectomy and bovine pericardial patch angioplasty of the right external iliac artery, common femoral artery, superficial femoral artery, profunda femoris artery  Right lower extremity arteriogram  Endovascular angioplasty of the right common femoral artery, right superficial femoral artery, and right popliteal artery using [employer] in pact Admiral paclitaxel coated balloon catheter 4 x 250 mm  Endovascular angioplasty of the right common femoral artery, right superficial femoral artery, and right popliteal artery using EV3 [place] balloon catheter 4 x 200 mm  Completion arteriography right lower extremity  Supervision interpretation of radiography fluoroscopy            Surgeon(s):  Daniel Bentley MD    Anesthesia: Choice    Staff:   Circulator: Lenora Ramirez, ALEJANDRO; Saúl Bradshaw, RN; Sridhar Givens RN  Radiology Technologist: Destini Almonte RT  Scrub Person: Alhaji Espinoza Megan  Nursing Assistant: Brittany Calix  Assistant: Iva Chisholm PA-C  Assistant: Iva Chisholm PA-C      Estimated Blood Loss: minimal    Urine Voided: * No values recorded between 9/12/2023  4:24 PM and 9/12/2023  6:49 PM *    Specimens:                A: Endarterectomy specimen          Drains: * No LDAs found *    Findings: Excellent technical result, inline flow to level of foot via posterior tibial artery        Complications: None    Assistant: Iva Chisholm PA-C  was responsible for performing the following activities: Retraction, Suction, Irrigation, Suturing, Closing, and Placing Dressing and their skilled assistance was necessary for the success of this case.    Daniel Bentley MD     Date:  9/12/2023  Time: 18:50 EDT

## 2023-09-12 NOTE — PROGRESS NOTES
[facility] Medicine Services  PROGRESS NOTE    Patient Name: Mike Gusman  : 1955  MRN: [id number]    Date of Admission: 2023  Primary Care Physician: Nba Randall MD    Subjective   Subjective     CC:  RLE claudication    HPI:  He is resting comfortably, pain controlled. Awaiting surgery      Objective   Objective     Vital Signs:   Temp:  [96.1 °F (35.6 °C)-98.2 °F (36.8 °C)] 97.2 °F (36.2 °C)  Heart Rate:  [72-91] 82  Resp:  [16-20] 18  BP: ()/(54-68) 106/63  Flow (L/min):  [1-2] 1     Physical Exam:  Constitutional: No acute distress, awake, alert  HENT: NCAT, mucous membranes moist  Respiratory: Clear to auscultation bilaterally, respiratory effort normal   Cardiovascular: RRR, no murmurs, rubs, or gallops, bilateral palpable pedal pulses  Gastrointestinal: Positive bowel sounds, soft, nontender, nondistended  Musculoskeletal: No bilateral ankle edema  Psychiatric: Appropriate affect, cooperative  Neurologic: Oriented x 3, strength symmetric in all extremities, Cranial Nerves grossly intact to confrontation, speech clear  Skin: R toe erythema      Results Reviewed:  LAB RESULTS:      Lab 23  1308 23  0455 23  1415 23  1048   WBC  --  13.93*  --  9.23 11.37*   HEMOGLOBIN  --  15.2  --  15.5 15.5   HEMATOCRIT  --  45.9  --  47.6 46.6   PLATELETS  --  258  --  229 257   NEUTROS ABS  --  9.97*  --  5.71 8.10*   IMMATURE GRANS (ABS)  --  0.05  --  0.03 0.04   LYMPHS ABS  --  2.62  --  2.42 2.13   MONOS ABS  --  0.83  --  0.70 0.72   EOS ABS  --  0.41*  --  0.33 0.34   MCV  --  89.0  --  89.0 88.6   PROTIME  --   --   --  12.6  --    APTT  --   --   --  29.5*  --    HEPARIN ANTI-XA 0.15* 0.17* 0.10* 0.10*  --          Lab 23  0455 23  1048   SODIUM 136 138   POTASSIUM 4.5 4.6   CHLORIDE 102 103   CO2 23.0 21.0*   ANION GAP 11.0 14.0   BUN 21 12   CREATININE 1.39* 1.22   EGFR 54.5* 63.8   GLUCOSE 156* 104*    CALCIUM 10.1 10.4   MAGNESIUM  --  2.4   HEMOGLOBIN A1C  --  7.00*   TSH  --  0.937         Lab 09/12/23  0455 09/11/23  1048   TOTAL PROTEIN 7.2 7.7   ALBUMIN 3.8 4.3   GLOBULIN 3.4 3.4   ALT (SGPT) 17 20   AST (SGOT) 22 28   BILIRUBIN 0.3 0.3   ALK PHOS 76 87         Lab 09/11/23  1415   PROTIME 12.6   INR 0.94         Lab 09/12/23  0455   CHOLESTEROL 137   LDL CHOL 48   HDL CHOL 43   TRIGLYCERIDES 305*         Lab 09/11/23  2213   ABO TYPING A   RH TYPING Positive   ANTIBODY SCREEN Negative         Brief Urine Lab Results       None            Microbiology Results Abnormal       None            XR Chest 1 View    Result Date: 9/11/2023  XR CHEST 1 VW Date of Exam: 9/11/2023 9:06 PM EDT Indication: pre-op surgery Comparison: 2/1/2023 Findings: Cardiomediastinal silhouette is unchanged. Coarse interstitial opacities, most conspicuous in the right mid and upper lung, overall similar compared to prior exam given differences in technique. Stimulator device overlies the thoracic spine. No pneumothorax. No pleural effusion. Bones are unchanged.     Impression: Impression: Overall similar chronic parenchymal changes. Electronically Signed: Sterling Longo MD  9/11/2023 10:30 PM EDT  Workstation ID: [id number]     Results for orders placed during the hospital encounter of 09/03/19    Adult Transthoracic Echo Complete W/ Cont if Necessary Per Protocol    Interpretation Summary  Technically limited study.    1.  Global LV systolic function is likely not critically depressed.  Formal regional wall motion assessment cannot be performed.  Borderline increased LV wall thickness with grade 2 diastolic dysfunction and mild to moderate left atrial enlargement.  Right heart chambers are grossly normal but are poorly visualized.    2.  Limited echo and Doppler sampling demonstrate grossly morphologically normal valves with no stenotic lesions, masses, or thrombi.  There is mild to moderate mitral regurgitation.    3.  No pericardial  great vessel pathology on limited imaging.    4.  Normal pulmonary pressures.      Current medications:  Scheduled Meds:[MAR Hold] budesonide-formoterol, 2 puff, Inhalation, BID - RT  [MAR Hold] busPIRone, 15 mg, Oral, Q12H  ceFAZolin, 2,000 mg, Intravenous, Once  [MAR Hold] cetirizine, 10 mg, Oral, Daily  [MAR Hold] cholecalciferol, 1,000 Units, Oral, Daily  [MAR Hold] finasteride, 5 mg, Oral, Daily  [MAR Hold] insulin lispro, 2-9 Units, Subcutaneous, 4x Daily AC & at Bedtime  [MAR Hold] isosorbide mononitrate, 15 mg, Oral, Nightly  metoprolol succinate XL, 25 mg, Oral, QAM  [MAR Hold] nortriptyline, 50 mg, Oral, Nightly  [MAR Hold] pantoprazole, 40 mg, Oral, Q AM  [MAR Hold] rosuvastatin, 40 mg, Oral, Nightly  [MAR Hold] senna-docusate sodium, 2 tablet, Oral, BID  [MAR Hold] sodium chloride, 10 mL, Intravenous, Q12H  sodium chloride, 10 mL, Intravenous, Q12H  [MAR Hold] traZODone, 75 mg, Oral, Nightly  valsartan, 160 mg, Oral, Daily      Continuous Infusions:heparin, 17 Units/kg/hr, Last Rate: Stopped (09/12/23 1415)  Pharmacy to Dose Heparin,   sodium chloride, 9 mL/hr, Last Rate: 9 mL/hr (09/12/23 1415)      PRN Meds:.  [MAR Hold] acetaminophen **OR** [MAR Hold] acetaminophen **OR** [MAR Hold] acetaminophen    [MAR Hold] benzonatate    [MAR Hold] senna-docusate sodium **AND** [MAR Hold] polyethylene glycol **AND** [MAR Hold] bisacodyl **AND** [MAR Hold] bisacodyl    [MAR Hold] dextrose    [MAR Hold] dextrose    [MAR Hold] glucagon (human recombinant)    [MAR Hold] Morphine **AND** [MAR Hold] naloxone    [MAR Hold] nitroglycerin    [MAR Hold] ondansetron **OR** [MAR Hold] ondansetron    Pharmacy to Dose Heparin    [MAR Hold] sodium chloride    sodium chloride    [MAR Hold] sodium chloride    sodium chloride    sodium chloride    Assessment & Plan   Assessment & Plan     Active Hospital Problems    Diagnosis  POA    **PVD (peripheral vascular disease) with claudication [I73.9]  Yes    S/P spinal cord stimulator  [Z96.89]  Not Applicable    Moderate bilateral carotid artery stenosis [I65.23]  Yes    ADRIANA w/CPAP non-compliance  [G47.33]  Yes    COPD with emphysema [J43.9]  Yes    L Fem Pop Bypass 11/12/2021 [I73.9]  Yes    T2DM on insulin and oral agents  [E11.9]  Yes    Hyperlipemia [E78.5]  Yes    Essential hypertension [I10]  Yes    Coronary artery disease involving native coronary artery of native heart without angina pectoris [I25.10]  Yes      Resolved Hospital Problems   No resolved problems to display.        Brief Hospital Course to date:  Mike Gusman is a 68 y.o. male  with a past medical history of a right femoral above-knee to popliteal bypass on 2/2/23 by Dr. Mann, is a former smoker that quit in 2005, HTN, hyperlipidemia, ADIRANA with CPAP noncompliance, COPD, chronic back pain with spinal cord stimulator, type 2 diabetes, PSVT, CAD s/p PCI 8/5/2021, EL followed by cardiology, PVD s/p remote stenting to left iliac and left femoral popliteal bypass per Dr. Mann on 11/12/2021. He presented to [facility] as a direct admission from the CT surgery outpatient office due to concern for ongoing claudications symptoms in his right lower extremity with coolness of the right foot.      PVD with claudication  - s/p remote stenting to left iliac and left femoral popliteal bypass per Dr. Mann 11/12/2021  - s/p Right femoral above-knee to popliteal bypass 2/2/23  - recurrent claudication began in June 2023  - new CT angio 9/5/23 showing multiple areas of hemodynamically significant stenosis.  Occluded right femoral-popliteal graft.   - weak monophasic right pedal pulses with dusky discoloration to the right great toe and heel on admission  -Started on a heparin drip, as needed pain control, 2 OR today with CT surgery for angiogram with right SFA angioplasty/stent placement     Elevated Cr   CKD  --baseline Cr aroun 1.1  --bmp in am, may need to hold ARB if worsens    HLD  - continue statin       HTN  - continue home  medications     DM2  - consistent carb diet  - A1C of 7  - SSI       ADRIANA  - does not use CPAP at home     COPD with emphysema  - no acute exacerbation  - monitor     B/l Carotid artery stenosis  - managed outpatient by cardiology     CAD  - s/p PCI 8/5/2021     Chronic back pain  - s/p spinal cord stimulator        Expected Discharge Location and Transportation: home  Expected Discharge   Expected Discharge Date: 9/18/2023; Expected Discharge Time:      DVT prophylaxis:  Medical and mechanical DVT prophylaxis orders are present.     AM-PAC 6 Clicks Score (PT): 22 (09/12/23 0800)    CODE STATUS:   Code Status and Medical Interventions:   Ordered at: 09/11/23 1030     Level Of Support Discussed With:    Patient     Code Status (Patient has no pulse and is not breathing):    CPR (Attempt to Resuscitate)     Medical Interventions (Patient has pulse or is breathing):    Full Support       Ofelia Loomis MD  09/12/23

## 2023-09-12 NOTE — TELEPHONE ENCOUNTER
Called patient for vascular referral. He states he is inpatient at [facility] being treated now and has established care with surgeon there. Closing referral and routing message to provider to make him aware.

## 2023-09-12 NOTE — CASE MANAGEMENT/SOCIAL WORK
Discharge Planning Assessment  [facility]     Patient Name: Mike Gusman  MRN: [id number]  Today's Date: 9/12/2023    Admit Date: 9/11/2023    Plan: Home with spouse   Discharge Needs Assessment       Row Name 09/12/23 0842       Living Environment    People in Home spouse    Name(s) of People in Home Cira (spouse) [phone]    Current Living Arrangements home    Potentially Unsafe Housing Conditions none    Primary Care Provided by self    Provides Primary Care For no one    Family Caregiver if Needed spouse    Able to Return to Prior Arrangements yes       Resource/Environmental Concerns    Resource/Environmental Concerns none    Transportation Concerns none       Transition Planning    Patient/Family Anticipates Transition to home with family    Patient/Family Anticipated Services at Transition none    Transportation Anticipated family or friend will provide       Discharge Needs Assessment    Readmission Within the Last 30 Days no previous admission in last 30 days    Equipment Currently Used at Home cane, straight    Concerns to be Addressed denies needs/concerns at this time    Anticipated Changes Related to Illness none    Equipment Needed After Discharge none                   Discharge Plan       Row Name 09/12/23 0809       Plan    Plan Home with spouse    Patient/Family in Agreement with Plan yes    Plan Comments Spoke with patient at bedside. Lives with Cira (spouse) [phone] in [facility]. Is independent with ADL's. No problems with Medicare/[employer] or medications. Uses a straight cane at home. Has no advanced directives. PCP is Nba Randall MD. Plan is home with spouse. CM will continue to follow.    Final Discharge Disposition Code 01 - home or self-care                  Continued Care and Services - Admitted Since 9/11/2023    Coordination has not been started for this encounter.       Expected Discharge Date and Time       Expected Discharge Date Expected Discharge Time    Sep  18, 2023            Demographic Summary       Row Name 09/12/23 0842       General Information    Admission Type inpatient    Arrived From physician office - internal    Referral Source admission list    Reason for Consult discharge planning    Preferred Language English       Contact Information    Permission Granted to Share Info With     Contact Information Obtained for                    Functional Status       Row Name 09/12/23 0842       Functional Status    Usual Activity Tolerance good    Current Activity Tolerance good       Functional Status, IADL    Medications independent    Meal Preparation independent    Housekeeping independent    Laundry independent    Shopping independent       Mental Status    General Appearance WDL WDL       Mental Status Summary    Recent Changes in Mental Status/Cognitive Functioning no changes       Employment/    Employment Status retired                   Psychosocial    No documentation.                  Abuse/Neglect    No documentation.                  Legal    No documentation.                  Substance Abuse    No documentation.                  Patient Forms    No documentation.                     Sarkis Mccloud RN

## 2023-09-12 NOTE — ANESTHESIA PREPROCEDURE EVALUATION
Anesthesia Evaluation     Patient summary reviewed and Nursing notes reviewed   no history of anesthetic complications:   NPO Solid Status: > 8 hours  NPO Liquid Status: > 8 hours           Airway   Mallampati: II  TM distance: >3 FB  Neck ROM: full  No difficulty expected  Dental      Pulmonary - normal exam   (+) COPD, asthma,shortness of breath, sleep apnea  Cardiovascular - normal exam    (+) hypertension, CAD, PVD, hyperlipidemia,  carotid artery disease      Neuro/Psych  (+) CVA, psychiatric history  GI/Hepatic/Renal/Endo    (+) GERD, renal disease, diabetes mellitus    Musculoskeletal     Abdominal    Substance History      OB/GYN          Other                      Anesthesia Plan    ASA 3     general     intravenous induction     Anesthetic plan, risks, benefits, and alternatives have been provided, discussed and informed consent has been obtained with: patient.    Plan discussed with CRNA.    CODE STATUS:    Level Of Support Discussed With: Patient  Code Status (Patient has no pulse and is not breathing): CPR (Attempt to Resuscitate)  Medical Interventions (Patient has pulse or is breathing): Full Support

## 2023-09-12 NOTE — ANESTHESIA PROCEDURE NOTES
Airway  Urgency: elective    Date/Time: 9/12/2023 4:33 PM  Airway not difficult    General Information and Staff    Patient location during procedure: OR  SRNA: Amanda Avelar SRNA  Indications and Patient Condition  Indications for airway management: airway protection    Preoxygenated: yes  MILS not maintained throughout  Mask difficulty assessment: 2 - vent by mask + OA or adjuvant +/- NMBA    Final Airway Details  Final airway type: endotracheal airway      Successful airway: ETT  Cuffed: yes   Successful intubation technique: direct laryngoscopy and video laryngoscopy  Endotracheal tube insertion site: oral  Blade: Sophy  Blade size: 3  ETT size (mm): 7.5  Cormack-Lehane Classification: grade I - full view of glottis  Placement verified by: chest auscultation and capnometry   Cuff volume (mL): 10  Measured from: lips  ETT/EBT  to lips (cm): 22  Number of attempts at approach: 1  Assessment: lips, teeth, and gum same as pre-op and atraumatic intubation    Additional Comments  Negative epigastric sounds, Breath sound equal bilaterally with symmetric chest rise and fall

## 2023-09-12 NOTE — OP NOTE
CARDIOTHORACIC AND VASCULAR SURGERY OPERATIVE NOTE    Date of Procedure:   September 12, 2023    Preoperative Diagnosis:   Acute limb ischemia Co[place] 2B classification of right lower extremity in the setting of a right femoral to popliteal artery bypass occlusion, having a history of hypertension, hyperlipidemia, diabetes, COPD with emphysema, bilateral carotid artery stenosis, coronary artery disease    Postoperative Diagnosis:   Same    Procedure(s):   Redo endarterectomy and bovine pericardial patch angioplasty of the right external iliac artery, common femoral artery, superficial femoral artery, profunda femoris artery  Right lower extremity arteriogram  Endovascular angioplasty of the right common femoral artery, right superficial femoral artery, and right popliteal artery using ShopSue[employer] in pact Admiral paclitaxel coated balloon catheter 4 x 250 mm  Endovascular angioplasty of the right common femoral artery, right superficial femoral artery, and right popliteal artery using EV3 [place] balloon catheter 4 x 200 mm  Completion arteriography right lower extremity  Supervision interpretation of radiography fluoroscopy    Surgeon:   Daniel Bentley M.D., R.P.V.I.    Assistant(s):   Gurjit TURNER  The presence and participation of the physician's assistant was necessary for the successful completion of the case, and duties included positioning, suctioning, retracting, stitching, dressing, holding intracorporeal devices such as a wire or camera and/or harvesting vessels for bypass as needed.     Anesthesia:   General endotracheal anesthesia with 1% lidocaine filtration of right groin    Estimated Blood Loss:  Minimal    Complications:   None    Total fluoroscopy time 9 minutes    Total radiation dose delivered 26 mGy    Total contrast delivered 45-milliequivalents of Visipaque    Indications:   68-year-old male with history of peripheral arterial disease who underwent right femoral endarterectomy and  femoral to popliteal bypass by my partner Dr. Farhat Mann several months ago, as well as left femoral to popliteal bypass in 2021, who presented to [facility] with concerns for [place] 2B classification acute limb ischemia of the right lower extremity.  He was able to flex and extend the toes and ankle on the right foot, however this was weak, and there was pain and paresthesia.  He was admitted to the hospital and placed on a heparin drip which improved his symptoms.  The patient was initially under the care of my partner Dr. Gagan More while I was out of the state, however Dr. More asked me to be involved with the case due to the complexity and opportunity for endovascular revascularization with a hybrid operation.  I discussed with the patient the risks, benefits, and alternatives of the planned procedure including risk of bleeding, infection, heart attack, stroke, permanent disability, and even death.  Patient demonstrated good understanding and signed written consent.    Operative Findings:   Acute occlusion of femoral-popliteal bypass and restenosis of the right sided arteries, excellent technical result with inline flow to the foot via the posterior tibial artery    Procedure in Detail:   The patient was identified in the preoperative area, taken to the operating room, and laid in the supine position on the operating room table. A safety pause was performed in the presence of the operating room staff.  Systemic antibiotics were administered.  General anesthesia was induced, and an endotracheal tube was placed.  The bilateral groins were prepped and draped in standard fashion.  A redo incision was made over the prior incision on the right side, and this was carried down to the level of the femoral artery using combination of meticulous Bovie electrocautery and sharp tissue dissection.  I exposed the distal external iliac artery by partially dividing the inguinal ligament, and encircled  the aforementioned arteries with Vesseloops in standard fashion.  5000 units of heparin was administered systemically to maintain an ACT greater than 300.  After several minutes of circulation time, occluding tension was placed at the aforementioned arteries and an anterior arteriotomy was created and utilized to access the scarred endothelium of the aforementioned arteries which was gently dissected and removed and a redo endarterectomy fashion.  I was able to reestablish what appeared to be essentially normal lumen at the aforementioned arteries and a bovine pericardial patch was sewn to close the arteriotomy in the standard fashion using a 5-0 running Prolene suture.  The anastomosis was de-aired antegradely and appropriately, and then we initiated the endovascular portion of the procedure.  A U stitch was placed at the anterior aspect of the bovine pericardial patch, and a 5 Frisian sheath was placed using a modified Seldinger technique over the wire into the superficial femoral artery.  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  A Glidewire was advanced into the level of the posterior tibial artery, and balloon angioplasty was performed as described above with excellent technical result.  Protamine was administered systemically, the gears were withdrawn, and the groin incision was closed in layers of absorbable suture.  There were monophasic Doppler signals in the foot at the case completion.  All needle, sponge, and instrument counts were correct at the completion of the procedure. I was present for the critical portions of the procedure.     Daniel Bentley M.D., R.P.V.I.  Cardiothoracic and Vascular Surgeon  [facility]

## 2023-09-12 NOTE — PROGRESS NOTES
CTS Progress Note      Chief Complaint: Peripheral vascular disease     LOS: 1 day     Subjective  No acute events overnight. VSS on 2L NC.    Objective    Vital Signs  Temp:  [96.1 °F (35.6 °C)-98.2 °F (36.8 °C)] 98 °F (36.7 °C)  Heart Rate:  [63-91] 91  Resp:  [16-18] 16  BP: ()/(54-68) 96/54    Physical Exam:   General Appearance: alert, appears stated age and cooperative   Lungs: clear to auscultation    Heart: regular rhythm & normal rate, normal S1, S2 and no murmur, no gallop, no rub   Extremities: intact motor and sensation BLE, erythema on dorsum of right foot, weak monophasic pulses right DP/PT. Biphasic doppler signals left DP/PT        Results     Results from last 7 days   Lab Units 09/12/23  0455   WBC 10*3/mm3 13.93*   HEMOGLOBIN g/dL 15.2   HEMATOCRIT % 45.9   PLATELETS 10*3/mm3 258     Results from last 7 days   Lab Units 09/12/23  0455   SODIUM mmol/L 136   POTASSIUM mmol/L 4.5   CHLORIDE mmol/L 102   CO2 mmol/L 23.0   BUN mg/dL 21   CREATININE mg/dL 1.39*   GLUCOSE mg/dL 156*   CALCIUM mg/dL 10.1       Imaging Results (Last 24 Hours)       Procedure Component Value Units Date/Time    XR Chest 1 View [[id number]] Collected: 09/11/23 2227     Updated: 09/11/23 2233    Narrative:      XR CHEST 1 VW    Date of Exam: 9/11/2023 9:06 PM EDT    Indication: pre-op surgery    Comparison: 2/1/2023    Findings:  Cardiomediastinal silhouette is unchanged. Coarse interstitial opacities, most conspicuous in the right mid and upper lung, overall similar compared to prior exam given differences in technique. Stimulator device overlies the thoracic spine. No   pneumothorax. No pleural effusion. Bones are unchanged.      Impression:      Impression:  Overall similar chronic parenchymal changes.    Electronically Signed: Sterling Longo MD    9/11/2023 10:30 PM EDT    Workstation ID: [id number]            Assessment    PVD (peripheral vascular disease) with claudication    Coronary artery disease involving native  coronary artery of native heart without angina pectoris    Hyperlipemia    Essential hypertension    L Fem Pop Bypass 11/12/2021    T2DM on insulin and oral agents     ADRIANA w/CPAP non-compliance     COPD with emphysema    Moderate bilateral carotid artery stenosis    S/P spinal cord stimulator      Plan   Angiogram with right SFA angioplasty/stent placement today with Dr. Bentley.     Usha Figueroa PA-C  09/12/23  07:35 EDT    --    I reviewed this documentation. I interviewed and examined this patient this afternoon. Plan as documented.     Daniel Bentley M.D., R.P.V.I.  Cardiothoracic and Vascular Surgeon  [facility]

## 2023-09-12 NOTE — ANESTHESIA POSTPROCEDURE EVALUATION
Patient: Mike Gusman    Procedure Summary       Date: 09/12/23 Room / Location: [facility] OR 01 / [facility] HYBRID PRINCESS    Anesthesia Start: 1624 Anesthesia Stop: 1915    Procedure: 1. Redo endarterectomy and bovine pericardial patch angioplasty of the right external iliac artery, common femoral artery, superficial femoral artery, profunda femoris artery 2. Right lower extremity arteriogram 3. Endovascular angioplasty of the right common femoral artery, right superficial femoral artery, and right popliteal artery using Endocleartronic in pact Admiral paclitaxel coated balloon catheter 4 x 250 mm 4. Completion arteriography right lower extremity (Right: Thigh) Diagnosis:     Surgeons: Daniel Bentley MD Provider: Robbie Cassidy MD    Anesthesia Type: general ASA Status: 3            Anesthesia Type: general    Vitals  No vitals data found for the desired time range.          Post Anesthesia Care and Evaluation    Patient location during evaluation: PACU  Patient participation: complete - patient participated  Level of consciousness: awake and alert  Pain management: adequate    Airway patency: patent  Anesthetic complications: No anesthetic complications  PONV Status: none  Cardiovascular status: hemodynamically stable and acceptable  Respiratory status: nonlabored ventilation, acceptable and nasal cannula  Hydration status: acceptable

## 2023-09-13 LAB
ANION GAP SERPL CALCULATED.3IONS-SCNC: 15 MMOL/L (ref 5–15)
BASOPHILS # BLD AUTO: 0.03 10*3/MM3 (ref 0–0.2)
BASOPHILS NFR BLD AUTO: 0.2 % (ref 0–1.5)
BUN SERPL-MCNC: 21 MG/DL (ref 8–23)
BUN/CREAT SERPL: 18.3 (ref 7–25)
CALCIUM SPEC-SCNC: 9.7 MG/DL (ref 8.6–10.5)
CHLORIDE SERPL-SCNC: 100 MMOL/L (ref 98–107)
CO2 SERPL-SCNC: 20 MMOL/L (ref 22–29)
CREAT SERPL-MCNC: 1.15 MG/DL (ref 0.76–1.27)
DEPRECATED RDW RBC AUTO: 41.4 FL (ref 37–54)
EGFRCR SERPLBLD CKD-EPI 2021: 68.5 ML/MIN/1.73
EOSINOPHIL # BLD AUTO: 0 10*3/MM3 (ref 0–0.4)
EOSINOPHIL NFR BLD AUTO: 0 % (ref 0.3–6.2)
ERYTHROCYTE [DISTWIDTH] IN BLOOD BY AUTOMATED COUNT: 12.8 % (ref 12.3–15.4)
GLUCOSE BLDC GLUCOMTR-MCNC: 166 MG/DL (ref 70–130)
GLUCOSE BLDC GLUCOMTR-MCNC: 179 MG/DL (ref 70–130)
GLUCOSE BLDC GLUCOMTR-MCNC: 183 MG/DL (ref 70–130)
GLUCOSE BLDC GLUCOMTR-MCNC: 191 MG/DL (ref 70–130)
GLUCOSE SERPL-MCNC: 114 MG/DL (ref 65–99)
HCT VFR BLD AUTO: 40 % (ref 37.5–51)
HGB BLD-MCNC: 13.3 G/DL (ref 13–17.7)
IMM GRANULOCYTES # BLD AUTO: 0.06 10*3/MM3 (ref 0–0.05)
IMM GRANULOCYTES NFR BLD AUTO: 0.5 % (ref 0–0.5)
LYMPHOCYTES # BLD AUTO: 1.07 10*3/MM3 (ref 0.7–3.1)
LYMPHOCYTES NFR BLD AUTO: 8.5 % (ref 19.6–45.3)
MCH RBC QN AUTO: 29.5 PG (ref 26.6–33)
MCHC RBC AUTO-ENTMCNC: 33.3 G/DL (ref 31.5–35.7)
MCV RBC AUTO: 88.7 FL (ref 79–97)
MONOCYTES # BLD AUTO: 0.67 10*3/MM3 (ref 0.1–0.9)
MONOCYTES NFR BLD AUTO: 5.4 % (ref 5–12)
NEUTROPHILS NFR BLD AUTO: 10.69 10*3/MM3 (ref 1.7–7)
NEUTROPHILS NFR BLD AUTO: 85.4 % (ref 42.7–76)
NRBC BLD AUTO-RTO: 0 /100 WBC (ref 0–0.2)
PLAT MORPH BLD: NORMAL
PLATELET # BLD AUTO: 197 10*3/MM3 (ref 140–450)
PMV BLD AUTO: 11.1 FL (ref 6–12)
POTASSIUM SERPL-SCNC: 5 MMOL/L (ref 3.5–5.2)
RBC # BLD AUTO: 4.51 10*6/MM3 (ref 4.14–5.8)
RBC MORPH BLD: NORMAL
SODIUM SERPL-SCNC: 135 MMOL/L (ref 136–145)
WBC MORPH BLD: NORMAL
WBC NRBC COR # BLD: 12.52 10*3/MM3 (ref 3.4–10.8)

## 2023-09-13 PROCEDURE — 85025 COMPLETE CBC W/AUTO DIFF WBC: CPT | Performed by: STUDENT IN AN ORGANIZED HEALTH CARE EDUCATION/TRAINING PROGRAM

## 2023-09-13 PROCEDURE — 80048 BASIC METABOLIC PNL TOTAL CA: CPT | Performed by: STUDENT IN AN ORGANIZED HEALTH CARE EDUCATION/TRAINING PROGRAM

## 2023-09-13 PROCEDURE — 63710000001 INSULIN LISPRO (HUMAN) PER 5 UNITS: Performed by: STUDENT IN AN ORGANIZED HEALTH CARE EDUCATION/TRAINING PROGRAM

## 2023-09-13 PROCEDURE — 94761 N-INVAS EAR/PLS OXIMETRY MLT: CPT

## 2023-09-13 PROCEDURE — 99024 POSTOP FOLLOW-UP VISIT: CPT

## 2023-09-13 PROCEDURE — 94799 UNLISTED PULMONARY SVC/PX: CPT

## 2023-09-13 PROCEDURE — 82948 REAGENT STRIP/BLOOD GLUCOSE: CPT

## 2023-09-13 PROCEDURE — 85007 BL SMEAR W/DIFF WBC COUNT: CPT | Performed by: STUDENT IN AN ORGANIZED HEALTH CARE EDUCATION/TRAINING PROGRAM

## 2023-09-13 PROCEDURE — 25010000002 CEFAZOLIN IN DEXTROSE 2-4 GM/100ML-% SOLUTION: Performed by: STUDENT IN AN ORGANIZED HEALTH CARE EDUCATION/TRAINING PROGRAM

## 2023-09-13 PROCEDURE — 94664 DEMO&/EVAL PT USE INHALER: CPT

## 2023-09-13 PROCEDURE — 97161 PT EVAL LOW COMPLEX 20 MIN: CPT

## 2023-09-13 RX ADMIN — Medication 10 ML: at 20:57

## 2023-09-13 RX ADMIN — BUSPIRONE HYDROCHLORIDE 15 MG: 10 TABLET ORAL at 20:56

## 2023-09-13 RX ADMIN — ACETAMINOPHEN 650 MG: 325 TABLET, FILM COATED ORAL at 12:41

## 2023-09-13 RX ADMIN — Medication 1000 UNITS: at 09:30

## 2023-09-13 RX ADMIN — SENNOSIDES AND DOCUSATE SODIUM 2 TABLET: 8.6; 5 TABLET ORAL at 20:56

## 2023-09-13 RX ADMIN — CEFAZOLIN SODIUM 2000 MG: 2 INJECTION, SOLUTION INTRAVENOUS at 01:24

## 2023-09-13 RX ADMIN — TRAZODONE HYDROCHLORIDE 75 MG: 50 TABLET ORAL at 20:55

## 2023-09-13 RX ADMIN — Medication 10 ML: at 09:32

## 2023-09-13 RX ADMIN — ASPIRIN 81 MG: 81 TABLET, COATED ORAL at 09:31

## 2023-09-13 RX ADMIN — FINASTERIDE 5 MG: 5 TABLET, FILM COATED ORAL at 09:32

## 2023-09-13 RX ADMIN — INSULIN LISPRO 2 UNITS: 100 INJECTION, SOLUTION INTRAVENOUS; SUBCUTANEOUS at 12:36

## 2023-09-13 RX ADMIN — CLOPIDOGREL BISULFATE 75 MG: 75 TABLET ORAL at 09:31

## 2023-09-13 RX ADMIN — INSULIN LISPRO 2 UNITS: 100 INJECTION, SOLUTION INTRAVENOUS; SUBCUTANEOUS at 18:18

## 2023-09-13 RX ADMIN — ROSUVASTATIN 40 MG: 20 TABLET, FILM COATED ORAL at 20:56

## 2023-09-13 RX ADMIN — PANTOPRAZOLE SODIUM 40 MG: 40 TABLET, DELAYED RELEASE ORAL at 05:25

## 2023-09-13 RX ADMIN — BUDESONIDE AND FORMOTEROL FUMARATE DIHYDRATE 2 PUFF: 160; 4.5 AEROSOL RESPIRATORY (INHALATION) at 09:38

## 2023-09-13 RX ADMIN — INSULIN LISPRO 2 UNITS: 100 INJECTION, SOLUTION INTRAVENOUS; SUBCUTANEOUS at 21:02

## 2023-09-13 RX ADMIN — SENNOSIDES AND DOCUSATE SODIUM 2 TABLET: 8.6; 5 TABLET ORAL at 09:30

## 2023-09-13 RX ADMIN — CEFAZOLIN SODIUM 2000 MG: 2 INJECTION, SOLUTION INTRAVENOUS at 09:30

## 2023-09-13 RX ADMIN — ISOSORBIDE MONONITRATE 15 MG: 30 TABLET, EXTENDED RELEASE ORAL at 20:56

## 2023-09-13 RX ADMIN — ACETAMINOPHEN 650 MG: 325 TABLET, FILM COATED ORAL at 20:56

## 2023-09-13 RX ADMIN — BUDESONIDE AND FORMOTEROL FUMARATE DIHYDRATE 2 PUFF: 160; 4.5 AEROSOL RESPIRATORY (INHALATION) at 19:58

## 2023-09-13 RX ADMIN — NORTRIPTYLINE HYDROCHLORIDE 50 MG: 25 CAPSULE ORAL at 20:56

## 2023-09-13 RX ADMIN — CETIRIZINE HYDROCHLORIDE TABLETS 10 MG: 10 TABLET, FILM COATED ORAL at 09:30

## 2023-09-13 RX ADMIN — BUSPIRONE HYDROCHLORIDE 15 MG: 10 TABLET ORAL at 09:30

## 2023-09-13 RX ADMIN — VALSARTAN 160 MG: 160 TABLET, FILM COATED ORAL at 09:31

## 2023-09-13 NOTE — THERAPY EVALUATION
"Patient Name: Mike Gusman  : 1955    MRN: [id number]                              Today's Date: 2023       Admit Date: 2023    Visit Dx:     ICD-10-CM ICD-9-CM   1. Peripheral vascular disease  I73.9 443.9     Patient Active Problem List   Diagnosis    Coronary artery disease involving native coronary artery of native heart without angina pectoris    Hyperlipemia    Essential hypertension    L Fem Pop Bypass 2021    T2DM on insulin and oral agents     Shortness of breath    Chest pain    ADRIANA w/CPAP non-compliance     COPD with emphysema    Abnormal CT of the chest    PSVT (paroxysmal supraventricular tachycardia)    Moderate bilateral carotid artery stenosis    Peripheral edema    Former smoker    H/O traumatic \"brain bleed\" 2* fall     S/P spinal cord stimulator    COVID-19 vaccine administered    Diastolic dysfunction    PVD (peripheral vascular disease)    PVD (peripheral vascular disease) with claudication     Past Medical History:   Diagnosis Date    Anxiety and depression     Asthma     Clotting disorder     COPD (chronic obstructive pulmonary disease)     Coronary artery disease     COVID-19 vaccine administered  -     2nd - 2021  Ana Luisa ( Booster 2021 ) Pfizer    Diabetes mellitus     GERD (gastroesophageal reflux disease)     Hyperlipidemia     Hypertension     Kidney failure     Peripheral vascular disease     Sleep apnea     waiting for a new machine 2020    Stroke     n o residual     Past Surgical History:   Procedure Laterality Date    ANGIOPLASTY FEMORAL ARTERY Right 2023    Procedure: Redo endarterectomy and bovine pericardial patch angioplasty of the right external iliac artery, common femoral artery, superficial femoral artery, profunda femoris artery, Right lower extremity arteriogram, Endovascular angioplasty of the right common femoral artery, right superficial femoral artery, and right popliteal artery using [employer] in pact " Admiral paclitaxel coated balloon ca    CARDIAC CATHETERIZATION      Stent x1    COLONOSCOPY      FEMORAL POPLITEAL BYPASS Left 11/12/2021    Procedure: FEMORAL POPLITEAL BYPASS LEFT, ABOVE KNEE;  Surgeon: Farhat Mann MD;  Location:  [facility];  Service: Vascular;  Laterality: Left;    FEMORAL POPLITEAL BYPASS Right 02/02/2023    Procedure: FEMORAL POPLITEAL BYPASS, ABOVE KNEE RIGHT;  Surgeon: Farhat Mann MD;  Location:  [facility];  Service: Vascular;  Laterality: Right;    LEG SURGERY Right     Had rods/screws, but were removed when he had Right hip replacement    LUMBAR FUSION      RENAL ARTERY STENT      SPINAL CORD STIMULATOR IMPLANT      TOTAL HIP ARTHROPLASTY Right     VEIN SURGERY        General Information       Row Name 09/13/23 1348          Physical Therapy Time and Intention    Document Type evaluation  -AB     Mode of Treatment physical therapy  -AB       Row Name 09/13/23 1348          General Information    Patient Profile Reviewed yes  -AB     Prior Level of Function independent:;all household mobility;community mobility;gait;transfer;bed mobility;ADL's  Pt recently using cane secondary to RLE pain.  -AB     Existing Precautions/Restrictions fall;other (see comments)  RLE WBAT per APRN  -AB     Barriers to Rehab medically complex  -AB       Row Name 09/13/23 1348          Living Environment    People in Home spouse  -AB       Row Name 09/13/23 1348          Home Main Entrance    Number of Stairs, Main Entrance other (see comments);none  Ramp from garage and back door  -AB       Row Name 09/13/23 1348          Stairs Within Home, Primary    Number of Stairs, Within Home, Primary none  -AB       Row Name 09/13/23 1348          Cognition    Orientation Status (Cognition) oriented x 4  -AB       Row Name 09/13/23 1348          Safety Issues, Functional Mobility    Safety Issues Affecting Function (Mobility) insight into deficits/self-awareness;safety precaution awareness;safety precautions  follow-through/compliance  -AB     Impairments Affecting Function (Mobility) endurance/activity tolerance;pain;strength;sensation/sensory awareness  -AB               User Key  (r) = Recorded By, (t) = Taken By, (c) = Cosigned By      Initials Name Provider Type    AB Aparna Caicedo, PT Physical Therapist                   Mobility       Row Name 09/13/23 1350          Bed Mobility    Bed Mobility supine-sit;scooting/bridging  -AB     Scooting/Bridging [place] (Bed Mobility) modified independence  -AB     Supine-Sit [place] (Bed Mobility) modified independence  -AB     Assistive Device (Bed Mobility) head of bed elevated  -AB     Comment, (Bed Mobility) No issues noted.  -AB       Row Name 09/13/23 1350          Transfers    Comment, (Transfers) Cues for hand placement and walker positioning.  -AB       Row Name 09/13/23 1350          Bed-Chair Transfer    Bed-Chair [place] (Transfers) 1 person assist;verbal cues;contact guard  -AB     Assistive Device (Bed-Chair Transfers) walker, front-wheeled  -AB       Row Name 09/13/23 1350          Sit-Stand Transfer    Sit-Stand [place] (Transfers) 1 person assist;standby assist;verbal cues  -AB     Assistive Device (Sit-Stand Transfers) walker, front-wheeled  -AB     Comment, (Sit-Stand Transfer) Cues to push up from bed.  -AB       Row Name 09/13/23 1350          Gait/Stairs (Locomotion)    [place] Level (Gait) contact guard;1 person assist;verbal cues  -AB     Assistive Device (Gait) cane, straight;walker, front-wheeled  -AB     Distance in Feet (Gait) 200  -AB     Deviations/Abnormal Patterns (Gait) bilateral deviations;gait speed decreased;stride length decreased;nazanin decreased;weight shifting decreased  -AB     Bilateral Gait Deviations heel strike decreased  -AB     Right Sided Gait Deviations weight shift ability decreased  -AB     [place] Level (Stairs) not tested  -AB     Comment, (Gait/Stairs) Pt ambulated with step through gait  pattern and decreased WB through RLE. Pt initially using cane but transitioned to RW for improved gait mechanics. Cues for walker positioning and upright posture. No overt LOB. Further activity limited by RLE pain.  -AB       Row Name 09/13/23 4094          Mobility    Extremity Weight-bearing Status right lower extremity  -AB     Right Lower Extremity (Weight-bearing Status) weight-bearing as tolerated (WBAT)  -AB               User Key  (r) = Recorded By, (t) = Taken By, (c) = Cosigned By      Initials Name Provider Type    AB Aparna Caicedo, PT Physical Therapist                   Obj/Interventions       Row Name 09/13/23 1354          Range of Motion Comprehensive    General Range of Motion bilateral lower extremity ROM WNL  -AB       [facility] Name 09/13/23 9805          Strength Comprehensive (MMT)    General Manual Muscle Testing (MMT) Assessment lower extremity strength deficits identified  -AB     Comment, General Manual Muscle Testing (MMT) Assessment LLE grossly 4+/5; RLE 4-/5 secondary to pain.  -AB       Row Name 09/13/23 8444          Balance    Balance Assessment sitting static balance;sitting dynamic balance;standing static balance;standing dynamic balance  -AB     Static Sitting Balance independent  -AB     Dynamic Sitting Balance supervision  -AB     Position, Sitting Balance unsupported;sitting edge of bed  -AB     Static Standing Balance standby assist;1-person assist  -AB     Dynamic Standing Balance contact guard;1-person assist;verbal cues  -AB     Position/Device Used, Standing Balance supported;walker, front-wheeled  -AB     Balance Interventions sitting;standing;sit to stand;supported;static;dynamic;occupation based/functional task  -AB     Comment, Balance No overt LOB.  -AB       Row Name 09/13/23 4429          Sensory Assessment (Somatosensory)    Sensory Assessment (Somatosensory) right LE  -AB     Right LE Sensory Assessment impaired  Decreased sensation in RLE.  -AB               User Key   (r) = Recorded By, (t) = Taken By, (c) = Cosigned By      Initials Name Provider Type    AB Aparna Caicedo, PT Physical Therapist                   Goals/Plan       Row Name 09/13/23 1357          Bed Mobility Goal 1 (PT)    Activity/Assistive Device (Bed Mobility Goal 1, PT) sit to supine;supine to sit  -AB     [place] Level/Cues Needed (Bed Mobility Goal 1, PT) independent  -AB     Time Frame (Bed Mobility Goal 1, PT) long term goal (LTG);10 days  -AB       Row Name 09/13/23 1357          Transfer Goal 1 (PT)    Activity/Assistive Device (Transfer Goal 1, PT) sit-to-stand/stand-to-sit;bed-to-chair/chair-to-bed;walker, rolling  -AB     [place] Level/Cues Needed (Transfer Goal 1, PT) modified independence  -AB     Time Frame (Transfer Goal 1, PT) long term goal (LTG);10 days  -AB       Row Name 09/13/23 1357          Gait Training Goal 1 (PT)    Activity/Assistive Device (Gait Training Goal 1, PT) gait (walking locomotion);assistive device use;walker, rolling  -AB     [place] Level (Gait Training Goal 1, PT) modified independence  -AB     Distance (Gait Training Goal 1, PT) 400  -AB     Time Frame (Gait Training Goal 1, PT) long term goal (LTG);10 days  -AB       Row Name 09/13/23 1357          Therapy Assessment/Plan (PT)    Planned Therapy Interventions (PT) balance training;bed mobility training;gait training;home exercise program;patient/family education;postural re-education;transfer training;stretching;strengthening;ROM (range of motion)  -AB               User Key  (r) = Recorded By, (t) = Taken By, (c) = Cosigned By      Initials Name Provider Type    Aparna Cruz, PT Physical Therapist                   Clinical Impression       Row Name 09/13/23 2731          Pain    Additional Documentation Pain Scale: FACES Pre/Post-Treatment (Group)  -AB       Row Name 09/13/23 6377          Pain Scale: FACES Pre/Post-Treatment    Pain: FACES Scale, Pretreatment 2-->hurts little bit  -AB      Posttreatment Pain Rating 4-->hurts little more  -AB     Pain Location - Side/Orientation Right  -AB     Pain Location lower  -AB     Pain Location - extremity  -AB     Pre/Posttreatment Pain Comment Tolerated.  -AB       Row Name 09/13/23 1698          Plan of Care Review    Plan of Care Reviewed With patient  -AB     Progress no change  -AB     Outcome Evaluation PT initial eval completed. Pt presents below his functional baseline with decreased strength, increased pain, and impaired endurance. Pt ambulated 200' with CGA and RW. No overt LOB. Further IPPT is warrented. PT rec d/c home with assist and HHPT when medically appropriate.  -AB       Row Name 09/13/23 1357          Therapy Assessment/Plan (PT)    Rehab Potential (PT) good, to achieve stated therapy goals  -AB     Criteria for Skilled Interventions Met (PT) yes;skilled treatment is necessary;meets criteria  -AB     Therapy Frequency (PT) daily  -AB       Row Name 09/13/23 6108          Vital Signs    Pre Systolic BP Rehab 107  -AB     Pre Treatment Diastolic BP 56  -AB     Post Systolic BP Rehab 140  -AB     Post Treatment Diastolic BP 61  -AB     Pretreatment Heart Rate (beats/min) 99  -AB     Posttreatment Heart Rate (beats/min) 100  -AB     Pre SpO2 (%) 97  -AB     O2 Delivery Pre Treatment room air  -AB     O2 Delivery Intra Treatment room air  -AB     Post SpO2 (%) 96  -AB     O2 Delivery Post Treatment room air  -AB     Pre Patient Position Supine  -AB     Intra Patient Position Standing  -AB     Post Patient Position Sitting  -AB       Row Name 09/13/23 3433          Positioning and Restraints    Pre-Treatment Position in bed  -AB     Post Treatment Position chair  -AB     In Chair notified nsg;reclined;sitting;call light within reach;encouraged to call for assist;exit alarm on;legs elevated;waffle cushion  -AB               User Key  (r) = Recorded By, (t) = Taken By, (c) = Cosigned By      Initials Name Provider Type    AB Aparna Caicedo, PT  Physical Therapist                   Outcome Measures       Row Name 09/13/23 135 09/13/23 0800       How much help from another person do you currently need...    Turning from your back to your side while in flat bed without using bedrails? 4  -AB 3  -AT    Moving from lying on back to sitting on the side of a flat bed without bedrails? 4  -AB 3  -AT    Moving to and from a bed to a chair (including a wheelchair)? 3  -AB 3  -AT    Standing up from a chair using your arms (e.g., wheelchair, bedside chair)? 3  -AB 3  -AT    Climbing 3-5 steps with a railing? 2  -AB 2  -AT    To walk in hospital room? 3  -AB 2  -AT    AM-PAC 6 Clicks Score (PT) 19  -AB 16  -AT    Highest level of mobility 6 --> Walked 10 steps or more  -AB 5 --> Static standing  -AT      Row Name 09/13/23 1357          Functional Assessment    Outcome Measure Options AM-PAC 6 Clicks Basic Mobility (PT)  -AB               User Key  (r) = Recorded By, (t) = Taken By, (c) = Cosigned By      Initials Name Provider Type    AT Lilo Espana RN Registered Nurse    Aparna Cruz, PT Physical Therapist                                 Physical Therapy Education       Title: PT OT SLP Therapies (In Progress)       Topic: Physical Therapy (In Progress)       Point: Mobility training (Done)       Learning Progress Summary             Patient Acceptance, E,D, VU,NR by AB at 9/13/2023 1358                         Point: Home exercise program (Not Started)       Learner Progress:  Not documented in this visit.              Point: Body mechanics (Done)       Learning Progress Summary             Patient Acceptance, E,D, VU,NR by AB at 9/13/2023 1358                         Point: Precautions (Done)       Learning Progress Summary             Patient Acceptance, E,D, VU,NR by AB at 9/13/2023 1358                                         User Key       Initials Effective Dates Name Provider Type Discipline    AB 09/22/22 -  Aparna Caicedo, PT Physical Therapist  PT                  PT Recommendation and Plan  Planned Therapy Interventions (PT): balance training, bed mobility training, gait training, home exercise program, patient/family education, postural re-education, transfer training, stretching, strengthening, ROM (range of motion)  Plan of Care Reviewed With: patient  Progress: no change  Outcome Evaluation: PT initial eval completed. Pt presents below his functional baseline with decreased strength, increased pain, and impaired endurance. Pt ambulated 200' with CGA and RW. No overt LOB. Further IPPT is warrented. PT rec d/c home with assist and HHPT when medically appropriate.     Time Calculation:   PT Evaluation Complexity  History, PT Evaluation Complexity: 1-2 personal factors and/or comorbidities  Examination of Body Systems (PT Eval Complexity): total of 3 or more elements  Clinical Presentation (PT Evaluation Complexity): stable  Clinical Decision Making (PT Evaluation Complexity): low complexity  Overall Complexity (PT Evaluation Complexity): low complexity     PT Charges       Row Name 09/13/23 1358             Time Calculation    Start Time 1316  -AB      PT Received On 09/13/23  -AB         Untimed Charges    PT Eval/Re-eval Minutes 48  -AB         Total Minutes    Untimed Charges Total Minutes 48  -AB       Total Minutes 48  -AB                User Key  (r) = Recorded By, (t) = Taken By, (c) = Cosigned By      Initials Name Provider Type    AB Aparna Caicedo, PT Physical Therapist                  Therapy Charges for Today       Code Description Service Date Service Provider Modifiers Qty    [id number]  PT EVAL LOW COMPLEXITY 4 9/13/2023 Aparna Caicedo, PT GP 1            PT G-Codes  Outcome Measure Options: AM-PAC 6 Clicks Basic Mobility (PT)  AM-PAC 6 Clicks Score (PT): 19  PT Discharge Summary  Anticipated Discharge Disposition (PT): home with assist, home with home health    Aparna Caicedo PT  9/13/2023

## 2023-09-13 NOTE — PROGRESS NOTES
[facility]  PROGRESS NOTE    Patient Name: Mike Gusman  : 1955  MRN: [id number]    Date of Admission: 2023  Primary Care Physician: Nba Randall MD    Subjective   Subjective     CC:  RLE claudication    HPI:  Doing ok. Reports legs feel ok. No new complaints today. CTS reporting to home possible home tomorrow     ROS  As above       Objective   Objective     Vital Signs:   Temp:  [97 °F (36.1 °C)-98.2 °F (36.8 °C)] 97 °F (36.1 °C)  Heart Rate:  [] 95  Resp:  [16-18] 16  BP: ()/(47-84) 96/53  Flow (L/min):  [1-2] 2     Physical Exam:  GEN: NAD, resting in bed, awake  HEENT: on room air, atraumatic, normocephalic  NECK: supple, no masses  RESP: on room air, normal effort  CV: on tele, sinus rhythm  PSYCH: normal affect, appropriate  NEURO: awake, alert, no focal deficits noted  MSK: no edema noted, RLE painted with betadine, pulses in tact   SKIN: no rashes noted         Results Reviewed:  LAB RESULTS:      Lab 23  0626 23  1308 23  0455 23  1415 23  1048   WBC 12.52*  --  13.93*  --  9.23 11.37*   HEMOGLOBIN 13.3  --  15.2  --  15.5 15.5   HEMATOCRIT 40.0  --  45.9  --  47.6 46.6   PLATELETS 197  --  258  --  229 257   NEUTROS ABS 10.69*  --  9.97*  --  5.71 8.10*   IMMATURE GRANS (ABS) 0.06*  --  0.05  --  0.03 0.04   LYMPHS ABS 1.07  --  2.62  --  2.42 2.13   MONOS ABS 0.67  --  0.83  --  0.70 0.72   EOS ABS 0.00  --  0.41*  --  0.33 0.34   MCV 88.7  --  89.0  --  89.0 88.6   PROTIME  --   --   --   --  12.6  --    APTT  --   --   --   --  29.5*  --    HEPARIN ANTI-XA  --  0.15* 0.17* 0.10* 0.10*  --          Lab 23  0626 23  0455 23  1048   SODIUM 135* 136 138   POTASSIUM 5.0 4.5 4.6   CHLORIDE 100 102 103   CO2 20.0* 23.0 21.0*   ANION GAP 15.0 11.0 14.0   BUN 21 21 12   CREATININE 1.15 1.39* 1.22   EGFR 68.5 54.5* 63.8   GLUCOSE 114* 156* 104*   CALCIUM 9.7 10.1  10.4   MAGNESIUM  --   --  2.4   HEMOGLOBIN A1C  --   --  7.00*   TSH  --   --  0.937         Lab 09/12/23  0455 09/11/23  1048   TOTAL PROTEIN 7.2 7.7   ALBUMIN 3.8 4.3   GLOBULIN 3.4 3.4   ALT (SGPT) 17 20   AST (SGOT) 22 28   BILIRUBIN 0.3 0.3   ALK PHOS 76 87         Lab 09/11/23  1415   PROTIME 12.6   INR 0.94         Lab 09/12/23  0455   CHOLESTEROL 137   LDL CHOL 48   HDL CHOL 43   TRIGLYCERIDES 305*         Lab 09/11/23  2213   ABO TYPING A   RH TYPING Positive   ANTIBODY SCREEN Negative         Brief Urine Lab Results       None            Microbiology Results Abnormal       None            XR Chest 1 View    Result Date: 9/11/2023  XR CHEST 1 VW Date of Exam: 9/11/2023 9:06 PM EDT Indication: pre-op surgery Comparison: 2/1/2023 Findings: Cardiomediastinal silhouette is unchanged. Coarse interstitial opacities, most conspicuous in the right mid and upper lung, overall similar compared to prior exam given differences in technique. Stimulator device overlies the thoracic spine. No pneumothorax. No pleural effusion. Bones are unchanged.     Impression: Impression: Overall similar chronic parenchymal changes. Electronically Signed: Sterling Longo MD  9/11/2023 10:30 PM EDT  Workstation ID: [id number]     Results for orders placed during the hospital encounter of 09/03/19    Adult Transthoracic Echo Complete W/ Cont if Necessary Per Protocol    Interpretation Summary  Technically limited study.    1.  Global LV systolic function is likely not critically depressed.  Formal regional wall motion assessment cannot be performed.  Borderline increased LV wall thickness with grade 2 diastolic dysfunction and mild to moderate left atrial enlargement.  Right heart chambers are grossly normal but are poorly visualized.    2.  Limited echo and Doppler sampling demonstrate grossly morphologically normal valves with no stenotic lesions, masses, or thrombi.  There is mild to moderate mitral regurgitation.    3.  No pericardial  great vessel pathology on limited imaging.    4.  Normal pulmonary pressures.      Current medications:  Scheduled Meds:aspirin, 81 mg, Oral, Daily  budesonide-formoterol, 2 puff, Inhalation, BID - RT  busPIRone, 15 mg, Oral, Q12H  cetirizine, 10 mg, Oral, Daily  cholecalciferol, 1,000 Units, Oral, Daily  clopidogrel, 75 mg, Oral, Daily  finasteride, 5 mg, Oral, Daily  insulin lispro, 2-9 Units, Subcutaneous, 4x Daily AC & at Bedtime  isosorbide mononitrate, 15 mg, Oral, Nightly  metoprolol succinate XL, 25 mg, Oral, QAM  nortriptyline, 50 mg, Oral, Nightly  pantoprazole, 40 mg, Oral, Q AM  rosuvastatin, 40 mg, Oral, Nightly  senna-docusate sodium, 2 tablet, Oral, BID  sodium chloride, 10 mL, Intravenous, Q12H  traZODone, 75 mg, Oral, Nightly  valsartan, 160 mg, Oral, Daily      Continuous Infusions:niCARdipine, 5-15 mg/hr, Last Rate: Stopped (09/13/23 0039)  sodium chloride, 9 mL/hr, Last Rate: 9 mL/hr (09/12/23 1415)      PRN Meds:.  acetaminophen **OR** acetaminophen **OR** acetaminophen    benzonatate    senna-docusate sodium **AND** polyethylene glycol **AND** bisacodyl **AND** bisacodyl    dextrose    dextrose    glucagon (human recombinant)    HYDROcodone-acetaminophen    Morphine **AND** naloxone    nitroglycerin    ondansetron **OR** ondansetron    sodium chloride    sodium chloride    sodium chloride    Assessment & Plan   Assessment & Plan     Active Hospital Problems    Diagnosis  POA    **PVD (peripheral vascular disease) with claudication [I73.9]  Yes    S/P spinal cord stimulator [Z96.89]  Not Applicable    Moderate bilateral carotid artery stenosis [I65.23]  Yes    ADRIANA w/CPAP non-compliance  [G47.33]  Yes    COPD with emphysema [J43.9]  Yes    L Fem Pop Bypass 11/12/2021 [I73.9]  Yes    T2DM on insulin and oral agents  [E11.9]  Yes    Hyperlipemia [E78.5]  Yes    Essential hypertension [I10]  Yes    Coronary artery disease involving native coronary artery of native heart without angina pectoris  [I25.10]  Yes      Resolved Hospital Problems   No resolved problems to display.        Brief Hospital Course to date:  Mike Gusman is a 68 y.o. male  with a past medical history of a right femoral above-knee to popliteal bypass on 2/2/23 by Dr. Mann, is a former smoker that quit in 2005, HTN, hyperlipidemia, ADRIANA with CPAP noncompliance, COPD, chronic back pain with spinal cord stimulator, type 2 diabetes, PSVT, CAD s/p PCI 8/5/2021, EL followed by cardiology, PVD s/p remote stenting to left iliac and left femoral popliteal bypass per Dr. Mann on 11/12/2021. He presented to [facility] as a direct admission from the CT surgery outpatient office due to concern for ongoing claudications symptoms in his right lower extremity with coolness of the right foot.      PVD with claudication  - s/p remote stenting to left iliac and left femoral popliteal bypass per Dr. Mann 11/12/2021  - s/p Right femoral above-knee to popliteal bypass 2/2/23  - recurrent claudication began in June 2023  - new CT angio 9/5/23 showing multiple areas of hemodynamically significant stenosis.  Occluded right femoral-popliteal graft.   - POD 1 s/p redo endartertectomy and bovine pericardial patch angioplasty, right lower ext arteriogram, endovascular angioplasty of RLE, arteriography of RLE      Elevated Cr   CKD  --baseline Cr aroun 1.1  --at baseline    HLD  - continue statin       HTN  - continue home medications     DM2  - consistent carb diet  - A1C of 7  - SSI       ADRIANA  - does not use CPAP at home     COPD with emphysema  - no acute exacerbation  - monitor     B/l Carotid artery stenosis  - managed outpatient by cardiology     CAD  - s/p PCI 8/5/2021     Chronic back pain  - s/p spinal cord stimulator        Expected Discharge Location and Transportation: home likely tomorrow per CTS  Expected Discharge   Expected Discharge Date: 9/18/2023; Expected Discharge Time:      DVT prophylaxis:  Mechanical DVT prophylaxis orders are  present.     AM-PAC 6 Clicks Score (PT): 22 (09/12/23 0800)    CODE STATUS:   Code Status and Medical Interventions:   Ordered at: 09/11/23 1030     Level Of Support Discussed With:    Patient     Code Status (Patient has no pulse and is not breathing):    CPR (Attempt to Resuscitate)     Medical Interventions (Patient has pulse or is breathing):    Full Support       Nicole Mendoza MD  09/13/23

## 2023-09-13 NOTE — PROGRESS NOTES
[facility] Cardiothoracic Surgery In-Patient Progress Note    POD # 1 s/p   Redo endarterectomy and bovine pericardial patch angioplasty of the right external iliac artery, common femoral artery, superficial femoral artery, profunda femoris artery  Right lower extremity arteriogram  Endovascular angioplasty of the right common femoral artery, right superficial femoral artery, and right popliteal artery using Medtronic in pact Admiral paclitaxel coated balloon catheter 4 x 250 mm  Endovascular angioplasty of the right common femoral artery, right superficial femoral artery, and right popliteal artery using EV3 Evercross balloon catheter 4 x 200 mm  Completion arteriography right lower extremity  Supervision interpretation of radiography fluoroscopy     LOS: 2 days     Subjective  No complaints this morning.      Objective  Vital Signs  Temp:  [97 °F (36.1 °C)-98.2 °F (36.8 °C)] 97 °F (36.1 °C)  Heart Rate:  [] 88  Resp:  [16-20] 16  BP: ()/(47-84) 96/53        Physical Exam:   General Appearance: alert, appears stated age and cooperative   Lungs: clear to auscultation, respirations regular, respirations even, and respirations unlabored   Heart: regular rhythm & normal rate, normal S1, S2, no murmur, no gallop, no rub, and no click   Skin: Incision c/d/I.  Aquacel dressing in place without hematoma   Ext: Warm and viable   Pulses: Dopplerable PT bilaterally, intermittent DP present     Results     Results from last 7 days   Lab Units 09/12/23  0455   WBC 10*3/mm3 13.93*   HEMOGLOBIN g/dL 15.2   HEMATOCRIT % 45.9   PLATELETS 10*3/mm3 258     Results from last 7 days   Lab Units 09/12/23  0455   SODIUM mmol/L 136   POTASSIUM mmol/L 4.5   CHLORIDE mmol/L 102   CO2 mmol/L 23.0   BUN mg/dL 21   CREATININE mg/dL 1.39*   GLUCOSE mg/dL 156*   CALCIUM mg/dL 10.1     Assessment    PVD (peripheral vascular disease) with claudication    Coronary artery disease involving native coronary artery of native heart  without angina pectoris    Hyperlipemia    Essential hypertension    L Fem Pop Bypass 11/12/2021    T2DM on insulin and oral agents     ADRIANA w/CPAP non-compliance     COPD with emphysema    Moderate bilateral carotid artery stenosis    S/P spinal cord stimulator      Plan   ASA, Plavix, Statin, SQH  PT  Ambulate  Likely home tomorrow      Emy Pathak, APRN  09/13/23  07:13 EDT

## 2023-09-13 NOTE — CASE MANAGEMENT/SOCIAL WORK
Continued Stay Note   Angela     Patient Name: Mike Gusman  MRN: [id number]  Today's Date: 9/13/2023    Admit Date: 9/11/2023    Plan: Home   Discharge Plan       Row Name 09/13/23 1635       Plan    Plan Home    Patient/Family in Agreement with Plan yes    Plan Comments I spoke w/pt in room regarding PT recs of home w/HH. Pt stated that he has had this procedure before and he doesn't need HH @ this time. He has DME @ home, and is very comfortable going home w /spouse w/o HH. CM will continue to follow.    Final Discharge Disposition Code 01 - home or self-care                   Discharge Codes    No documentation.                 Expected Discharge Date and Time       Expected Discharge Date Expected Discharge Time    Sep 18, 2023               Abdiel Olvera RN

## 2023-09-13 NOTE — PLAN OF CARE
Goal Outcome Evaluation:           Progress: improving  Outcome Evaluation: PT VSS, A/Ox4, NSR, RA sats > 92%. PT off bedrest, able to ambulate standby with cane. Ambulated 2X in the baker today. PT C/O mild pain in his R foot, groin PRN Tylenol utilized. Possible DC home tomorrow with HH, care ongoing.

## 2023-09-13 NOTE — PLAN OF CARE
Goal Outcome Evaluation:  Plan of Care Reviewed With: patient        Progress: no change  Outcome Evaluation: PT initial eval completed. Pt presents below his functional baseline with decreased strength, increased pain, and impaired endurance. Pt ambulated 200' with CGA and RW. No overt LOB. Further IPPT is warrented. PT rec d/c home with assist and HHPT when medically appropriate.      Anticipated Discharge Disposition (PT): home with assist, home with home health

## 2023-09-14 ENCOUNTER — READMISSION MANAGEMENT (OUTPATIENT)
Dept: CALL CENTER | Facility: HOSPITAL | Age: 68
End: 2023-09-14
Payer: MEDICARE

## 2023-09-14 VITALS
OXYGEN SATURATION: 96 % | TEMPERATURE: 98.2 F | WEIGHT: 163.14 LBS | BODY MASS INDEX: 26.22 KG/M2 | HEIGHT: 66 IN | SYSTOLIC BLOOD PRESSURE: 117 MMHG | HEART RATE: 92 BPM | RESPIRATION RATE: 17 BRPM | DIASTOLIC BLOOD PRESSURE: 58 MMHG

## 2023-09-14 LAB
ALBUMIN SERPL-MCNC: 3.7 G/DL (ref 3.5–5.2)
ALBUMIN/GLOB SERPL: 1.2 G/DL
ALP SERPL-CCNC: 68 U/L (ref 39–117)
ALT SERPL W P-5'-P-CCNC: 11 U/L (ref 1–41)
ANION GAP SERPL CALCULATED.3IONS-SCNC: 12 MMOL/L (ref 5–15)
AST SERPL-CCNC: 28 U/L (ref 1–40)
BASOPHILS # BLD AUTO: 0.06 10*3/MM3 (ref 0–0.2)
BASOPHILS NFR BLD AUTO: 0.4 % (ref 0–1.5)
BILIRUB SERPL-MCNC: 0.4 MG/DL (ref 0–1.2)
BUN SERPL-MCNC: 21 MG/DL (ref 8–23)
BUN/CREAT SERPL: 21.2 (ref 7–25)
CALCIUM SPEC-SCNC: 10 MG/DL (ref 8.6–10.5)
CHLORIDE SERPL-SCNC: 100 MMOL/L (ref 98–107)
CO2 SERPL-SCNC: 24 MMOL/L (ref 22–29)
CREAT SERPL-MCNC: 0.99 MG/DL (ref 0.76–1.27)
CYTO UR: NORMAL
DEPRECATED RDW RBC AUTO: 42.7 FL (ref 37–54)
EGFRCR SERPLBLD CKD-EPI 2021: 81.9 ML/MIN/1.73
EOSINOPHIL # BLD AUTO: 0.21 10*3/MM3 (ref 0–0.4)
EOSINOPHIL NFR BLD AUTO: 1.4 % (ref 0.3–6.2)
ERYTHROCYTE [DISTWIDTH] IN BLOOD BY AUTOMATED COUNT: 13.1 % (ref 12.3–15.4)
GLOBULIN UR ELPH-MCNC: 3.1 GM/DL
GLUCOSE BLDC GLUCOMTR-MCNC: 118 MG/DL (ref 70–130)
GLUCOSE BLDC GLUCOMTR-MCNC: 147 MG/DL (ref 70–130)
GLUCOSE SERPL-MCNC: 125 MG/DL (ref 65–99)
HCT VFR BLD AUTO: 41.7 % (ref 37.5–51)
HGB BLD-MCNC: 13.7 G/DL (ref 13–17.7)
IMM GRANULOCYTES # BLD AUTO: 0.06 10*3/MM3 (ref 0–0.05)
IMM GRANULOCYTES NFR BLD AUTO: 0.4 % (ref 0–0.5)
LAB AP CASE REPORT: NORMAL
LAB AP CLINICAL INFORMATION: NORMAL
LYMPHOCYTES # BLD AUTO: 2.41 10*3/MM3 (ref 0.7–3.1)
LYMPHOCYTES NFR BLD AUTO: 16.4 % (ref 19.6–45.3)
MCH RBC QN AUTO: 29.7 PG (ref 26.6–33)
MCHC RBC AUTO-ENTMCNC: 32.9 G/DL (ref 31.5–35.7)
MCV RBC AUTO: 90.3 FL (ref 79–97)
MONOCYTES # BLD AUTO: 1.21 10*3/MM3 (ref 0.1–0.9)
MONOCYTES NFR BLD AUTO: 8.3 % (ref 5–12)
NEUTROPHILS NFR BLD AUTO: 10.71 10*3/MM3 (ref 1.7–7)
NEUTROPHILS NFR BLD AUTO: 73.1 % (ref 42.7–76)
NRBC BLD AUTO-RTO: 0 /100 WBC (ref 0–0.2)
PATH REPORT.FINAL DX SPEC: NORMAL
PATH REPORT.GROSS SPEC: NORMAL
PLATELET # BLD AUTO: 215 10*3/MM3 (ref 140–450)
PMV BLD AUTO: 10.5 FL (ref 6–12)
POTASSIUM SERPL-SCNC: 4.7 MMOL/L (ref 3.5–5.2)
PROT SERPL-MCNC: 6.8 G/DL (ref 6–8.5)
RBC # BLD AUTO: 4.62 10*6/MM3 (ref 4.14–5.8)
SODIUM SERPL-SCNC: 136 MMOL/L (ref 136–145)
WBC NRBC COR # BLD: 14.66 10*3/MM3 (ref 3.4–10.8)

## 2023-09-14 PROCEDURE — 94799 UNLISTED PULMONARY SVC/PX: CPT

## 2023-09-14 PROCEDURE — 82948 REAGENT STRIP/BLOOD GLUCOSE: CPT

## 2023-09-14 PROCEDURE — 94664 DEMO&/EVAL PT USE INHALER: CPT

## 2023-09-14 PROCEDURE — 99024 POSTOP FOLLOW-UP VISIT: CPT

## 2023-09-14 PROCEDURE — 85025 COMPLETE CBC W/AUTO DIFF WBC: CPT | Performed by: INTERNAL MEDICINE

## 2023-09-14 PROCEDURE — 80053 COMPREHEN METABOLIC PANEL: CPT | Performed by: INTERNAL MEDICINE

## 2023-09-14 RX ADMIN — ACETAMINOPHEN 650 MG: 325 TABLET, FILM COATED ORAL at 12:09

## 2023-09-14 RX ADMIN — Medication 1000 UNITS: at 09:11

## 2023-09-14 RX ADMIN — SENNOSIDES AND DOCUSATE SODIUM 2 TABLET: 8.6; 5 TABLET ORAL at 09:11

## 2023-09-14 RX ADMIN — BUSPIRONE HYDROCHLORIDE 15 MG: 10 TABLET ORAL at 09:11

## 2023-09-14 RX ADMIN — ASPIRIN 81 MG: 81 TABLET, COATED ORAL at 09:11

## 2023-09-14 RX ADMIN — Medication 10 ML: at 09:12

## 2023-09-14 RX ADMIN — ACETAMINOPHEN 650 MG: 325 TABLET, FILM COATED ORAL at 06:00

## 2023-09-14 RX ADMIN — FINASTERIDE 5 MG: 5 TABLET, FILM COATED ORAL at 09:11

## 2023-09-14 RX ADMIN — CETIRIZINE HYDROCHLORIDE TABLETS 10 MG: 10 TABLET, FILM COATED ORAL at 09:11

## 2023-09-14 RX ADMIN — VALSARTAN 160 MG: 160 TABLET, FILM COATED ORAL at 09:11

## 2023-09-14 RX ADMIN — PANTOPRAZOLE SODIUM 40 MG: 40 TABLET, DELAYED RELEASE ORAL at 05:59

## 2023-09-14 RX ADMIN — BUDESONIDE AND FORMOTEROL FUMARATE DIHYDRATE 2 PUFF: 160; 4.5 AEROSOL RESPIRATORY (INHALATION) at 08:19

## 2023-09-14 RX ADMIN — CLOPIDOGREL BISULFATE 75 MG: 75 TABLET ORAL at 09:11

## 2023-09-14 RX ADMIN — METOPROLOL SUCCINATE 25 MG: 25 TABLET, EXTENDED RELEASE ORAL at 05:59

## 2023-09-14 NOTE — DISCHARGE INSTR - ACTIVITY
Monitor surgical wounds daily. Keep incisons clean and dry at all times.  Call CT Surgery office [phone]  with any questions or concerns, specifically let them know of increased redness, drainage, or opening up of incision.

## 2023-09-14 NOTE — DISCHARGE SUMMARY
Psychiatric Medicine Services  DISCHARGE SUMMARY    Patient Name: Mike Gusman  : 1955  MRN: [id number]    Date of Admission: 2023  9:57 AM  Date of Discharge:  23    Primary Care Physician: Nba Randall MD    Consults       Date and Time Order Name Status Description    2023 10:30 AM Inpatient Cardiothoracic Surgery Consult Completed             Hospital Course     Presenting Problem: claudication    Active Hospital Problems    Diagnosis  POA    **PVD (peripheral vascular disease) with claudication [I73.9]  Yes    S/P spinal cord stimulator [Z96.89]  Not Applicable    Moderate bilateral carotid artery stenosis [I65.23]  Yes    ADRIANA w/CPAP non-compliance  [G47.33]  Yes    COPD with emphysema [J43.9]  Yes    L Fem Pop Bypass 2021 [I73.9]  Yes    T2DM on insulin and oral agents  [E11.9]  Yes    Hyperlipemia [E78.5]  Yes    Essential hypertension [I10]  Yes    Coronary artery disease involving native coronary artery of native heart without angina pectoris [I25.10]  Yes      Resolved Hospital Problems   No resolved problems to display.          Hospital Course:  Mike Gusman is a 68 y.o. male  with a past medical history of a right femoral above-knee to popliteal bypass on 23 by Dr. Mann, is a former smoker that quit in , HTN, hyperlipidemia, ADRIANA with CPAP noncompliance, COPD, chronic back pain with spinal cord stimulator, type 2 diabetes, PSVT, CAD s/p PCI 2021, LE followed by cardiology, PVD s/p remote stenting to left iliac and left femoral popliteal bypass per Dr. Mann on 2021. He presented to [facility] as a direct admission from the CT surgery outpatient office due to concern for ongoing claudications symptoms in his right lower extremity with coolness of the right foot.      PVD with claudication  - s/p remote stenting to left iliac and left femoral popliteal bypass per Dr. Mann 2021  - s/p Right femoral  above-knee to popliteal bypass 2/2/23  - recurrent claudication began in June 2023  - new CT angio 9/5/23 showing multiple areas of hemodynamically significant stenosis.  Occluded right femoral-popliteal graft.   - Post op this admission 9/12/23: redo endartertectomy and bovine pericardial patch angioplasty, right lower ext arteriogram, endovascular angioplasty of RLE, arteriography of RLE      Elevated Cr   CKD  --baseline Cr aroun 1.1  --at baseline     HLD  - continue statin     HTN  - continue home medications     DM2  - consistent carb diet, resume home meds at discharge  - A1C of 7      ADRIANA  - does not use CPAP at home     COPD with emphysema  - no acute exacerbation     B/l Carotid artery stenosis  - managed outpatient by cardiology     CAD  - s/p PCI 8/5/2021     Chronic back pain  - s/p spinal cord stimulator      Discharge Follow Up Recommendations for outpatient labs/diagnostics:  Follow-up with PCP in 1 to 2 weeks recheck kidney function and discuss metformin use  Follow-up with CT surgery for postop check 2 to 4 weeks    Day of Discharge     HPI:   Patient feeling well and ready to be discharged home.  Tolerating p.o. and ambulating.  No new concerns.  No chest pain or shortness of breath.    Review of Systems  Gen- No fevers, chills  CV- No chest pain, palpitations  Resp- No cough, dyspnea  GI- No N/V/D, abd pain      Vital Signs:   Temp:  [97.4 °F (36.3 °C)-98 °F (36.7 °C)] 98 °F (36.7 °C)  Heart Rate:  [71-96] 96  Resp:  [16-18] 18  BP: (114-122)/(61-72) 114/72      Physical Exam:  Constitutional: No acute distress, awake, alert  HENT: NCAT, mucous membranes moist  Respiratory: Clear to auscultation bilaterally, respiratory effort normal   Cardiovascular: RRR  Gastrointestinal: Positive bowel sounds, soft, nontender, nondistended  Musculoskeletal: Right lower extremity rubor and blue dye noted  Psychiatric: Appropriate affect, cooperative  Neurologic: Oriented x 3, strength symmetric in all  extremities, Cranial Nerves grossly intact to confrontation, speech clear  Skin: Rubor of the left foot      Pertinent  and/or Most Recent Results     LAB RESULTS:      Lab 09/14/23  0640 09/13/23 0626 09/12/23  1308 09/12/23 0455 09/11/23 2010 09/11/23  1415 09/11/23  1048   WBC 14.66* 12.52*  --  13.93*  --  9.23 11.37*   HEMOGLOBIN 13.7 13.3  --  15.2  --  15.5 15.5   HEMATOCRIT 41.7 40.0  --  45.9  --  47.6 46.6   PLATELETS 215 197  --  258  --  229 257   NEUTROS ABS 10.71* 10.69*  --  9.97*  --  5.71 8.10*   IMMATURE GRANS (ABS) 0.06* 0.06*  --  0.05  --  0.03 0.04   LYMPHS ABS 2.41 1.07  --  2.62  --  2.42 2.13   MONOS ABS 1.21* 0.67  --  0.83  --  0.70 0.72   EOS ABS 0.21 0.00  --  0.41*  --  0.33 0.34   MCV 90.3 88.7  --  89.0  --  89.0 88.6   PROTIME  --   --   --   --   --  12.6  --    APTT  --   --   --   --   --  29.5*  --    HEPARIN ANTI-XA  --   --  0.15* 0.17* 0.10* 0.10*  --          Lab 09/14/23  0640 09/13/23  0626 09/12/23 0455 09/11/23  1048   SODIUM 136 135* 136 138   POTASSIUM 4.7 5.0 4.5 4.6   CHLORIDE 100 100 102 103   CO2 24.0 20.0* 23.0 21.0*   ANION GAP 12.0 15.0 11.0 14.0   BUN 21 21 21 12   CREATININE 0.99 1.15 1.39* 1.22   EGFR 81.9 68.5 54.5* 63.8   GLUCOSE 125* 114* 156* 104*   CALCIUM 10.0 9.7 10.1 10.4   MAGNESIUM  --   --   --  2.4   HEMOGLOBIN A1C  --   --   --  7.00*   TSH  --   --   --  0.937         Lab 09/14/23  0640 09/12/23  0455 09/11/23  1048   TOTAL PROTEIN 6.8 7.2 7.7   ALBUMIN 3.7 3.8 4.3   GLOBULIN 3.1 3.4 3.4   ALT (SGPT) 11 17 20   AST (SGOT) 28 22 28   BILIRUBIN 0.4 0.3 0.3   ALK PHOS 68 76 87         Lab 09/11/23  1415   PROTIME 12.6   INR 0.94         Lab 09/12/23  0455   CHOLESTEROL 137   LDL CHOL 48   HDL CHOL 43   TRIGLYCERIDES 305*         Lab 09/11/23  2213   ABO TYPING A   RH TYPING Positive   ANTIBODY SCREEN Negative         Brief Urine Lab Results       None          Microbiology Results (last 10 days)       ** No results found for the last 240 hours.  **            XR Chest 1 View    Result Date: 9/11/2023  XR CHEST 1 VW Date of Exam: 9/11/2023 9:06 PM EDT Indication: pre-op surgery Comparison: 2/1/2023 Findings: Cardiomediastinal silhouette is unchanged. Coarse interstitial opacities, most conspicuous in the right mid and upper lung, overall similar compared to prior exam given differences in technique. Stimulator device overlies the thoracic spine. No pneumothorax. No pleural effusion. Bones are unchanged.     Impression: Overall similar chronic parenchymal changes. Electronically Signed: Sterling Longo MD  9/11/2023 10:30 PM EDT  Workstation ID: [id number]    CT outside films    Result Date: 9/8/2023  This procedure was auto-finalized with no dictation required.     Results for orders placed during the hospital encounter of 08/03/23    Duplex Lower Extremity Art / Grafts - Right CAR    Narrative  EXAM:  US Duplex Right Lower Extremity Arteries    EXAM DATE:  8/3/2023 8:42 AM    CLINICAL HISTORY:  Claudication 167.6 72.576    TECHNIQUE:  Real-time duplex ultrasound scan of the right lower extremity arteries  integrating B-mode two-dimensional vascular structure, Doppler spectral  analysis and color flow Doppler imaging.    COMPARISON:  No relevant prior studies available.    FINDINGS:  Diminished velocities within the dorsalis pedis on the right side.  The right femoral to popliteal graft appears thrombosed or occluded.  The more distal vessel shows monophasic dampened waveform patterns.    Impression  1.  Diminished velocities within the dorsalis pedis on the right side.  2.  The right femoral to popliteal graft appears thrombosed or occluded.  The more distal vessel shows monophasic dampened waveform patterns.    This report was finalized on 8/3/2023 9:28 AM by Dr. Gerardo Sebastian MD.      Results for orders placed during the hospital encounter of 08/03/23    Duplex Lower Extremity Art / Grafts - Right CAR    Narrative  EXAM:  US Duplex Right Lower Extremity  Arteries    EXAM DATE:  8/3/2023 8:42 AM    CLINICAL HISTORY:  Claudication 167.6 72.576    TECHNIQUE:  Real-time duplex ultrasound scan of the right lower extremity arteries  integrating B-mode two-dimensional vascular structure, Doppler spectral  analysis and color flow Doppler imaging.    COMPARISON:  No relevant prior studies available.    FINDINGS:  Diminished velocities within the dorsalis pedis on the right side.  The right femoral to popliteal graft appears thrombosed or occluded.  The more distal vessel shows monophasic dampened waveform patterns.    Impression  1.  Diminished velocities within the dorsalis pedis on the right side.  2.  The right femoral to popliteal graft appears thrombosed or occluded.  The more distal vessel shows monophasic dampened waveform patterns.    This report was finalized on 8/3/2023 9:28 AM by Dr. Gerardo Sebastian MD.      Results for orders placed during the hospital encounter of 09/03/19    Adult Transthoracic Echo Complete W/ Cont if Necessary Per Protocol    Interpretation Summary  Technically limited study.    1.  Global LV systolic function is likely not critically depressed.  Formal regional wall motion assessment cannot be performed.  Borderline increased LV wall thickness with grade 2 diastolic dysfunction and mild to moderate left atrial enlargement.  Right heart chambers are grossly normal but are poorly visualized.    2.  Limited echo and Doppler sampling demonstrate grossly morphologically normal valves with no stenotic lesions, masses, or thrombi.  There is mild to moderate mitral regurgitation.    3.  No pericardial great vessel pathology on limited imaging.    4.  Normal pulmonary pressures.      Plan for Follow-up of Pending Labs/Results: Tissue from pathology pending  Pending Labs       Order Current Status    Tissue Pathology Exam In process          Discharge Details        Discharge Medications        Changes to Medications        Instructions Start Date    Aspirin Low Dose 81 MG EC tablet  Generic drug: aspirin  What changed:   how much to take  when to take this   TAKE 1 TABLET DAILY             Continue These Medications        Instructions Start Date   benzonatate 100 MG capsule  Commonly known as: TESSALON   100 mg, Oral, 3 Times Daily PRN      Breo Ellipta 200-25 MCG/ACT inhaler  Generic drug: Fluticasone Furoate-Vilanterol   1 puff, Inhalation, Daily - RT      busPIRone 15 MG tablet  Commonly known as: BUSPAR   15 mg, Oral, 2 times daily, 1.5 tabs BID      cetirizine 10 MG tablet  Commonly known as: zyrTEC   10 mg, Oral, Daily      cholecalciferol 25 MCG (1000 UT) tablet  Commonly known as: VITAMIN D3   1,000 Units, Oral, Daily      clopidogrel 75 MG tablet  Commonly known as: PLAVIX   TAKE 1 TABLET DAILY      finasteride 5 MG tablet  Commonly known as: PROSCAR   5 mg, Oral, Daily      fluticasone 50 MCG/ACT nasal spray  Commonly known as: FLONASE       isosorbide mononitrate 30 MG 24 hr tablet  Commonly known as: IMDUR   TAKE ONE-HALF (1/2) TABLET EVERY NIGHT      magnesium oxide 400 (241.3 Mg) MG tablet tablet  Commonly known as: MAGOX   400 mg, Oral, Daily      metoprolol succinate XL 25 MG 24 hr tablet  Commonly known as: TOPROL-XL   25 mg, Oral, Every Morning      multivitamin with minerals tablet tablet   1 tablet, Oral, Daily      nitroglycerin 0.4 MG SL tablet  Commonly known as: NITROSTAT   1 under the tongue as needed for angina, may repeat q5mins for up three doses      nortriptyline 50 MG capsule  Commonly known as: PAMELOR   50 mg, Oral, Nightly      Omega 3 1000 MG capsule   2,000 mg, Oral, Daily      omeprazole 40 MG capsule  Commonly known as: priLOSEC   40 mg, Oral, Daily      rosuvastatin 40 MG tablet  Commonly known as: CRESTOR   40 mg, Oral, Daily      Segluromet 7.5-1000 MG tablet  Generic drug: Ertugliflozin-metFORMIN HCl   1 tablet/day, Oral, Daily      traZODone 50 MG tablet  Commonly known as: DESYREL   75 mg, Oral, Nightly       valsartan 160 MG tablet  Commonly known as: DIOVAN   160 mg, Oral, Daily      Vitamin E 180 MG capsule   400 mg, Oral, Daily               No Known Allergies      Discharge Disposition:  Home or Self Care    Diet:  Hospital:  Diet Order   Procedures    Diet: Cardiac Diets; Healthy Heart (2-3 Na+); Texture: Regular Texture (IDDSI 7); Fluid Consistency: Thin (IDDSI 0)       Activity:  Activity Instructions       Activity as Tolerated      Activity as Tolerated      Bathing Restrictions      You may shower    Type of Restriction: Bathing    Bathing Restrictions: No Tub Bath    Driving Restrictions      Type of Restriction: Driving    Driving Restrictions: No Driving While Taking Narcotics    Lifting Restrictions      Type of Restriction: Lifting    Lifting Restrictions: Lifting Restriction (Indicate Limit)    Weight Limit (Pounds): 10    Length of Lifting Restriction: until seen at next follow-up appointment            Restrictions or Other Recommendations:  Per CT surgery postoperative recommendations       CODE STATUS:    Code Status and Medical Interventions:   Ordered at: 09/11/23 1030     Level Of Support Discussed With:    Patient     Code Status (Patient has no pulse and is not breathing):    CPR (Attempt to Resuscitate)     Medical Interventions (Patient has pulse or is breathing):    Full Support       Future Appointments   Date Time Provider Department Center   2/7/2024 10:00 AM Jean-Pierre Adler PA [facility]   3/7/2024 10:30 AM Aleja Fernández APRN [facility]       Additional Instructions for the Follow-ups that You Need to Schedule       Call MD With Problems / Concerns   As directed      Instructions:   Please call the CT Surgery office with any questions or concerns you may have [phone]    Order Comments: Instructions: Please call the CT Surgery office with any questions or concerns you may have [phone]         Discharge Follow-up with PCP   As directed       Currently Documented  PCP:    Nba Randall MD    PCP Phone Number:    [phone]     Follow Up Details: Follow-up with your PCP within 1-2 weeks        Discharge Follow-up with PCP   As directed       Currently Documented PCP:    Nba Randall MD    PCP Phone Number:    [phone]     Follow Up Details: 1-2 weeks        Discharge Follow-up with Specialty: CT Surgery; 2 Weeks   As directed      Specialty: CT Surgery   Follow Up: 2 Weeks   Follow Up Details: Follow-up in 2 weeks                      Marsha Avalos MD  09/14/23      Time Spent on Discharge:  I spent  32  minutes on this discharge activity which included: face-to-face encounter with the patient, reviewing the data in the system, coordination of the care with the nursing staff as well as consultants, documentation, and entering orders.

## 2023-09-14 NOTE — PROGRESS NOTES
[facility] Cardiothoracic Surgery In-Patient Progress Note    POD # 2 s/p   Redo endarterectomy and bovine pericardial patch angioplasty of the right external iliac artery, common femoral artery, superficial femoral artery, profunda femoris artery  Right lower extremity arteriogram  Endovascular angioplasty of the right common femoral artery, right superficial femoral artery, and right popliteal artery using Medtronic in pact Admiral paclitaxel coated balloon catheter 4 x 250 mm  Endovascular angioplasty of the right common femoral artery, right superficial femoral artery, and right popliteal artery using EV3 Evercross balloon catheter 4 x 200 mm  Completion arteriography right lower extremity  Supervision interpretation of radiography fluoroscopy     LOS: 3 days     Subjective  No complaints.  No acute events or night.    Objective  Vital Signs  Temp:  [97.4 °F (36.3 °C)-98 °F (36.7 °C)] 98 °F (36.7 °C)  Heart Rate:  [71-95] 89  Resp:  [16-18] 16  BP: (105-122)/(55-62) 122/61        Physical Exam:   General Appearance: alert, appears stated age and cooperative   Lungs: clear to auscultation, respirations regular, respirations even, and respirations unlabored   Heart: regular rhythm & normal rate, normal S1, S2, no murmur, no gallop, no rub, and no click   Skin: Incision c/d/I.  Aquacel dressing in place without hematoma   Ext: Warm and viable   Pulses: Dopplerable PT bilaterally, intermittent DP present     Results     Results from last 7 days   Lab Units 09/14/23  0640   WBC 10*3/mm3 14.66*   HEMOGLOBIN g/dL 13.7   HEMATOCRIT % 41.7   PLATELETS 10*3/mm3 215       Results from last 7 days   Lab Units 09/13/23  0626   SODIUM mmol/L 135*   POTASSIUM mmol/L 5.0   CHLORIDE mmol/L 100   CO2 mmol/L 20.0*   BUN mg/dL 21   CREATININE mg/dL 1.15   GLUCOSE mg/dL 114*   CALCIUM mg/dL 9.7       Assessment    PVD (peripheral vascular disease) with claudication    Coronary artery disease involving native coronary artery of  native heart without angina pectoris    Hyperlipemia    Essential hypertension    L Fem Pop Bypass 11/12/2021    T2DM on insulin and oral agents     ADRIANA w/CPAP non-compliance     COPD with emphysema    Moderate bilateral carotid artery stenosis    S/P spinal cord stimulator      Plan   ASA, Plavix, Statin, SQH  PT  Ambulate  D/C home today if all agree      Emy Pathak, ELIZABETH  09/14/23  07:30 EDT

## 2023-09-14 NOTE — NURSING NOTE
Discussed patient discharge instructions with him and spouse at bedside. Written documentation provided, no additional questions or concerns at this time. PT going home with spouse in personal vehicle.

## 2023-09-15 LAB
BH BB BLOOD EXPIRATION DATE: NORMAL
BH BB BLOOD TYPE BARCODE: 6200
BH BB DISPENSE STATUS: NORMAL
BH BB PRODUCT CODE: NORMAL
BH BB UNIT NUMBER: NORMAL
CROSSMATCH INTERPRETATION: NORMAL
UNIT  ABO: NORMAL
UNIT  RH: NORMAL

## 2023-09-15 NOTE — OUTREACH NOTE
Prep Survey      Flowsheet Row Responses   [facility]ondenominational facility patient discharged from? [facility]   Is LACE score < 7 ? No   Eligibility Readm Mgmt   Discharge diagnosis PVD (peripheral vascular disease) with claudication   Does the patient have one of the following disease processes/diagnoses(primary or secondary)? General Surgery   Does the patient have Home health ordered? No   Is there a DME ordered? No   Comments regarding appointments Follow-up with PCP in 1 to 2 weeks recheck kidney function and discuss metformin use  Follow-up with CT surgery for postop check 2 to 4 weeks   Medication alerts for this patient aspirin   Prep survey completed? Yes            Janet MURDOCK - Registered Nurse

## 2023-09-18 ENCOUNTER — READMISSION MANAGEMENT (OUTPATIENT)
Dept: CALL CENTER | Facility: HOSPITAL | Age: 68
End: 2023-09-18
Payer: MEDICARE

## 2023-09-18 NOTE — OUTREACH NOTE
General Surgery Week 1 Survey      Flowsheet Row Responses   [facility] patient discharged from? [place]   Does the patient have one of the following disease processes/diagnoses(primary or secondary)? General Surgery   Week 1 attempt successful? Yes   Call start time 0856   Call end time 0901   Discharge diagnosis PVD (peripheral vascular disease) with claudication   Meds reviewed with patient/caregiver? Yes   Is the patient having any side effects they believe may be caused by any medication additions or changes? No   Does the patient have all medications related to this admission filled (includes all antibiotics, pain medications, etc.) N/A   Is the patient taking all medications as directed (includes completed medication regime)? Yes   Does the patient have a follow up appointment scheduled with their surgeon? Yes  [9/28/23]   Has the patient kept scheduled appointments due by today? N/A   Comments PCP appt  9/27/23   Has home health visited the patient within 72 hours of discharge? N/A   Psychosocial issues? No   Did the patient receive a copy of their discharge instructions? Yes   Nursing interventions Reviewed instructions with patient   What is the patient's perception of their health status since discharge? Improving   Nursing interventions Nurse provided patient education   Is the patient /caregiver able to teach back basic post-op care? Take showers only when approved by MD-sponge bathe until then, No tub bath, swimming, or hot tub until instructed by MD, Keep incision areas clean,dry and protected, Lifting as instructed by MD in discharge instructions   Is the patient/caregiver able to teach back signs and symptoms of incisional infection? Increased redness, swelling or pain at the incisonal site, Increased drainage or bleeding, Fever   Is the patient/caregiver able to teach back steps to recovery at home? Set small, achievable goals for return to baseline health   Is the patient/caregiver able to  teach back the hierarchy of who to call/visit for symptoms/problems? PCP, Specialist, Home health nurse, Urgent Care, ED, 911 Yes   Week 1 call completed? Yes   Call end time 0901            Stephanie WRIGHT - Registered Nurse

## 2023-09-19 ENCOUNTER — TELEPHONE (OUTPATIENT)
Dept: CARDIAC SURGERY | Facility: CLINIC | Age: 68
End: 2023-09-19

## 2023-09-19 NOTE — TELEPHONE ENCOUNTER
Caller: Cira Gusman    Relationship to patient: Emergency Contact    Best call back number: [phone]     Chief complaint: BUS    Type of visit: POST OP    Requested date: ANY DAY BETWEEN 9-12 DUE TO GRAND KIDS GETTING OFF OF THE BUS.      If rescheduling, when is the original appointment: 9/28/23     Additional notes:PATIENT WOULD LIKE A CALL BACK TO RESCHEDULE

## 2023-09-27 ENCOUNTER — READMISSION MANAGEMENT (OUTPATIENT)
Dept: CALL CENTER | Facility: HOSPITAL | Age: 68
End: 2023-09-27
Payer: MEDICARE

## 2023-10-10 PROBLEM — R07.9 CHEST PAIN: Status: RESOLVED | Noted: 2017-03-15 | Resolved: 2023-10-10

## 2023-10-10 NOTE — PROGRESS NOTES
[facility] Cardiothoracic Surgery Office Follow Up Note     Date of Encounter: 10/12/2023     Name: Mike Gusman  : 1955     Referred By: No ref. provider found  PCP: Nba Randall MD    Chief Complaint:    Chief Complaint   Patient presents with    Hospital Follow Up Visit     Hosp follow up aortgram angioplasty right common fem, right SFA, ritgh popliteal artery 23- [facility] [place] for PVD. Pt states that his right foot and leg feel heavy with swelling. Pt states he is using his cane to walk for his balance, denies any muscle cramps states that this is better since SX.       Subjective      History of Present Illness:    Mike Gusman is a 68 y.o. male former smoker with history of chronic back pain s/p spinal cord stimulator, COPD, ADRIANA with CPAP noncompliance, HTN, HLD on statin therapy, non-insulin-dependent DM, CAD s/p stent, and PVD s/p left femoral to above-knee popliteal bypass with 8 mm PTFE graft with endarterectomy of the left popliteal artery in  by Dr. Mann followed by right femoral to above-knee popliteal bypass with 8 mm PTFE graft via right common femoral endarterectomy 2023 again with Dr. Mann.    Patient presented with acute [place] 2B classification RLE limb ischemia with right fem-pop bypass occlusion s/p arteriogram with redo endarterectomy and bovine pericardial patch angioplasty of the right external iliac, common femoral, superficial femoral, and profundofemoral artery on 2023 with Dr Bentley. Pt had uneventful post-op and was discharged on 23. He presents today for post-op eval.   He is reporting improvement in his RLE leg pain but continues to have tingling/pins and needles to the bottom of his foot and heaviness sensation that requires him to use a cane for walking. He also has pins and needles to his left foot but otherwise has no LLE complaints. He denies rest pain and has no open wounds/ulcers. He is complaint with DAPT and statin  therapy and has not smoked in almost 20 years.     Review of Systems:  Review of Systems   Constitutional: Negative for chills, decreased appetite, diaphoresis, fever, malaise/fatigue, night sweats and weight loss.   HENT:  Negative for congestion, hoarse voice, sore throat and stridor.    Cardiovascular:  Negative for chest pain, claudication, dyspnea on exertion, irregular heartbeat, leg swelling, near-syncope, orthopnea, palpitations, paroxysmal nocturnal dyspnea and syncope.   Respiratory:  Negative for cough, hemoptysis, shortness of breath, sleep disturbances due to breathing, snoring, sputum production and wheezing.    Hematologic/Lymphatic: Positive for bleeding problem. Negative for adenopathy. Bruises/bleeds easily.   Skin:  Positive for color change, dry skin and poor wound healing. Negative for itching and rash.   Musculoskeletal:  Negative for arthritis, back pain, falls, joint pain and muscle weakness.   Gastrointestinal:  Positive for constipation. Negative for abdominal pain, anorexia, diarrhea, hematochezia, melena, nausea and vomiting.   Neurological:  Positive for dizziness (from time to time) and numbness. Negative for difficulty with concentration, disturbances in coordination, focal weakness, loss of balance, paresthesias, seizures, vertigo and weakness.   Psychiatric/Behavioral:  Negative for altered mental status, depression, memory loss and substance abuse. The patient has insomnia (pt states that he does not sleep well). The patient is not nervous/anxious.    Allergic/Immunologic: Negative for persistent infections.       I have reviewed the following portions of the patient's history: problem list, current medications, allergies, past surgical history, past medical history, past social history, past family history, and ROS and confirm it's accurate.    Allergies:  No Known Allergies    Medications:      Current Outpatient Medications:     Aspirin Low Dose 81 MG EC tablet, TAKE 1 TABLET  DAILY (Patient taking differently: Take 1 tablet by mouth Every Night.), Disp: 90 tablet, Rfl: 3    benzonatate (TESSALON) 100 MG capsule, Take 1 capsule by mouth 3 (Three) Times a Day As Needed for Cough., Disp: , Rfl:     Breo Ellipta 200-25 MCG/INH inhaler, Inhale 1 puff Daily., Disp: , Rfl:     busPIRone (BUSPAR) 15 MG tablet, Take 1 tablet by mouth 2 (two) times a day. 1.5 tabs BID, Disp: , Rfl:     cetirizine (zyrTEC) 10 MG tablet, Take 1 tablet by mouth Daily., Disp: , Rfl:     cholecalciferol (VITAMIN D3) 25 MCG (1000 UT) tablet, Take 1 tablet by mouth Daily., Disp: , Rfl:     clopidogrel (PLAVIX) 75 MG tablet, TAKE 1 TABLET DAILY, Disp: 90 tablet, Rfl: 3    finasteride (PROSCAR) 5 MG tablet, Take 1 tablet by mouth Daily., Disp: , Rfl:     fluticasone (FLONASE) 50 MCG/ACT nasal spray, , Disp: , Rfl:     isosorbide mononitrate (IMDUR) 30 MG 24 hr tablet, TAKE ONE-HALF (1/2) TABLET EVERY NIGHT, Disp: 45 tablet, Rfl: 3    magnesium oxide (MAGOX) 400 (241.3 MG) MG tablet tablet, Take 1 tablet by mouth Daily., Disp: , Rfl:     metoprolol succinate XL (TOPROL-XL) 25 MG 24 hr tablet, Take 1 tablet by mouth Every Morning., Disp: , Rfl:     Mounjaro 2.5 MG/0.5ML solution pen-injector, , Disp: , Rfl:     multivitamin with minerals tablet tablet, Take 1 tablet by mouth Daily., Disp: , Rfl:     nitroglycerin (NITROSTAT) 0.4 MG SL tablet, 1 under the tongue as needed for angina, may repeat q5mins for up three doses, Disp: 30 tablet, Rfl: 3    nortriptyline (PAMELOR) 50 MG capsule, Take 1 capsule by mouth Every Night., Disp: , Rfl:     Omega 3 1000 MG capsule, Take 2,000 mg by mouth Daily., Disp: , Rfl:     omeprazole (PriLOSEC) 40 MG capsule, Take 1 capsule by mouth Daily., Disp: , Rfl:     rosuvastatin (CRESTOR) 40 MG tablet, Take 1 tablet by mouth Daily., Disp: 90 tablet, Rfl: 3    traZODone (DESYREL) 50 MG tablet, Take 1.5 tablets by mouth Every Night., Disp: , Rfl:     valsartan (DIOVAN) 160 MG tablet, Take 1 tablet  by mouth Daily., Disp: , Rfl:     Vitamin E 180 MG capsule, Take 400 mg by mouth Daily., Disp: , Rfl:     Ertugliflozin-metFORMIN HCl (Segluromet) 7.5-1000 MG tablet, Take 1 tablet/day by mouth Daily. (Patient not taking: Reported on 10/12/2023), Disp: , Rfl:     History:   Past Medical History:   Diagnosis Date    Anxiety and depression     Asthma     Clotting disorder     COPD (chronic obstructive pulmonary disease)     Coronary artery disease     COVID-19 vaccine administered 1st - 03/08/201 2nd - 04/07/2021  [place] ( Booster 12/21/2021 ) Pfizer    Diabetes mellitus     GERD (gastroesophageal reflux disease)     Hyperlipidemia     Hypertension     Kidney failure     Peripheral vascular disease     Sleep apnea     waiting for a new machine 11/2020    Stroke 2019    n o residual       Past Surgical History:   Procedure Laterality Date    ANGIOPLASTY FEMORAL ARTERY Right 9/12/2023    Procedure: Redo endarterectomy and bovine pericardial patch angioplasty of the right external iliac artery, common femoral artery, superficial femoral artery, profunda femoris artery, Right lower extremity arteriogram, Endovascular angioplasty of the right common femoral artery, right superficial femoral artery, and right popliteal artery using Queralt in pact Admiral paclitaxel coated balloon ca    CARDIAC CATHETERIZATION      Stent x1    COLONOSCOPY      FEMORAL POPLITEAL BYPASS Left 11/12/2021    Procedure: FEMORAL POPLITEAL BYPASS LEFT, ABOVE KNEE;  Surgeon: Farhat Mann MD;  Location:  [facility] OR;  Service: Vascular;  Laterality: Left;    FEMORAL POPLITEAL BYPASS Right 02/02/2023    Procedure: FEMORAL POPLITEAL BYPASS, ABOVE KNEE RIGHT;  Surgeon: Farhat Mann MD;  Location:  [facility];  Service: Vascular;  Laterality: Right;    LEG SURGERY Right     Had rods/screws, but were removed when he had Right hip replacement    LUMBAR FUSION      RENAL ARTERY STENT      SPINAL CORD STIMULATOR IMPLANT      TOTAL HIP  "ARTHROPLASTY Right     VEIN SURGERY         Social History     Socioeconomic History    Marital status:     Number of children: 4   Tobacco Use    Smoking status: Former     Packs/day: 2.00     Years: 20.00     Additional pack years: 0.00     Total pack years: 40.00     Types: Cigarettes     Quit date: 3/2/2005     Years since quittin.6    Smokeless tobacco: Never   Vaping Use    Vaping Use: Never used   Substance and Sexual Activity    Alcohol use: No    Drug use: No    Sexual activity: Not Currently     Partners: Female     Birth control/protection: Surgical        Family History   Problem Relation Age of Onset    Heart failure Mother     Heart disease Father     Heart failure Father     Asthma Sister        Objective     Physical Exam:  Vitals:    10/12/23 1013 10/12/23 1014   BP: 114/72 112/68   BP Location: Left arm Right arm   Pulse: 73    Temp: 98.6 øF (37 øC)    SpO2: 99%    Weight: 73.9 kg (163 lb)    Height: 167.6 cm (66\")  Comment: per pt       Body mass index is 26.31 kg/mý.    Physical Exam  Vitals and nursing note reviewed.   Constitutional:       Appearance: Normal appearance.   Cardiovascular:      Rate and Rhythm: Normal rate and regular rhythm.      Pulses: No decreased pulses.           Femoral pulses are 1+ on the right side and 1+ on the left side.       Dorsalis pedis pulses are detected w/ Doppler on the right side and detected w/ Doppler on the left side.        Posterior tibial pulses are detected w/ Doppler on the right side and detected w/ Doppler on the left side.      Heart sounds: Normal heart sounds, S1 normal and S2 normal. No murmur heard.     Comments: Left DP only hear proximally at ankle   Pulmonary:      Effort: Pulmonary effort is normal.      Breath sounds: Normal breath sounds.   Abdominal:      Palpations: Abdomen is soft.   Musculoskeletal:         General: Normal range of motion.      Right lower leg: No edema.      Left lower leg: No edema.   Feet:      Right " foot:      Skin integrity: Skin integrity normal. No ulcer, skin breakdown, callus or dry skin.      Toenail Condition: Right toenails are abnormally thick.      Left foot:      Skin integrity: Skin integrity normal. No ulcer, skin breakdown, callus or dry skin.      Toenail Condition: Left toenails are abnormally thick.      Comments: Toes: pink, warm, dry without delayed cap refill.  Right foot hypereremic   Skin:     General: Skin is warm and dry.      Capillary Refill: Capillary refill takes less than 2 seconds.      Comments: Right femoral vertical incision: well healing with wound edges well approximated, no surround erythema, edema, warmth, drainage or hematoma.    Neurological:      General: No focal deficit present.      Mental Status: He is alert and oriented to person, place, and time. Mental status is at baseline.      Gait: Gait abnormal.      Comments: cane   Psychiatric:         Mood and Affect: Mood normal.         Behavior: Behavior normal.         Imaging/Labs:  CT Angio Abdominal Aorta Bilateral Iliofem Runoff (09/05/2023 16:37)   Impression: Multiple areas of hemodynamically significant stenosis and vessel occlusion as detailed above.  Cholelithiasis without cholecystitis.    Duplex Lower Extremity Art / Grafts - Right CAR (08/03/2023 09:20)   1.  Diminished velocities within the dorsalis pedis on the right side.  2.  The right femoral to popliteal graft appears thrombosed or occluded.  The more distal vessel shows monophasic dampened waveform patterns.   This report was finalized on 8/3/2023 9:28 AM by Dr. Gerardo Sebastian MD.    CT Angio Abdominal Aorta Bilateral Iliofem Runoff (07/15/2022 15:59)   1.  Left external iliac artery stent appears patent.  2.  Mild stenosis of bilateral common iliac arteries.  3.  Left SFA graft appears patent. The native left superficial femoral  artery is nearly completely occluded throughout its entirety.  4.  Right proximal superficial femoral, and mid superficial  femoral and  right distal superficial femoral moderate stenosis noted.  5.  Significant stenosis at the level of the left popliteal graft  anastomosis.  6.  Gallstones.  Gallbladder otherwise unremarkable.  7.  Severe bilateral subpleural pulmonary fibrotic change.  8.  Degenerative changes lumbar spine as described.   This report was finalized on 7/15/2022 4:06 PM by Dr. Gerardo Sebastian MD.    Assessment / Plan      Assessment / Plan:  Diagnoses and all orders for this visit:    1. PAD (peripheral artery disease) (Primary)         s/p left femoral to above-knee popliteal bypass with 8 mm PTFE graft with endarterectomy of the left popliteal artery in 2021 by Dr. Mann   s/p right femoral to above-knee popliteal bypass with 8 mm PTFE graft via right common femoral endarterectomy 2/2023 again with Dr. Mann  acute [place] 2B classification RLE limb ischemia with right fem-pop bypass occlusion   s/p arteriogram with redo endarterectomy and bovine pericardial patch angioplasty of the right external iliac, common femoral, superficial femoral, and profundofemoral artery on 9/12/2023 with Dr Bentley.   uneventful post-op and was discharged on 9/14/23.   initial post-op eval with improvement in RLE pain. No rest pain. Complaining of neuropathy to bilateral feet. No open wounds. No incisional healing concerns  On exam patient has well healing right femoral incision without complication. All pedal pulses obtainable.   Compliant with DAPT and statin therapy. Has abstained from smoking for almost 20 years  Encouraged structured walking regiment      Patient Education:  A structured walking regiment is used to improve the circulation or blood flow in your legs. Recognize that walking may hurt at first - and that is good. In fact, the goal is to walk at a pace that causes mild or moderate pain or tightness in your legs. Pace yourself, stop to rest for a few minutes, but then resume walking. This discomfort triggers your body  to improve your circulation. Repeat several times: Walk at a pace that causes mild or moderate leg pain, then rest, and keep going. Over time, you may find you are able to walk longer with less pain. That is a sign that your blood vessels are recovering. It may take months, so be patient and persistent.       Follow Up:   Return in about 3 months (around 1/12/2024) for ABIs.   Or sooner for any further concerns or worsening sign and symptoms. If unable to reach us in the office please dial 911 or go to the nearest emergency department.      ELIZABETH Casey  [facility] Cardiothoracic Surgery

## 2023-10-12 ENCOUNTER — OFFICE VISIT (OUTPATIENT)
Dept: CARDIAC SURGERY | Facility: CLINIC | Age: 68
End: 2023-10-12
Payer: MEDICARE

## 2023-10-12 VITALS
WEIGHT: 163 LBS | HEIGHT: 66 IN | TEMPERATURE: 98.6 F | BODY MASS INDEX: 26.2 KG/M2 | HEART RATE: 73 BPM | DIASTOLIC BLOOD PRESSURE: 68 MMHG | OXYGEN SATURATION: 99 % | SYSTOLIC BLOOD PRESSURE: 112 MMHG

## 2023-10-12 DIAGNOSIS — I73.9 PAD (PERIPHERAL ARTERY DISEASE): Primary | ICD-10-CM

## 2023-10-12 PROCEDURE — 99024 POSTOP FOLLOW-UP VISIT: CPT | Performed by: NURSE PRACTITIONER

## 2023-10-12 RX ORDER — TIRZEPATIDE 2.5 MG/.5ML
INJECTION, SOLUTION SUBCUTANEOUS
COMMUNITY
Start: 2023-09-27

## 2024-01-19 ENCOUNTER — TELEPHONE (OUTPATIENT)
Dept: CARDIAC SURGERY | Facility: CLINIC | Age: 69
End: 2024-01-19
Payer: MEDICARE

## 2024-02-07 ENCOUNTER — OFFICE VISIT (OUTPATIENT)
Dept: CARDIOLOGY | Facility: CLINIC | Age: 69
End: 2024-02-07
Payer: MEDICARE

## 2024-02-07 VITALS
OXYGEN SATURATION: 94 % | HEIGHT: 66 IN | HEART RATE: 82 BPM | DIASTOLIC BLOOD PRESSURE: 59 MMHG | WEIGHT: 169.6 LBS | SYSTOLIC BLOOD PRESSURE: 113 MMHG | BODY MASS INDEX: 27.26 KG/M2

## 2024-02-07 DIAGNOSIS — R42 DIZZINESS: ICD-10-CM

## 2024-02-07 DIAGNOSIS — I25.10 CORONARY ARTERY DISEASE INVOLVING NATIVE CORONARY ARTERY OF NATIVE HEART WITHOUT ANGINA PECTORIS: ICD-10-CM

## 2024-02-07 DIAGNOSIS — I73.9 PVD (PERIPHERAL VASCULAR DISEASE): ICD-10-CM

## 2024-02-07 PROCEDURE — 3074F SYST BP LT 130 MM HG: CPT | Performed by: PHYSICIAN ASSISTANT

## 2024-02-07 PROCEDURE — 1159F MED LIST DOCD IN RCRD: CPT | Performed by: PHYSICIAN ASSISTANT

## 2024-02-07 PROCEDURE — 3078F DIAST BP <80 MM HG: CPT | Performed by: PHYSICIAN ASSISTANT

## 2024-02-07 PROCEDURE — 1160F RVW MEDS BY RX/DR IN RCRD: CPT | Performed by: PHYSICIAN ASSISTANT

## 2024-02-07 PROCEDURE — 99213 OFFICE O/P EST LOW 20 MIN: CPT | Performed by: PHYSICIAN ASSISTANT

## 2024-02-07 PROCEDURE — 93000 ELECTROCARDIOGRAM COMPLETE: CPT | Performed by: PHYSICIAN ASSISTANT

## 2024-02-07 RX ORDER — TAMSULOSIN HYDROCHLORIDE 0.4 MG/1
1 CAPSULE ORAL DAILY
COMMUNITY

## 2024-02-07 RX ORDER — PREGABALIN 25 MG/1
25 CAPSULE ORAL 2 TIMES DAILY
COMMUNITY

## 2024-02-07 NOTE — PROGRESS NOTES
Problem list     Subjective   Mike Gusman is a 68 y.o. male     Chief Complaint   Patient presents with    Follow-up     6 month follow up / CTA     Shortness of Breath    Palpitations     Patient reports not very often   Problem List:     1. CAD stent x1 somewhere between 2004 and 2006 in [place]  1.1 CT chest 8/5/19-severe atherosclerotic plaque formation throughout the coronary arteries, aorta has mild atherosclerotic plaque formation without aneurysmal dilation, interstitial fibrosis with underlying emphysema, fatty liver  1.2 left heart cath 10/22/19- obtuse marginal small-caliber 70% proximal narrowing, obtuse marginal terminal 50 to 70%, small limb of the LAD 50 to 70%, EF 60%, LVEDP 22-24  1.3 left heart cath 8/5/2021-left main minor ostial distal tapering, 90% or greater stenosis in the ostium and proximal portion of the circumflex, which received a stent.  Right coronary artery no high-grade stenosis, LAD 20 to 40% stenosis, EF 55%, LVEDP 18  2. Hypertension  3. Hyperlipidemia   4. Viral Pericarditis 2009  5. Aortic valve disorder  6. Carotid Artery Disease   6.1 Carotid artery ultrasound 6/1/2021-16 to 49% stenosis in the right that is calcified but actually by laterally at the bifurcations, 16 to 49% stenosis in the mid right internal carotid artery and throughout the left her carotid artery, antegrade flow both vertebral arteries  7. PVD   7.1 H/O left common iliac artery stenting   7.2 CT of abdomen with runoff 6/24/2021-common femoral artery had up to 50% stenosis, three-vessel runoff to the ankle, extremely diminutive distal anterior tibial artery and DPA, severe left-sided SMA disease with multilevel severe stenosis and extensive plaque  7.3  Left femoropopliteal 11/12/2021 Dr. Mann  7.4  Right femoropopliteal bypass, 2/2023.  This was performed by Dr. Mann as well.  7.5  Redo endarterectomy and bovine pericardial patch angioplasty to the right external iliac, common femoral,  superficial femoral, and distally otherwise, 9/2023 with Dr. Sanchez.  Procedure was performed because of occluded right femoropopliteal bypass.  8. Shortness of breath  8.1 echo 9/3/19 -diastolic dysfunction 2, mild to moderate left atrial enlargement, mild to moderate MR  8.2 Echo 9/5/2021-EF 55 to 60%, diastolic filling pattern indicates impaired laxation, mild aortic stenosis with a VTI of 1.9 cm², mild to moderate MR  9. H/O back surgery; per report   10. DM II  11. GERD  12. BPH   13. Brain Bleed 11/2011 s/p fall   14. ADRIANA - CPAP   15. Emphysema/Asthma   16. PSVT  16.1 Event Monitor 5/9-5/22/17 - NSR - ST with one episode of PSVT  17. Moderate periventricular and subcortical microangiopathy  17.1 MRI of the brain/MRI of orbitis 5/16/18-moderate.  Moderate periventricular and subcortical microangiopathic  18.  Emphysema/COPD    HPI    The patient presents to clinic today for routine evaluation and follow-up.  He was seen at last evaluation where he had increasing symptoms of claudication and coolness to the right lower extremity.  Arterial duplex was performed which suggested occluded graft and diffuse disease.  CTA suggested similar findings.  He was referred back to his CT surgical/vascular surgical team.  He eventually had intervention as above.  He feels much improved, and he reports that his claudication symptoms have really minimized postintervention.  He continues to follow with his vascular team closely on an outpatient basis.  He does report that even his previously described a sensation of coolness to the right lower extremity has resolved postintervention as well.  For general cardiac standpoint, the patient denies chest pain.  His dyspnea is at baseline.  He has no failure nor dysrhythmic symptoms.  To his knowledge, he typically is normotensive.  He has no further complaints otherwise and feels that he is doing well.  Current Outpatient Medications on File Prior to Visit   Medication Sig Dispense  Refill    Aspirin Low Dose 81 MG EC tablet TAKE 1 TABLET DAILY (Patient taking differently: Take 1 tablet by mouth Every Night.) 90 tablet 3    benzonatate (TESSALON) 100 MG capsule Take 1 capsule by mouth 3 (Three) Times a Day As Needed for Cough.      Breo Ellipta 200-25 MCG/INH inhaler Inhale 1 puff Daily.      busPIRone (BUSPAR) 15 MG tablet Take 2 tablets by mouth 2 (two) times a day. 1.5 tabs BID      cetirizine (zyrTEC) 10 MG tablet Take 1 tablet by mouth Daily.      cholecalciferol (VITAMIN D3) 25 MCG (1000 UT) tablet Take 1 tablet by mouth Daily.      clopidogrel (PLAVIX) 75 MG tablet TAKE 1 TABLET DAILY 90 tablet 3    finasteride (PROSCAR) 5 MG tablet Take 1 tablet by mouth Daily.      fluticasone (FLONASE) 50 MCG/ACT nasal spray 2 sprays into the nostril(s) as directed by provider Daily As Needed.      isosorbide mononitrate (IMDUR) 30 MG 24 hr tablet TAKE ONE-HALF (1/2) TABLET EVERY NIGHT 45 tablet 3    magnesium oxide (MAGOX) 400 (241.3 MG) MG tablet tablet Take 1 tablet by mouth Daily.      metoprolol succinate XL (TOPROL-XL) 25 MG 24 hr tablet Take 1 tablet by mouth Every Morning.      Mounjaro 2.5 MG/0.5ML solution pen-injector       nitroglycerin (NITROSTAT) 0.4 MG SL tablet 1 under the tongue as needed for angina, may repeat q5mins for up three doses 30 tablet 3    nortriptyline (PAMELOR) 50 MG capsule Take 1 capsule by mouth Every Night.      Omega 3 1000 MG capsule Take 2,000 mg by mouth Daily.      omeprazole (PriLOSEC) 40 MG capsule Take 1 capsule by mouth Daily.      pregabalin (LYRICA) 25 MG capsule Take 1 capsule by mouth 2 (Two) Times a Day.      rosuvastatin (CRESTOR) 40 MG tablet Take 1 tablet by mouth Daily. 90 tablet 3    tamsulosin (FLOMAX) 0.4 MG capsule 24 hr capsule Take 1 capsule by mouth Daily.      traZODone (DESYREL) 50 MG tablet Take 1.5 tablets by mouth Every Night.      valsartan (DIOVAN) 160 MG tablet Take 1 tablet by mouth Daily.      Vitamin E 180 MG capsule Take 400 mg by  mouth Daily.       No current facility-administered medications on file prior to visit.       Patient has no known allergies.    Past Medical History:   Diagnosis Date    Anxiety and depression     Asthma     Clotting disorder     COPD (chronic obstructive pulmonary disease)     Coronary artery disease     COVID-19 vaccine administered  -     2nd - 2021  Ana Luisa ( Booster 2021 ) Pf[employer]    Diabetes mellitus     GERD (gastroesophageal reflux disease)     Hyperlipidemia     Hypertension     Kidney failure     Peripheral vascular disease     Sleep apnea     waiting for a new machine 2020    Stroke 2019    n o residual       Social History     Socioeconomic History    Marital status:     Number of children: 4   Tobacco Use    Smoking status: Former     Packs/day: 2.00     Years: 20.00     Additional pack years: 0.00     Total pack years: 40.00     Types: Cigarettes     Quit date: 3/2/2005     Years since quittin.9    Smokeless tobacco: Never   Vaping Use    Vaping Use: Never used   Substance and Sexual Activity    Alcohol use: No    Drug use: No    Sexual activity: Not Currently     Partners: Female     Birth control/protection: Surgical       Family History   Problem Relation Age of Onset    Heart failure Mother     Heart disease Father     Heart failure Father     Asthma Sister        Review of Systems   Constitutional: Negative.  Negative for chills, diaphoresis, fatigue and fever.   HENT: Negative.     Eyes: Negative.  Negative for visual disturbance.   Respiratory:  Positive for apnea (wears c-pap), shortness of breath and wheezing. Negative for cough and chest tightness.    Cardiovascular:  Positive for palpitations. Negative for chest pain and leg swelling.   Gastrointestinal: Negative.  Negative for abdominal pain and blood in stool.   Endocrine: Negative.    Genitourinary: Negative.  Negative for hematuria.   Musculoskeletal:  Positive for arthralgias, back pain, myalgias,  "neck pain and neck stiffness.   Skin: Negative.  Negative for rash and wound.   Allergic/Immunologic: Positive for environmental allergies (seasonal). Negative for food allergies.   Neurological: Negative.  Negative for dizziness, syncope, weakness, light-headedness, numbness and headaches.   Hematological:  Bruises/bleeds easily (bruises easily).   Psychiatric/Behavioral:  Positive for sleep disturbance (sleep apnea).        Objective   Vitals:    02/07/24 1010   BP: 113/59   Pulse: 82   SpO2: 94%   Weight: 76.9 kg (169 lb 9.6 oz)   Height: 167.6 cm (66\")      /59   Pulse 82   Ht 167.6 cm (66\")   Wt 76.9 kg (169 lb 9.6 oz)   SpO2 94%   BMI 27.37 kg/m²    Lab Results (most recent)       None          Physical Exam  Vitals and nursing note reviewed.   Constitutional:       General: He is not in acute distress.     Appearance: He is well-developed.   HENT:      Head: Normocephalic and atraumatic.   Eyes:      Conjunctiva/sclera: Conjunctivae normal.      Pupils: Pupils are equal, round, and reactive to light.   Neck:      Vascular: No JVD.      Trachea: No tracheal deviation.   Cardiovascular:      Rate and Rhythm: Normal rate and regular rhythm.      Pulses:           Dorsalis pedis pulses are 1+ on the right side and 1+ on the left side.        Posterior tibial pulses are 1+ on the right side and 1+ on the left side.      Heart sounds: Normal heart sounds.      Comments: 1/6 systolic murmur mid lower left sternal border.  Pulmonary:      Effort: Pulmonary effort is normal.      Breath sounds: Normal breath sounds.   Abdominal:      General: Bowel sounds are normal. There is no distension.      Palpations: Abdomen is soft. There is no mass.      Tenderness: There is no abdominal tenderness. There is no guarding or rebound.   Musculoskeletal:         General: No tenderness or deformity. Normal range of motion.      Cervical back: Normal range of motion and neck supple.   Skin:     General: Skin is warm and " dry.      Coloration: Skin is not pale.      Findings: No erythema or rash.   Neurological:      Mental Status: He is alert and oriented to person, place, and time.   Psychiatric:         Behavior: Behavior normal.         Thought Content: Thought content normal.         Judgment: Judgment normal.           Procedure     ECG 12 Lead    Date/Time: 2/7/2024 10:16 AM  Performed by: Jean-Pierre Adler PA    Authorized by: Jean-Pierre Adler PA  Comparison: compared with previous ECG from 9/7/2022  Comparison to previous ECG: Sinus rhythm, rate 74, inaccurate CO interval reported at 359 ms, CO interval is actually normal, IVCD, no acute changes noted.             Assessment & Plan      Diagnosis Plan   1. PVD (peripheral vascular disease)        2. Coronary artery disease involving native coronary artery of native heart without angina pectoris        3. Dizziness  ECG 12 Lead    Duplex Carotid Ultrasound CAR        1.  For the most part, the patient continues to do fairly well status post lower extremity intervention for occluded graft as above.  He follows closely with his surgical team.  We will follow along in that regard.  He does report that symptoms have now minimized postintervention.    2.  He currently denies angina.  His dyspnea is at baseline.  He has no failure nor dysrhythmic symptoms.  I do not feel further cardiac evaluation is indicated.  We will continue medical management.  I feel he is on appropriate medications for now.    3.  I would like to schedule for carotid duplex  Given clinical scenario as above.    4.  We will see the patient routinely through the clinic.  He will call immediately for any issues.  If indicated, we will see him immediately for carotid duplex findings.           Advance Care Planning   ACP discussion was held with the patient during this visit. Patient does not have an advance directive, declines further assistance.           Electronically signed by:

## 2024-02-26 ENCOUNTER — HOSPITAL ENCOUNTER (OUTPATIENT)
Dept: CARDIOLOGY | Facility: HOSPITAL | Age: 69
Discharge: HOME | End: 2024-02-26
Admitting: PHYSICIAN ASSISTANT
Payer: MEDICARE

## 2024-02-26 DIAGNOSIS — R42 DIZZINESS: ICD-10-CM

## 2024-02-26 PROCEDURE — 93880 EXTRACRANIAL BILAT STUDY: CPT

## 2024-03-01 LAB
BH CV XLRA MEAS LEFT DIST CCA EDV: 19.4 CM/SEC
BH CV XLRA MEAS LEFT DIST CCA PSV: 98.8 CM/SEC
BH CV XLRA MEAS LEFT DIST ICA EDV: -21.2 CM/SEC
BH CV XLRA MEAS LEFT DIST ICA PSV: -99.6 CM/SEC
BH CV XLRA MEAS LEFT ICA/CCA RATIO: 1.7
BH CV XLRA MEAS LEFT MID ICA EDV: -31 CM/SEC
BH CV XLRA MEAS LEFT MID ICA PSV: -169 CM/SEC
BH CV XLRA MEAS LEFT PROX CCA EDV: 22.3 CM/SEC
BH CV XLRA MEAS LEFT PROX CCA PSV: 128 CM/SEC
BH CV XLRA MEAS LEFT PROX ECA PSV: -142 CM/SEC
BH CV XLRA MEAS LEFT PROX ICA EDV: -38.7 CM/SEC
BH CV XLRA MEAS LEFT PROX ICA PSV: -162 CM/SEC
BH CV XLRA MEAS LEFT VERTEBRAL A EDV: 14.9 CM/SEC
BH CV XLRA MEAS LEFT VERTEBRAL A PSV: 63.5 CM/SEC
BH CV XLRA MEAS RIGHT DIST CCA EDV: 10.5 CM/SEC
BH CV XLRA MEAS RIGHT DIST CCA PSV: 93.9 CM/SEC
BH CV XLRA MEAS RIGHT DIST ICA EDV: -23.5 CM/SEC
BH CV XLRA MEAS RIGHT DIST ICA PSV: -107 CM/SEC
BH CV XLRA MEAS RIGHT ICA/CCA RATIO: 1.3
BH CV XLRA MEAS RIGHT MID ICA EDV: -20.3 CM/SEC
BH CV XLRA MEAS RIGHT MID ICA PSV: -126 CM/SEC
BH CV XLRA MEAS RIGHT PROX CCA EDV: 12.6 CM/SEC
BH CV XLRA MEAS RIGHT PROX CCA PSV: 104 CM/SEC
BH CV XLRA MEAS RIGHT PROX ECA PSV: -210 CM/SEC
BH CV XLRA MEAS RIGHT PROX ICA EDV: -22.3 CM/SEC
BH CV XLRA MEAS RIGHT PROX ICA PSV: -122 CM/SEC
BH CV XLRA MEAS RIGHT VERTEBRAL A EDV: 7 CM/SEC
BH CV XLRA MEAS RIGHT VERTEBRAL A PSV: 51.1 CM/SEC

## 2024-03-04 ENCOUNTER — TELEPHONE (OUTPATIENT)
Dept: CARDIOLOGY | Facility: CLINIC | Age: 69
End: 2024-03-04
Payer: MEDICARE

## 2024-03-04 NOTE — TELEPHONE ENCOUNTER
----- Message from JOHNNY Rosenthal sent at 3/3/2024  9:43 PM EST -----  Diffuse but nonobstructive disease.  Long-term management is indicated.  Ensure no neurologic symptoms.  ----- Message -----  From: Farhat Nelson MD  Sent: 3/1/2024   8:31 PM EST  To: JOHNNY Rosenthal    Duplex Carotid Ultrasound CAR  Order: [id number]  Status: Final result       Visible to patient: Yes (seen)       Dx: Dizziness    0 Result Notes  Details    Reading Physician Reading Date Result Priority   Farhat Nelson MD  [phone] 3/1/2024 Routine     Result Text  1.  Both common carotid arteries are without obstruction.     2.  There is mild to moderate bifurcation disease bilaterally.  Mural calcification limits luminal assessment of the bifurcation on the right by Doppler is compatible with 16 to 49% stenosis.  16 to 49% stenosis by both echo and Doppler in the bifurcation on the left.     3.  16 to 49% stenosis by Doppler in both internal carotid arteries with mild proximal atheroma on the right mild to moderate proximal atheroma on the left.     4.  Antegrade flow both vertebral arteries.     Summary: Diffuse atherosclerotic involvement of both carotid systems as detailed above without discrete hemodynamically significant stenoses.  Antegrade flow in both vertebral arteries.

## 2024-03-04 NOTE — TELEPHONE ENCOUNTER
Called patient regarding carotid us result's, no answer, left voice message requesting return call.

## 2024-03-05 ENCOUNTER — TELEPHONE (OUTPATIENT)
Dept: CARDIAC SURGERY | Facility: CLINIC | Age: 69
End: 2024-03-05

## 2024-03-05 NOTE — TELEPHONE ENCOUNTER
Called and spoke with patient's spouse Cira who is on HIPAA, she reports patient doing well no complication's, she is aware to contact our office if patient experiences any complication's such as confusion, loss of sensation, poor coordination, paralysis, muscle weakness, dizziness for sooner appointment or recommendation's.

## 2024-03-05 NOTE — TELEPHONE ENCOUNTER
S/w pt wife and let her know that the pt needed to be seen in Jan/feb 2024 for a 3-month follow up and a 1-year follow up tht the pt had cancelled a few times. She wants to do both of these appt at the same time. I let her know that usually that doesn't happen but I'd send a message to our APRN to see what she thinks.

## 2024-03-05 NOTE — TELEPHONE ENCOUNTER
Caller: Cira Gusman    Relationship to patient: Emergency Contact    Best call back number: [phone]     Chief complaint: PT WIFE CALLED IN TO SEE WHEN PT NEEDED TO BE SEEN AGAIN    Type of visit: 1 YR F/U    Requested date: WHEN IT IS DUE-PTS WIFE  STATES EVERYTHING IS GOING OK     If rescheduling, when is the original appointment: N/A       LOOKS LIKE PT WOULD F/U O[facility] MICHELLE PER 3/7/24 APPT CANCEL NOTE

## 2024-03-13 ENCOUNTER — TELEPHONE (OUTPATIENT)
Dept: CARDIAC SURGERY | Facility: CLINIC | Age: 69
End: 2024-03-13

## 2024-03-13 NOTE — TELEPHONE ENCOUNTER
Caller: Cira Gusman    Relationship to patient: Emergency Contact    Best call back number: [phone]     Chief complaint: PT HAS APPT 4/8/24 NEEDS A FRIDAY APPT AROUND 11 AM-IF NO FRIDAY APPT AVAILABLE PT WOULD JUST LIKE TO CANCEL    Type of visit: 1 YEAR F/U    Requested date: ANY FRIDAY     If rescheduling, when is the original appointment: 4/8/24     Additional notes:PLEASE CALL PT IF APPT CAN TAKE PLACE ON FRIDAY IF NOT PT WANTS TO CANCEL

## 2024-03-19 DIAGNOSIS — I73.9 PVD (PERIPHERAL VASCULAR DISEASE): Primary | ICD-10-CM

## 2024-03-28 DIAGNOSIS — R07.2 PRECORDIAL PAIN: ICD-10-CM

## 2024-03-28 RX ORDER — ISOSORBIDE MONONITRATE 30 MG/1
TABLET, EXTENDED RELEASE ORAL
Qty: 45 TABLET | Refills: 3 | Status: SHIPPED | OUTPATIENT
Start: 2024-03-28

## 2024-04-18 NOTE — PROGRESS NOTES
"     [facility] Cardiothoracic Surgery Office Follow Up Note     Date of Encounter: 2024     Name: Mike Gusman  : 1955     Referred By: No ref. provider found  PCP: Nba Randall MD    Chief Complaint:    Chief Complaint   Patient presents with    Peripheral Vascular Disease     3 month follow-up with arterial duplex. Patient has pain in feet, but says \"seems to be getting better.\" Patient has pain in bilateral lower extremities after walking and gets shortness of breath       Subjective      History of Present Illness:    Mike Gusman is a 68 y.o. male former smoker with history of chronic back pain s/p spinal cord stimulator, COPD, ADRIANA with CPAP noncompliance, HTN, HLD on statin therapy, Type2 DM, CAD s/p stent, and PVD s/p left femoral to above-knee popliteal bypass with 8 mm PTFE graft + endarterectomy of the left popliteal artery in  by Dr. Mann followed by right femoral to above-knee popliteal bypass with 8 mm PTFE graft via right common femoral endarterectomy 2023 again with Dr. Mann.        Patient presented with acute Howell 2B classification RLE limb ischemia with right fem-pop bypass occlusion s/p arteriogram with redo endarterectomy and bovine pericardial patch angioplasty of the right external iliac, common femoral, superficial femoral, and profundofemoral artery on 2023 with Dr Bentley. Last seen in clinic 10/12/23 for post-op eval: well healing incisions and all pedal pulses obtainable by doppler.  Patient encouraged to continue DAPT and statin therapy and continue structured walking.  Smoking cessation nearly 20 yrs.      Mr. Gusman returns for 6 month post op follow up with arterial duplex. States he is active taking care of outside animals and his property.  C/o lower leg cramping after approximately 300 feet with improves with rest and he continues working.  States black callus that formed prior to surgery 2023 @ distal tip right " great toe and right heel eventually fell off.         Review of Systems:  Review of Systems   Constitutional: Negative for chills, decreased appetite, diaphoresis, fever, malaise/fatigue, night sweats, weight gain and weight loss.   HENT:  Negative for hoarse voice.    Eyes:  Negative for blurred vision, double vision and visual disturbance.   Cardiovascular:  Positive for claudication and dyspnea on exertion. Negative for chest pain, irregular heartbeat, leg swelling, near-syncope, orthopnea, palpitations, paroxysmal nocturnal dyspnea and syncope.   Respiratory:  Positive for shortness of breath. Negative for cough, hemoptysis, sputum production and wheezing.    Hematologic/Lymphatic: Negative for adenopathy and bleeding problem. Does not bruise/bleed easily.   Skin:  Negative for color change, nail changes, poor wound healing and rash.   Musculoskeletal:  Positive for back pain and joint pain. Negative for falls and muscle cramps.   Gastrointestinal:  Negative for abdominal pain, dysphagia and heartburn.   Genitourinary:  Negative for flank pain.   Neurological:  Negative for brief paralysis, disturbances in coordination, dizziness, focal weakness, headaches, light-headedness, loss of balance, numbness, paresthesias, sensory change, vertigo and weakness.   Psychiatric/Behavioral:  Negative for depression and suicidal ideas. The patient is not nervous/anxious.    Allergic/Immunologic: Negative for persistent infections.       I have reviewed the following portions of the patient's history: problem list, current medications, allergies, past surgical history, past medical history, past social history, past family history, and ROS and confirm it's accurate.    Allergies:  No Known Allergies    Medications:      Current Outpatient Medications:     Aspirin Low Dose 81 MG EC tablet, TAKE 1 TABLET DAILY (Patient taking differently: Take 1 tablet by mouth Every Night.), Disp: 90 tablet, Rfl: 3    benzonatate (TESSALON) 100  MG capsule, Take 1 capsule by mouth 3 (Three) Times a Day As Needed for Cough., Disp: , Rfl:     Breo Ellipta 200-25 MCG/INH inhaler, Inhale 1 puff Daily., Disp: , Rfl:     busPIRone (BUSPAR) 15 MG tablet, Take 2 tablets by mouth 2 (two) times a day. 1.5 tabs BID, Disp: , Rfl:     cetirizine (zyrTEC) 10 MG tablet, Take 1 tablet by mouth Daily., Disp: , Rfl:     cholecalciferol (VITAMIN D3) 25 MCG (1000 UT) tablet, Take 1 tablet by mouth Daily., Disp: , Rfl:     clopidogrel (PLAVIX) 75 MG tablet, TAKE 1 TABLET DAILY, Disp: 90 tablet, Rfl: 3    finasteride (PROSCAR) 5 MG tablet, Take 1 tablet by mouth Daily., Disp: , Rfl:     fluticasone (FLONASE) 50 MCG/ACT nasal spray, 2 sprays into the nostril(s) as directed by provider Daily As Needed., Disp: , Rfl:     isosorbide mononitrate (IMDUR) 30 MG 24 hr tablet, TAKE ONE-HALF (1/2) TABLET EVERY NIGHT, Disp: 45 tablet, Rfl: 3    magnesium oxide (MAGOX) 400 (241.3 MG) MG tablet tablet, Take 1 tablet by mouth Daily., Disp: , Rfl:     metoprolol succinate XL (TOPROL-XL) 25 MG 24 hr tablet, Take 1 tablet by mouth Every Morning., Disp: , Rfl:     Mounjaro 2.5 MG/0.5ML solution pen-injector, , Disp: , Rfl:     nitroglycerin (NITROSTAT) 0.4 MG SL tablet, 1 under the tongue as needed for angina, may repeat q5mins for up three doses, Disp: 30 tablet, Rfl: 3    nortriptyline (PAMELOR) 50 MG capsule, Take 1 capsule by mouth Every Night., Disp: , Rfl:     Omega 3 1000 MG capsule, Take 2,000 mg by mouth Daily., Disp: , Rfl:     omeprazole (PriLOSEC) 40 MG capsule, Take 1 capsule by mouth Daily., Disp: , Rfl:     pregabalin (LYRICA) 25 MG capsule, Take 1 capsule by mouth 2 (Two) Times a Day., Disp: , Rfl:     rosuvastatin (CRESTOR) 40 MG tablet, Take 1 tablet by mouth Daily., Disp: 90 tablet, Rfl: 3    tamsulosin (FLOMAX) 0.4 MG capsule 24 hr capsule, Take 1 capsule by mouth Daily., Disp: , Rfl:     traZODone (DESYREL) 50 MG tablet, Take 1.5 tablets by mouth Every Night., Disp: , Rfl:      valsartan (DIOVAN) 160 MG tablet, Take 1 tablet by mouth Daily., Disp: , Rfl:     Vitamin E 180 MG capsule, Take 400 mg by mouth Daily., Disp: , Rfl:     History:   Past Medical History:   Diagnosis Date    Anxiety and depression     Asthma     Clotting disorder     COPD (chronic obstructive pulmonary disease)     Coronary artery disease     COVID-19 vaccine administered 1st - 03/08/201    2nd - 04/07/2021  Monderna ( Booster 12/21/2021 ) Pfizer    Diabetes mellitus     GERD (gastroesophageal reflux disease)     Hyperlipidemia     Hypertension     Kidney failure     Peripheral vascular disease     Sleep apnea     waiting for a new machine 11/2020    Stroke 2019    n o residual       Past Surgical History:   Procedure Laterality Date    ANGIOPLASTY FEMORAL ARTERY Right 9/12/2023    Procedure: Redo endarterectomy and bovine pericardial patch angioplasty of the right external iliac artery, common femoral artery, superficial femoral artery, profunda femoris artery, Right lower extremity arteriogram, Endovascular angioplasty of the right common femoral artery, right superficial femoral artery, and right popliteal artery using [employer] in pact Admiral paclitaxel coated balloon ca    CARDIAC CATHETERIZATION      Stent x1    COLONOSCOPY      FEMORAL POPLITEAL BYPASS Left 11/12/2021    Procedure: FEMORAL POPLITEAL BYPASS LEFT, ABOVE KNEE;  Surgeon: Farhat Mann MD;  Location:  [facility];  Service: Vascular;  Laterality: Left;    FEMORAL POPLITEAL BYPASS Right 02/02/2023    Procedure: FEMORAL POPLITEAL BYPASS, ABOVE KNEE RIGHT;  Surgeon: Farhat Mann MD;  Location:  [facility];  Service: Vascular;  Laterality: Right;    LEG SURGERY Right     Had rods/screws, but were removed when he had Right hip replacement    LUMBAR FUSION      RENAL ARTERY STENT      SPINAL CORD STIMULATOR IMPLANT      TOTAL HIP ARTHROPLASTY Right     VEIN SURGERY         Social History     Socioeconomic History    Marital status:      Number of children: 4   Tobacco Use    Smoking status: Former     Current packs/day: 0.00     Average packs/day: 2.0 packs/day for 20.0 years (40.0 ttl pk-yrs)     Types: Cigarettes     Start date: 3/2/1985     Quit date: 3/2/2005     Years since quittin.1    Smokeless tobacco: Never   Vaping Use    Vaping status: Never Used   Substance and Sexual Activity    Alcohol use: No    Drug use: No    Sexual activity: Not Currently     Partners: Female     Birth control/protection: Surgical        Family History   Problem Relation Age of Onset    Heart failure Mother     Heart disease Father     Heart failure Father     Asthma Sister        Objective   Physical Exam:  Vitals:    24 1028 24 1029   BP: 112/50 110/49   BP Location: Right arm Left arm   Patient Position: Sitting Sitting   Pulse: 80    Temp: 97.3 °F (36.3 °C)    SpO2: 98%    Weight: 78.6 kg (173 lb 3.2 oz)       Body mass index is 27.96 kg/m².    Physical Exam  Vitals reviewed.   Constitutional:       General: He is not in acute distress.     Appearance: He is well-developed and well-groomed.   HENT:      Head: Normocephalic and atraumatic.   Eyes:      General: Lids are normal.      Conjunctiva/sclera: Conjunctivae normal.      Pupils: Pupils are equal, round, and reactive to light.   Cardiovascular:      Rate and Rhythm: Normal rate.      Pulses:           Dorsalis pedis pulses are detected w/ Doppler on the right side and detected w/ Doppler on the left side.        Posterior tibial pulses are detected w/ Doppler on the right side and detected w/ Doppler on the left side.   Pulmonary:      Effort: Pulmonary effort is normal. No respiratory distress.   Musculoskeletal:         General: Normal range of motion.      Cervical back: Normal range of motion and neck supple.      Right lower leg: No edema.      Left lower leg: No edema.   Feet:      Right foot:      Skin integrity: Skin integrity normal.      Toenail Condition: Right toenails are  normal.      Left foot:      Skin integrity: Skin integrity normal.      Toenail Condition: Left toenails are normal.   Skin:     General: Skin is warm and dry.      Capillary Refill: Capillary refill takes less than 2 seconds.   Neurological:      General: No focal deficit present.      Mental Status: He is alert and oriented to person, place, and time.   Psychiatric:         Attention and Perception: Attention normal.         Mood and Affect: Mood normal.         Speech: Speech normal.         Behavior: Behavior is cooperative.         Imaging/Labs:  Arterial duplex bilateral lower extremity 4/5/2024 @ [facility]  Impression: Total occlusion right femoropopliteal graft with extremely elevated velocity in right common femoral artery.  The left femoropopliteal graft is patent.  Sluggish velocities with high resistance monophasic flow and below-knee vessels on the right, consistent with arterial occlusive disease.  Diffuse atherosclerotic disease present throughout bilateral lower extremity arteries.    CT Angio Abdominal Aorta Bilateral Iliofem Runoff (09/05/2023 16:37)   Impression: Multiple areas of hemodynamically significant stenosis and vessel occlusion as detailed above.  Cholelithiasis without cholecystitis.     Duplex Lower Extremity Art / Grafts - Right CAR (08/03/2023 09:20)   1.  Diminished velocities within the dorsalis pedis on the right side.  2.  The right femoral to popliteal graft appears thrombosed or occluded.  The more distal vessel shows monophasic dampened waveform patterns.   This report was finalized on 8/3/2023 9:28 AM by Dr. Gerardo Sebastian MD.     CT Angio Abdominal Aorta Bilateral Iliofem Runoff (07/15/2022 15:59)   1.  Left external iliac artery stent appears patent.  2.  Mild stenosis of bilateral common iliac arteries.  3.  Left SFA graft appears patent. The native left superficial femoral  artery is nearly completely occluded throughout its entirety.  4.  Right proximal superficial femoral,  and mid superficial femoral and  right distal superficial femoral moderate stenosis noted.  5.  Significant stenosis at the level of the left popliteal graft  anastomosis.  6.  Gallstones.  Gallbladder otherwise unremarkable.  7.  Severe bilateral subpleural pulmonary fibrotic change.  8.  Degenerative changes lumbar spine as described.   This report was finalized on 7/15/2022 4:06 PM by Dr. Gerardo Sebastian MD.    Assessment / Plan      Assessment / Plan:  1. PVD (peripheral vascular disease)  -  68 y.o. male former smoker with history of chronic back pain s/p spinal cord stimulator, COPD, ADRIANA with CPAP noncompliance, HTN, HLD on statin therapy, Type2 DM, CAD s/p stent, and PVD.   - S/p left femoral to above-knee popliteal bypass with 8 mm PTFE graft + endarterectomy of the left popliteal artery in 2021 by Dr. Mann followed by right femoral to above-knee popliteal bypass with 8 mm PTFE graft via right common femoral endarterectomy 2/2023 again with Dr. Mann.     - Presented with acute Beadle 2B classification RLE limb ischemia with right fem-pop bypass occlusion s/p arteriogram with redo endarterectomy and bovine pericardial patch angioplasty of the right external iliac, common femoral, superficial femoral, and profundofemoral artery on 9/12/2023 with Dr Bentley.   - Last seen in clinic 10/12/23 for post-op eval: well healing incisions and all pedal pulses obtainable by doppler.    - Returns for 6 month post op follow up exam + arterial duplex: monophasic flow throughout the RLE, occluded right fem-pop graft, and biphasic flow throughout LLE   - Symptomatically improved  - Claudication @ approximately 300 feet with improves with rest and he continues working.    - Discussed arterial duplex results, including monophasic flow throughout RLE  - Bilateral pedal pulses obtainable per doppler  - No LE wounds or ulcerations  - Remote smoking cessation without current use of any nicotine products  - Continue DAPT  and statin  - Discussed imperative need to comply with structured walking to maintain blood flow to lower extremities.      Patient Education: A structured walking regimen is used to improve the circulation or blood flow in your legs. Recognize that walking may hurt at first - and that is good. In fact, the goal is to walk at a pace that causes mild or moderate pain or tightness in your legs. Pace yourself, stop to rest for a few minutes, but then resume walking. This discomfort triggers your body to improve your circulation. Repeat several times: Walk at a pace that causes mild or moderate leg pain, then rest, and keep going. Over time, you may find you are able to walk longer with less pain. That is a sign that your blood vessels are recovering. It may take months, so be patient and persistent.        Follow Up:   Return in about 6 months (around 10/19/2024) for arterial duplex.   Or sooner for any further concerns or worsening sign and symptoms. If unable to reach us in the office please dial 911 or go to the nearest emergency department.      ELIZABETH Sanz  [facility] Cardiothoracic Surgery    Time Spent: I spent 35 minutes caring for Mike on this date of service. This time includes time spent by me in the following activities: preparing for the visit, reviewing tests, obtaining and/or reviewing a separately obtained history, performing a medically appropriate examination and/or evaluation, counseling and educating the patient/family/caregiver, documenting information in the medical record, and care coordination.

## 2024-04-19 ENCOUNTER — OFFICE VISIT (OUTPATIENT)
Dept: CARDIAC SURGERY | Facility: CLINIC | Age: 69
End: 2024-04-19
Payer: MEDICARE

## 2024-04-19 VITALS
TEMPERATURE: 97.3 F | HEART RATE: 80 BPM | DIASTOLIC BLOOD PRESSURE: 49 MMHG | OXYGEN SATURATION: 98 % | BODY MASS INDEX: 27.96 KG/M2 | WEIGHT: 173.2 LBS | SYSTOLIC BLOOD PRESSURE: 110 MMHG

## 2024-04-19 DIAGNOSIS — I73.9 PVD (PERIPHERAL VASCULAR DISEASE): Primary | ICD-10-CM

## 2024-04-19 PROCEDURE — 3074F SYST BP LT 130 MM HG: CPT | Performed by: NURSE PRACTITIONER

## 2024-04-19 PROCEDURE — 1159F MED LIST DOCD IN RCRD: CPT | Performed by: NURSE PRACTITIONER

## 2024-04-19 PROCEDURE — 3078F DIAST BP <80 MM HG: CPT | Performed by: NURSE PRACTITIONER

## 2024-04-19 PROCEDURE — 99214 OFFICE O/P EST MOD 30 MIN: CPT | Performed by: NURSE PRACTITIONER

## 2024-04-19 PROCEDURE — 1160F RVW MEDS BY RX/DR IN RCRD: CPT | Performed by: NURSE PRACTITIONER

## 2024-05-09 DIAGNOSIS — E78.5 HYPERLIPIDEMIA, UNSPECIFIED HYPERLIPIDEMIA TYPE: ICD-10-CM

## 2024-05-09 DIAGNOSIS — I25.119 CORONARY ARTERY DISEASE INVOLVING NATIVE CORONARY ARTERY OF NATIVE HEART WITH ANGINA PECTORIS: ICD-10-CM

## 2024-05-09 RX ORDER — ROSUVASTATIN CALCIUM 40 MG/1
40 TABLET, COATED ORAL DAILY
Qty: 90 TABLET | Refills: 3 | Status: SHIPPED | OUTPATIENT
Start: 2024-05-09

## 2024-08-22 DIAGNOSIS — I25.119 CORONARY ARTERY DISEASE INVOLVING NATIVE CORONARY ARTERY OF NATIVE HEART WITH ANGINA PECTORIS: ICD-10-CM

## 2024-08-23 RX ORDER — CLOPIDOGREL BISULFATE 75 MG/1
TABLET ORAL
Qty: 90 TABLET | Refills: 3 | Status: SHIPPED | OUTPATIENT
Start: 2024-08-23

## 2024-08-30 DIAGNOSIS — I73.9 PVD (PERIPHERAL VASCULAR DISEASE): Primary | ICD-10-CM

## 2024-09-20 LAB
MAXIMAL PREDICTED HEART RATE: 157 BPM
STRESS TARGET HR: 133 BPM

## 2024-10-07 ENCOUNTER — TELEPHONE (OUTPATIENT)
Dept: CARDIAC SURGERY | Facility: CLINIC | Age: 69
End: 2024-10-07

## 2024-10-07 NOTE — TELEPHONE ENCOUNTER
Caller: GERARD    Relationship: Provider    Best call back number: [phone]     What orders are you requesting (i.e. lab or imaging): ARTERIAL LOWER EXTREMITY US     In what timeframe would the patient need to come in: ASAP (PT COMING IN TO [facility] 10.8.24)    Where will you receive your lab/imaging services: [facility]     Additional notes: FAX: [phone]    PLEASE SEND ORDERS IN ASAP.

## 2024-10-10 DIAGNOSIS — I73.9 PVD (PERIPHERAL VASCULAR DISEASE): ICD-10-CM

## 2024-10-22 NOTE — PROGRESS NOTES
[facility] Cardiothoracic Surgery Office Follow Up Note     Date of Encounter: 10/23/2024     Name: Mike Gusman  : 1955     Referred By: No ref. provider found  PCP: Nba Randall MD    Chief Complaint:    Chief Complaint   Patient presents with    Peripheral Vascular Disease     6 month follow up with arterial US for PVD        Subjective      History of Present Illness:    Mike Gusman is a 69 y.o. male former smoker with history of chronic back pain s/p spinal cord stimulator, COPD, ADRIANA with CPAP noncompliance, HTN, HLD on statin therapy, Type2 DM, CAD s/p stent, and PVD s/p left femoral to above-knee popliteal bypass with 8 mm PTFE graft + endarterectomy of the left popliteal artery in  by Dr. Mann followed by right femoral to above-knee popliteal bypass with 8 mm PTFE graft via right common femoral endarterectomy 2023 again with Dr. Mann.        Patient presented with acute Zach 2B classification RLE limb ischemia with right fem-pop bypass occlusion s/p arteriogram with redo endarterectomy and bovine pericardial patch angioplasty of the right external iliac, common femoral, superficial femoral, and profundofemoral artery on 2023 with Dr Bentley.     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  C/o lower leg cramping after approximately 300 feet with improves with rest and he continues working. States distance and time before claudication symptoms develop is stable.   States compliance with ASA, Plavix, and Crestor.  Former smoker.    Review of Systems:  Review of Systems   Constitutional: Negative for chills, decreased appetite, diaphoresis, fever, malaise/fatigue, night sweats, weight gain and weight loss.   HENT:  Negative for hoarse voice.    Eyes:  Negative for blurred vision, double vision and visual disturbance.   Cardiovascular:  Negative for chest pain,  claudication, dyspnea on exertion, irregular heartbeat, leg swelling, near-syncope, orthopnea, palpitations, paroxysmal nocturnal dyspnea and syncope.   Respiratory:  Negative for cough, hemoptysis, shortness of breath, sputum production and wheezing.    Hematologic/Lymphatic: Negative for adenopathy and bleeding problem. Does not bruise/bleed easily.   Skin:  Negative for color change, nail changes, poor wound healing and rash.   Musculoskeletal:  Negative for back pain, falls and muscle cramps.   Gastrointestinal:  Negative for abdominal pain, dysphagia and heartburn.   Genitourinary:  Negative for flank pain.   Neurological:  Positive for numbness (in both legs). Negative for brief paralysis, disturbances in coordination, dizziness, focal weakness, headaches, light-headedness, loss of balance, paresthesias, sensory change, vertigo and weakness.   Psychiatric/Behavioral:  Negative for depression and suicidal ideas.    Allergic/Immunologic: Negative for persistent infections.       I have reviewed the following portions of the patient's history: problem list, current medications, allergies, past surgical history, past medical history, past social history, past family history, and ROS and confirm it's accurate.    Allergies:  No Known Allergies    Medications:      Current Outpatient Medications:     Aspirin Low Dose 81 MG EC tablet, TAKE 1 TABLET DAILY (Patient taking differently: Take 1 tablet by mouth Every Night.), Disp: 90 tablet, Rfl: 3    benzonatate (TESSALON) 100 MG capsule, Take 1 capsule by mouth 3 (Three) Times a Day As Needed for Cough., Disp: , Rfl:     Breo Ellipta 200-25 MCG/INH inhaler, Inhale 1 puff Daily., Disp: , Rfl:     busPIRone (BUSPAR) 15 MG tablet, Take 2 tablets by mouth 2 (two) times a day. 1.5 tabs BID, Disp: , Rfl:     cetirizine (zyrTEC) 10 MG tablet, Take 1 tablet by mouth Daily., Disp: , Rfl:     cholecalciferol (VITAMIN D3) 25 MCG (1000 UT) tablet, Take 1 tablet by mouth Daily.,  Disp: , Rfl:     clopidogrel (PLAVIX) 75 MG tablet, TAKE 1 TABLET DAILY, Disp: 90 tablet, Rfl: 3    finasteride (PROSCAR) 5 MG tablet, Take 1 tablet by mouth Daily., Disp: , Rfl:     fluticasone (FLONASE) 50 MCG/ACT nasal spray, Administer 2 sprays into the nostril(s) as directed by provider Daily As Needed., Disp: , Rfl:     isosorbide mononitrate (IMDUR) 30 MG 24 hr tablet, TAKE ONE-HALF (1/2) TABLET EVERY NIGHT, Disp: 45 tablet, Rfl: 3    magnesium oxide (MAGOX) 400 (241.3 MG) MG tablet tablet, Take 1 tablet by mouth Daily., Disp: , Rfl:     metoprolol succinate XL (TOPROL-XL) 25 MG 24 hr tablet, Take 1 tablet by mouth Every Morning., Disp: , Rfl:     Mounjaro 2.5 MG/0.5ML solution pen-injector, , Disp: , Rfl:     nitroglycerin (NITROSTAT) 0.4 MG SL tablet, 1 under the tongue as needed for angina, may repeat q5mins for up three doses, Disp: 30 tablet, Rfl: 3    nortriptyline (PAMELOR) 50 MG capsule, Take 1 capsule by mouth Every Night., Disp: , Rfl:     Omega 3 1000 MG capsule, Take 2,000 mg by mouth Daily., Disp: , Rfl:     omeprazole (PriLOSEC) 40 MG capsule, Take 1 capsule by mouth Daily., Disp: , Rfl:     pregabalin (LYRICA) 25 MG capsule, Take 1 capsule by mouth 2 (Two) Times a Day., Disp: , Rfl:     rosuvastatin (CRESTOR) 40 MG tablet, TAKE 1 TABLET DAILY, Disp: 90 tablet, Rfl: 3    tamsulosin (FLOMAX) 0.4 MG capsule 24 hr capsule, Take 1 capsule by mouth Daily., Disp: , Rfl:     traZODone (DESYREL) 50 MG tablet, Take 1.5 tablets by mouth Every Night., Disp: , Rfl:     valsartan (DIOVAN) 160 MG tablet, Take 1 tablet by mouth Daily., Disp: , Rfl:     Vitamin E 180 MG capsule, Take 400 mg by mouth Daily., Disp: , Rfl:     History:   Past Medical History:   Diagnosis Date    Anxiety and depression     Asthma     Clotting disorder     COPD (chronic obstructive pulmonary disease)     Coronary artery disease     COVID-19 vaccine administered 1st - 03/08/201    2nd - 04/07/2021  Edelmirana ( Booster 12/21/2021 )  [employer]    Diabetes mellitus     GERD (gastroesophageal reflux disease)     Hyperlipidemia     Hypertension     Kidney failure     Peripheral vascular disease     Sleep apnea     waiting for a new machine 2020    Stroke 2019    n o residual       Past Surgical History:   Procedure Laterality Date    ANGIOPLASTY FEMORAL ARTERY Right 2023    Procedure: Redo endarterectomy and bovine pericardial patch angioplasty of the right external iliac artery, common femoral artery, superficial femoral artery, profunda femoris artery, Right lower extremity arteriogram, Endovascular angioplasty of the right common femoral artery, right superficial femoral artery, and right popliteal artery using [employer] in pact Admiral paclitaxel coated balloon ca    CARDIAC CATHETERIZATION      Stent x1    COLONOSCOPY      FEMORAL POPLITEAL BYPASS Left 2021    Procedure: FEMORAL POPLITEAL BYPASS LEFT, ABOVE KNEE;  Surgeon: Farhat Mann MD;  Location:  [facility];  Service: Vascular;  Laterality: Left;    FEMORAL POPLITEAL BYPASS Right 2023    Procedure: FEMORAL POPLITEAL BYPASS, ABOVE KNEE RIGHT;  Surgeon: Farhat Mann MD;  Location:  [facility];  Service: Vascular;  Laterality: Right;    LEG SURGERY Right     Had rods/screws, but were removed when he had Right hip replacement    LUMBAR FUSION      RENAL ARTERY STENT      SPINAL CORD STIMULATOR IMPLANT      TOTAL HIP ARTHROPLASTY Right     VEIN SURGERY         Social History     Socioeconomic History    Marital status:     Number of children: 4   Tobacco Use    Smoking status: Former     Current packs/day: 0.00     Average packs/day: 2.0 packs/day for 20.0 years (40.0 ttl pk-yrs)     Types: Cigarettes     Start date: 3/2/1985     Quit date: 3/2/2005     Years since quittin.6    Smokeless tobacco: Never   Vaping Use    Vaping status: Never Used   Substance and Sexual Activity    Alcohol use: No    Drug use: No    Sexual activity: Not Currently      "Partners: Female     Birth control/protection: Surgical        Family History   Problem Relation Age of Onset    Heart failure Mother     Heart disease Father     Heart failure Father     Asthma Sister        Objective   Physical Exam:  Vitals:    10/23/24 1031   BP: 112/68   BP Location: Right arm   Patient Position: Sitting   Pulse: 78   Temp: 97.1 °F (36.2 °C)   SpO2: 98%   Weight: 70.4 kg (155 lb 3.2 oz)   Height: 167.6 cm (66\")      Body mass index is 25.05 kg/m².    Physical Exam  Vitals reviewed.   Constitutional:       General: He is not in acute distress.     Appearance: He is well-developed and well-groomed. He is not toxic-appearing.      Comments: Ambulates w/ cane   HENT:      Head: Normocephalic and atraumatic.   Eyes:      General: Lids are normal.      Conjunctiva/sclera: Conjunctivae normal.      Pupils: Pupils are equal, round, and reactive to light.   Cardiovascular:      Rate and Rhythm: Normal rate and regular rhythm.      Pulses:           Dorsalis pedis pulses are detected w/ Doppler on the right side and detected w/ Doppler on the left side.        Posterior tibial pulses are detected w/ Doppler on the right side and detected w/ Doppler on the left side.      Heart sounds: S1 normal and S2 normal. No murmur heard.  Pulmonary:      Effort: Pulmonary effort is normal. No respiratory distress.      Breath sounds: Normal breath sounds.   Musculoskeletal:         General: Normal range of motion.      Cervical back: Normal range of motion and neck supple.      Right lower leg: No edema.      Left lower leg: No edema.   Feet:      Right foot:      Skin integrity: Skin integrity normal. No ulcer.      Toenail Condition: Right toenails are normal.      Left foot:      Skin integrity: Skin integrity normal. No ulcer.      Toenail Condition: Left toenails are normal.   Skin:     General: Skin is warm and dry.      Capillary Refill: Capillary refill takes less than 2 seconds.   Neurological:      " General: No focal deficit present.      Mental Status: He is alert and oriented to person, place, and time.   Psychiatric:         Attention and Perception: Attention normal.         Mood and Affect: Mood normal.         Speech: Speech normal.         Behavior: Behavior is cooperative.         Imaging/Labs:  Arterial duplex bilateral lower extremity 10/8/2024 [facility]  Findings:  Right CFA 3 3 9 cm/s, biphasic  Right SFA proximal 113, mid 91, distal 132 cm/s, monophasic  Right profunda 87 cm/s, biphasic  Right popliteal 56 cm/s, monophasic  Right PTA 27 cm/s monophasic  Right MAYURI 17 cm/s monophasic, right DPA 11 cm/s, monophasic  Occluded right femoropopliteal graft    Impression:  1.  On the right, occluded femoropopliteal graft.  Extensive hemodynamically significant disease.  2.  On the left patent femoropopliteal graft.  No hemodynamically significant lower extremity arterial disease      Arterial duplex bilateral lower extremity 4/5/2024 @ [facility]  Impression: Total occlusion right femoropopliteal graft with extremely elevated velocity in right common femoral artery.  The left femoropopliteal graft is patent.  Sluggish velocities with high resistance monophasic flow and below-knee vessels on the right, consistent with arterial occlusive disease.  Diffuse atherosclerotic disease present throughout bilateral lower extremity arteries.    CT Angio Abdominal Aorta Bilateral Iliofem Runoff (09/05/2023 16:37)   Impression: Multiple areas of hemodynamically significant stenosis and vessel occlusion as detailed above.  Cholelithiasis without cholecystitis.     Duplex Lower Extremity Art / Grafts - Right CAR (08/03/2023 09:20)   1.  Diminished velocities within the dorsalis pedis on the right side.  2.  The right femoral to popliteal graft appears thrombosed or occluded.  The more distal vessel shows monophasic dampened waveform patterns.   This report was finalized on 8/3/2023 9:28 AM by Dr. Gerardo Sebastian MD.     CT Angio  Abdominal Aorta Bilateral Iliofem Runoff (07/15/2022 15:59)   1.  Left external iliac artery stent appears patent.  2.  Mild stenosis of bilateral common iliac arteries.  3.  Left SFA graft appears patent. The native left superficial femoral  artery is nearly completely occluded throughout its entirety.  4.  Right proximal superficial femoral, and mid superficial femoral and  right distal superficial femoral moderate stenosis noted.  5.  Significant stenosis at the level of the left popliteal graft  anastomosis.  6.  Gallstones.  Gallbladder otherwise unremarkable.  7.  Severe bilateral subpleural pulmonary fibrotic change.  8.  Degenerative changes lumbar spine as described.   This report was finalized on 7/15/2022 4:06 PM by Dr. Gerardo Sebastian MD.    Assessment / Plan      Assessment / Plan:  1. PVD (peripheral vascular disease)   - 69 y.o. male former smoker with history of chronic back pain s/p spinal cord stimulator, COPD, ADRIANA with CPAP noncompliance, HTN, HLD on statin therapy, Type2 DM, CAD s/p stent, and PVD s/p left femoral to above-knee popliteal bypass with 8 mm PTFE graft + endarterectomy of the left popliteal artery in 2021 by Dr. Mann followed by right femoral to above-knee popliteal bypass with 8 mm PTFE graft via right common femoral endarterectomy 2/2023 again with Dr. Mann.     - Presented with acute RLE limb ischemia due to right fem-pop bypass occlusion s/p arteriogram with redo endarterectomy and bovine pericardial patch angioplasty of the right external iliac, common femoral, superficial femoral, and profundofemoral artery on 9/12/2023 with Dr Bentley.   - Follows up with 6 month arterial duplex and PVD exam: biphasic arterial doppler signal in CFA and profunda, monophasic signal in all other arteries.  Arterial velocities are stable.    - Stable functional claudication  - no wounds or LE ulcerations  -  States compliance with ASA, Plavix, and Crestor.    - Former smoker.   - Reminded  patient to contact [facility] should acute limb ischemia symptoms develop again  - Continue to be as active as possible  - Will plan to continue close follow up and surveillance w/ arterial duplex again in 6 months.      Follow Up:   Return in about 6 months (around 4/23/2025) for Arterial duplex.   Or sooner for any further concerns or worsening sign and symptoms. If unable to reach us in the office please dial 911 or go to the nearest emergency department.      ELIZABETH Sanz  [facility]    Time Spent: I spent 28 minutes caring for Mike on this date of service. This time includes time spent by me in the following activities: preparing for the visit, reviewing tests, obtaining and/or reviewing a separately obtained history, performing a medically appropriate examination and/or evaluation, counseling and educating the patient/family/caregiver, documenting information in the medical record, independently interpreting results and communicating that information with the patient/family/caregiver, and care coordination.

## 2024-10-23 ENCOUNTER — OFFICE VISIT (OUTPATIENT)
Dept: CARDIAC SURGERY | Facility: CLINIC | Age: 69
End: 2024-10-23
Payer: MEDICARE

## 2024-10-23 VITALS
BODY MASS INDEX: 24.94 KG/M2 | HEIGHT: 66 IN | SYSTOLIC BLOOD PRESSURE: 112 MMHG | TEMPERATURE: 97.1 F | WEIGHT: 155.2 LBS | HEART RATE: 78 BPM | OXYGEN SATURATION: 98 % | DIASTOLIC BLOOD PRESSURE: 68 MMHG

## 2024-10-23 DIAGNOSIS — I73.9 PVD (PERIPHERAL VASCULAR DISEASE): Primary | ICD-10-CM

## 2024-10-23 PROCEDURE — 3074F SYST BP LT 130 MM HG: CPT | Performed by: NURSE PRACTITIONER

## 2024-10-23 PROCEDURE — 3078F DIAST BP <80 MM HG: CPT | Performed by: NURSE PRACTITIONER

## 2024-10-23 PROCEDURE — 99213 OFFICE O/P EST LOW 20 MIN: CPT | Performed by: NURSE PRACTITIONER

## 2024-10-25 ENCOUNTER — TELEPHONE (OUTPATIENT)
Dept: CARDIOLOGY | Facility: CLINIC | Age: 69
End: 2024-10-25
Payer: MEDICARE

## 2024-10-25 NOTE — TELEPHONE ENCOUNTER
Relay    Calling to remind you of your appointment with Jean-Pierre Adler 10/28 @ 11AM. Please bring insurance cards, ID and medications in there original bottles

## 2024-10-28 ENCOUNTER — OFFICE VISIT (OUTPATIENT)
Dept: CARDIOLOGY | Facility: CLINIC | Age: 69
End: 2024-10-28
Payer: MEDICARE

## 2024-10-28 VITALS
WEIGHT: 158 LBS | DIASTOLIC BLOOD PRESSURE: 54 MMHG | BODY MASS INDEX: 25.39 KG/M2 | HEIGHT: 66 IN | OXYGEN SATURATION: 96 % | HEART RATE: 73 BPM | SYSTOLIC BLOOD PRESSURE: 106 MMHG

## 2024-10-28 DIAGNOSIS — I73.9 PVD (PERIPHERAL VASCULAR DISEASE): ICD-10-CM

## 2024-10-28 DIAGNOSIS — I25.10 CORONARY ARTERY DISEASE INVOLVING NATIVE CORONARY ARTERY OF NATIVE HEART WITHOUT ANGINA PECTORIS: Primary | ICD-10-CM

## 2024-10-28 DIAGNOSIS — R06.09 DYSPNEA ON EXERTION: ICD-10-CM

## 2024-10-28 PROCEDURE — 3074F SYST BP LT 130 MM HG: CPT | Performed by: PHYSICIAN ASSISTANT

## 2024-10-28 PROCEDURE — 3078F DIAST BP <80 MM HG: CPT | Performed by: PHYSICIAN ASSISTANT

## 2024-10-28 PROCEDURE — 1159F MED LIST DOCD IN RCRD: CPT | Performed by: PHYSICIAN ASSISTANT

## 2024-10-28 PROCEDURE — 1160F RVW MEDS BY RX/DR IN RCRD: CPT | Performed by: PHYSICIAN ASSISTANT

## 2024-10-28 PROCEDURE — 99213 OFFICE O/P EST LOW 20 MIN: CPT | Performed by: PHYSICIAN ASSISTANT

## 2024-10-28 NOTE — PROGRESS NOTES
Problem list     Subjective   Mike Gusman is a 69 y.o. male     Chief Complaint   Patient presents with    Follow-up     8 month follow up     Shortness of Breath   Problem List:     1. CAD stent x1 somewhere between 2004 and 2006 in [place]  1.1 CT chest 8/5/19-severe atherosclerotic plaque formation throughout the coronary arteries, aorta has mild atherosclerotic plaque formation without aneurysmal dilation, interstitial fibrosis with underlying emphysema, fatty liver  1.2 left heart cath 10/22/19- obtuse marginal small-caliber 70% proximal narrowing, obtuse marginal terminal 50 to 70%, small limb of the LAD 50 to 70%, EF 60%, LVEDP 22-24  1.3 left heart cath 8/5/2021-left main minor ostial distal tapering, 90% or greater stenosis in the ostium and proximal portion of the circumflex, which received a stent.  Right coronary artery no high-grade stenosis, LAD 20 to 40% stenosis, EF 55%, LVEDP 18  2. Hypertension  3. Hyperlipidemia   4. Viral Pericarditis 2009  5. Aortic valve disorder  6. Carotid Artery Disease   6.1 Carotid artery ultrasound 6/1/2021-16 to 49% stenosis in the right that is calcified but actually by laterally at the bifurcations, 16 to 49% stenosis in the mid right internal carotid artery and throughout the left her carotid artery, antegrade flow both vertebral arteries  7. PVD   7.1 H/O left common iliac artery stenting   7.2 CT of abdomen with runoff 6/24/2021-common femoral artery had up to 50% stenosis, three-vessel runoff to the ankle, extremely diminutive distal anterior tibial artery and DPA, severe left-sided SMA disease with multilevel severe stenosis and extensive plaque  7.3  Left femoropopliteal 11/12/2021 Dr. Mann  7.4  Right femoropopliteal bypass, 2/2023.  This was performed by Dr. Mann as well.  7.5  Redo endarterectomy and bovine pericardial patch angioplasty to the right external iliac, common femoral, superficial femoral, and distally otherwise, 9/2023 with  Dr. Sanchez.  Procedure was performed because of occluded right femoropopliteal bypass.  8. Shortness of breath  8.1 echo 9/3/19 -diastolic dysfunction 2, mild to moderate left atrial enlargement, mild to moderate MR  8.2 Echo 9/5/2021-EF 55 to 60%, diastolic filling pattern indicates impaired laxation, mild aortic stenosis with a VTI of 1.9 cm², mild to moderate MR  9. H/O back surgery; per report   10. DM II  11. GERD  12. BPH   13. Brain Bleed 11/2011 s/p fall   14. ADRIANA - CPAP   15. Emphysema/Asthma   16. PSVT  16.1 Event Monitor 5/9-5/22/17 - NSR - ST with one episode of PSVT  17. Moderate periventricular and subcortical microangiopathy  17.1 MRI of the brain/MRI of orbitis 5/16/18-moderate.  Moderate periventricular and subcortical microangiopathic  18.  Emphysema/COPD       HPI    The patient presents in the clinic today for routine evaluation and follow-up.  For the most part, the patient is done fairly well.  He reports no current angina.  Dyspnea and fatigue are at baseline.  He has no failure nor dysrhythmic issues.  He did see his CT surgical team recently who reportedly did a lower extremity arterial duplex.  By his report this suggested stable parameters.  We did a carotid duplex after his last appointment which indicated nonobstructive disease and antegrade flow in bilateral vertebral arteries.  The patient has no further complaints and feels that he is doing well.  Current Outpatient Medications on File Prior to Visit   Medication Sig Dispense Refill    Aspirin Low Dose 81 MG EC tablet TAKE 1 TABLET DAILY (Patient taking differently: Take 1 tablet by mouth Every Night.) 90 tablet 3    benzonatate (TESSALON) 100 MG capsule Take 1 capsule by mouth 3 (Three) Times a Day As Needed for Cough.      Breo Ellipta 200-25 MCG/INH inhaler Inhale 1 puff Daily.      busPIRone (BUSPAR) 15 MG tablet Take  by mouth 2 (two) times a day. Takes 1 tab in am and 2 in pm      cetirizine (zyrTEC) 10 MG tablet Take 1 tablet by  mouth Daily.      cholecalciferol (VITAMIN D3) 25 MCG (1000 UT) tablet Take 1 tablet by mouth Daily.      clopidogrel (PLAVIX) 75 MG tablet TAKE 1 TABLET DAILY 90 tablet 3    finasteride (PROSCAR) 5 MG tablet Take 1 tablet by mouth Daily.      fluticasone (FLONASE) 50 MCG/ACT nasal spray Administer 2 sprays into the nostril(s) as directed by provider Daily As Needed.      isosorbide mononitrate (IMDUR) 30 MG 24 hr tablet TAKE ONE-HALF (1/2) TABLET EVERY NIGHT 45 tablet 3    magnesium oxide (MAGOX) 400 (241.3 MG) MG tablet tablet Take 1 tablet by mouth Daily.      metoprolol succinate XL (TOPROL-XL) 25 MG 24 hr tablet Take 1 tablet by mouth Every Morning.      Mounjaro 2.5 MG/0.5ML solution pen-injector       nitroglycerin (NITROSTAT) 0.4 MG SL tablet 1 under the tongue as needed for angina, may repeat q5mins for up three doses 30 tablet 3    nortriptyline (PAMELOR) 50 MG capsule Take 1 capsule by mouth Every Night.      Omega 3 1000 MG capsule Take 2,000 mg by mouth Daily.      omeprazole (PriLOSEC) 40 MG capsule Take 1 capsule by mouth Daily.      pregabalin (LYRICA) 25 MG capsule Take 1 capsule by mouth 2 (Two) Times a Day.      rosuvastatin (CRESTOR) 40 MG tablet TAKE 1 TABLET DAILY 90 tablet 3    tamsulosin (FLOMAX) 0.4 MG capsule 24 hr capsule Take 1 capsule by mouth Daily.      traZODone (DESYREL) 50 MG tablet Take 1.5 tablets by mouth Every Night.      valsartan (DIOVAN) 160 MG tablet Take 1 tablet by mouth Daily.      Vitamin E 180 MG capsule Take 400 mg by mouth Daily.       No current facility-administered medications on file prior to visit.       Patient has no known allergies.    Past Medical History:   Diagnosis Date    Anxiety and depression     Asthma     Clotting disorder     COPD (chronic obstructive pulmonary disease)     Coronary artery disease     COVID-19 vaccine administered 1st - 03/08/201    2nd - 04/07/2021  Ana Luisa ( Booster 12/21/2021 ) [employer]    Diabetes mellitus     GERD  (gastroesophageal reflux disease)     Hyperlipidemia     Hypertension     Kidney failure     Peripheral vascular disease     Sleep apnea     waiting for a new machine 2020    Stroke 2019    n o residual       Social History     Socioeconomic History    Marital status:     Number of children: 4   Tobacco Use    Smoking status: Former     Current packs/day: 0.00     Average packs/day: 2.0 packs/day for 20.0 years (40.0 ttl pk-yrs)     Types: Cigarettes     Start date: 3/2/1985     Quit date: 3/2/2005     Years since quittin.6    Smokeless tobacco: Never   Vaping Use    Vaping status: Never Used   Substance and Sexual Activity    Alcohol use: No    Drug use: No    Sexual activity: Not Currently     Partners: Female     Birth control/protection: Surgical       Family History   Problem Relation Age of Onset    Heart failure Mother     Heart disease Father     Heart failure Father     Asthma Sister        Review of Systems   Constitutional:  Positive for fatigue. Negative for chills, diaphoresis and fever.   HENT: Negative.     Eyes: Negative.  Negative for visual disturbance.   Respiratory:  Positive for apnea and shortness of breath. Negative for cough, chest tightness and wheezing.    Cardiovascular: Negative.  Negative for chest pain, palpitations and leg swelling.   Gastrointestinal: Negative.  Negative for abdominal pain and blood in stool.   Endocrine: Negative.    Genitourinary: Negative.  Negative for hematuria.   Musculoskeletal:  Positive for arthralgias, back pain, myalgias, neck pain and neck stiffness.   Skin: Negative.  Negative for rash and wound.   Allergic/Immunologic: Negative.  Negative for environmental allergies and food allergies.   Neurological:  Negative for dizziness, syncope, weakness, light-headedness, numbness and headaches.   Hematological:  Bruises/bleeds easily (on aspirin).   Psychiatric/Behavioral:  Positive for sleep disturbance (sleep apnea).        Objective   Vitals:     "10/28/24 1129   BP: 106/54   Pulse: 73   SpO2: 96%   Weight: 71.7 kg (158 lb)   Height: 167.6 cm (66\")      /54   Pulse 73   Ht 167.6 cm (66\")   Wt 71.7 kg (158 lb)   SpO2 96%   BMI 25.50 kg/m²    Lab Results (most recent)       None          Physical Exam  Vitals and nursing note reviewed.   Constitutional:       General: He is not in acute distress.     Appearance: He is well-developed.   HENT:      Head: Normocephalic and atraumatic.   Eyes:      Conjunctiva/sclera: Conjunctivae normal.      Pupils: Pupils are equal, round, and reactive to light.   Neck:      Vascular: No JVD.      Trachea: No tracheal deviation.   Cardiovascular:      Rate and Rhythm: Normal rate and regular rhythm.      Heart sounds: Normal heart sounds.      Comments: 2/6 systolic murmur noted basically across the precordium.  Pulmonary:      Effort: Pulmonary effort is normal.      Breath sounds: Normal breath sounds.   Abdominal:      General: Bowel sounds are normal. There is no distension.      Palpations: Abdomen is soft. There is no mass.      Tenderness: There is no abdominal tenderness. There is no guarding or rebound.   Musculoskeletal:         General: No tenderness or deformity. Normal range of motion.      Cervical back: Normal range of motion and neck supple.      Right lower le+ Edema present.      Left lower le+ Edema present.   Skin:     General: Skin is warm and dry.      Coloration: Skin is not pale.      Findings: No erythema or rash.   Neurological:      Mental Status: He is alert and oriented to person, place, and time.   Psychiatric:         Behavior: Behavior normal.         Thought Content: Thought content normal.         Judgment: Judgment normal.           Procedure   Procedures       Assessment & Plan      Diagnosis Plan   1. Coronary artery disease involving native coronary artery of native heart without angina pectoris        2. PVD (peripheral vascular disease)        3. Dyspnea on exertion      "       1.  At this time, the patient appears to be doing well.  He is asymptomatic of coronary artery disease or peripheral vascular disease.  Dyspnea is at baseline.  He has no further cardiovascular symptoms or complaints.  He feels that he is doing well.    2.  I feel he is on appropriate medical regimen and we will make no adjustments.    3.  We will continue to see him on 6 to 9-month intervals, sooner for complications.         Advance Care Planning   ACP discussion was held with the patient during this visit. Patient does not have an advance directive, declines further assistance.           Electronically signed by:

## 2025-02-26 DIAGNOSIS — I73.9 PVD (PERIPHERAL VASCULAR DISEASE) WITH CLAUDICATION: Primary | ICD-10-CM

## 2025-04-03 DIAGNOSIS — R07.2 PRECORDIAL PAIN: ICD-10-CM

## 2025-04-03 RX ORDER — ISOSORBIDE MONONITRATE 30 MG/1
15 TABLET, EXTENDED RELEASE ORAL NIGHTLY
Qty: 45 TABLET | Refills: 3 | Status: SHIPPED | OUTPATIENT
Start: 2025-04-03

## 2025-04-21 NOTE — PLAN OF CARE
[facility]  DR WILTON HERRERA     The patient (or guardian, if applicable) and other individuals in attendance with the patient were advised that Artificial Intelligence will be utilized during this visit to record, process the conversation to generate a clinical note, and support improvement of the AI technology. The patient (or guardian, if applicable) and other individuals in attendance at the appointment consented to the use of AI, including the recording.        Mychal Jefferson is a 66 y.o. male  has a past medical history of Diabetes (HCC), DJD (degenerative joint disease), GERD (gastroesophageal reflux disease), HTN (hypertension), Postherpetic neuralgia, and Shingles.     ASSESSMENT AND PLAN:       Type 2 diabetes Mellitus, well controlled   Complications: Periph neuropathy  History of SANDRA    Discussed the details of diabetes mellitus including pathophysiology and diabetes care and importance of achieving target blood sugar numbers for a goal a1c of less than 7%    Abdominal ultrasound in 2022 showed likely MASANCA, he doing well with his lifestyle modifications    - Fasting blood glucose levels fluctuating between 120 and 130, slightly elevated  - Cholesterol levels within normal limits  - Discontinue metformin and Jardiance, start Synjardy 5/1000 mg twice daily with meals  - Initiate prior authorization for Synjardy  - Continue current lifestyle modifications (physical activity, dietary changes)  - Monitor blood glucose levels regularly, report significant changes  - Inform clinic if issues with cost or availability of Synjardy to consider reverting to previous regimen    Medications:    Stop metformin and Jardiance  START Synjardy 5-1000 mg twice daily with meals    Check blood sugar 1 times per day    Discussed hypoglycemia management  Annual Ophthalm, Regular foot self checks and regular dental care  Discussed important lifestyle aspects and importance of staying  Goal Outcome Evaluation:   Pt is AxO x4. Pt's right fem site soft and without discoloration or hematoma. Pt started to have SBP> 120. Cardene was started, but turned off due to bp not tolerating. Pt has continued to have soft bps and PA was notified. No new orders were placed. Pulses are able to be found with doppler. Pt has fluids running per order. Pt is resting with mild complaints of pain, but unable to give pain medications due to bp. Pt was educated effects of pain meds on bp. No concerns at this time and call light within reach.

## 2025-04-22 DIAGNOSIS — I73.9 PVD (PERIPHERAL VASCULAR DISEASE) WITH CLAUDICATION: ICD-10-CM

## 2025-05-15 DIAGNOSIS — I25.119 CORONARY ARTERY DISEASE INVOLVING NATIVE CORONARY ARTERY OF NATIVE HEART WITH ANGINA PECTORIS: ICD-10-CM

## 2025-05-15 DIAGNOSIS — E78.5 HYPERLIPIDEMIA, UNSPECIFIED HYPERLIPIDEMIA TYPE: ICD-10-CM

## 2025-05-15 RX ORDER — ROSUVASTATIN CALCIUM 40 MG/1
40 TABLET, COATED ORAL DAILY
Qty: 90 TABLET | Refills: 3 | Status: SHIPPED | OUTPATIENT
Start: 2025-05-15

## 2025-05-19 NOTE — PROGRESS NOTES
[facility] Follow Up Note     Date of Encounter: 2025     Name: Mike Gusman  : 1955     Referred By: No ref. provider found  PCP: Nba Randall MD    Chief Complaint:    Chief Complaint   Patient presents with    Peripheral Vascular Disease     6 month follow up with arterial duplex - Hx of left fem pop bypass on 21, right fem pop bypass on 23, and redo endarterectomy RLE on 23       Subjective      History of Present Illness:    Mike Gusman is a 70 y.o. male  former smoker with history of chronic back pain s/p spinal cord stimulator, COPD, ADRIANA with CPAP noncompliance, HTN, HLD on statin therapy, Type2 DM, CAD s/p stent, and PVD s/p left femoral to above-knee popliteal bypass with 8 mm PTFE graft + endarterectomy of the left popliteal artery in  by Dr. Mann followed by right femoral to above-knee popliteal bypass with 8 mm PTFE graft via right common femoral endarterectomy 2023 again with Dr. Mann.        Presented w/ RLE limb ischemia 2023 with right fem-pop bypass occlusion s/p arteriogram with redo endarterectomy and bovine pericardial patch angioplasty of the right external iliac, common femoral, superficial femoral, and profundofemoral artery on 2023 with Dr Bentley.      Follows up with 6 month arterial duplex and PVD exam.  No lower extremity wounds or ulcerations.  Minimal reports of claudication as his breathing limits him walking long distance.      Review of Systems:  Review of Systems   Constitutional: Negative for chills, decreased appetite, diaphoresis, fever, malaise/fatigue, night sweats, weight gain and weight loss.   HENT:  Negative for hoarse voice.    Eyes:  Negative for blurred vision, double vision and visual disturbance.   Cardiovascular:  Negative for chest pain, claudication, dyspnea on exertion, irregular heartbeat, leg swelling, near-syncope, orthopnea, palpitations, paroxysmal  nocturnal dyspnea and syncope.   Respiratory:  Negative for cough, hemoptysis, shortness of breath, sputum production and wheezing.    Hematologic/Lymphatic: Negative for adenopathy and bleeding problem. Does not bruise/bleed easily.   Skin:  Negative for color change, nail changes, poor wound healing and rash.   Musculoskeletal:  Negative for back pain, falls and muscle cramps.   Gastrointestinal:  Negative for abdominal pain, dysphagia and heartburn.   Genitourinary:  Negative for flank pain.   Neurological:  Negative for brief paralysis, disturbances in coordination, dizziness, focal weakness, headaches, light-headedness, loss of balance, numbness, paresthesias, sensory change, vertigo and weakness.   Psychiatric/Behavioral:  Negative for depression and suicidal ideas.    Allergic/Immunologic: Negative for persistent infections.       I have reviewed the following portions of the patient's history: problem list, current medications, allergies, past surgical history, past medical history, past social history, past family history, and ROS and confirm it's accurate.    Allergies:  No Known Allergies    Medications:      Current Outpatient Medications:     Aspirin Low Dose 81 MG EC tablet, TAKE 1 TABLET DAILY (Patient taking differently: Take 1 tablet by mouth Every Night.), Disp: 90 tablet, Rfl: 3    benzonatate (TESSALON) 100 MG capsule, Take 1 capsule by mouth 3 (Three) Times a Day As Needed for Cough., Disp: , Rfl:     Breo Ellipta 200-25 MCG/INH inhaler, Inhale 1 puff Daily., Disp: , Rfl:     busPIRone (BUSPAR) 15 MG tablet, Take  by mouth 2 (two) times a day. Takes 1 tab in am and 2 in pm, Disp: , Rfl:     cetirizine (zyrTEC) 10 MG tablet, Take 1 tablet by mouth Daily., Disp: , Rfl:     cholecalciferol (VITAMIN D3) 25 MCG (1000 UT) tablet, Take 1 tablet by mouth Daily., Disp: , Rfl:     clopidogrel (PLAVIX) 75 MG tablet, TAKE 1 TABLET DAILY, Disp: 90 tablet, Rfl: 3    finasteride (PROSCAR) 5 MG tablet, Take 1  tablet by mouth Daily., Disp: , Rfl:     fluticasone (FLONASE) 50 MCG/ACT nasal spray, Administer 2 sprays into the nostril(s) as directed by provider Daily As Needed., Disp: , Rfl:     isosorbide mononitrate (IMDUR) 30 MG 24 hr tablet, TAKE ONE-HALF (1/2) TABLET EVERY NIGHT, Disp: 45 tablet, Rfl: 3    magnesium oxide (MAGOX) 400 (241.3 MG) MG tablet tablet, Take 1 tablet by mouth Daily., Disp: , Rfl:     metoprolol succinate XL (TOPROL-XL) 25 MG 24 hr tablet, Take 1 tablet by mouth Every Morning., Disp: , Rfl:     nitroglycerin (NITROSTAT) 0.4 MG SL tablet, 1 under the tongue as needed for angina, may repeat q5mins for up three doses, Disp: 30 tablet, Rfl: 3    nortriptyline (PAMELOR) 50 MG capsule, Take 1 capsule by mouth Every Night., Disp: , Rfl:     Omega 3 1000 MG capsule, Take 2,000 mg by mouth Daily., Disp: , Rfl:     omeprazole (PriLOSEC) 40 MG capsule, Take 1 capsule by mouth Daily., Disp: , Rfl:     pregabalin (LYRICA) 25 MG capsule, Take 1 capsule by mouth 2 (Two) Times a Day., Disp: , Rfl:     rosuvastatin (CRESTOR) 40 MG tablet, TAKE 1 TABLET DAILY, Disp: 90 tablet, Rfl: 3    tamsulosin (FLOMAX) 0.4 MG capsule 24 hr capsule, Take 1 capsule by mouth Daily., Disp: , Rfl:     traZODone (DESYREL) 50 MG tablet, Take 1.5 tablets by mouth Every Night., Disp: , Rfl:     valsartan (DIOVAN) 160 MG tablet, Take 1 tablet by mouth Daily., Disp: , Rfl:     Vitamin E 180 MG capsule, Take 400 mg by mouth Daily., Disp: , Rfl:     Mounjaro 2.5 MG/0.5ML solution pen-injector, , Disp: , Rfl:     History:   Past Medical History:   Diagnosis Date    Anxiety and depression     Asthma     Clotting disorder     COPD (chronic obstructive pulmonary disease)     Coronary artery disease     COVID-19 vaccine administered 1st - 03/08/201 2nd - 04/07/2021  Ana Luisa ( Booster 12/21/2021 ) Pfizer    Diabetes mellitus     GERD (gastroesophageal reflux disease)     Hyperlipidemia     Hypertension     Kidney failure     Peripheral  vascular disease     Sleep apnea     waiting for a new machine 2020    Stroke 2019    n o residual       Past Surgical History:   Procedure Laterality Date    ANGIOPLASTY FEMORAL ARTERY Right 2023    Procedure: Redo endarterectomy and bovine pericardial patch angioplasty of the right external iliac artery, common femoral artery, superficial femoral artery, profunda femoris artery, Right lower extremity arteriogram, Endovascular angioplasty of the right common femoral artery, right superficial femoral artery, and right popliteal artery using [employer] in pact Admiral paclitaxel coated balloon ca    CARDIAC CATHETERIZATION      Stent x1    COLONOSCOPY      FEMORAL POPLITEAL BYPASS Left 2021    Procedure: FEMORAL POPLITEAL BYPASS LEFT, ABOVE KNEE;  Surgeon: Farhat Mann MD;  Location:  [facility];  Service: Vascular;  Laterality: Left;    FEMORAL POPLITEAL BYPASS Right 2023    Procedure: FEMORAL POPLITEAL BYPASS, ABOVE KNEE RIGHT;  Surgeon: Farhat Mann MD;  Location:  [facility];  Service: Vascular;  Laterality: Right;    LEG SURGERY Right     Had rods/screws, but were removed when he had Right hip replacement    LUMBAR FUSION      RENAL ARTERY STENT      SPINAL CORD STIMULATOR IMPLANT      TOTAL HIP ARTHROPLASTY Right     VEIN SURGERY         Social History     Socioeconomic History    Marital status:     Number of children: 4   Tobacco Use    Smoking status: Former     Current packs/day: 0.00     Average packs/day: 2.0 packs/day for 20.0 years (40.0 ttl pk-yrs)     Types: Cigarettes     Start date: 3/2/1985     Quit date: 3/2/2005     Years since quittin.2    Smokeless tobacco: Never   Vaping Use    Vaping status: Never Used   Substance and Sexual Activity    Alcohol use: No    Drug use: No    Sexual activity: Not Currently     Partners: Female     Birth control/protection: Surgical        Family History   Problem Relation Age of Onset    Heart failure Mother     Heart  "disease Father     Heart failure Father     Asthma Sister        Objective   Physical Exam:  Vitals:    05/21/25 1305   BP: 138/60   BP Location: Left arm   Patient Position: Sitting   Pulse: 92   Temp: 97.3 °F (36.3 °C)   SpO2: 98%   Weight: 58.7 kg (129 lb 6.4 oz)   Height: 167.6 cm (66\")      Body mass index is 20.89 kg/m².    Physical Exam  Vitals reviewed.   Constitutional:       General: He is not in acute distress.     Appearance: He is not toxic-appearing.      Comments: Ambulates with cane   HENT:      Head: Normocephalic and atraumatic.   Eyes:      General: Lids are normal.      Conjunctiva/sclera: Conjunctivae normal.      Pupils: Pupils are equal, round, and reactive to light.   Cardiovascular:      Rate and Rhythm: Normal rate and regular rhythm.      Pulses:           Dorsalis pedis pulses are detected w/ Doppler on the right side and detected w/ Doppler on the left side.        Posterior tibial pulses are detected w/ Doppler on the right side and detected w/ Doppler on the left side.      Comments: Right side pedal pulses monophasic. Left side pedal pulses biphasic  Pulmonary:      Effort: Pulmonary effort is normal. No respiratory distress.   Musculoskeletal:         General: Normal range of motion.      Cervical back: Normal range of motion and neck supple.      Right lower leg: No edema.      Left lower leg: No edema.   Feet:      Right foot:      Skin integrity: Skin integrity normal.      Toenail Condition: Right toenails are normal.      Left foot:      Skin integrity: Skin integrity normal.      Toenail Condition: Left toenails are normal.   Skin:     General: Skin is warm and dry.      Capillary Refill: Capillary refill takes less than 2 seconds.   Neurological:      General: No focal deficit present.      Mental Status: He is alert and oriented to person, place, and time.   Psychiatric:         Attention and Perception: Attention normal.         Mood and Affect: Mood normal.         Speech: " Speech normal.         Behavior: Behavior is cooperative.         Imaging/Labs:        Arterial duplex bilateral lower extremity 10/8/2024 [facility]  Findings:  Right CFA 3 3 9 cm/s, biphasic  Right SFA proximal 113, mid 91, distal 132 cm/s, monophasic  Right profunda 87 cm/s, biphasic  Right popliteal 56 cm/s, monophasic  Right PTA 27 cm/s monophasic  Right MAYURI 17 cm/s monophasic, right DPA 11 cm/s, monophasic  Occluded right femoropopliteal graft     Impression:  1.  On the right, occluded femoropopliteal graft.  Extensive hemodynamically significant disease.  2.  On the left patent femoropopliteal graft.  No hemodynamically significant lower extremity arterial disease       Arterial duplex bilateral lower extremity 4/5/2024 @ [facility]  Impression: Total occlusion right femoropopliteal graft with extremely elevated velocity in right common femoral artery.  The left femoropopliteal graft is patent.  Sluggish velocities with high resistance monophasic flow and below-knee vessels on the right, consistent with arterial occlusive disease.  Diffuse atherosclerotic disease present throughout bilateral lower extremity arteries.    CT Angio Abdominal Aorta Bilateral Iliofem Runoff (09/05/2023 16:37)   Impression: Multiple areas of hemodynamically significant stenosis and vessel occlusion as detailed above.  Cholelithiasis without cholecystitis.     Duplex Lower Extremity Art / Grafts - Right CAR (08/03/2023 09:20)   1.  Diminished velocities within the dorsalis pedis on the right side.  2.  The right femoral to popliteal graft appears thrombosed or occluded.  The more distal vessel shows monophasic dampened waveform patterns.   This report was finalized on 8/3/2023 9:28 AM by Dr. Gerardo Sebastian MD.     CT Angio Abdominal Aorta Bilateral Iliofem Runoff (07/15/2022 15:59)   1.  Left external iliac artery stent appears patent.  2.  Mild stenosis of bilateral common iliac arteries.  3.  Left SFA graft appears patent. The native left  superficial femoral  artery is nearly completely occluded throughout its entirety.  4.  Right proximal superficial femoral, and mid superficial femoral and  right distal superficial femoral moderate stenosis noted.  5.  Significant stenosis at the level of the left popliteal graft  anastomosis.  6.  Gallstones.  Gallbladder otherwise unremarkable.  7.  Severe bilateral subpleural pulmonary fibrotic change.  8.  Degenerative changes lumbar spine as described.   This report was finalized on 7/15/2022 4:06 PM by Dr. Gerardo Sebastian MD.    Assessment / Plan      Assessment / Plan:  1. PVD (peripheral vascular disease)   - 70 y.o. male  former smoker with history of chronic back pain s/p spinal cord stimulator, COPD, ADRIANA with CPAP noncompliance, HTN, HLD on statin therapy, Type2 DM, CAD s/p stent, and PVD s/p left femoral to above-knee popliteal bypass with 8 mm PTFE graft + endarterectomy of the left popliteal artery in 2021 (Dr. Mann) followed by right femoral to above-knee popliteal bypass with 8 mm PTFE graft via right common femoral endarterectomy 2/2023 (Dr. Mann).     - Presented w/ RLE limb ischemia 9/2023: right fem-pop bypass occlusion s/p arteriogram with redo endarterectomy and bovine pericardial patch angioplasty of the right external iliac, common femoral, superficial femoral, and profundofemoral artery on 9/12/2023 with Dr Bentley.   - Has followed periodically w/ arterial duplex and PVD exam.  - Current arterial duplex 4/21/[facility]25 at [facility] demonstrates monophasic flow throughout RLE and occluded right femoropopliteal graft.  Biphasic flow throughout LLE including patent left femoropopliteal graft.   - No lower extremity wounds or ulcerations.  Minimal reports of claudication as his breathing limits him walking long distance.   - Physical exam notable for monophasic right pedal pulses per Doppler and biphasic left pedal pulses  - Continue medical therapy including aspirin,  Plavix statin  - Continue to follow with [facility] cardiology [place]  - Despite limitations from chronic pain and COPD, encourage patient to continue structured walking daily  - RTC 1 year with updated arterial duplex.  Contact vascular surgery in the interim for any signs of acute limb ischemia.      Patient Education:A structured walking regimen is used to improve the circulation or blood flow in your legs. Recognize that walking may hurt at first - and that is good. In fact, the goal is to walk at a pace that causes mild or moderate pain or tightness in your legs. Pace yourself, stop to rest for a few minutes, but then resume walking. This discomfort triggers your body to improve your circulation. Repeat several times: Walk at a pace that causes mild or moderate leg pain, then rest, and keep going. Over time, you may find you are able to walk longer with less pain. That is a sign that your blood vessels are recovering. It may take months, so be patient and persistent.        Follow Up:   No follow-ups on file.   Or sooner for any further concerns or worsening sign and symptoms. If unable to reach us in the office please dial 911 or go to the nearest emergency department.      ELIZABETH Sanz  [facility]    Time Spent: I spent 20 minutes caring for Mike on this date of service. This time includes time spent by me in the following activities: preparing for the visit, reviewing tests, obtaining and/or reviewing a separately obtained history, performing a medically appropriate examination and/or evaluation, counseling and educating the patient/family/caregiver, documenting information in the medical record, and care coordination.

## 2025-05-21 ENCOUNTER — OFFICE VISIT (OUTPATIENT)
Dept: CARDIAC SURGERY | Facility: CLINIC | Age: 70
End: 2025-05-21
Payer: MEDICARE

## 2025-05-21 VITALS
HEART RATE: 92 BPM | WEIGHT: 129.4 LBS | SYSTOLIC BLOOD PRESSURE: 138 MMHG | BODY MASS INDEX: 20.79 KG/M2 | DIASTOLIC BLOOD PRESSURE: 60 MMHG | TEMPERATURE: 97.3 F | OXYGEN SATURATION: 98 % | HEIGHT: 66 IN

## 2025-05-21 DIAGNOSIS — I73.9 PVD (PERIPHERAL VASCULAR DISEASE): Primary | ICD-10-CM

## 2025-05-21 PROCEDURE — 99213 OFFICE O/P EST LOW 20 MIN: CPT | Performed by: NURSE PRACTITIONER

## 2025-05-21 PROCEDURE — 3078F DIAST BP <80 MM HG: CPT | Performed by: NURSE PRACTITIONER

## 2025-05-21 PROCEDURE — 3075F SYST BP GE 130 - 139MM HG: CPT | Performed by: NURSE PRACTITIONER

## 2025-05-29 NOTE — ANESTHESIA POSTPROCEDURE EVALUATION
Patient: Mike Gusman    Procedure Summary       Date: 02/02/23 Room / Location:  [facility] [address] [place] [address] [facility]    Anesthesia Start: 0834 Anesthesia Stop: 1008    Procedure: FEMORAL POPLITEAL BYPASS, ABOVE KNEE RIGHT (Right: Thigh) Diagnosis:       PAD (peripheral artery disease)      (PAD (peripheral artery disease) (HCC) [I73.9])    Surgeons: Farhat Mann MD Provider: Alhaji Tucker MD    Anesthesia Type: general ASA Status: 3            Anesthesia Type: general    Vitals  Vitals Value Taken Time   /55 02/02/23 11:45   Temp 97.6 °F (36.4 °C) 02/02/23 11:45   Pulse 74 02/02/23 11:45   Resp 20 02/02/23 11:45   SpO2 99 % 02/02/23 11:45           Post Anesthesia Care and Evaluation    Patient location during evaluation: PACU  Patient participation: complete - patient participated  Level of consciousness: awake and alert  Pain management: adequate    Airway patency: patent  Anesthetic complications: No anesthetic complications  PONV Status: none  Cardiovascular status: hemodynamically stable and acceptable  Respiratory status: nonlabored ventilation, acceptable and nasal cannula  Hydration status: acceptable

## 2025-07-29 ENCOUNTER — OFFICE VISIT (OUTPATIENT)
Dept: CARDIOLOGY | Facility: CLINIC | Age: 70
End: 2025-07-29
Payer: MEDICARE

## 2025-07-29 VITALS
SYSTOLIC BLOOD PRESSURE: 101 MMHG | HEIGHT: 66 IN | WEIGHT: 130.8 LBS | OXYGEN SATURATION: 95 % | BODY MASS INDEX: 21.02 KG/M2 | HEART RATE: 89 BPM | DIASTOLIC BLOOD PRESSURE: 55 MMHG

## 2025-07-29 DIAGNOSIS — I65.23 BILATERAL CAROTID ARTERY STENOSIS: ICD-10-CM

## 2025-07-29 DIAGNOSIS — I73.9 PVD (PERIPHERAL VASCULAR DISEASE): ICD-10-CM

## 2025-07-29 DIAGNOSIS — R06.09 DYSPNEA ON EXERTION: ICD-10-CM

## 2025-07-29 DIAGNOSIS — I25.10 CORONARY ARTERY DISEASE INVOLVING NATIVE CORONARY ARTERY OF NATIVE HEART, UNSPECIFIED WHETHER ANGINA PRESENT: Primary | ICD-10-CM

## 2025-07-29 PROCEDURE — 3074F SYST BP LT 130 MM HG: CPT | Performed by: PHYSICIAN ASSISTANT

## 2025-07-29 PROCEDURE — 1159F MED LIST DOCD IN RCRD: CPT | Performed by: PHYSICIAN ASSISTANT

## 2025-07-29 PROCEDURE — 1160F RVW MEDS BY RX/DR IN RCRD: CPT | Performed by: PHYSICIAN ASSISTANT

## 2025-07-29 PROCEDURE — 3078F DIAST BP <80 MM HG: CPT | Performed by: PHYSICIAN ASSISTANT

## 2025-07-29 PROCEDURE — 99214 OFFICE O/P EST MOD 30 MIN: CPT | Performed by: PHYSICIAN ASSISTANT

## 2025-07-29 NOTE — PROGRESS NOTES
Problem list     Subjective   Mike Gusman is a 70 y.o. male     Chief Complaint   Patient presents with    Coronary Artery Disease     Routine follow up, rq EKG on next visit if possible    Shortness of Breath     Problem List:     1. CAD stent x1 somewhere between 2004 and 2006 in [place]  1.1 CT chest 8/5/19-severe atherosclerotic plaque formation throughout the coronary arteries, aorta has mild atherosclerotic plaque formation without aneurysmal dilation, interstitial fibrosis with underlying emphysema, fatty liver  1.2 left heart cath 10/22/19- obtuse marginal small-caliber 70% proximal narrowing, obtuse marginal terminal 50 to 70%, small limb of the LAD 50 to 70%, EF 60%, LVEDP 22-24  1.3 left heart cath 8/5/2021-left main minor ostial distal tapering, 90% or greater stenosis in the ostium and proximal portion of the circumflex, which received a stent.  Right coronary artery no high-grade stenosis, LAD 20 to 40% stenosis, EF 55%, LVEDP 18  2. Hypertension  3. Hyperlipidemia   4. Viral Pericarditis 2009  5. Aortic valve disorder  6. Carotid Artery Disease   6.1 Carotid artery ultrasound 6/1/2021-16 to 49% stenosis in the right that is calcified but actually by laterally at the bifurcations, 16 to 49% stenosis in the mid right internal carotid artery and throughout the left her carotid artery, antegrade flow both vertebral arteries  7. PVD   7.1 H/O left common iliac artery stenting   7.2 CT of abdomen with runoff 6/24/2021-common femoral artery had up to 50% stenosis, three-vessel runoff to the ankle, extremely diminutive distal anterior tibial artery and DPA, severe left-sided SMA disease with multilevel severe stenosis and extensive plaque  7.3  Left femoropopliteal 11/12/2021 Dr. Mann  7.4  Right femoropopliteal bypass, 2/2023.  This was performed by Dr. Mann as well.  7.5  Redo endarterectomy and bovine pericardial patch angioplasty to the right external iliac, common femoral,  superficial femoral, and distally otherwise, 9/2023 with Dr. Sanchez.  Procedure was performed because of occluded right femoropopliteal bypass.  8. Shortness of breath  8.1 echo 9/3/19 -diastolic dysfunction 2, mild to moderate left atrial enlargement, mild to moderate MR  8.2 Echo 9/5/2021-EF 55 to 60%, diastolic filling pattern indicates impaired laxation, mild aortic stenosis with a VTI of 1.9 cm², mild to moderate MR  9. H/O back surgery; per report   10. DM II  11. GERD  12. BPH   13. Brain Bleed 11/2011 s/p fall   14. ADRIANA - CPAP   15. Emphysema/Asthma   16. PSVT  16.1 Event Monitor 5/9-5/22/17 - NSR - ST with one episode of PSVT  17. Moderate periventricular and subcortical microangiopathy  17.1 MRI of the brain/MRI of orbitis 5/16/18-moderate.  Moderate periventricular and subcortical microangiopathic  18.  Emphysema/COPD  HPI    The patient presents in the clinic today for routine follow-up.  The patient notes that he has not felt as well recently.  He admits to progressive dyspnea, but feels there could be a component of his pulmonary fibrosis.  This is followed by his pulmonology team.  Still, he is also concerned as this is very similar to how he felt with prior stenting procedure.  He denies chest pain.  He has no dysrhythmic or failure symptoms.  He reports that blood pressures typically are very well-maintained.  Laboratories are followed with his primary care provider and felt to be normal.  He has no further complaints.  Current Outpatient Medications on File Prior to Visit   Medication Sig Dispense Refill    Aspirin Low Dose 81 MG EC tablet TAKE 1 TABLET DAILY (Patient taking differently: Take 1 tablet by mouth Every Night.) 90 tablet 3    benzonatate (TESSALON) 100 MG capsule Take 1 capsule by mouth 3 (Three) Times a Day As Needed for Cough.      Breo Ellipta 200-25 MCG/INH inhaler Inhale 1 puff Daily.      busPIRone (BUSPAR) 15 MG tablet Take  by mouth 2 (two) times a day. Takes 1 tab in am and 2  in pm      cetirizine (zyrTEC) 10 MG tablet Take 1 tablet by mouth Daily.      cholecalciferol (VITAMIN D3) 25 MCG (1000 UT) tablet Take 1 tablet by mouth Daily.      clopidogrel (PLAVIX) 75 MG tablet TAKE 1 TABLET DAILY 90 tablet 3    finasteride (PROSCAR) 5 MG tablet Take 1 tablet by mouth Daily.      fluticasone (FLONASE) 50 MCG/ACT nasal spray Administer 2 sprays into the nostril(s) as directed by provider Daily As Needed.      isosorbide mononitrate (IMDUR) 30 MG 24 hr tablet TAKE ONE-HALF (1/2) TABLET EVERY NIGHT 45 tablet 3    magnesium oxide (MAGOX) 400 (241.3 MG) MG tablet tablet Take 1 tablet by mouth Daily.      metoprolol succinate XL (TOPROL-XL) 25 MG 24 hr tablet Take 1 tablet by mouth Every Morning.      nitroglycerin (NITROSTAT) 0.4 MG SL tablet 1 under the tongue as needed for angina, may repeat q5mins for up three doses 30 tablet 3    nortriptyline (PAMELOR) 50 MG capsule Take 1 capsule by mouth Every Night.      Omega 3 1000 MG capsule Take 2,000 mg by mouth Daily.      omeprazole (PriLOSEC) 40 MG capsule Take 1 capsule by mouth Daily.      pregabalin (LYRICA) 25 MG capsule Take 1 capsule by mouth 2 (Two) Times a Day.      rosuvastatin (CRESTOR) 40 MG tablet TAKE 1 TABLET DAILY 90 tablet 3    tamsulosin (FLOMAX) 0.4 MG capsule 24 hr capsule Take 1 capsule by mouth Daily.      traZODone (DESYREL) 50 MG tablet Take 1.5 tablets by mouth Every Night.      valsartan (DIOVAN) 160 MG tablet Take 1 tablet by mouth Daily.      Vitamin E 180 MG capsule Take 400 mg by mouth Daily.      [DISCONTINUED] Mounjaro 2.5 MG/0.5ML solution pen-injector  (Patient not taking: Reported on 5/21/2025)       No current facility-administered medications on file prior to visit.       Patient has no known allergies.    Past Medical History:   Diagnosis Date    Anxiety and depression     Asthma     Clotting disorder     COPD (chronic obstructive pulmonary disease)     Coronary artery disease     COVID-19 vaccine administered  1st -     2nd - 2021  Ana Luisa ( Booster 2021 ) Pf[employer]    Diabetes mellitus     GERD (gastroesophageal reflux disease)     Hyperlipidemia     Hypertension     Kidney failure     Peripheral vascular disease     Pulmonary fibrosis     Sleep apnea     waiting for a new machine 2020    Stroke 2019    n o residual       Social History     Socioeconomic History    Marital status:     Number of children: 4   Tobacco Use    Smoking status: Former     Current packs/day: 0.00     Average packs/day: 2.0 packs/day for 20.0 years (40.0 ttl pk-yrs)     Types: Cigarettes     Start date: 3/2/1985     Quit date: 3/2/2005     Years since quittin.4    Smokeless tobacco: Never   Vaping Use    Vaping status: Never Used   Substance and Sexual Activity    Alcohol use: No    Drug use: No    Sexual activity: Not Currently     Partners: Female     Birth control/protection: Surgical       Family History   Problem Relation Age of Onset    Heart failure Mother     Heart disease Father     Heart failure Father     Asthma Sister        Review of Systems   Constitutional:  Positive for fatigue. Negative for chills, diaphoresis and fever.   HENT: Negative.     Eyes: Negative.    Respiratory:  Positive for apnea (cpap), cough, chest tightness, shortness of breath (at all times) and wheezing.         Pulmonary fibrosis   Cardiovascular:  Positive for leg swelling (mild at sock line). Negative for chest pain and palpitations.   Gastrointestinal: Negative.  Negative for abdominal pain, blood in stool, constipation, diarrhea, nausea and vomiting.   Endocrine: Negative.    Genitourinary: Negative.  Negative for hematuria.   Musculoskeletal:  Positive for back pain. Negative for arthralgias, myalgias and neck pain.   Skin: Negative.    Allergic/Immunologic: Negative.    Neurological: Negative.  Negative for dizziness, syncope, weakness, light-headedness, numbness and headaches.   Hematological:  Bruises/bleeds easily.  "  Psychiatric/Behavioral: Negative.  Negative for sleep disturbance.        Objective   Vitals:    07/29/25 1125   BP: 101/55   BP Location: Left arm   Patient Position: Sitting   Pulse: 89   SpO2: 95%   Weight: 59.3 kg (130 lb 12.8 oz)   Height: 167.6 cm (65.98\")      /55 (BP Location: Left arm, Patient Position: Sitting)   Pulse 89   Ht 167.6 cm (65.98\")   Wt 59.3 kg (130 lb 12.8 oz)   SpO2 95%   BMI 21.12 kg/m²    Lab Results (most recent)       None          Physical Exam  Vitals and nursing note reviewed.   Constitutional:       General: He is not in acute distress.     Appearance: He is well-developed.   HENT:      Head: Normocephalic and atraumatic.   Eyes:      Conjunctiva/sclera: Conjunctivae normal.      Pupils: Pupils are equal, round, and reactive to light.   Neck:      Vascular: Carotid bruit present. No JVD.      Trachea: No tracheal deviation.   Cardiovascular:      Rate and Rhythm: Normal rate and regular rhythm.      Heart sounds: Normal heart sounds.      Comments: 1-2 or 6 systolic murmur noted second prior costal space and entire left sternal border.  Pulmonary:      Effort: Pulmonary effort is normal.      Breath sounds: Normal breath sounds.   Abdominal:      General: Bowel sounds are normal. There is no distension.      Palpations: Abdomen is soft. There is no mass.      Tenderness: There is no abdominal tenderness. There is no guarding or rebound.   Musculoskeletal:         General: No tenderness or deformity. Normal range of motion.      Cervical back: Normal range of motion and neck supple.   Skin:     General: Skin is warm and dry.      Coloration: Skin is not pale.      Findings: No erythema or rash.   Neurological:      Mental Status: He is alert and oriented to person, place, and time.   Psychiatric:         Behavior: Behavior normal.         Thought Content: Thought content normal.         Judgment: Judgment normal.           Procedure   Procedures       Assessment & Plan "      Diagnosis Plan   1. Coronary artery disease involving native coronary artery of native heart, unspecified whether angina present  Adult Transthoracic Echo Complete W/ Cont if Necessary Per Protocol    Stress Test With Myocardial Perfusion One Day    Duplex Carotid Ultrasound CAR      2. PVD (peripheral vascular disease)  Adult Transthoracic Echo Complete W/ Cont if Necessary Per Protocol    Stress Test With Myocardial Perfusion One Day    Duplex Carotid Ultrasound CAR      3. Dyspnea on exertion  Adult Transthoracic Echo Complete W/ Cont if Necessary Per Protocol    Stress Test With Myocardial Perfusion One Day    Duplex Carotid Ultrasound CAR      4. Bilateral carotid artery stenosis  Duplex Carotid Ultrasound CAR      1.  The patient presents in the clinic today for follow-up.  He notes increasing dyspnea.  He does feel that this is likely pulmonary given history of pulmonary fibrosis.  Unfortunately, he feels very similar to the way he did prior to stenting.  In this setting, I feel he needs evaluation.    2.  We will schedule for nuclear stress test for ischemia assessment given symptoms and known coronary disease.    3.  Will repeat echo.  We can evaluate systolic function, right sided parameters, valvular morphologies, and cardiac structure otherwise.    4.  He does express concern over carotid stenosis.  He apparently was told he had carotid disease a number of years ago and reports that this has not been checked recently.  He would like to have that done in the office at same time as echo.  He will be scheduled accordingly with carotid duplex.    5.  We will make no adjustments in medications for now.  As we know results of the above we can see him back in recommended further.  He will call for any issues.             Mike Gusman  reports that he quit smoking about 20 years ago. His smoking use included cigarettes. He started smoking about 40 years ago. He has a 40 pack-year smoking history. He  has never used smokeless tobacco.     Advance Care Planning   ACP discussion was held with the patient during this visit. Patient does not have an advance directive, declines further assistance.       Electronically signed by:

## 2025-08-22 ENCOUNTER — TELEPHONE (OUTPATIENT)
Dept: CARDIOLOGY | Facility: CLINIC | Age: 70
End: 2025-08-22
Payer: MEDICARE

## 2025-08-26 DIAGNOSIS — I25.119 CORONARY ARTERY DISEASE INVOLVING NATIVE CORONARY ARTERY OF NATIVE HEART WITH ANGINA PECTORIS: ICD-10-CM

## 2025-08-27 ENCOUNTER — HOSPITAL ENCOUNTER (OUTPATIENT)
Dept: CARDIOLOGY | Facility: HOSPITAL | Age: 70
Discharge: HOME | End: 2025-08-27
Payer: MEDICARE

## 2025-08-27 DIAGNOSIS — I25.10 CORONARY ARTERY DISEASE INVOLVING NATIVE CORONARY ARTERY OF NATIVE HEART, UNSPECIFIED WHETHER ANGINA PRESENT: ICD-10-CM

## 2025-08-27 DIAGNOSIS — R06.09 DYSPNEA ON EXERTION: ICD-10-CM

## 2025-08-27 DIAGNOSIS — I73.9 PVD (PERIPHERAL VASCULAR DISEASE): ICD-10-CM

## 2025-08-27 LAB
AORTIC DIMENSIONLESS INDEX: 0.55 (DI)
AV MEAN PRESS GRAD SYS DOP V1V2: 11.3 MMHG
AV VMAX SYS DOP: 234.8 CM/SEC
BH CV ECHO MEAS - ACS: 1.08 CM
BH CV ECHO MEAS - AI P1/2T: 421.2 MSEC
BH CV ECHO MEAS - AO MAX PG: 22.1 MMHG
BH CV ECHO MEAS - AO ROOT DIAM: 3.6 CM
BH CV ECHO MEAS - AO V2 VTI: 49 CM
BH CV ECHO MEAS - AVA(I,D): 1.72 CM2
BH CV ECHO MEAS - EDV(CUBED): 68.4 ML
BH CV ECHO MEAS - EDV(MOD-SP4): 76.4 ML
BH CV ECHO MEAS - EF(MOD-SP4): 49.7 %
BH CV ECHO MEAS - ESV(CUBED): 4.9 ML
BH CV ECHO MEAS - ESV(MOD-SP4): 38.4 ML
BH CV ECHO MEAS - FS: 58.4 %
BH CV ECHO MEAS - IVS/LVPW: 1.13 CM
BH CV ECHO MEAS - IVSD: 1.14 CM
BH CV ECHO MEAS - LA DIMENSION: 3.4 CM
BH CV ECHO MEAS - LAT PEAK E' VEL: 5.9 CM/SEC
BH CV ECHO MEAS - LV DIASTOLIC VOL/BSA (35-75): 46.4 CM2
BH CV ECHO MEAS - LV MASS(C)D: 145.8 GRAMS
BH CV ECHO MEAS - LV MAX PG: 5.3 MMHG
BH CV ECHO MEAS - LV MEAN PG: 2.6 MMHG
BH CV ECHO MEAS - LV SYSTOLIC VOL/BSA (12-30): 23.3 CM2
BH CV ECHO MEAS - LV V1 MAX: 115.4 CM/SEC
BH CV ECHO MEAS - LV V1 VTI: 26.8 CM
BH CV ECHO MEAS - LVIDD: 4.1 CM
BH CV ECHO MEAS - LVIDS: 1.7 CM
BH CV ECHO MEAS - LVOT AREA: 3.1 CM2
BH CV ECHO MEAS - LVOT DIAM: 2 CM
BH CV ECHO MEAS - LVPWD: 1.01 CM
BH CV ECHO MEAS - MED PEAK E' VEL: 5.1 CM/SEC
BH CV ECHO MEAS - MV A MAX VEL: 117.1 CM/SEC
BH CV ECHO MEAS - MV DEC TIME: 0.26 SEC
BH CV ECHO MEAS - MV E MAX VEL: 93.5 CM/SEC
BH CV ECHO MEAS - MV E/A: 0.8
BH CV ECHO MEAS - RAP SYSTOLE: 10 MMHG
BH CV ECHO MEAS - RVDD: 2.8 CM
BH CV ECHO MEAS - RVSP: 28.2 MMHG
BH CV ECHO MEAS - SV(LVOT): 84.2 ML
BH CV ECHO MEAS - SV(MOD-SP4): 38 ML
BH CV ECHO MEAS - SVI(LVOT): 51.1 ML/M2
BH CV ECHO MEAS - SVI(MOD-SP4): 23.1 ML/M2
BH CV ECHO MEAS - TR MAX PG: 18.2 MMHG
BH CV ECHO MEAS - TR MAX VEL: 213.1 CM/SEC
BH CV ECHO MEASUREMENTS AVERAGE E/E' RATIO: 17
IVRT: 130 MS
LEFT ATRIUM VOLUME INDEX: 27.1 ML/M2
LV EF 3D SEGMENTATION: 54 %

## 2025-08-27 PROCEDURE — 34310000005 TECHNETIUM SESTAMIBI: Performed by: INTERNAL MEDICINE

## 2025-08-27 PROCEDURE — 93017 CV STRESS TEST TRACING ONLY: CPT

## 2025-08-27 PROCEDURE — 78452 HT MUSCLE IMAGE SPECT MULT: CPT

## 2025-08-27 PROCEDURE — A9500 TC99M SESTAMIBI: HCPCS | Performed by: INTERNAL MEDICINE

## 2025-08-27 PROCEDURE — 25010000002 REGADENOSON 0.4 MG/5ML SOLUTION: Performed by: INTERNAL MEDICINE

## 2025-08-27 PROCEDURE — 93306 TTE W/DOPPLER COMPLETE: CPT

## 2025-08-27 RX ORDER — CLOPIDOGREL BISULFATE 75 MG/1
75 TABLET ORAL DAILY
Qty: 90 TABLET | Refills: 3 | Status: SHIPPED | OUTPATIENT
Start: 2025-08-27

## 2025-08-27 RX ORDER — REGADENOSON 0.08 MG/ML
0.4 INJECTION, SOLUTION INTRAVENOUS
Status: COMPLETED | OUTPATIENT
Start: 2025-08-27 | End: 2025-08-27

## 2025-08-27 RX ADMIN — REGADENOSON 0.4 MG: 0.08 INJECTION, SOLUTION INTRAVENOUS at 10:08

## 2025-08-27 RX ADMIN — TECHNETIUM TC 99M SESTAMIBI 1 DOSE: 1 INJECTION INTRAVENOUS at 08:42

## 2025-08-27 RX ADMIN — TECHNETIUM TC 99M SESTAMIBI 1 DOSE: 1 INJECTION INTRAVENOUS at 10:07

## 2025-08-29 LAB
BH CV REST NUCLEAR ISOTOPE DOSE: 10 MCI
BH CV STRESS COMMENTS STAGE 1: NORMAL
BH CV STRESS DOSE REGADENOSON STAGE 1: 0.4
BH CV STRESS DURATION MIN STAGE 1: 0
BH CV STRESS DURATION SEC STAGE 1: 10
BH CV STRESS NUCLEAR ISOTOPE DOSE: 30 MCI
BH CV STRESS PROTOCOL 1: NORMAL
BH CV STRESS RECOVERY BP: NORMAL MMHG
BH CV STRESS RECOVERY HR: 88 BPM
BH CV STRESS STAGE 1: 1
MAXIMAL PREDICTED HEART RATE: 148 BPM
PERCENT MAX PREDICTED HR: 62.16 %
STRESS BASELINE BP: NORMAL MMHG
STRESS BASELINE HR: 75 BPM
STRESS PERCENT HR: 73 %
STRESS POST ESTIMATED WORKLOAD: 1 METS
STRESS POST EXERCISE DUR MIN: 2 MIN
STRESS POST PEAK BP: NORMAL MMHG
STRESS POST PEAK HR: 92 BPM
STRESS TARGET HR: 126 BPM

## (undated) DEVICE — ANTIBACTERIAL UNDYED BRAIDED (POLYGLACTIN 910), SYNTHETIC ABSORBABLE SUTURE: Brand: COATED VICRYL

## (undated) DEVICE — CATH GUIDE SOFTVU SELECT/V HT OMNI .035 4F 65CM

## (undated) DEVICE — ELECTRD BLD EZ CLN STD 2.5IN

## (undated) DEVICE — KT INTRO MINISTICK MAX W/GW NITNL/TUNG ECHO STFF 4F 21G 7CM

## (undated) DEVICE — TRAP FLD MINIVAC MEGADYNE 100ML

## (undated) DEVICE — TBG PENCL TELESCP MEGADYNE SMOKE EVAC 10FT

## (undated) DEVICE — SUT PROLN SURGILENE 5.0 24IN BLU 9702H

## (undated) DEVICE — INTENDED FOR TISSUE SEPARATION, AND OTHER PROCEDURES THAT REQUIRE A SHARP SURGICAL BLADE TO PUNCTURE OR CUT.: Brand: BARD-PARKER ® STAINLESS STEEL BLADES

## (undated) DEVICE — SUT PROLN 6/0 C1 D/A 30IN 8706H

## (undated) DEVICE — PATIENT RETURN ELECTRODE, SINGLE-USE, CONTACT QUALITY MONITORING, ADULT, WITH 9FT CORD, FOR PATIENTS WEIGING OVER 33LBS. (15KG): Brand: MEGADYNE

## (undated) DEVICE — 3M™ IOBAN™ 2 ANTIMICROBIAL INCISE DRAPE 6651EZ: Brand: IOBAN™ 2

## (undated) DEVICE — NDL HYPO ECLPS SFTY 22G 1 1/2IN

## (undated) DEVICE — CATH GUIDE SOFTVU FLUSH HT PIG .035 4F 65CM

## (undated) DEVICE — KT INTRO MINISTICK MAX W/GW SS ECHO 5F 21G 4CM

## (undated) DEVICE — SUT PROLN 7/0 BV1 D/A 24IN 8702H

## (undated) DEVICE — STERILE PVP: Brand: MEDLINE INDUSTRIES, INC.

## (undated) DEVICE — DRSNG SURG AQUACEL AG 9X15CM

## (undated) DEVICE — PERIPHERAL SHIELD-ORANGE: Brand: RADPAD

## (undated) DEVICE — 450 ML BOTTLE OF 0.05% CHLORHEXIDINE GLUCONATE IN 99.95% STERILE WATER FOR IRRIGATION, USP AND APPLICATOR.: Brand: IRRISEPT ANTIMICROBIAL WOUND LAVAGE

## (undated) DEVICE — GLV SURG BIOGEL LTX PF 8

## (undated) DEVICE — AVANTI + 4F STD W/GW: Brand: AVANTI

## (undated) DEVICE — SUT SILK 3/0 TIES 18IN A184H

## (undated) DEVICE — AVANTI + 5F STD W/GW: Brand: AVANTI

## (undated) DEVICE — SYR LL 3CC

## (undated) DEVICE — GLV SURG BIOGEL LTX PF 7 1/2

## (undated) DEVICE — 3M™ STERI-DRAPE™ FLUOROSCOPE DRAPE, 10 PER CARTON / 4 CARTONS PER CASE, 1012: Brand: STERI-DRAPE™

## (undated) DEVICE — SUCTION CANISTER, 2500CC, RIGID: Brand: DEROYAL

## (undated) DEVICE — INFLATION DEVICE: Brand: ENCORE™ 26

## (undated) DEVICE — SYR LL TP 10ML STRL

## (undated) DEVICE — PENCL ROCKRSWCH MEGADYNE W/HOLSTR 10FT SS

## (undated) DEVICE — PK VASC 10

## (undated) DEVICE — SYR CONTRL LUERLOK 10CC

## (undated) DEVICE — ADHS SKIN PREMIERPRO EXOFIN TOPICAL HI/VISC .5ML

## (undated) DEVICE — CVR HNDL LIGHT RIGID

## (undated) DEVICE — GLV SURG BIOGELULTRATOUCH POLYISPRN PF LF SZ7 STRL

## (undated) DEVICE — SUT SILK 2/0 TIES 18IN A185H

## (undated) DEVICE — DRSNG SURG AQUACEL AG 9X25CM

## (undated) DEVICE — SNAP KOVER: Brand: UNBRANDED

## (undated) DEVICE — Device

## (undated) DEVICE — SUT SILK 0/0 CT2 18IN C027D

## (undated) DEVICE — PK ANGIO OR 10

## (undated) DEVICE — SUCTION CANISTER 2500CC: Brand: DEROYAL

## (undated) DEVICE — FOGARTY - HYDRAGRIP SURGICAL - CLAMP INSERTS: Brand: FOGARTY SOFTJAW

## (undated) DEVICE — CVR PROB ULTRASND/TRANSD W/GEL LNG 18X250CM STRL

## (undated) DEVICE — SPNG GZ WOVN 4X4IN 12PLY 10/BX STRL

## (undated) DEVICE — GLIDEX™ COATED HYDROPHILIC GUIDEWIRE: Brand: MAGIC TORQUE™

## (undated) DEVICE — RADIFOCUS GLIDEWIRE ADVANTAGE GUIDEWIRE: Brand: GLIDEWIRE ADVANTAGE

## (undated) DEVICE — DECANTER BAG 9": Brand: MEDLINE INDUSTRIES, INC.